# Patient Record
Sex: MALE | Race: WHITE | NOT HISPANIC OR LATINO
[De-identification: names, ages, dates, MRNs, and addresses within clinical notes are randomized per-mention and may not be internally consistent; named-entity substitution may affect disease eponyms.]

---

## 2017-01-13 ENCOUNTER — TRANSCRIPTION ENCOUNTER (OUTPATIENT)
Age: 82
End: 2017-01-13

## 2017-02-07 ENCOUNTER — APPOINTMENT (OUTPATIENT)
Dept: CARDIOTHORACIC SURGERY | Facility: CLINIC | Age: 82
End: 2017-02-07

## 2017-02-07 ENCOUNTER — RECORD ABSTRACTING (OUTPATIENT)
Age: 82
End: 2017-02-07

## 2017-02-07 VITALS
TEMPERATURE: 97.7 F | HEIGHT: 68 IN | OXYGEN SATURATION: 92 % | RESPIRATION RATE: 14 BRPM | BODY MASS INDEX: 27.28 KG/M2 | SYSTOLIC BLOOD PRESSURE: 144 MMHG | WEIGHT: 180 LBS | HEART RATE: 98 BPM | DIASTOLIC BLOOD PRESSURE: 98 MMHG

## 2017-02-07 VITALS — SYSTOLIC BLOOD PRESSURE: 140 MMHG | DIASTOLIC BLOOD PRESSURE: 98 MMHG

## 2017-02-07 DIAGNOSIS — R60.9 EDEMA, UNSPECIFIED: ICD-10-CM

## 2017-02-07 DIAGNOSIS — I10 ESSENTIAL (PRIMARY) HYPERTENSION: ICD-10-CM

## 2017-02-07 RX ORDER — DONEPEZIL HYDROCHLORIDE 10 MG/1
10 TABLET ORAL DAILY
Refills: 0 | Status: ACTIVE | COMMUNITY

## 2017-02-26 PROBLEM — I35.1 AORTIC REGURGITATION: Status: ACTIVE | Noted: 2017-02-07

## 2017-03-01 ENCOUNTER — APPOINTMENT (OUTPATIENT)
Dept: CARDIOTHORACIC SURGERY | Facility: CLINIC | Age: 82
End: 2017-03-01

## 2017-03-01 VITALS
HEIGHT: 68 IN | RESPIRATION RATE: 14 BRPM | HEART RATE: 74 BPM | WEIGHT: 180 LBS | DIASTOLIC BLOOD PRESSURE: 93 MMHG | TEMPERATURE: 98 F | OXYGEN SATURATION: 92 % | SYSTOLIC BLOOD PRESSURE: 146 MMHG | BODY MASS INDEX: 27.28 KG/M2

## 2017-03-01 VITALS — DIASTOLIC BLOOD PRESSURE: 89 MMHG | SYSTOLIC BLOOD PRESSURE: 135 MMHG

## 2017-03-01 DIAGNOSIS — I35.1 NONRHEUMATIC AORTIC (VALVE) INSUFFICIENCY: ICD-10-CM

## 2017-03-15 ENCOUNTER — OUTPATIENT (OUTPATIENT)
Dept: OUTPATIENT SERVICES | Facility: HOSPITAL | Age: 82
LOS: 1 days | Discharge: HOME | End: 2017-03-15

## 2017-03-15 DIAGNOSIS — S68.119A COMPLETE TRAUMATIC METACARPOPHALANGEAL AMPUTATION OF UNSPECIFIED FINGER, INITIAL ENCOUNTER: Chronic | ICD-10-CM

## 2017-03-15 DIAGNOSIS — Z98.890 OTHER SPECIFIED POSTPROCEDURAL STATES: Chronic | ICD-10-CM

## 2017-03-15 DIAGNOSIS — Z96.659 PRESENCE OF UNSPECIFIED ARTIFICIAL KNEE JOINT: Chronic | ICD-10-CM

## 2017-03-15 DIAGNOSIS — I35.0 NONRHEUMATIC AORTIC (VALVE) STENOSIS: ICD-10-CM

## 2017-03-15 DIAGNOSIS — I71.2 THORACIC AORTIC ANEURYSM, WITHOUT RUPTURE: ICD-10-CM

## 2017-03-15 LAB
ANION GAP SERPL CALC-SCNC: 9 MMOL/L — SIGNIFICANT CHANGE UP (ref 5–17)
APTT BLD: 32.7 SEC — SIGNIFICANT CHANGE UP (ref 27.5–37.4)
BUN SERPL-MCNC: 28 MG/DL — HIGH (ref 7–23)
CALCIUM SERPL-MCNC: 8.9 MG/DL — SIGNIFICANT CHANGE UP (ref 8.5–10.1)
CHLORIDE SERPL-SCNC: 109 MMOL/L — HIGH (ref 96–108)
CO2 SERPL-SCNC: 27 MMOL/L — SIGNIFICANT CHANGE UP (ref 22–31)
CREAT SERPL-MCNC: 0.97 MG/DL — SIGNIFICANT CHANGE UP (ref 0.5–1.3)
GLUCOSE SERPL-MCNC: 145 MG/DL — HIGH (ref 70–99)
HCT VFR BLD CALC: 44.6 % — SIGNIFICANT CHANGE UP (ref 39–50)
HGB BLD-MCNC: 15.1 G/DL — SIGNIFICANT CHANGE UP (ref 13–17)
INR BLD: 1.09 RATIO — SIGNIFICANT CHANGE UP (ref 0.88–1.16)
MCHC RBC-ENTMCNC: 32.6 PG — SIGNIFICANT CHANGE UP (ref 27–34)
MCHC RBC-ENTMCNC: 33.8 GM/DL — SIGNIFICANT CHANGE UP (ref 32–36)
MCV RBC AUTO: 96.5 FL — SIGNIFICANT CHANGE UP (ref 80–100)
PLATELET # BLD AUTO: 269 K/UL — SIGNIFICANT CHANGE UP (ref 150–400)
POTASSIUM SERPL-MCNC: 4.3 MMOL/L — SIGNIFICANT CHANGE UP (ref 3.5–5.3)
POTASSIUM SERPL-SCNC: 4.3 MMOL/L — SIGNIFICANT CHANGE UP (ref 3.5–5.3)
PROTHROM AB SERPL-ACNC: 12 SEC — SIGNIFICANT CHANGE UP (ref 10–13.1)
RBC # BLD: 4.62 M/UL — SIGNIFICANT CHANGE UP (ref 4.2–5.8)
RBC # FLD: 12.5 % — SIGNIFICANT CHANGE UP (ref 10.3–14.5)
SODIUM SERPL-SCNC: 145 MMOL/L — SIGNIFICANT CHANGE UP (ref 135–145)
WBC # BLD: 8.5 K/UL — SIGNIFICANT CHANGE UP (ref 3.8–10.5)
WBC # FLD AUTO: 8.5 K/UL — SIGNIFICANT CHANGE UP (ref 3.8–10.5)

## 2017-03-15 NOTE — H&P CARDIOLOGY - HISTORY OF PRESENT ILLNESS
81 year old male with PMHx HLD, thoracic aneurysm being followed by Dr Rodriguez. Echo and CTA have shown a progression of  aorta to 5.4/ 5.2 cm. Pt is scheduled for repair of aneurysm by the end of April and per family needs cardiac preop evaluation.  Negative stress MPI with PET in 1/2017  Wife observed pt to be SOB with walking. 81 year old male with PMHx HLD, thoracic aneurysm being followed by Dr Rodriguez. Echo and CTA have shown a progression of  aorta to 5.4/ 5.2 cm. Pt is scheduled for repair of aneurysm by the end of April and per wife needs cardiac preop evaluation.  Negative stress MPI with PET in 1/2017  Wife observed pt to be SOB with walking.

## 2017-03-15 NOTE — H&P CARDIOLOGY - PSH
Amputation finger  left index 1970  History of fundoplication  2000  S/P knee replacement  right  S/P knee replacement  left  S/P shoulder surgery  right

## 2017-03-17 ENCOUNTER — OUTPATIENT (OUTPATIENT)
Dept: OUTPATIENT SERVICES | Facility: HOSPITAL | Age: 82
LOS: 1 days | Discharge: HOME | End: 2017-03-17

## 2017-03-17 VITALS
WEIGHT: 180.34 LBS | OXYGEN SATURATION: 96 % | TEMPERATURE: 97 F | SYSTOLIC BLOOD PRESSURE: 122 MMHG | DIASTOLIC BLOOD PRESSURE: 86 MMHG | RESPIRATION RATE: 20 BRPM | HEART RATE: 61 BPM | HEIGHT: 68 IN

## 2017-03-17 VITALS
HEART RATE: 60 BPM | OXYGEN SATURATION: 99 % | DIASTOLIC BLOOD PRESSURE: 76 MMHG | SYSTOLIC BLOOD PRESSURE: 126 MMHG | RESPIRATION RATE: 18 BRPM

## 2017-03-17 DIAGNOSIS — Z96.659 PRESENCE OF UNSPECIFIED ARTIFICIAL KNEE JOINT: Chronic | ICD-10-CM

## 2017-03-17 DIAGNOSIS — Z98.890 OTHER SPECIFIED POSTPROCEDURAL STATES: Chronic | ICD-10-CM

## 2017-03-17 DIAGNOSIS — S68.119A COMPLETE TRAUMATIC METACARPOPHALANGEAL AMPUTATION OF UNSPECIFIED FINGER, INITIAL ENCOUNTER: Chronic | ICD-10-CM

## 2017-03-17 NOTE — PROGRESS NOTE ADULT - SUBJECTIVE AND OBJECTIVE BOX
Cardiology NP note      s/p LHC revealing non obstructive CAD    Denies chest pain, shortness of breath, dizziness or palpitations at this time    Right groin procedure site CDI. Venous and arterial sheaths pulled. Hemostasis achieved;  no bleeding, no hematoma, site soft, non tender, positive pedal pulses bilaterally  Right cephalic venous sheath pulled; no bleeding, no hematoma    81 year old male with PMHx HLD, thoracic aneurysm being followed by Dr Rodriguez. Echo and CTA have shown a progression of  aorta to 5.4/ 5.2 cm. Pt is scheduled for repair of aneurysm by the end of April and per wife needs cardiac preop evaluation.  Negative stress MPI with PET in 1/2017  Wife observed pt to be SOB with walking.    now s/p LHC revealing non obstructive CAD    -ambulate ad rafita post bedrest  -iv hydration  -encourage PO fluids  -plan of care discussed with patient, family and MD  -d/c after bedrest if stable  -post procedure and d/c instructions reviewed  -follow up with MD in 1-2 weeks to schedule CTS

## 2017-03-17 NOTE — PACU DISCHARGE NOTE - COMMENTS
Pt. john. baseline activity, ambulation without discomfort, s/s distress with right femoral punct. site and right antecubital site C,D,I without hematoma, s/s bleeding, ecchymosis, tenderness.  Pt and wife verb. agreement and understanding to discharge instructions.

## 2017-03-29 DIAGNOSIS — I71.2 THORACIC AORTIC ANEURYSM, WITHOUT RUPTURE: ICD-10-CM

## 2017-03-29 DIAGNOSIS — R06.02 SHORTNESS OF BREATH: ICD-10-CM

## 2017-03-29 DIAGNOSIS — E78.5 HYPERLIPIDEMIA, UNSPECIFIED: ICD-10-CM

## 2017-03-29 DIAGNOSIS — I25.10 ATHEROSCLEROTIC HEART DISEASE OF NATIVE CORONARY ARTERY WITHOUT ANGINA PECTORIS: ICD-10-CM

## 2017-03-29 DIAGNOSIS — I25.41 CORONARY ARTERY ANEURYSM: ICD-10-CM

## 2017-03-29 DIAGNOSIS — I35.1 NONRHEUMATIC AORTIC (VALVE) INSUFFICIENCY: ICD-10-CM

## 2017-04-03 ENCOUNTER — CLINICAL ADVICE (OUTPATIENT)
Age: 82
End: 2017-04-03

## 2017-04-04 ENCOUNTER — APPOINTMENT (OUTPATIENT)
Dept: ULTRASOUND IMAGING | Facility: HOSPITAL | Age: 82
End: 2017-04-04

## 2017-04-04 ENCOUNTER — APPOINTMENT (OUTPATIENT)
Dept: PULMONOLOGY | Facility: CLINIC | Age: 82
End: 2017-04-04

## 2017-04-04 ENCOUNTER — OUTPATIENT (OUTPATIENT)
Dept: OUTPATIENT SERVICES | Facility: HOSPITAL | Age: 82
LOS: 1 days | End: 2017-04-04
Payer: MEDICARE

## 2017-04-04 VITALS
WEIGHT: 186.51 LBS | DIASTOLIC BLOOD PRESSURE: 85 MMHG | RESPIRATION RATE: 18 BRPM | OXYGEN SATURATION: 94 % | TEMPERATURE: 98 F | SYSTOLIC BLOOD PRESSURE: 133 MMHG | HEART RATE: 94 BPM | HEIGHT: 64 IN

## 2017-04-04 DIAGNOSIS — I71.9 AORTIC ANEURYSM OF UNSPECIFIED SITE, WITHOUT RUPTURE: ICD-10-CM

## 2017-04-04 DIAGNOSIS — Z98.49 CATARACT EXTRACTION STATUS, UNSPECIFIED EYE: Chronic | ICD-10-CM

## 2017-04-04 DIAGNOSIS — Z96.659 PRESENCE OF UNSPECIFIED ARTIFICIAL KNEE JOINT: Chronic | ICD-10-CM

## 2017-04-04 DIAGNOSIS — I77.810 THORACIC AORTIC ECTASIA: ICD-10-CM

## 2017-04-04 DIAGNOSIS — G47.33 OBSTRUCTIVE SLEEP APNEA (ADULT) (PEDIATRIC): ICD-10-CM

## 2017-04-04 DIAGNOSIS — S68.119A COMPLETE TRAUMATIC METACARPOPHALANGEAL AMPUTATION OF UNSPECIFIED FINGER, INITIAL ENCOUNTER: Chronic | ICD-10-CM

## 2017-04-04 DIAGNOSIS — Z98.890 OTHER SPECIFIED POSTPROCEDURAL STATES: Chronic | ICD-10-CM

## 2017-04-04 DIAGNOSIS — Z01.818 ENCOUNTER FOR OTHER PREPROCEDURAL EXAMINATION: ICD-10-CM

## 2017-04-04 DIAGNOSIS — L98.9 DISORDER OF THE SKIN AND SUBCUTANEOUS TISSUE, UNSPECIFIED: ICD-10-CM

## 2017-04-04 LAB
ANION GAP SERPL CALC-SCNC: 20 MMOL/L — HIGH (ref 5–17)
BLD GP AB SCN SERPL QL: NEGATIVE — SIGNIFICANT CHANGE UP
BUN SERPL-MCNC: 20 MG/DL — SIGNIFICANT CHANGE UP (ref 7–23)
CALCIUM SERPL-MCNC: 9.7 MG/DL — SIGNIFICANT CHANGE UP (ref 8.4–10.5)
CHLORIDE SERPL-SCNC: 106 MMOL/L — SIGNIFICANT CHANGE UP (ref 96–108)
CO2 SERPL-SCNC: 18 MMOL/L — LOW (ref 22–31)
CREAT SERPL-MCNC: 0.88 MG/DL — SIGNIFICANT CHANGE UP (ref 0.5–1.3)
GLUCOSE SERPL-MCNC: 150 MG/DL — HIGH (ref 70–99)
HBA1C BLD-MCNC: 5.8 % — HIGH (ref 4–5.6)
MRSA PCR RESULT.: DETECTED
POTASSIUM SERPL-MCNC: 4.2 MMOL/L — SIGNIFICANT CHANGE UP (ref 3.5–5.3)
POTASSIUM SERPL-SCNC: 4.2 MMOL/L — SIGNIFICANT CHANGE UP (ref 3.5–5.3)
RH IG SCN BLD-IMP: POSITIVE — SIGNIFICANT CHANGE UP
S AUREUS DNA NOSE QL NAA+PROBE: DETECTED
SODIUM SERPL-SCNC: 144 MMOL/L — SIGNIFICANT CHANGE UP (ref 135–145)

## 2017-04-04 PROCEDURE — 93880 EXTRACRANIAL BILAT STUDY: CPT | Mod: 26

## 2017-04-04 PROCEDURE — 71020: CPT | Mod: 26

## 2017-04-04 RX ORDER — SODIUM CHLORIDE 9 MG/ML
3 INJECTION INTRAMUSCULAR; INTRAVENOUS; SUBCUTANEOUS EVERY 8 HOURS
Qty: 0 | Refills: 0 | Status: DISCONTINUED | OUTPATIENT
Start: 2017-04-07 | End: 2017-04-07

## 2017-04-04 RX ORDER — METOPROLOL TARTRATE 50 MG
1 TABLET ORAL
Qty: 0 | Refills: 0 | COMMUNITY

## 2017-04-04 NOTE — H&P PST ADULT - PSH
Amputation finger  left index 1970  History of fundoplication  2000  S/P knee replacement  right  S/P knee replacement  left  S/P shoulder surgery  right Amputation finger  left index 1970  History of fundoplication  2000  S/P debridement  left forearm -mrsa  S/P knee replacement  left  S/P knee replacement  right  S/P shoulder surgery  right rotatotor cuff injury  Status post cataract extraction, unspecified laterality  bilat

## 2017-04-04 NOTE — H&P PST ADULT - PMH
Hard of hearing    HLD (hyperlipidemia)    Restless leg syndrome    Thoracic aortic aneurysm Aortic root dilatation    Aortic valve insufficiency, unspecified etiology    BPH associated with nocturia    Diverticulosis of intestine without bleeding, unspecified intestinal tract location    Essential hypertension    Gastroesophageal reflux disease, esophagitis presence not specified    Hard of hearing    HLD (hyperlipidemia)    Lumbar degenerative disc disease    MRSA (methicillin resistant Staphylococcus aureus)  left forearm 2012  Nerve injury  complication from right TKR surgery, 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  CAMILA (obstructive sleep apnea)  unable to tolerate nocturnal CPAP  Restless leg syndrome    Thoracic aortic aneurysm

## 2017-04-04 NOTE — H&P PST ADULT - PROBLEM SELECTOR PLAN 3
Pt s/p fall 1 week ago, with resolving ecchymosis/small hematoma on lumbar spine.    Pt had evaluation with PMD and xray of lumbar spine negative for fracture.  Wife reports improvement of area over the last few days.  1.  CBC repeated today  2.  Instructed patient and wife to continue to monitor area and if pain worsens or site enlarges, to contact Dr. Larson and/or PMD  3.  fall precautions  4.  PT consult while in hospital

## 2017-04-04 NOTE — H&P PST ADULT - NS MD HP SKIN WOUND STATUS
mid lumbar spine with 3 x 2 cm swollen area with surrounding ecchymosis and multiple small abrasions, mildly tender to touch/dry/clean

## 2017-04-04 NOTE — H&P PST ADULT - NEGATIVE GENERAL GENITOURINARY SYMPTOMS
no bladder infections/no flank pain L/no flank pain R/no renal colic/no urine discoloration/no incontinence/no gas in urine/no dysuria

## 2017-04-04 NOTE — H&P PST ADULT - MUSCULOSKELETAL
details… detailed exam normal strength/no joint erythema/no joint warmth/no calf tenderness/decreased ROM/no joint swelling

## 2017-04-04 NOTE — H&P PST ADULT - NEGATIVE NEUROLOGICAL SYMPTOMS
no generalized seizures/no paresthesias/no weakness/no vertigo/no loss of consciousness/no syncope/no difficulty walking/no transient paralysis/no focal seizures/no tremors

## 2017-04-04 NOTE — H&P PST ADULT - HISTORY OF PRESENT ILLNESS
This is a 80 y/o male with pmhx of HLD, CAMILA, known aortic root dilatation  and AR presents today for presurgical testing.  Pt was diagnosed with aortic root dilatation several years ago and has been following with serial imaging.  He recently had been experiencing shortness of breath and dizziness and workup revealed enlargement of the aortic root.  He presents today for presurgical testing for ascending aortic replacement, possible aortic valve repair/replacement.  Denies recent chest pain, shortness of breath, syncope, palpitations, or vision change.  He does report slipping on the stairs 1 week ago.  He was evaluated by his PMD for large bruise/swelling on his lower back and had an xray that was negative for fracture.  His wife reports resolving ecchymosis/edema of his low back. This is a 80 y/o male with pmhx of HLD, CAMILA, known aortic root dilatation  and AR presents today for presurgical testing.  Pt was diagnosed with aortic root dilatation several years ago and has been followed with serial imaging.  He recently had been experiencing shortness of breath and dizziness and workup revealed enlargement of the aortic root.  He presents today for presurgical testing for ascending aortic replacement, possible aortic valve repair/replacement.  Denies recent chest pain, shortness of breath, syncope, palpitations, or vision change.      Pt does report slipping on the stairs 1 week ago.  He was evaluated by his PMD for large bruise/swelling on his lower back and had an xray that was negative for fracture.  His wife reports resolving ecchymosis/edema of his low back.

## 2017-04-05 ENCOUNTER — RECORD ABSTRACTING (OUTPATIENT)
Age: 82
End: 2017-04-05

## 2017-04-05 RX ORDER — CEFUROXIME AXETIL 250 MG
1500 TABLET ORAL ONCE
Qty: 0 | Refills: 0 | Status: DISCONTINUED | OUTPATIENT
Start: 2017-04-07 | End: 2017-04-07

## 2017-04-06 ENCOUNTER — TRANSCRIPTION ENCOUNTER (OUTPATIENT)
Age: 82
End: 2017-04-06

## 2017-04-07 ENCOUNTER — INPATIENT (INPATIENT)
Facility: HOSPITAL | Age: 82
LOS: 0 days | Discharge: HOME | DRG: 301 | End: 2017-04-07
Attending: THORACIC SURGERY (CARDIOTHORACIC VASCULAR SURGERY) | Admitting: THORACIC SURGERY (CARDIOTHORACIC VASCULAR SURGERY)
Payer: MEDICARE

## 2017-04-07 ENCOUNTER — APPOINTMENT (OUTPATIENT)
Dept: CARDIOTHORACIC SURGERY | Facility: HOSPITAL | Age: 82
End: 2017-04-07

## 2017-04-07 VITALS
RESPIRATION RATE: 20 BRPM | TEMPERATURE: 98 F | DIASTOLIC BLOOD PRESSURE: 87 MMHG | WEIGHT: 184.09 LBS | SYSTOLIC BLOOD PRESSURE: 126 MMHG | OXYGEN SATURATION: 95 % | HEIGHT: 65 IN | HEART RATE: 93 BPM

## 2017-04-07 DIAGNOSIS — I71.9 AORTIC ANEURYSM OF UNSPECIFIED SITE, WITHOUT RUPTURE: ICD-10-CM

## 2017-04-07 DIAGNOSIS — Z96.659 PRESENCE OF UNSPECIFIED ARTIFICIAL KNEE JOINT: Chronic | ICD-10-CM

## 2017-04-07 DIAGNOSIS — Z98.890 OTHER SPECIFIED POSTPROCEDURAL STATES: Chronic | ICD-10-CM

## 2017-04-07 DIAGNOSIS — Z98.49 CATARACT EXTRACTION STATUS, UNSPECIFIED EYE: Chronic | ICD-10-CM

## 2017-04-07 DIAGNOSIS — S68.119A COMPLETE TRAUMATIC METACARPOPHALANGEAL AMPUTATION OF UNSPECIFIED FINGER, INITIAL ENCOUNTER: Chronic | ICD-10-CM

## 2017-04-07 LAB — RH IG SCN BLD-IMP: POSITIVE — SIGNIFICANT CHANGE UP

## 2017-04-07 RX ORDER — VANCOMYCIN HCL 1 G
1250 VIAL (EA) INTRAVENOUS ONCE
Qty: 0 | Refills: 0 | Status: DISCONTINUED | OUTPATIENT
Start: 2017-04-07 | End: 2017-04-07

## 2017-04-07 RX ADMIN — SODIUM CHLORIDE 3 MILLILITER(S): 9 INJECTION INTRAMUSCULAR; INTRAVENOUS; SUBCUTANEOUS at 06:06

## 2017-04-07 NOTE — PRE-OP CHECKLIST - COMMENTS
Procedure cancelled by Dr. Larson for resolving hematoma on lumbar spine.  Patient to be rescheduled.

## 2017-04-07 NOTE — PATIENT PROFILE ADULT. - PMH
Aortic root dilatation    Aortic valve insufficiency, unspecified etiology    BPH associated with nocturia    Diverticulosis of intestine without bleeding, unspecified intestinal tract location    Essential hypertension    Gastroesophageal reflux disease, esophagitis presence not specified    Hard of hearing    HLD (hyperlipidemia)    Lumbar degenerative disc disease    MRSA (methicillin resistant Staphylococcus aureus)  left forearm 2012  Nerve injury  complication from right TKR surgery, 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  CAMILA (obstructive sleep apnea)  unable to tolerate nocturnal CPAP  Restless leg syndrome    Thoracic aortic aneurysm

## 2017-04-07 NOTE — PATIENT PROFILE ADULT. - PSH
Amputation finger  left index 1970  History of fundoplication  2000  S/P debridement  left forearm -mrsa  S/P knee replacement  left  S/P knee replacement  right  S/P shoulder surgery  right rotatotor cuff injury  Status post cataract extraction, unspecified laterality  bilat

## 2017-04-09 PROCEDURE — 86850 RBC ANTIBODY SCREEN: CPT

## 2017-04-09 PROCEDURE — 83036 HEMOGLOBIN GLYCOSYLATED A1C: CPT

## 2017-04-09 PROCEDURE — 86901 BLOOD TYPING SEROLOGIC RH(D): CPT

## 2017-04-09 PROCEDURE — 85027 COMPLETE CBC AUTOMATED: CPT

## 2017-04-09 PROCEDURE — 87641 MR-STAPH DNA AMP PROBE: CPT

## 2017-04-09 PROCEDURE — 87640 STAPH A DNA AMP PROBE: CPT

## 2017-04-09 PROCEDURE — 86900 BLOOD TYPING SEROLOGIC ABO: CPT

## 2017-04-09 PROCEDURE — 71046 X-RAY EXAM CHEST 2 VIEWS: CPT

## 2017-04-09 PROCEDURE — 80048 BASIC METABOLIC PNL TOTAL CA: CPT

## 2017-04-09 PROCEDURE — G0463: CPT

## 2017-04-09 PROCEDURE — 93880 EXTRACRANIAL BILAT STUDY: CPT

## 2017-06-26 ENCOUNTER — RESULT REVIEW (OUTPATIENT)
Age: 82
End: 2017-06-26

## 2017-07-07 ENCOUNTER — OUTPATIENT (OUTPATIENT)
Dept: OUTPATIENT SERVICES | Facility: HOSPITAL | Age: 82
LOS: 1 days | End: 2017-07-07
Payer: MEDICARE

## 2017-07-07 VITALS
OXYGEN SATURATION: 97 % | TEMPERATURE: 98 F | HEART RATE: 70 BPM | WEIGHT: 182.98 LBS | HEIGHT: 66 IN | RESPIRATION RATE: 18 BRPM | SYSTOLIC BLOOD PRESSURE: 130 MMHG | DIASTOLIC BLOOD PRESSURE: 80 MMHG

## 2017-07-07 DIAGNOSIS — Z98.890 OTHER SPECIFIED POSTPROCEDURAL STATES: Chronic | ICD-10-CM

## 2017-07-07 DIAGNOSIS — Z01.818 ENCOUNTER FOR OTHER PREPROCEDURAL EXAMINATION: ICD-10-CM

## 2017-07-07 DIAGNOSIS — I71.9 AORTIC ANEURYSM OF UNSPECIFIED SITE, WITHOUT RUPTURE: ICD-10-CM

## 2017-07-07 DIAGNOSIS — G47.33 OBSTRUCTIVE SLEEP APNEA (ADULT) (PEDIATRIC): ICD-10-CM

## 2017-07-07 DIAGNOSIS — Z98.49 CATARACT EXTRACTION STATUS, UNSPECIFIED EYE: Chronic | ICD-10-CM

## 2017-07-07 DIAGNOSIS — Z96.659 PRESENCE OF UNSPECIFIED ARTIFICIAL KNEE JOINT: Chronic | ICD-10-CM

## 2017-07-07 DIAGNOSIS — I77.810 THORACIC AORTIC ECTASIA: ICD-10-CM

## 2017-07-07 DIAGNOSIS — S68.119A COMPLETE TRAUMATIC METACARPOPHALANGEAL AMPUTATION OF UNSPECIFIED FINGER, INITIAL ENCOUNTER: Chronic | ICD-10-CM

## 2017-07-07 LAB
BLD GP AB SCN SERPL QL: NEGATIVE — SIGNIFICANT CHANGE UP
RH IG SCN BLD-IMP: POSITIVE — SIGNIFICANT CHANGE UP

## 2017-07-07 PROCEDURE — 80048 BASIC METABOLIC PNL TOTAL CA: CPT

## 2017-07-07 PROCEDURE — 85027 COMPLETE CBC AUTOMATED: CPT

## 2017-07-07 PROCEDURE — 87640 STAPH A DNA AMP PROBE: CPT

## 2017-07-07 PROCEDURE — 86900 BLOOD TYPING SEROLOGIC ABO: CPT

## 2017-07-07 PROCEDURE — 86901 BLOOD TYPING SEROLOGIC RH(D): CPT

## 2017-07-07 PROCEDURE — G0463: CPT

## 2017-07-07 PROCEDURE — 71046 X-RAY EXAM CHEST 2 VIEWS: CPT

## 2017-07-07 PROCEDURE — 83036 HEMOGLOBIN GLYCOSYLATED A1C: CPT

## 2017-07-07 PROCEDURE — 71020: CPT | Mod: 26

## 2017-07-07 PROCEDURE — 86850 RBC ANTIBODY SCREEN: CPT

## 2017-07-07 RX ORDER — CEFUROXIME AXETIL 250 MG
1500 TABLET ORAL ONCE
Qty: 0 | Refills: 0 | Status: COMPLETED | OUTPATIENT
Start: 2017-07-14 | End: 2017-07-14

## 2017-07-07 NOTE — H&P PST ADULT - NEGATIVE GENERAL GENITOURINARY SYMPTOMS
no flank pain L/no renal colic/no incontinence/no flank pain R/no urine discoloration/no dysuria/no bladder infections

## 2017-07-07 NOTE — H&P PST ADULT - NEGATIVE NEUROLOGICAL SYMPTOMS
no vertigo/no focal seizures/no transient paralysis/no tremors/no generalized seizures/no loss of consciousness/no paresthesias/no syncope/no difficulty walking/no weakness

## 2017-07-07 NOTE — H&P PST ADULT - PMH
Aortic root dilatation    Aortic valve insufficiency, unspecified etiology    BPH associated with nocturia    Diverticulosis of intestine without bleeding, unspecified intestinal tract location    Essential hypertension    Fall (on) (from) other stairs and steps, sequela  03/28/2017- lumbar hemtoma  Gastroesophageal reflux disease, esophagitis presence not specified    Hard of hearing    HLD (hyperlipidemia)    Lumbar degenerative disc disease    MRSA (methicillin resistant Staphylococcus aureus)  left forearm 2012  Nerve injury  complication from right TKR surgery, 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  CAMILA (obstructive sleep apnea)  unable to tolerate nocturnal CPAP  Restless leg syndrome    Thoracic aortic aneurysm Aortic root dilatation    Aortic valve insufficiency, unspecified etiology    BPH associated with nocturia    Diverticulosis of intestine without bleeding, unspecified intestinal tract location    Essential hypertension    Fall (on) (from) other stairs and steps, sequela  03/28/2017- lumbar hemtoma  Gastroesophageal reflux disease, esophagitis presence not specified    Hard of hearing    HLD (hyperlipidemia)    [place] (hard of hearing)    Lumbar degenerative disc disease    MRSA (methicillin resistant Staphylococcus aureus)  left forearm 2012  Nerve injury  complication from right TKR surgery, 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  CAMILA (obstructive sleep apnea)  unable to tolerate nocturnal CPAP  Restless leg syndrome    Thoracic aortic aneurysm

## 2017-07-07 NOTE — H&P PST ADULT - HISTORY OF PRESENT ILLNESS
This is a 80 y/o male with pmhx of HLD, CAMILA, known aortic root dilatation  and AR presents today for presurgical testing.  Pt was diagnosed with aortic root dilatation several years ago and has been followed with serial imaging.  He recently had been experiencing shortness of breath and dizziness and workup revealed enlargement of the aortic root.  He presents today for presurgical testing for ascending aortic replacement, possible aortic valve repair/replacement.  Denies recent chest pain, shortness of breath, syncope, palpitations, or vision change.      Pt does report slipping on the stairs 1 week ago.  He was evaluated by his PMD for large bruise/swelling on his lower back and had an xray that was negative for fracture.  His wife reports resolving ecchymosis/edema of his low back. This is a 82 y/o male with pmhx of HLD, CAMILA, GERD, known aortic root dilatation and AR presents today for presurgical testing.  Pt was diagnosed with aortic root dilatation several years ago and has been followed with serial imaging.  He recently had been experiencing shortness of breath and dizziness-s/p cardiology consult-s/p ECHO, cardiac cath revealed enlargement of the aortic root.  He presents today for presurgical testing for ascending aortic replacement, possible aortic valve repair/replacement.  Denies recent chest pain, shortness of breath, syncope, palpitations, or vision change.    Surgery was cancelled on 04/2017 since pt had lumbar hematoma due to s/p fall on 03/28/2017

## 2017-07-08 LAB
ANION GAP SERPL CALC-SCNC: 18 MMOL/L — HIGH (ref 5–17)
BUN SERPL-MCNC: 24 MG/DL — HIGH (ref 7–23)
CALCIUM SERPL-MCNC: 9.9 MG/DL — SIGNIFICANT CHANGE UP (ref 8.4–10.5)
CHLORIDE SERPL-SCNC: 103 MMOL/L — SIGNIFICANT CHANGE UP (ref 96–108)
CO2 SERPL-SCNC: 20 MMOL/L — LOW (ref 22–31)
CREAT SERPL-MCNC: 0.87 MG/DL — SIGNIFICANT CHANGE UP (ref 0.5–1.3)
GLUCOSE SERPL-MCNC: 100 MG/DL — HIGH (ref 70–99)
HBA1C BLD-MCNC: 5.8 % — HIGH (ref 4–5.6)
HCT VFR BLD CALC: 44.8 % — SIGNIFICANT CHANGE UP (ref 39–50)
HGB BLD-MCNC: 14.5 G/DL — SIGNIFICANT CHANGE UP (ref 13–17)
MCHC RBC-ENTMCNC: 31.2 PG — SIGNIFICANT CHANGE UP (ref 27–34)
MCHC RBC-ENTMCNC: 32.4 GM/DL — SIGNIFICANT CHANGE UP (ref 32–36)
MCV RBC AUTO: 96.3 FL — SIGNIFICANT CHANGE UP (ref 80–100)
MRSA PCR RESULT.: DETECTED
PLATELET # BLD AUTO: 276 K/UL — SIGNIFICANT CHANGE UP (ref 150–400)
POTASSIUM SERPL-MCNC: 4.7 MMOL/L — SIGNIFICANT CHANGE UP (ref 3.5–5.3)
POTASSIUM SERPL-SCNC: 4.7 MMOL/L — SIGNIFICANT CHANGE UP (ref 3.5–5.3)
RBC # BLD: 4.65 M/UL — SIGNIFICANT CHANGE UP (ref 4.2–5.8)
RBC # FLD: 14.3 % — SIGNIFICANT CHANGE UP (ref 10.3–14.5)
S AUREUS DNA NOSE QL NAA+PROBE: DETECTED
SODIUM SERPL-SCNC: 141 MMOL/L — SIGNIFICANT CHANGE UP (ref 135–145)
WBC # BLD: 7.61 K/UL — SIGNIFICANT CHANGE UP (ref 3.8–10.5)
WBC # FLD AUTO: 7.61 K/UL — SIGNIFICANT CHANGE UP (ref 3.8–10.5)

## 2017-07-10 RX ORDER — VANCOMYCIN HCL 1 G
1250 VIAL (EA) INTRAVENOUS ONCE
Qty: 0 | Refills: 0 | Status: COMPLETED | OUTPATIENT
Start: 2017-07-14 | End: 2017-07-14

## 2017-07-13 ENCOUNTER — OUTPATIENT (OUTPATIENT)
Dept: OUTPATIENT SERVICES | Facility: HOSPITAL | Age: 82
LOS: 1 days | End: 2017-07-13
Payer: MEDICARE

## 2017-07-13 DIAGNOSIS — S68.119A COMPLETE TRAUMATIC METACARPOPHALANGEAL AMPUTATION OF UNSPECIFIED FINGER, INITIAL ENCOUNTER: Chronic | ICD-10-CM

## 2017-07-13 DIAGNOSIS — Z96.659 PRESENCE OF UNSPECIFIED ARTIFICIAL KNEE JOINT: Chronic | ICD-10-CM

## 2017-07-13 DIAGNOSIS — Z98.890 OTHER SPECIFIED POSTPROCEDURAL STATES: Chronic | ICD-10-CM

## 2017-07-13 DIAGNOSIS — Z98.49 CATARACT EXTRACTION STATUS, UNSPECIFIED EYE: Chronic | ICD-10-CM

## 2017-07-13 PROCEDURE — 86923 COMPATIBILITY TEST ELECTRIC: CPT

## 2017-07-14 ENCOUNTER — RESULT REVIEW (OUTPATIENT)
Age: 82
End: 2017-07-14

## 2017-07-14 ENCOUNTER — APPOINTMENT (OUTPATIENT)
Dept: CARDIOTHORACIC SURGERY | Facility: HOSPITAL | Age: 82
End: 2017-07-14

## 2017-07-14 ENCOUNTER — INPATIENT (INPATIENT)
Facility: HOSPITAL | Age: 82
LOS: 6 days | Discharge: HOME | DRG: 220 | End: 2017-07-21
Attending: THORACIC SURGERY (CARDIOTHORACIC VASCULAR SURGERY) | Admitting: THORACIC SURGERY (CARDIOTHORACIC VASCULAR SURGERY)
Payer: MEDICARE

## 2017-07-14 VITALS
RESPIRATION RATE: 18 BRPM | DIASTOLIC BLOOD PRESSURE: 82 MMHG | OXYGEN SATURATION: 97 % | HEIGHT: 65 IN | HEART RATE: 86 BPM | SYSTOLIC BLOOD PRESSURE: 118 MMHG | TEMPERATURE: 98 F | WEIGHT: 181.88 LBS

## 2017-07-14 DIAGNOSIS — Z96.659 PRESENCE OF UNSPECIFIED ARTIFICIAL KNEE JOINT: Chronic | ICD-10-CM

## 2017-07-14 DIAGNOSIS — Z98.890 OTHER SPECIFIED POSTPROCEDURAL STATES: Chronic | ICD-10-CM

## 2017-07-14 DIAGNOSIS — Z98.49 CATARACT EXTRACTION STATUS, UNSPECIFIED EYE: Chronic | ICD-10-CM

## 2017-07-14 DIAGNOSIS — I71.9 AORTIC ANEURYSM OF UNSPECIFIED SITE, WITHOUT RUPTURE: ICD-10-CM

## 2017-07-14 DIAGNOSIS — S68.119A COMPLETE TRAUMATIC METACARPOPHALANGEAL AMPUTATION OF UNSPECIFIED FINGER, INITIAL ENCOUNTER: Chronic | ICD-10-CM

## 2017-07-14 LAB
ANION GAP SERPL CALC-SCNC: 11 MMOL/L — SIGNIFICANT CHANGE UP (ref 5–17)
BASE EXCESS BLDA CALC-SCNC: -1.5 MMOL/L — SIGNIFICANT CHANGE UP (ref -2–2)
BASOPHILS # BLD AUTO: 0 K/UL — SIGNIFICANT CHANGE UP (ref 0–0.2)
BASOPHILS NFR BLD AUTO: 0.2 % — SIGNIFICANT CHANGE UP (ref 0–2)
BUN SERPL-MCNC: 21 MG/DL — SIGNIFICANT CHANGE UP (ref 7–23)
CALCIUM SERPL-MCNC: 8.2 MG/DL — LOW (ref 8.4–10.5)
CHLORIDE SERPL-SCNC: 105 MMOL/L — SIGNIFICANT CHANGE UP (ref 96–108)
CK MB BLD-MCNC: 8.5 % — HIGH (ref 0–3.5)
CK MB CFR SERPL CALC: 30.7 NG/ML — HIGH (ref 0–6.7)
CK SERPL-CCNC: 361 U/L — HIGH (ref 30–200)
CO2 BLDA-SCNC: 25 MMOL/L — SIGNIFICANT CHANGE UP (ref 22–30)
CO2 SERPL-SCNC: 22 MMOL/L — SIGNIFICANT CHANGE UP (ref 22–31)
CREAT SERPL-MCNC: 0.69 MG/DL — SIGNIFICANT CHANGE UP (ref 0.5–1.3)
EOSINOPHIL # BLD AUTO: 0.2 K/UL — SIGNIFICANT CHANGE UP (ref 0–0.5)
EOSINOPHIL NFR BLD AUTO: 1 % — SIGNIFICANT CHANGE UP (ref 0–6)
FIBRINOGEN PPP-MCNC: 361 MG/DL — SIGNIFICANT CHANGE UP (ref 310–510)
GAS PNL BLDA: SIGNIFICANT CHANGE UP
GLUCOSE SERPL-MCNC: 167 MG/DL — HIGH (ref 70–99)
HCO3 BLDA-SCNC: 24 MMOL/L — SIGNIFICANT CHANGE UP (ref 21–29)
HCT VFR BLD CALC: 37.5 % — LOW (ref 39–50)
HGB BLD-MCNC: 13 G/DL — SIGNIFICANT CHANGE UP (ref 13–17)
HOROWITZ INDEX BLDA+IHG-RTO: 100 — SIGNIFICANT CHANGE UP
LYMPHOCYTES # BLD AUTO: 12.8 % — LOW (ref 13–44)
LYMPHOCYTES # BLD AUTO: 2.3 K/UL — SIGNIFICANT CHANGE UP (ref 1–3.3)
MCHC RBC-ENTMCNC: 33.9 PG — SIGNIFICANT CHANGE UP (ref 27–34)
MCHC RBC-ENTMCNC: 34.6 GM/DL — SIGNIFICANT CHANGE UP (ref 32–36)
MCV RBC AUTO: 98.1 FL — SIGNIFICANT CHANGE UP (ref 80–100)
MONOCYTES # BLD AUTO: 0.7 K/UL — SIGNIFICANT CHANGE UP (ref 0–0.9)
MONOCYTES NFR BLD AUTO: 4 % — SIGNIFICANT CHANGE UP (ref 2–14)
NEUTROPHILS # BLD AUTO: 15 K/UL — HIGH (ref 1.8–7.4)
NEUTROPHILS NFR BLD AUTO: 82.1 % — HIGH (ref 43–77)
PCO2 BLDA: 46 MMHG — SIGNIFICANT CHANGE UP (ref 32–46)
PH BLDA: 7.34 — LOW (ref 7.35–7.45)
PLATELET # BLD AUTO: 155 K/UL — SIGNIFICANT CHANGE UP (ref 150–400)
PO2 BLDA: 193 MMHG — HIGH (ref 74–108)
POTASSIUM SERPL-MCNC: 4.8 MMOL/L — SIGNIFICANT CHANGE UP (ref 3.5–5.3)
POTASSIUM SERPL-SCNC: 4.8 MMOL/L — SIGNIFICANT CHANGE UP (ref 3.5–5.3)
RBC # BLD: 3.83 M/UL — LOW (ref 4.2–5.8)
RBC # FLD: 12.1 % — SIGNIFICANT CHANGE UP (ref 10.3–14.5)
SAO2 % BLDA: 99 % — HIGH (ref 92–96)
SODIUM SERPL-SCNC: 138 MMOL/L — SIGNIFICANT CHANGE UP (ref 135–145)
TROPONIN T SERPL-MCNC: 0.43 NG/ML — HIGH (ref 0–0.06)
WBC # BLD: 18.3 K/UL — HIGH (ref 3.8–10.5)
WBC # FLD AUTO: 18.3 K/UL — HIGH (ref 3.8–10.5)

## 2017-07-14 PROCEDURE — 88305 TISSUE EXAM BY PATHOLOGIST: CPT | Mod: 26

## 2017-07-14 PROCEDURE — 88311 DECALCIFY TISSUE: CPT | Mod: 26

## 2017-07-14 PROCEDURE — 71010: CPT | Mod: 26

## 2017-07-14 RX ORDER — POTASSIUM CHLORIDE 20 MEQ
10 PACKET (EA) ORAL
Qty: 0 | Refills: 0 | Status: DISCONTINUED | OUTPATIENT
Start: 2017-07-14 | End: 2017-07-15

## 2017-07-14 RX ORDER — POTASSIUM CHLORIDE 20 MEQ
10 PACKET (EA) ORAL
Qty: 0 | Refills: 0 | Status: COMPLETED | OUTPATIENT
Start: 2017-07-14 | End: 2017-07-14

## 2017-07-14 RX ORDER — ALBUMIN HUMAN 25 %
250 VIAL (ML) INTRAVENOUS ONCE
Qty: 0 | Refills: 0 | Status: COMPLETED | OUTPATIENT
Start: 2017-07-14 | End: 2017-07-14

## 2017-07-14 RX ORDER — NOREPINEPHRINE BITARTRATE/D5W 8 MG/250ML
0.05 PLASTIC BAG, INJECTION (ML) INTRAVENOUS
Qty: 8 | Refills: 0 | Status: DISCONTINUED | OUTPATIENT
Start: 2017-07-14 | End: 2017-07-15

## 2017-07-14 RX ORDER — DOCUSATE SODIUM 100 MG
100 CAPSULE ORAL THREE TIMES A DAY
Qty: 0 | Refills: 0 | Status: DISCONTINUED | OUTPATIENT
Start: 2017-07-14 | End: 2017-07-21

## 2017-07-14 RX ORDER — VASOPRESSIN 20 [USP'U]/ML
0.05 INJECTION INTRAVENOUS
Qty: 100 | Refills: 0 | Status: DISCONTINUED | OUTPATIENT
Start: 2017-07-14 | End: 2017-07-15

## 2017-07-14 RX ORDER — SODIUM CHLORIDE 9 MG/ML
1000 INJECTION, SOLUTION INTRAVENOUS
Qty: 0 | Refills: 0 | Status: DISCONTINUED | OUTPATIENT
Start: 2017-07-14 | End: 2017-07-16

## 2017-07-14 RX ORDER — SODIUM CHLORIDE 9 MG/ML
3 INJECTION INTRAMUSCULAR; INTRAVENOUS; SUBCUTANEOUS EVERY 8 HOURS
Qty: 0 | Refills: 0 | Status: DISCONTINUED | OUTPATIENT
Start: 2017-07-14 | End: 2017-07-14

## 2017-07-14 RX ORDER — INSULIN HUMAN 100 [IU]/ML
2 INJECTION, SOLUTION SUBCUTANEOUS
Qty: 100 | Refills: 0 | Status: DISCONTINUED | OUTPATIENT
Start: 2017-07-14 | End: 2017-07-15

## 2017-07-14 RX ORDER — FAMOTIDINE 10 MG/ML
20 INJECTION INTRAVENOUS EVERY 12 HOURS
Qty: 0 | Refills: 0 | Status: DISCONTINUED | OUTPATIENT
Start: 2017-07-14 | End: 2017-07-15

## 2017-07-14 RX ORDER — CEFUROXIME AXETIL 250 MG
1500 TABLET ORAL EVERY 8 HOURS
Qty: 0 | Refills: 0 | Status: COMPLETED | OUTPATIENT
Start: 2017-07-14 | End: 2017-07-15

## 2017-07-14 RX ORDER — MEPERIDINE HYDROCHLORIDE 50 MG/ML
25 INJECTION INTRAMUSCULAR; INTRAVENOUS; SUBCUTANEOUS ONCE
Qty: 0 | Refills: 0 | Status: DISCONTINUED | OUTPATIENT
Start: 2017-07-14 | End: 2017-07-14

## 2017-07-14 RX ORDER — VANCOMYCIN HCL 1 G
1250 VIAL (EA) INTRAVENOUS EVERY 12 HOURS
Qty: 0 | Refills: 0 | Status: COMPLETED | OUTPATIENT
Start: 2017-07-14 | End: 2017-07-15

## 2017-07-14 RX ORDER — FENTANYL CITRATE 50 UG/ML
50 INJECTION INTRAVENOUS ONCE
Qty: 0 | Refills: 0 | Status: DISCONTINUED | OUTPATIENT
Start: 2017-07-14 | End: 2017-07-14

## 2017-07-14 RX ORDER — ASPIRIN/CALCIUM CARB/MAGNESIUM 324 MG
325 TABLET ORAL DAILY
Qty: 0 | Refills: 0 | Status: DISCONTINUED | OUTPATIENT
Start: 2017-07-14 | End: 2017-07-21

## 2017-07-14 RX ORDER — POTASSIUM CHLORIDE 20 MEQ
10 PACKET (EA) ORAL ONCE
Qty: 0 | Refills: 0 | Status: DISCONTINUED | OUTPATIENT
Start: 2017-07-14 | End: 2017-07-15

## 2017-07-14 RX ORDER — DEXMEDETOMIDINE HYDROCHLORIDE IN 0.9% SODIUM CHLORIDE 4 UG/ML
0.4 INJECTION INTRAVENOUS
Qty: 200 | Refills: 0 | Status: DISCONTINUED | OUTPATIENT
Start: 2017-07-14 | End: 2017-07-14

## 2017-07-14 RX ADMIN — FAMOTIDINE 20 MILLIGRAM(S): 10 INJECTION INTRAVENOUS at 17:09

## 2017-07-14 RX ADMIN — Medication 166.67 MILLIGRAM(S): at 17:14

## 2017-07-14 RX ADMIN — VASOPRESSIN 3 UNIT(S)/MIN: 20 INJECTION INTRAVENOUS at 16:54

## 2017-07-14 RX ADMIN — Medication 8 MICROGRAM(S)/KG/MIN: at 16:14

## 2017-07-14 RX ADMIN — Medication 100 MILLIEQUIVALENT(S): at 19:16

## 2017-07-14 RX ADMIN — INSULIN HUMAN 2 UNIT(S)/HR: 100 INJECTION, SOLUTION SUBCUTANEOUS at 16:14

## 2017-07-14 RX ADMIN — Medication 100 MILLIGRAM(S): at 16:31

## 2017-07-14 RX ADMIN — SODIUM CHLORIDE 3 MILLILITER(S): 9 INJECTION INTRAMUSCULAR; INTRAVENOUS; SUBCUTANEOUS at 06:37

## 2017-07-14 RX ADMIN — DEXMEDETOMIDINE HYDROCHLORIDE IN 0.9% SODIUM CHLORIDE 8.25 MICROGRAM(S)/KG/HR: 4 INJECTION INTRAVENOUS at 16:14

## 2017-07-14 RX ADMIN — FENTANYL CITRATE 50 MICROGRAM(S): 50 INJECTION INTRAVENOUS at 15:15

## 2017-07-14 RX ADMIN — FENTANYL CITRATE 25 MICROGRAM(S): 50 INJECTION INTRAVENOUS at 20:45

## 2017-07-14 RX ADMIN — Medication 125 MILLILITER(S): at 16:13

## 2017-07-14 RX ADMIN — FENTANYL CITRATE 50 MICROGRAM(S): 50 INJECTION INTRAVENOUS at 16:13

## 2017-07-14 RX ADMIN — FENTANYL CITRATE 25 MICROGRAM(S): 50 INJECTION INTRAVENOUS at 20:30

## 2017-07-14 RX ADMIN — Medication 125 MILLILITER(S): at 17:08

## 2017-07-14 RX ADMIN — Medication 100 MILLIEQUIVALENT(S): at 19:36

## 2017-07-14 RX ADMIN — Medication 125 MILLILITER(S): at 16:14

## 2017-07-14 RX ADMIN — Medication 125 MILLILITER(S): at 14:45

## 2017-07-14 NOTE — PATIENT PROFILE ADULT. - PMH
Aortic root dilatation    Aortic valve insufficiency, unspecified etiology    BPH associated with nocturia    Diverticulosis of intestine without bleeding, unspecified intestinal tract location    Essential hypertension    Fall (on) (from) other stairs and steps, sequela  03/28/2017- lumbar hemtoma  Gastroesophageal reflux disease, esophagitis presence not specified    Hard of hearing    HLD (hyperlipidemia)    [place] (hard of hearing)    Lumbar degenerative disc disease    MRSA (methicillin resistant Staphylococcus aureus)  left forearm 2012  Nerve injury  complication from right TKR surgery, 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  CAMILA (obstructive sleep apnea)  unable to tolerate nocturnal CPAP  Restless leg syndrome    Thoracic aortic aneurysm

## 2017-07-14 NOTE — BRIEF OPERATIVE NOTE - PROCEDURE
Repair of ascending aortic aneurysm  07/14/2017  REPAIR OFAAA UTILIZING A 28X8MM GELWEAVE GRAFT  Active  PGELLERT  AVR (aortic valve replacement)  07/14/2017  AVR UTILIZING A #23MM BOVINE PERICARDIAL VALVE  Active  PGELLERT

## 2017-07-14 NOTE — BRIEF OPERATIVE NOTE - PRE-OP DX
AI (aortic insufficiency)  07/14/2017    Abel Gruber  Ascending aortic aneurysm  07/14/2017    Abel Gruber

## 2017-07-14 NOTE — AIRWAY REMOVAL NOTE  ADULT & PEDS - ARTIFICAL AIRWAY REMOVAL COMMENTS
Written order for extubation verified. The patient was identified by full name and birth date compared to the identification band. Present during the procedure was MARILYNN vyas and ERIBERTO marshall

## 2017-07-15 LAB
ANION GAP SERPL CALC-SCNC: 14 MMOL/L — SIGNIFICANT CHANGE UP (ref 5–17)
APTT BLD: 26.4 SEC — LOW (ref 27.5–37.4)
BASOPHILS # BLD AUTO: 0 K/UL — SIGNIFICANT CHANGE UP (ref 0–0.2)
BASOPHILS NFR BLD AUTO: 0.2 % — SIGNIFICANT CHANGE UP (ref 0–2)
BUN SERPL-MCNC: 24 MG/DL — HIGH (ref 7–23)
CALCIUM SERPL-MCNC: 8.6 MG/DL — SIGNIFICANT CHANGE UP (ref 8.4–10.5)
CHLORIDE SERPL-SCNC: 107 MMOL/L — SIGNIFICANT CHANGE UP (ref 96–108)
CO2 SERPL-SCNC: 21 MMOL/L — LOW (ref 22–31)
CREAT SERPL-MCNC: 0.79 MG/DL — SIGNIFICANT CHANGE UP (ref 0.5–1.3)
EOSINOPHIL # BLD AUTO: 0.1 K/UL — SIGNIFICANT CHANGE UP (ref 0–0.5)
EOSINOPHIL NFR BLD AUTO: 0.7 % — SIGNIFICANT CHANGE UP (ref 0–6)
GAS PNL BLDA: SIGNIFICANT CHANGE UP
GLUCOSE SERPL-MCNC: 93 MG/DL — SIGNIFICANT CHANGE UP (ref 70–99)
HCT VFR BLD CALC: 36.1 % — LOW (ref 39–50)
HGB BLD-MCNC: 12.5 G/DL — LOW (ref 13–17)
INR BLD: 1.22 RATIO — HIGH (ref 0.88–1.16)
LYMPHOCYTES # BLD AUTO: 1 K/UL — SIGNIFICANT CHANGE UP (ref 1–3.3)
LYMPHOCYTES # BLD AUTO: 7.2 % — LOW (ref 13–44)
MCHC RBC-ENTMCNC: 34.3 PG — HIGH (ref 27–34)
MCHC RBC-ENTMCNC: 34.6 GM/DL — SIGNIFICANT CHANGE UP (ref 32–36)
MCV RBC AUTO: 99.1 FL — SIGNIFICANT CHANGE UP (ref 80–100)
MONOCYTES # BLD AUTO: 1.2 K/UL — HIGH (ref 0–0.9)
MONOCYTES NFR BLD AUTO: 8.8 % — SIGNIFICANT CHANGE UP (ref 2–14)
NEUTROPHILS # BLD AUTO: 11.7 K/UL — HIGH (ref 1.8–7.4)
NEUTROPHILS NFR BLD AUTO: 83.2 % — HIGH (ref 43–77)
PLATELET # BLD AUTO: 136 K/UL — LOW (ref 150–400)
POTASSIUM SERPL-MCNC: 4.6 MMOL/L — SIGNIFICANT CHANGE UP (ref 3.5–5.3)
POTASSIUM SERPL-SCNC: 4.6 MMOL/L — SIGNIFICANT CHANGE UP (ref 3.5–5.3)
PROTHROM AB SERPL-ACNC: 13.4 SEC — HIGH (ref 9.8–12.7)
RBC # BLD: 3.64 M/UL — LOW (ref 4.2–5.8)
RBC # FLD: 12.2 % — SIGNIFICANT CHANGE UP (ref 10.3–14.5)
SODIUM SERPL-SCNC: 142 MMOL/L — SIGNIFICANT CHANGE UP (ref 135–145)
TSH SERPL-MCNC: 1.39 UIU/ML — SIGNIFICANT CHANGE UP (ref 0.27–4.2)
WBC # BLD: 14.1 K/UL — HIGH (ref 3.8–10.5)
WBC # FLD AUTO: 14.1 K/UL — HIGH (ref 3.8–10.5)

## 2017-07-15 PROCEDURE — 93010 ELECTROCARDIOGRAM REPORT: CPT | Mod: 76

## 2017-07-15 PROCEDURE — 36620 INSERTION CATHETER ARTERY: CPT

## 2017-07-15 PROCEDURE — 71010: CPT | Mod: 26

## 2017-07-15 RX ORDER — HYDROMORPHONE HYDROCHLORIDE 2 MG/ML
1 INJECTION INTRAMUSCULAR; INTRAVENOUS; SUBCUTANEOUS ONCE
Qty: 0 | Refills: 0 | Status: DISCONTINUED | OUTPATIENT
Start: 2017-07-15 | End: 2017-07-15

## 2017-07-15 RX ORDER — HYDROMORPHONE HYDROCHLORIDE 2 MG/ML
0.5 INJECTION INTRAMUSCULAR; INTRAVENOUS; SUBCUTANEOUS ONCE
Qty: 0 | Refills: 0 | Status: DISCONTINUED | OUTPATIENT
Start: 2017-07-15 | End: 2017-07-15

## 2017-07-15 RX ORDER — SODIUM CHLORIDE 9 MG/ML
1000 INJECTION, SOLUTION INTRAVENOUS
Qty: 0 | Refills: 0 | Status: DISCONTINUED | OUTPATIENT
Start: 2017-07-15 | End: 2017-07-21

## 2017-07-15 RX ORDER — GLUCAGON INJECTION, SOLUTION 0.5 MG/.1ML
1 INJECTION, SOLUTION SUBCUTANEOUS ONCE
Qty: 0 | Refills: 0 | Status: DISCONTINUED | OUTPATIENT
Start: 2017-07-15 | End: 2017-07-16

## 2017-07-15 RX ORDER — ACETAMINOPHEN 500 MG
1000 TABLET ORAL ONCE
Qty: 0 | Refills: 0 | Status: COMPLETED | OUTPATIENT
Start: 2017-07-15 | End: 2017-07-15

## 2017-07-15 RX ORDER — POTASSIUM CHLORIDE 20 MEQ
10 PACKET (EA) ORAL
Qty: 0 | Refills: 0 | Status: COMPLETED | OUTPATIENT
Start: 2017-07-15 | End: 2017-07-15

## 2017-07-15 RX ORDER — DONEPEZIL HYDROCHLORIDE 10 MG/1
10 TABLET, FILM COATED ORAL AT BEDTIME
Qty: 0 | Refills: 0 | Status: DISCONTINUED | OUTPATIENT
Start: 2017-07-15 | End: 2017-07-21

## 2017-07-15 RX ORDER — DEXTROSE 50 % IN WATER 50 %
12.5 SYRINGE (ML) INTRAVENOUS ONCE
Qty: 0 | Refills: 0 | Status: DISCONTINUED | OUTPATIENT
Start: 2017-07-15 | End: 2017-07-16

## 2017-07-15 RX ORDER — ATORVASTATIN CALCIUM 80 MG/1
40 TABLET, FILM COATED ORAL AT BEDTIME
Qty: 0 | Refills: 0 | Status: DISCONTINUED | OUTPATIENT
Start: 2017-07-15 | End: 2017-07-21

## 2017-07-15 RX ORDER — FENTANYL CITRATE 50 UG/ML
25 INJECTION INTRAVENOUS ONCE
Qty: 0 | Refills: 0 | Status: DISCONTINUED | OUTPATIENT
Start: 2017-07-15 | End: 2017-07-14

## 2017-07-15 RX ORDER — INSULIN LISPRO 100/ML
VIAL (ML) SUBCUTANEOUS AT BEDTIME
Qty: 0 | Refills: 0 | Status: DISCONTINUED | OUTPATIENT
Start: 2017-07-15 | End: 2017-07-16

## 2017-07-15 RX ORDER — INSULIN LISPRO 100/ML
VIAL (ML) SUBCUTANEOUS
Qty: 0 | Refills: 0 | Status: DISCONTINUED | OUTPATIENT
Start: 2017-07-15 | End: 2017-07-16

## 2017-07-15 RX ORDER — GABAPENTIN 400 MG/1
600 CAPSULE ORAL EVERY 6 HOURS
Qty: 0 | Refills: 0 | Status: DISCONTINUED | OUTPATIENT
Start: 2017-07-15 | End: 2017-07-21

## 2017-07-15 RX ORDER — ROPINIROLE 8 MG/1
2 TABLET, FILM COATED, EXTENDED RELEASE ORAL
Qty: 0 | Refills: 0 | Status: DISCONTINUED | OUTPATIENT
Start: 2017-07-15 | End: 2017-07-21

## 2017-07-15 RX ORDER — DEXTROSE 50 % IN WATER 50 %
25 SYRINGE (ML) INTRAVENOUS ONCE
Qty: 0 | Refills: 0 | Status: DISCONTINUED | OUTPATIENT
Start: 2017-07-15 | End: 2017-07-16

## 2017-07-15 RX ORDER — IPRATROPIUM/ALBUTEROL SULFATE 18-103MCG
3 AEROSOL WITH ADAPTER (GRAM) INHALATION EVERY 8 HOURS
Qty: 0 | Refills: 0 | Status: COMPLETED | OUTPATIENT
Start: 2017-07-15 | End: 2017-07-16

## 2017-07-15 RX ORDER — ENOXAPARIN SODIUM 100 MG/ML
40 INJECTION SUBCUTANEOUS DAILY
Qty: 0 | Refills: 0 | Status: DISCONTINUED | OUTPATIENT
Start: 2017-07-15 | End: 2017-07-17

## 2017-07-15 RX ORDER — FENTANYL CITRATE 50 UG/ML
50 INJECTION INTRAVENOUS ONCE
Qty: 0 | Refills: 0 | Status: DISCONTINUED | OUTPATIENT
Start: 2017-07-15 | End: 2017-07-15

## 2017-07-15 RX ORDER — DEXTROSE 50 % IN WATER 50 %
1 SYRINGE (ML) INTRAVENOUS ONCE
Qty: 0 | Refills: 0 | Status: DISCONTINUED | OUTPATIENT
Start: 2017-07-15 | End: 2017-07-16

## 2017-07-15 RX ORDER — ALBUMIN HUMAN 25 %
250 VIAL (ML) INTRAVENOUS ONCE
Qty: 0 | Refills: 0 | Status: COMPLETED | OUTPATIENT
Start: 2017-07-15 | End: 2017-07-15

## 2017-07-15 RX ORDER — FUROSEMIDE 40 MG
20 TABLET ORAL ONCE
Qty: 0 | Refills: 0 | Status: COMPLETED | OUTPATIENT
Start: 2017-07-15 | End: 2017-07-15

## 2017-07-15 RX ADMIN — ATORVASTATIN CALCIUM 40 MILLIGRAM(S): 80 TABLET, FILM COATED ORAL at 21:20

## 2017-07-15 RX ADMIN — Medication 100 MILLIEQUIVALENT(S): at 06:30

## 2017-07-15 RX ADMIN — Medication 400 MILLIGRAM(S): at 03:00

## 2017-07-15 RX ADMIN — Medication 325 MILLIGRAM(S): at 12:00

## 2017-07-15 RX ADMIN — HYDROMORPHONE HYDROCHLORIDE 0.5 MILLIGRAM(S): 2 INJECTION INTRAMUSCULAR; INTRAVENOUS; SUBCUTANEOUS at 20:25

## 2017-07-15 RX ADMIN — GABAPENTIN 600 MILLIGRAM(S): 400 CAPSULE ORAL at 13:03

## 2017-07-15 RX ADMIN — Medication 100 MILLIGRAM(S): at 10:03

## 2017-07-15 RX ADMIN — Medication 166.67 MILLIGRAM(S): at 16:56

## 2017-07-15 RX ADMIN — FENTANYL CITRATE 50 MICROGRAM(S): 50 INJECTION INTRAVENOUS at 08:30

## 2017-07-15 RX ADMIN — Medication 166.67 MILLIGRAM(S): at 05:21

## 2017-07-15 RX ADMIN — INSULIN HUMAN 2 UNIT(S)/HR: 100 INJECTION, SOLUTION SUBCUTANEOUS at 07:07

## 2017-07-15 RX ADMIN — Medication 100 MILLIGRAM(S): at 23:40

## 2017-07-15 RX ADMIN — Medication 100 MILLIGRAM(S): at 05:21

## 2017-07-15 RX ADMIN — HYDROMORPHONE HYDROCHLORIDE 0.5 MILLIGRAM(S): 2 INJECTION INTRAMUSCULAR; INTRAVENOUS; SUBCUTANEOUS at 23:35

## 2017-07-15 RX ADMIN — ENOXAPARIN SODIUM 40 MILLIGRAM(S): 100 INJECTION SUBCUTANEOUS at 12:00

## 2017-07-15 RX ADMIN — GABAPENTIN 600 MILLIGRAM(S): 400 CAPSULE ORAL at 16:56

## 2017-07-15 RX ADMIN — ROPINIROLE 2 MILLIGRAM(S): 8 TABLET, FILM COATED, EXTENDED RELEASE ORAL at 16:56

## 2017-07-15 RX ADMIN — HYDROMORPHONE HYDROCHLORIDE 0.5 MILLIGRAM(S): 2 INJECTION INTRAMUSCULAR; INTRAVENOUS; SUBCUTANEOUS at 23:20

## 2017-07-15 RX ADMIN — HYDROMORPHONE HYDROCHLORIDE 1 MILLIGRAM(S): 2 INJECTION INTRAMUSCULAR; INTRAVENOUS; SUBCUTANEOUS at 01:15

## 2017-07-15 RX ADMIN — Medication 100 MILLIGRAM(S): at 21:20

## 2017-07-15 RX ADMIN — ROPINIROLE 2 MILLIGRAM(S): 8 TABLET, FILM COATED, EXTENDED RELEASE ORAL at 03:44

## 2017-07-15 RX ADMIN — Medication 500 MILLILITER(S): at 16:00

## 2017-07-15 RX ADMIN — HYDROMORPHONE HYDROCHLORIDE 0.5 MILLIGRAM(S): 2 INJECTION INTRAMUSCULAR; INTRAVENOUS; SUBCUTANEOUS at 05:00

## 2017-07-15 RX ADMIN — Medication 100 MILLIEQUIVALENT(S): at 05:45

## 2017-07-15 RX ADMIN — ROPINIROLE 2 MILLIGRAM(S): 8 TABLET, FILM COATED, EXTENDED RELEASE ORAL at 10:03

## 2017-07-15 RX ADMIN — Medication 3 MILLILITER(S): at 21:16

## 2017-07-15 RX ADMIN — HYDROMORPHONE HYDROCHLORIDE 0.5 MILLIGRAM(S): 2 INJECTION INTRAMUSCULAR; INTRAVENOUS; SUBCUTANEOUS at 12:30

## 2017-07-15 RX ADMIN — HYDROMORPHONE HYDROCHLORIDE 0.5 MILLIGRAM(S): 2 INJECTION INTRAMUSCULAR; INTRAVENOUS; SUBCUTANEOUS at 12:00

## 2017-07-15 RX ADMIN — GABAPENTIN 600 MILLIGRAM(S): 400 CAPSULE ORAL at 23:39

## 2017-07-15 RX ADMIN — Medication 100 MILLIGRAM(S): at 00:44

## 2017-07-15 RX ADMIN — Medication 1000 MILLIGRAM(S): at 03:30

## 2017-07-15 RX ADMIN — FENTANYL CITRATE 50 MICROGRAM(S): 50 INJECTION INTRAVENOUS at 08:00

## 2017-07-15 RX ADMIN — HYDROMORPHONE HYDROCHLORIDE 0.5 MILLIGRAM(S): 2 INJECTION INTRAMUSCULAR; INTRAVENOUS; SUBCUTANEOUS at 20:10

## 2017-07-15 RX ADMIN — ROPINIROLE 2 MILLIGRAM(S): 8 TABLET, FILM COATED, EXTENDED RELEASE ORAL at 21:20

## 2017-07-15 RX ADMIN — Medication 100 MILLIGRAM(S): at 15:59

## 2017-07-15 RX ADMIN — Medication 20 MILLIGRAM(S): at 06:00

## 2017-07-15 RX ADMIN — HYDROMORPHONE HYDROCHLORIDE 1 MILLIGRAM(S): 2 INJECTION INTRAMUSCULAR; INTRAVENOUS; SUBCUTANEOUS at 01:00

## 2017-07-15 RX ADMIN — HYDROMORPHONE HYDROCHLORIDE 0.5 MILLIGRAM(S): 2 INJECTION INTRAMUSCULAR; INTRAVENOUS; SUBCUTANEOUS at 04:30

## 2017-07-15 RX ADMIN — FAMOTIDINE 20 MILLIGRAM(S): 10 INJECTION INTRAVENOUS at 05:21

## 2017-07-15 RX ADMIN — DONEPEZIL HYDROCHLORIDE 10 MILLIGRAM(S): 10 TABLET, FILM COATED ORAL at 21:20

## 2017-07-15 NOTE — PROGRESS NOTE ADULT - ASSESSMENT
81 yrs old male s/p Ascending aortic replacement Ef 50% extubated  vpaced at 80, hypoxemic on 1005 high Flow oxygen  Diuresis   lovenox for Dvt prophiolaxis   titrate o2 , check ABG's  ecotrin  Ambulate as tollerated

## 2017-07-15 NOTE — PROCEDURE NOTE - NSPROCDETAILS_GEN_ALL_CORE
all materials/supplies accounted for at end of procedure/location identified, draped/prepped, sterile technique used, needle inserted/introduced/connected to a pressurized flush line/positive blood return obtained via catheter/sutured in place/Seldinger technique

## 2017-07-15 NOTE — PROGRESS NOTE ADULT - SUBJECTIVE AND OBJECTIVE BOX
Operation; Ascending aortic Replacement  POD# 1    Patient is doing well, resting comfortably; complains of mild incisional pain that is relieved by pain medication.    Vital Signs Last 24 Hrs  T(C): 37.3 (15 Jul 2017 03:00), Max: 37.9 (14 Jul 2017 23:00)  T(F): 99.1 (15 Jul 2017 03:00), Max: 100.2 (14 Jul 2017 23:00)  HR: 86 (15 Jul 2017 07:00) (79 - 93)  BP: --  BP(mean): --  RR: 33 (15 Jul 2017 07:00) (10 - 38)  SpO2: 98% (15 Jul 2017 07:00) (90% - 100%)  I&O's Detail    14 Jul 2017 07:01  -  15 Jul 2017 07:00  --------------------------------------------------------  IN:    Albumin 5%  - 250 mL: 1000 mL    dexmedetomidine Infusion: 33.2 mL    insulin Infusion: 32.5 mL    IV PiggyBack: 884 mL    norepinephrine Infusion: 37 mL    sodium chloride 0.45%.: 170 mL    vasopressin Infusion: 37 mL  Total IN: 2193.7 mL    OUT:    Chest Tube: 260 mL    Chest Tube: 145 mL    Chest Tube: 125 mL    Indwelling Catheter - Urethral: 1420 mL  Total OUT: 1950 mL    Total NET: 243.7 mL          CHEST TUBE:  1 mediastinal & lt plural    EPICARDIAL WIRES: Ventricular wires   HERNANDEZ: Yes    MEDICATIONS  (STANDING):  sodium chloride 0.45%. 1000 milliLiter(s) (10 mL/Hr) IV Continuous <Continuous>  famotidine Injectable 20 milliGRAM(s) IV Push every 12 hours  aspirin enteric coated 325 milliGRAM(s) Oral daily  docusate sodium 100 milliGRAM(s) Oral three times a day  potassium chloride  10 mEq/50 mL IVPB 10 milliEquivalent(s) IV Intermittent every 1 hour  potassium chloride  10 mEq/50 mL IVPB 10 milliEquivalent(s) IV Intermittent every 1 hour  potassium chloride  10 mEq/50 mL IVPB 10 milliEquivalent(s) IV Intermittent once  insulin Infusion 2 Unit(s)/Hr (2 mL/Hr) IV Continuous <Continuous>  cefuroxime  IVPB 1500 milliGRAM(s) IV Intermittent every 8 hours  norepinephrine Infusion 0.052 MICROgram(s)/kG/Min (8 mL/Hr) IV Continuous <Continuous>  vasopressin Infusion 0.05 Unit(s)/Min (3 mL/Hr) IV Continuous <Continuous>  vancomycin  IVPB 1250 milliGRAM(s) IV Intermittent every 12 hours  donepezil 10 milliGRAM(s) Oral at bedtime  rOPINIRole 2 milliGRAM(s) Oral four times a day  HYDROmorphone  Injectable 0.5 milliGRAM(s) IV Push once    MEDICATIONS  (PRN):      General: A+Ox3, in NAD  Chest: sternal dressing C/D/I  Heart: S1, S2, RRR  Lungs: CTA B/L, without W/R/R  Abdomen: Soft, ND, NT, positive BS  Extremeties: without edema B/L LE, positive DP pulses B/L     Does the patient have a history of CHF? no  -pre-operative EF 50%    Extubation within 24 hours? yes    CXR reviewed with attending.     Does the patient have a history of kidney disease? No    -what is patient's baseline Creatinine 0/79    Beta Blockers contraindicated for the first 24 hours due to vasopressor/inotropic medication      Did the patient receive transfusion of blood and/or products? non    DVT PPX with vendodynes as well as chemical prophylaxis.    Elina-operative antibiotics to be discontinued within 48 hours of CTU admission    Patient care discussed on morning interdisciplinary rounds with CTS team.

## 2017-07-15 NOTE — PHYSICAL THERAPY INITIAL EVALUATION ADULT - TRANSFER SAFETY CONCERNS NOTED: SIT/STAND, REHAB EVAL
stepping too close to front of assistive device/decreased weight-shifting ability/inability to maintain weight-bearing restrictions w/o assist/decreased step length

## 2017-07-16 DIAGNOSIS — I44.1 ATRIOVENTRICULAR BLOCK, SECOND DEGREE: ICD-10-CM

## 2017-07-16 LAB
ALBUMIN SERPL ELPH-MCNC: 3.6 G/DL — SIGNIFICANT CHANGE UP (ref 3.3–5)
ALP SERPL-CCNC: 35 U/L — LOW (ref 40–120)
ALT FLD-CCNC: 21 U/L RC — SIGNIFICANT CHANGE UP (ref 10–45)
ANION GAP SERPL CALC-SCNC: 9 MMOL/L — SIGNIFICANT CHANGE UP (ref 5–17)
AST SERPL-CCNC: 31 U/L — SIGNIFICANT CHANGE UP (ref 10–40)
BILIRUB SERPL-MCNC: 0.7 MG/DL — SIGNIFICANT CHANGE UP (ref 0.2–1.2)
BUN SERPL-MCNC: 22 MG/DL — SIGNIFICANT CHANGE UP (ref 7–23)
CALCIUM SERPL-MCNC: 8.8 MG/DL — SIGNIFICANT CHANGE UP (ref 8.4–10.5)
CHLORIDE SERPL-SCNC: 103 MMOL/L — SIGNIFICANT CHANGE UP (ref 96–108)
CO2 SERPL-SCNC: 26 MMOL/L — SIGNIFICANT CHANGE UP (ref 22–31)
CREAT SERPL-MCNC: 0.65 MG/DL — SIGNIFICANT CHANGE UP (ref 0.5–1.3)
GAS PNL BLDA: SIGNIFICANT CHANGE UP
GLUCOSE SERPL-MCNC: 150 MG/DL — HIGH (ref 70–99)
HCT VFR BLD CALC: 34.2 % — LOW (ref 39–50)
HGB BLD-MCNC: 11.3 G/DL — LOW (ref 13–17)
MCHC RBC-ENTMCNC: 32.7 PG — SIGNIFICANT CHANGE UP (ref 27–34)
MCHC RBC-ENTMCNC: 33 GM/DL — SIGNIFICANT CHANGE UP (ref 32–36)
MCV RBC AUTO: 99.1 FL — SIGNIFICANT CHANGE UP (ref 80–100)
PHOSPHATE SERPL-MCNC: 2.1 MG/DL — LOW (ref 2.5–4.5)
PLATELET # BLD AUTO: 108 K/UL — LOW (ref 150–400)
POTASSIUM SERPL-MCNC: 4.3 MMOL/L — SIGNIFICANT CHANGE UP (ref 3.5–5.3)
POTASSIUM SERPL-SCNC: 4.3 MMOL/L — SIGNIFICANT CHANGE UP (ref 3.5–5.3)
PROT SERPL-MCNC: 5.5 G/DL — LOW (ref 6–8.3)
RBC # BLD: 3.45 M/UL — LOW (ref 4.2–5.8)
RBC # FLD: 12.1 % — SIGNIFICANT CHANGE UP (ref 10.3–14.5)
SODIUM SERPL-SCNC: 138 MMOL/L — SIGNIFICANT CHANGE UP (ref 135–145)
WBC # BLD: 12.9 K/UL — HIGH (ref 3.8–10.5)
WBC # FLD AUTO: 12.9 K/UL — HIGH (ref 3.8–10.5)

## 2017-07-16 PROCEDURE — 71010: CPT | Mod: 26

## 2017-07-16 PROCEDURE — 93010 ELECTROCARDIOGRAM REPORT: CPT

## 2017-07-16 RX ORDER — OXYCODONE AND ACETAMINOPHEN 5; 325 MG/1; MG/1
1 TABLET ORAL EVERY 4 HOURS
Qty: 0 | Refills: 0 | Status: DISCONTINUED | OUTPATIENT
Start: 2017-07-16 | End: 2017-07-21

## 2017-07-16 RX ORDER — PANTOPRAZOLE SODIUM 20 MG/1
40 TABLET, DELAYED RELEASE ORAL
Qty: 0 | Refills: 0 | Status: DISCONTINUED | OUTPATIENT
Start: 2017-07-16 | End: 2017-07-21

## 2017-07-16 RX ORDER — OXYCODONE AND ACETAMINOPHEN 5; 325 MG/1; MG/1
1 TABLET ORAL ONCE
Qty: 0 | Refills: 0 | Status: DISCONTINUED | OUTPATIENT
Start: 2017-07-16 | End: 2017-07-16

## 2017-07-16 RX ORDER — OXYCODONE AND ACETAMINOPHEN 5; 325 MG/1; MG/1
2 TABLET ORAL EVERY 6 HOURS
Qty: 0 | Refills: 0 | Status: DISCONTINUED | OUTPATIENT
Start: 2017-07-16 | End: 2017-07-21

## 2017-07-16 RX ADMIN — ENOXAPARIN SODIUM 40 MILLIGRAM(S): 100 INJECTION SUBCUTANEOUS at 11:01

## 2017-07-16 RX ADMIN — OXYCODONE AND ACETAMINOPHEN 2 TABLET(S): 5; 325 TABLET ORAL at 22:45

## 2017-07-16 RX ADMIN — OXYCODONE AND ACETAMINOPHEN 1 TABLET(S): 5; 325 TABLET ORAL at 11:02

## 2017-07-16 RX ADMIN — OXYCODONE AND ACETAMINOPHEN 1 TABLET(S): 5; 325 TABLET ORAL at 11:30

## 2017-07-16 RX ADMIN — OXYCODONE AND ACETAMINOPHEN 1 TABLET(S): 5; 325 TABLET ORAL at 09:31

## 2017-07-16 RX ADMIN — ROPINIROLE 2 MILLIGRAM(S): 8 TABLET, FILM COATED, EXTENDED RELEASE ORAL at 21:17

## 2017-07-16 RX ADMIN — ATORVASTATIN CALCIUM 40 MILLIGRAM(S): 80 TABLET, FILM COATED ORAL at 21:17

## 2017-07-16 RX ADMIN — ROPINIROLE 2 MILLIGRAM(S): 8 TABLET, FILM COATED, EXTENDED RELEASE ORAL at 09:31

## 2017-07-16 RX ADMIN — ROPINIROLE 2 MILLIGRAM(S): 8 TABLET, FILM COATED, EXTENDED RELEASE ORAL at 17:37

## 2017-07-16 RX ADMIN — OXYCODONE AND ACETAMINOPHEN 1 TABLET(S): 5; 325 TABLET ORAL at 10:00

## 2017-07-16 RX ADMIN — GABAPENTIN 600 MILLIGRAM(S): 400 CAPSULE ORAL at 17:35

## 2017-07-16 RX ADMIN — OXYCODONE AND ACETAMINOPHEN 1 TABLET(S): 5; 325 TABLET ORAL at 18:48

## 2017-07-16 RX ADMIN — PANTOPRAZOLE SODIUM 40 MILLIGRAM(S): 20 TABLET, DELAYED RELEASE ORAL at 11:01

## 2017-07-16 RX ADMIN — OXYCODONE AND ACETAMINOPHEN 2 TABLET(S): 5; 325 TABLET ORAL at 15:30

## 2017-07-16 RX ADMIN — Medication 100 MILLIGRAM(S): at 05:27

## 2017-07-16 RX ADMIN — DONEPEZIL HYDROCHLORIDE 10 MILLIGRAM(S): 10 TABLET, FILM COATED ORAL at 21:17

## 2017-07-16 RX ADMIN — Medication 100 MILLIGRAM(S): at 14:23

## 2017-07-16 RX ADMIN — OXYCODONE AND ACETAMINOPHEN 1 TABLET(S): 5; 325 TABLET ORAL at 19:48

## 2017-07-16 RX ADMIN — ROPINIROLE 2 MILLIGRAM(S): 8 TABLET, FILM COATED, EXTENDED RELEASE ORAL at 05:27

## 2017-07-16 RX ADMIN — GABAPENTIN 600 MILLIGRAM(S): 400 CAPSULE ORAL at 05:27

## 2017-07-16 RX ADMIN — OXYCODONE AND ACETAMINOPHEN 2 TABLET(S): 5; 325 TABLET ORAL at 21:45

## 2017-07-16 RX ADMIN — Medication 325 MILLIGRAM(S): at 11:01

## 2017-07-16 RX ADMIN — GABAPENTIN 600 MILLIGRAM(S): 400 CAPSULE ORAL at 11:03

## 2017-07-16 RX ADMIN — Medication 100 MILLIGRAM(S): at 21:17

## 2017-07-16 RX ADMIN — OXYCODONE AND ACETAMINOPHEN 2 TABLET(S): 5; 325 TABLET ORAL at 16:00

## 2017-07-16 RX ADMIN — HYDROMORPHONE HYDROCHLORIDE 0.5 MILLIGRAM(S): 2 INJECTION INTRAMUSCULAR; INTRAVENOUS; SUBCUTANEOUS at 21:00

## 2017-07-16 RX ADMIN — HYDROMORPHONE HYDROCHLORIDE 0.5 MILLIGRAM(S): 2 INJECTION INTRAMUSCULAR; INTRAVENOUS; SUBCUTANEOUS at 21:15

## 2017-07-16 RX ADMIN — Medication 3 MILLILITER(S): at 05:06

## 2017-07-16 RX ADMIN — GABAPENTIN 600 MILLIGRAM(S): 400 CAPSULE ORAL at 23:56

## 2017-07-16 NOTE — CONSULT NOTE ADULT - PROBLEM SELECTOR RECOMMENDATION 9
Intermittently fluctuating with A-Fib rate control  -Hold all A-V Mal blockers  -Monitor ECG Daily  -Donepezil may slow hear rate because of anticholinergic effect  --Cardiology available upon request

## 2017-07-16 NOTE — PROGRESS NOTE ADULT - SUBJECTIVE AND OBJECTIVE BOX
Operation; Asc. Aortic Replacement  POD #2     Patient is doing well, resting comfortably; complains of mild incisional pain that is relieved by pain medication.    Vital Signs Last 24 Hrs  T(C): 37.3 (15 Jul 2017 19:00), Max: 37.3 (15 Jul 2017 19:00)  T(F): 99.1 (15 Jul 2017 19:00), Max: 99.1 (15 Jul 2017 19:00)  HR: 79 (16 Jul 2017 07:00) (79 - 97)  BP: --  BP(mean): --  RR: 21 (16 Jul 2017 07:00) (14 - 49)  SpO2: 96% (16 Jul 2017 07:00) (87% - 98%)  I&O's Detail    15 Jul 2017 07:01  -  16 Jul 2017 07:00  --------------------------------------------------------  IN:    Albumin 5%  - 250 mL: 250 mL    insulin Infusion: 5 mL    IV PiggyBack: 400 mL    sodium chloride 0.45%.: 240 mL  Total IN: 895 mL    OUT:    Chest Tube: 220 mL    Chest Tube: 220 mL    Chest Tube: 95 mL    Indwelling Catheter - Urethral: 1655 mL  Total OUT: 2190 mL    Total NET: -1295 mL          CHEST TUBE: ms Ct & left plural  on Suction    EPICARDIAL WIRES:  2Ventricular wires   HERNANDEZ: Yes    MEDICATIONS  (STANDING):  sodium chloride 0.45%. 1000 milliLiter(s) (10 mL/Hr) IV Continuous <Continuous>  aspirin enteric coated 325 milliGRAM(s) Oral daily  docusate sodium 100 milliGRAM(s) Oral three times a day  donepezil 10 milliGRAM(s) Oral at bedtime  rOPINIRole 2 milliGRAM(s) Oral four times a day  gabapentin 600 milliGRAM(s) Oral every 6 hours  enoxaparin Injectable 40 milliGRAM(s) SubCutaneous daily  dextrose 5%. 1000 milliLiter(s) (50 mL/Hr) IV Continuous <Continuous>  dextrose 50% Injectable 12.5 Gram(s) IV Push once  dextrose 50% Injectable 25 Gram(s) IV Push once  dextrose 50% Injectable 25 Gram(s) IV Push once  insulin lispro (HumaLOG) corrective regimen sliding scale   SubCutaneous three times a day before meals  insulin lispro (HumaLOG) corrective regimen sliding scale   SubCutaneous at bedtime  atorvastatin 40 milliGRAM(s) Oral at bedtime  ALBUTerol/ipratropium for Nebulization 3 milliLiter(s) Nebulizer every 8 hours    MEDICATIONS  (PRN):  dextrose Gel 1 Dose(s) Oral once PRN Blood Glucose LESS THAN 70 milliGRAM(s)/deciLiter  glucagon  Injectable 1 milliGRAM(s) IntraMuscular once PRN Glucose <70 milliGRAM(s)/deciLiter      General: A+Ox3, in NAD  Chest: sternal dressing C/D/I  Heart: S1, S2, RRR  Lungs: CTA B/L, without W/R/R  Abdomen: Soft, ND, NT, positive BS  Extremeties: without edema B/L LE, positive DP pulses B/L     Does the patient have a history of CHF? no    -pre-operative EF 50%    Extubation within 24 hours? yes    CXR reviewed with attending.     Does the patient have a history of kidney disease? No    -what is patient's baseline Creatinine 0.6    Beta Blockers contraindicated for the first 24 hours due to vasopressor/inotropic medication      Did the patient receive transfusion of blood and/or products?  -If yes, indication no     DVT PPX with vendodynes as well as chemical prophylaxis.    Elina-operative antibiotics to be discontinued within 48 hours of CTU admission    Patient care discussed on morning interdisciplinary rounds with CTS team.

## 2017-07-16 NOTE — CONSULT NOTE ADULT - SUBJECTIVE AND OBJECTIVE BOX
HPI:  This is a 80 y/o male with pmhx of HLD, CAMILA, GERD, known aortic root dilatation and AR presents today for presurgical testing.  Pt was diagnosed with aortic root dilatation several years ago and has been followed with serial imaging.  He recently had been experiencing shortness of breath and dizziness-s/p cardiology consult-s/p ECHO, cardiac cath revealed enlargement of the aortic root.  He presents today for presurgical testing for ascending aortic replacement, possible aortic valve repair/replacement.  Denies recent chest pain, shortness of breath, syncope, palpitations, or vision change.    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  Aortic root dilatation  MRSA (methicillin resistant Staphylococcus aureus): left forearm 2012  Lumbar degenerative disc disease  BPH associated with nocturia  Diverticulosis of intestine without bleeding, unspecified intestinal tract location  Gastroesophageal reflux disease, esophagitis presence not specified  Aortic valve insufficiency, unspecified etiology  CAMILA (obstructive sleep apnea): unable to tolerate nocturnal CPAP  Restless leg syndrome  Hard of hearing  Thoracic aortic aneurysm  HLD (hyperlipidemia)  Status post cataract extraction, unspecified laterality: bilat  S/P debridement: left forearm -mrsa  History of fundoplication: 2000  Amputation finger: left index 1970  S/P knee replacement: left  S/P knee replacement: right  S/P shoulder surgery: right rotatotor cuff injury      MEDICATIONS  (STANDING):  aspirin enteric coated 325 milliGRAM(s) Oral daily  docusate sodium 100 milliGRAM(s) Oral three times a day  donepezil 10 milliGRAM(s) Oral at bedtime  rOPINIRole 2 milliGRAM(s) Oral four times a day  gabapentin 600 milliGRAM(s) Oral every 6 hours  enoxaparin Injectable 40 milliGRAM(s) SubCutaneous daily  dextrose 5%. 1000 milliLiter(s) (50 mL/Hr) IV Continuous <Continuous>  atorvastatin 40 milliGRAM(s) Oral at bedtime  pantoprazole    Tablet 40 milliGRAM(s) Oral before breakfast    MEDICATIONS  (PRN):  oxyCODONE    5 mG/acetaminophen 325 mG 1 Tablet(s) Oral every 4 hours PRN Mild Pain (1 - 3)  oxyCODONE    5 mG/acetaminophen 325 mG 2 Tablet(s) Oral every 6 hours PRN Moderate Pain (4 - 6)      REVIEW OF SYSTEMS:    CONSTITUTIONAL: No weakness, fevers or chills  EYES: No visual changes, No diplopia  ENMT: No throat pain , No exudate  NECK: No pain or stiffness  RESPIRATORY: No cough, wheezing, hemoptysis; No shortness of breath  CARDIOVASCULAR: No chest pain or palpitations  GASTROINTESTINAL: No abdominal pain. No nausea, vomiting, or hematemesis; No diarrhea or constipation. No melena or hematochezia.  GENITOURINARY: No dysuria, frequency or hematuria  NEUROLOGICAL: No numbness or weakness  SKIN: No itching or rash  All other review of systems is negative unless indicated above    Vital Signs Last 24 Hrs  T(C): 37.2 (16 Jul 2017 12:00), Max: 37.4 (16 Jul 2017 08:00)  T(F): 99 (16 Jul 2017 12:00), Max: 99.3 (16 Jul 2017 08:00)  HR: 101 (16 Jul 2017 15:00) (79 - 110)  BP: --  BP(mean): --  RR: 19 (16 Jul 2017 15:00) (17 - 35)  SpO2: 96% (16 Jul 2017 15:00) (87% - 100%)    I&O's Summary    15 Jul 2017 07:01  -  16 Jul 2017 07:00  --------------------------------------------------------  IN: 895 mL / OUT: 2225 mL / NET: -1330 mL    16 Jul 2017 07:01  -  16 Jul 2017 16:02  --------------------------------------------------------  IN: 10 mL / OUT: 255 mL / NET: -245 mL        PHYSICAL EXAM:    Constitutional: NAD, awake and alert, well-developed  Eyes:  EOMI,  Pupils round, no lesions  ENMT: no exudate or erythema  Pulmonary: Non-labored, breath sounds are clear bilaterally, No wheezing, rales or rhonchi  Cardiovascular: PMI not palpable non-displaced Regular S1 and S2, no murmurs, rubs, gallops or clicks  Gastrointestinal: Bowel Sounds present, soft, nontender.   Lymph: No peripheral edema. No cervical lymphadenopathy.  Neurological: Alert, no focal deficits  Skin: No rashes. Changes of chronic venous stasis. No cyanosis.  Psych:  Mood & affect appropriate Confused.                                11.3   12.9  )-----------( 108      ( 16 Jul 2017 02:01 )             34.2     CBC Full  -  ( 16 Jul 2017 02:01 )  WBC Count : 12.9 K/uL  Hemoglobin : 11.3 g/dL  Hematocrit : 34.2 %  Platelet Count - Automated : 108 K/uL  Mean Cell Volume : 99.1 fl  Mean Cell Hemoglobin : 32.7 pg  Mean Cell Hemoglobin Concentration : 33.0 gm/dL  Auto Neutrophil # : x  Auto Lymphocyte # : x  Auto Monocyte # : x  Auto Eosinophil # : x  Auto Basophil # : x  Auto Neutrophil % : x  Auto Lymphocyte % : x  Auto Monocyte % : x  Auto Eosinophil % : x  Auto Basophil % : x    07-16    138  |  103  |  22  ----------------------------<  150<H>  4.3   |  26  |  0.65    Ca    8.8      16 Jul 2017 02:01  Phos  2.1     07-16    TPro  5.5<L>  /  Alb  3.6  /  TBili  0.7  /  DBili  x   /  AST  31  /  ALT  21  /  AlkPhos  35<L>  07-16 HPI:  This is a 82 y/o male with pmhx of HLD, CAMILA, GERD, known aortic root dilatation and AR presents today for presurgical testing.  Pt was diagnosed with aortic root dilatation several years ago and has been followed with serial imaging.  He recently had been experiencing shortness of breath and dizziness-s/p cardiology consult-s/p ECHO, cardiac cath revealed enlargement of the aortic root.  He presents today for presurgical testing for ascending aortic replacement, possible aortic valve repair/replacement.  Denies recent chest pain, shortness of breath, syncope, palpitations, or vision change.    Surgery was cancelled on 04/2017 since pt had lumbar hematoma due to s/p fall on 03/28/2017 (07 Jul 2017 14:13)      PAST MEDICAL & SURGICAL HISTORY:  [place] (hard of hearing)  Fall (on) (from) other stairs and steps, sequela: 03/28/2017- lumbar hemtoma  Essential hypertension  Nerve injury: complication from right TKR surgery, 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  Aortic root dilatation  MRSA (methicillin resistant Staphylococcus aureus): left forearm 2012  Lumbar degenerative disc disease  BPH associated with nocturia  Diverticulosis of intestine without bleeding, unspecified intestinal tract location  Gastroesophageal reflux disease, esophagitis presence not specified  Aortic valve insufficiency, unspecified etiology  CAMILA (obstructive sleep apnea): unable to tolerate nocturnal CPAP  Restless leg syndrome  Hard of hearing  Thoracic aortic aneurysm  HLD (hyperlipidemia)  Status post cataract extraction, unspecified laterality: bilat  S/P debridement: left forearm -mrsa  History of fundoplication: 2000  Amputation finger: left index 1970  S/P knee replacement: left  S/P knee replacement: right  S/P shoulder surgery: right rotatotor cuff injury      MEDICATIONS  (STANDING):  aspirin enteric coated 325 milliGRAM(s) Oral daily  docusate sodium 100 milliGRAM(s) Oral three times a day  donepezil 10 milliGRAM(s) Oral at bedtime  rOPINIRole 2 milliGRAM(s) Oral four times a day  gabapentin 600 milliGRAM(s) Oral every 6 hours  enoxaparin Injectable 40 milliGRAM(s) SubCutaneous daily  dextrose 5%. 1000 milliLiter(s) (50 mL/Hr) IV Continuous <Continuous>  atorvastatin 40 milliGRAM(s) Oral at bedtime  pantoprazole    Tablet 40 milliGRAM(s) Oral before breakfast    MEDICATIONS  (PRN):  oxyCODONE    5 mG/acetaminophen 325 mG 1 Tablet(s) Oral every 4 hours PRN Mild Pain (1 - 3)  oxyCODONE    5 mG/acetaminophen 325 mG 2 Tablet(s) Oral every 6 hours PRN Moderate Pain (4 - 6)      REVIEW OF SYSTEMS:    CONSTITUTIONAL: No weakness, fevers or chills  EYES: No visual changes, No diplopia  ENMT: No throat pain , No exudate  NECK: No pain or stiffness  RESPIRATORY: No cough, wheezing, hemoptysis; No shortness of breath  CARDIOVASCULAR: No chest pain or palpitations  GASTROINTESTINAL: No abdominal pain. No nausea, vomiting, or hematemesis; No diarrhea or constipation. No melena or hematochezia.  GENITOURINARY: No dysuria, frequency or hematuria  NEUROLOGICAL: No numbness or weakness  SKIN: No itching or rash  All other review of systems is negative unless indicated above    Vital Signs Last 24 Hrs  T(C): 37.2 (16 Jul 2017 12:00), Max: 37.4 (16 Jul 2017 08:00)  T(F): 99 (16 Jul 2017 12:00), Max: 99.3 (16 Jul 2017 08:00)  HR: 101 (16 Jul 2017 15:00) (79 - 110)  BP: --  BP(mean): --  RR: 19 (16 Jul 2017 15:00) (17 - 35)  SpO2: 96% (16 Jul 2017 15:00) (87% - 100%)    I&O's Summary    15 Jul 2017 07:01  -  16 Jul 2017 07:00  --------------------------------------------------------  IN: 895 mL / OUT: 2225 mL / NET: -1330 mL    16 Jul 2017 07:01  -  16 Jul 2017 16:02  --------------------------------------------------------  IN: 10 mL / OUT: 255 mL / NET: -245 mL        PHYSICAL EXAM:    Constitutional: NAD, awake and alert, well-developed  Eyes:  EOMI,  Pupils round, no lesions  ENMT: no exudate or erythema  Pulmonary: Non-labored, mild rales at bases  Cardiovascular: PMI not palpable non-displaced Regular S1 and S2, no murmurs, rubs, gallops or clicks, Midsternal dsg C/D/I, Mediastinal Chest Intact, MS pacing wire to Temp Pacemaker, VVI demand 52, MA 16 sens 0.8  Gastrointestinal: Bowel Sounds present, soft, nontender.   Lymph: No peripheral edema. No cervical lymphadenopathy.  Neurological: Alert, no focal deficits  Skin: No rashes. Changes of chronic venous stasis. No cyanosis.  Psych:  Mood & affect appropriate Confused.                          11.3   12.9  )-----------( 108      ( 16 Jul 2017 02:01 )             34.2     CBC Full  -  ( 16 Jul 2017 02:01 )  WBC Count : 12.9 K/uL  Hemoglobin : 11.3 g/dL  Hematocrit : 34.2 %  Platelet Count - Automated : 108 K/uL  Mean Cell Volume : 99.1 fl  Mean Cell Hemoglobin : 32.7 pg  Mean Cell Hemoglobin Concentration : 33.0 gm/dL  Auto Neutrophil # : x  Auto Lymphocyte # : x  Auto Monocyte # : x  Auto Eosinophil # : x  Auto Basophil # : x  Auto Neutrophil % : x  Auto Lymphocyte % : x  Auto Monocyte % : x  Auto Eosinophil % : x  Auto Basophil % : x    07-16    138  |  103  |  22  ----------------------------<  150<H>  4.3   |  26  |  0.65    Ca    8.8      16 Jul 2017 02:01  Phos  2.1     07-16    TPro  5.5<L>  /  Alb  3.6  /  TBili  0.7  /  DBili  x   /  AST  31  /  ALT  21  /  AlkPhos  35<L>  07-16 7/14/17 ECHO: EF 53%  There is a bioprosthetic aortic valve and ascending  aortic graft in situ. All leaflets open and coapt freely.  There are no paravalvular leaks, no aortic regurgitation,  and no LVOT obstruction. Limited views preclude measurement  of the mean gradient across the valve. The biventricular  function is unchanged and there are no new regional wall  motion abnormalities. The native valves are unchanged. The  aortic arch and descending aorta are intact. There is no  residual pericardial effusion.

## 2017-07-16 NOTE — CONSULT NOTE ADULT - ASSESSMENT
This is a 82 y/o male with pmhx of HLD, CAMILA, GERD, known aortic root dilatation and AR presents with shortness of breath and dizziness. Pt was diagnosed with aortic root dilatation several years ago and has been followed with serial imaging. S/P cardiac cath revealed enlargement of the aortic root. 7/14/17 and underwent AVR utilizing a #23mm bovine pericardial valve as well as repair of ascending aortic aneurysm / hemiarch with a 28X8mm gelwearve graft 7/16 Mobitz Type I noted on ECG.

## 2017-07-16 NOTE — PROGRESS NOTE ADULT - ASSESSMENT
81 yrs old male s/p Asc thoracic aotra replacement Ef 50%, post -op course complicated with Hypoxemia on high flow 60%   PMH: CAMILA   neurontin   ASA  Lovenox  Gi prophiolaxis with pepcid

## 2017-07-17 DIAGNOSIS — I35.1 NONRHEUMATIC AORTIC (VALVE) INSUFFICIENCY: ICD-10-CM

## 2017-07-17 DIAGNOSIS — J95.89 OTHER POSTPROCEDURAL COMPLICATIONS AND DISORDERS OF RESPIRATORY SYSTEM, NOT ELSEWHERE CLASSIFIED: ICD-10-CM

## 2017-07-17 DIAGNOSIS — I48.91 UNSPECIFIED ATRIAL FIBRILLATION: ICD-10-CM

## 2017-07-17 LAB
ALBUMIN SERPL ELPH-MCNC: 3.4 G/DL — SIGNIFICANT CHANGE UP (ref 3.3–5)
ALP SERPL-CCNC: 40 U/L — SIGNIFICANT CHANGE UP (ref 40–120)
ALT FLD-CCNC: 20 U/L RC — SIGNIFICANT CHANGE UP (ref 10–45)
ANION GAP SERPL CALC-SCNC: 11 MMOL/L — SIGNIFICANT CHANGE UP (ref 5–17)
APTT BLD: 32.3 SEC — SIGNIFICANT CHANGE UP (ref 27.5–37.4)
AST SERPL-CCNC: 21 U/L — SIGNIFICANT CHANGE UP (ref 10–40)
BILIRUB SERPL-MCNC: 0.5 MG/DL — SIGNIFICANT CHANGE UP (ref 0.2–1.2)
BUN SERPL-MCNC: 19 MG/DL — SIGNIFICANT CHANGE UP (ref 7–23)
CALCIUM SERPL-MCNC: 9.1 MG/DL — SIGNIFICANT CHANGE UP (ref 8.4–10.5)
CHLORIDE SERPL-SCNC: 103 MMOL/L — SIGNIFICANT CHANGE UP (ref 96–108)
CO2 SERPL-SCNC: 27 MMOL/L — SIGNIFICANT CHANGE UP (ref 22–31)
CREAT SERPL-MCNC: 0.64 MG/DL — SIGNIFICANT CHANGE UP (ref 0.5–1.3)
GLUCOSE SERPL-MCNC: 129 MG/DL — HIGH (ref 70–99)
HCT VFR BLD CALC: 33.7 % — LOW (ref 39–50)
HCT VFR BLD CALC: 35.5 % — LOW (ref 39–50)
HGB BLD-MCNC: 11.5 G/DL — LOW (ref 13–17)
HGB BLD-MCNC: 11.6 G/DL — LOW (ref 13–17)
INR BLD: 1.35 RATIO — HIGH (ref 0.88–1.16)
MCHC RBC-ENTMCNC: 32.5 GM/DL — SIGNIFICANT CHANGE UP (ref 32–36)
MCHC RBC-ENTMCNC: 32.5 PG — SIGNIFICANT CHANGE UP (ref 27–34)
MCHC RBC-ENTMCNC: 33.8 PG — SIGNIFICANT CHANGE UP (ref 27–34)
MCHC RBC-ENTMCNC: 34 GM/DL — SIGNIFICANT CHANGE UP (ref 32–36)
MCV RBC AUTO: 99.3 FL — SIGNIFICANT CHANGE UP (ref 80–100)
MCV RBC AUTO: 99.9 FL — SIGNIFICANT CHANGE UP (ref 80–100)
PHOSPHATE SERPL-MCNC: 2 MG/DL — LOW (ref 2.5–4.5)
PLATELET # BLD AUTO: 140 K/UL — LOW (ref 150–400)
PLATELET # BLD AUTO: 96 K/UL — LOW (ref 150–400)
POTASSIUM SERPL-MCNC: 4.3 MMOL/L — SIGNIFICANT CHANGE UP (ref 3.5–5.3)
POTASSIUM SERPL-SCNC: 4.3 MMOL/L — SIGNIFICANT CHANGE UP (ref 3.5–5.3)
PROT SERPL-MCNC: 5.6 G/DL — LOW (ref 6–8.3)
PROTHROM AB SERPL-ACNC: 14.7 SEC — HIGH (ref 9.8–12.7)
RBC # BLD: 3.39 M/UL — LOW (ref 4.2–5.8)
RBC # BLD: 3.56 M/UL — LOW (ref 4.2–5.8)
RBC # FLD: 12 % — SIGNIFICANT CHANGE UP (ref 10.3–14.5)
RBC # FLD: 12 % — SIGNIFICANT CHANGE UP (ref 10.3–14.5)
SODIUM SERPL-SCNC: 141 MMOL/L — SIGNIFICANT CHANGE UP (ref 135–145)
WBC # BLD: 12.4 K/UL — HIGH (ref 3.8–10.5)
WBC # BLD: 13 K/UL — HIGH (ref 3.8–10.5)
WBC # FLD AUTO: 12.4 K/UL — HIGH (ref 3.8–10.5)
WBC # FLD AUTO: 13 K/UL — HIGH (ref 3.8–10.5)

## 2017-07-17 PROCEDURE — 99233 SBSQ HOSP IP/OBS HIGH 50: CPT

## 2017-07-17 PROCEDURE — 93010 ELECTROCARDIOGRAM REPORT: CPT

## 2017-07-17 PROCEDURE — 71010: CPT | Mod: 26

## 2017-07-17 RX ORDER — SODIUM CHLORIDE 9 MG/ML
3 INJECTION INTRAMUSCULAR; INTRAVENOUS; SUBCUTANEOUS EVERY 8 HOURS
Qty: 0 | Refills: 0 | Status: DISCONTINUED | OUTPATIENT
Start: 2017-07-17 | End: 2017-07-21

## 2017-07-17 RX ORDER — HEPARIN SODIUM 5000 [USP'U]/ML
1200 INJECTION INTRAVENOUS; SUBCUTANEOUS
Qty: 25000 | Refills: 0 | Status: DISCONTINUED | OUTPATIENT
Start: 2017-07-17 | End: 2017-07-19

## 2017-07-17 RX ORDER — WARFARIN SODIUM 2.5 MG/1
5 TABLET ORAL ONCE
Qty: 0 | Refills: 0 | Status: COMPLETED | OUTPATIENT
Start: 2017-07-17 | End: 2017-07-17

## 2017-07-17 RX ORDER — FUROSEMIDE 40 MG
40 TABLET ORAL DAILY
Qty: 0 | Refills: 0 | Status: COMPLETED | OUTPATIENT
Start: 2017-07-18 | End: 2017-07-20

## 2017-07-17 RX ORDER — HEPARIN SODIUM 5000 [USP'U]/ML
1000 INJECTION INTRAVENOUS; SUBCUTANEOUS
Qty: 25000 | Refills: 0 | Status: DISCONTINUED | OUTPATIENT
Start: 2017-07-17 | End: 2017-07-17

## 2017-07-17 RX ORDER — HYDROMORPHONE HYDROCHLORIDE 2 MG/ML
0.5 INJECTION INTRAMUSCULAR; INTRAVENOUS; SUBCUTANEOUS ONCE
Qty: 0 | Refills: 0 | Status: DISCONTINUED | OUTPATIENT
Start: 2017-07-17 | End: 2017-07-16

## 2017-07-17 RX ORDER — POLYETHYLENE GLYCOL 3350 17 G/17G
17 POWDER, FOR SOLUTION ORAL DAILY
Qty: 0 | Refills: 0 | Status: DISCONTINUED | OUTPATIENT
Start: 2017-07-17 | End: 2017-07-21

## 2017-07-17 RX ADMIN — Medication 63.75 MILLIMOLE(S): at 03:30

## 2017-07-17 RX ADMIN — GABAPENTIN 600 MILLIGRAM(S): 400 CAPSULE ORAL at 17:38

## 2017-07-17 RX ADMIN — Medication 100 MILLIGRAM(S): at 12:45

## 2017-07-17 RX ADMIN — DONEPEZIL HYDROCHLORIDE 10 MILLIGRAM(S): 10 TABLET, FILM COATED ORAL at 22:27

## 2017-07-17 RX ADMIN — ROPINIROLE 2 MILLIGRAM(S): 8 TABLET, FILM COATED, EXTENDED RELEASE ORAL at 10:32

## 2017-07-17 RX ADMIN — Medication 100 MILLIGRAM(S): at 22:28

## 2017-07-17 RX ADMIN — PANTOPRAZOLE SODIUM 40 MILLIGRAM(S): 20 TABLET, DELAYED RELEASE ORAL at 05:39

## 2017-07-17 RX ADMIN — OXYCODONE AND ACETAMINOPHEN 2 TABLET(S): 5; 325 TABLET ORAL at 11:15

## 2017-07-17 RX ADMIN — OXYCODONE AND ACETAMINOPHEN 2 TABLET(S): 5; 325 TABLET ORAL at 21:07

## 2017-07-17 RX ADMIN — OXYCODONE AND ACETAMINOPHEN 2 TABLET(S): 5; 325 TABLET ORAL at 20:35

## 2017-07-17 RX ADMIN — ROPINIROLE 2 MILLIGRAM(S): 8 TABLET, FILM COATED, EXTENDED RELEASE ORAL at 22:28

## 2017-07-17 RX ADMIN — OXYCODONE AND ACETAMINOPHEN 2 TABLET(S): 5; 325 TABLET ORAL at 12:00

## 2017-07-17 RX ADMIN — Medication 325 MILLIGRAM(S): at 12:45

## 2017-07-17 RX ADMIN — ROPINIROLE 2 MILLIGRAM(S): 8 TABLET, FILM COATED, EXTENDED RELEASE ORAL at 05:40

## 2017-07-17 RX ADMIN — WARFARIN SODIUM 5 MILLIGRAM(S): 2.5 TABLET ORAL at 22:27

## 2017-07-17 RX ADMIN — POLYETHYLENE GLYCOL 3350 17 GRAM(S): 17 POWDER, FOR SOLUTION ORAL at 12:45

## 2017-07-17 RX ADMIN — ATORVASTATIN CALCIUM 40 MILLIGRAM(S): 80 TABLET, FILM COATED ORAL at 22:28

## 2017-07-17 RX ADMIN — SODIUM CHLORIDE 3 MILLILITER(S): 9 INJECTION INTRAMUSCULAR; INTRAVENOUS; SUBCUTANEOUS at 11:40

## 2017-07-17 RX ADMIN — OXYCODONE AND ACETAMINOPHEN 1 TABLET(S): 5; 325 TABLET ORAL at 18:00

## 2017-07-17 RX ADMIN — GABAPENTIN 600 MILLIGRAM(S): 400 CAPSULE ORAL at 12:45

## 2017-07-17 RX ADMIN — OXYCODONE AND ACETAMINOPHEN 2 TABLET(S): 5; 325 TABLET ORAL at 05:30

## 2017-07-17 RX ADMIN — SODIUM CHLORIDE 3 MILLILITER(S): 9 INJECTION INTRAMUSCULAR; INTRAVENOUS; SUBCUTANEOUS at 22:14

## 2017-07-17 RX ADMIN — HEPARIN SODIUM 10 UNIT(S)/HR: 5000 INJECTION INTRAVENOUS; SUBCUTANEOUS at 10:32

## 2017-07-17 RX ADMIN — OXYCODONE AND ACETAMINOPHEN 1 TABLET(S): 5; 325 TABLET ORAL at 17:39

## 2017-07-17 RX ADMIN — Medication 100 MILLIGRAM(S): at 05:39

## 2017-07-17 RX ADMIN — OXYCODONE AND ACETAMINOPHEN 2 TABLET(S): 5; 325 TABLET ORAL at 06:30

## 2017-07-17 RX ADMIN — HEPARIN SODIUM 1200 UNIT(S)/HR: 5000 INJECTION INTRAVENOUS; SUBCUTANEOUS at 19:59

## 2017-07-17 RX ADMIN — GABAPENTIN 600 MILLIGRAM(S): 400 CAPSULE ORAL at 05:40

## 2017-07-17 RX ADMIN — ROPINIROLE 2 MILLIGRAM(S): 8 TABLET, FILM COATED, EXTENDED RELEASE ORAL at 17:39

## 2017-07-17 NOTE — PHYSICAL THERAPY INITIAL EVALUATION ADULT - GAIT DEVIATIONS NOTED, PT EVAL
decreased stride length/decreased elli/decreased step length
decreased weight-shifting ability/decreased stride length/decreased step length/decreased elli

## 2017-07-17 NOTE — PHYSICAL THERAPY INITIAL EVALUATION ADULT - PLANNED THERAPY INTERVENTIONS, PT EVAL
gait training/transfer training/stairs negotiation/bed mobility training
bed mobility training/strengthening/gait training/balance training/transfer training

## 2017-07-17 NOTE — PROGRESS NOTE ADULT - SUBJECTIVE AND OBJECTIVE BOX
SON HO  MRN#: [id number]  Subjective:  Patient was seen and evaluate on AM rounds offering no specific complaints at this time.    OBJECTIVE:  ICU Vital Signs Last 24 Hrs  T(C): 36.7 (2017 04:00), Max: 37.3 (2017 16:00)  T(F): 98.1 (2017 04:00), Max: 99.1 (2017 16:00)  HR: 74 (2017 09:00) (62 - 110)  BP: 102/59 (2017 09:00) (95/52 - 135/88)  BP(mean): 74 (2017 09:00) (68 - 104)  ABP: 154/55 (2017 16:00) (77/74 - 154/55)  ABP(mean): 88 (2017 16:00) (75 - 97)  RR: 27 (2017 09:00) (13 - 34)  SpO2: 93% (2017 09:00) (92% - 99%)       @ 07:  -   @ 07:00  --------------------------------------------------------  IN: 380 mL / OUT: 1395 mL / NET: -1015 mL     @ 07:01  -  17 @ 10:08  --------------------------------------------------------  IN: 0 mL / OUT: 0 mL / NET: 0 mL        PHYSICAL EXAM:Daily     Daily Weight in k.2 (2017 00:00)  General: WN/WD NAD    HEENT:     + NCAT  + EOMI  - Conjuctival edema   - Icterus   - Thrush   - ETT  - NGT/OGT  Neck:         + FROM    + JVD     - Nodes     - Masses    + Mid-line trachea   - Tracheostomy  Chest:         - Sternal click  - Sternal drainage  + Pacing wires  - Chest tubes  - SubQ emphysema  Lungs:          + CTA   - Rhonchi    - Rales    - Wheezing     - Decreased BS   - Dullness R L  Cardiac:       + S1 + S2    + RRR   - Irregular   - S3  - S4    - Murmurs   - Rub   - Hamman’s sign   Abdomen:    + BS     + Soft    + Non-tender     - Distended    - Organomegaly  - PEG  Extremities:   - Cyanosis U/L   - Clubbing  U/L  - LE/UE Edema   + Capillary refill    + Pulses   Neuro:        + Awake   +  Alert   - Confused   - Lethargic   - Sedated   - Generalized Weakness  Skin:        - Rashes    - Erythema   + Normal incisions   + IV sites intact  - Sacral decubitus    HOSPITAL MEDICATIONS: All mediciations reviewed and analyzed  MEDICATIONS  (STANDING):  aspirin enteric coated 325 milliGRAM(s) Oral daily  docusate sodium 100 milliGRAM(s) Oral three times a day  donepezil 10 milliGRAM(s) Oral at bedtime  rOPINIRole 2 milliGRAM(s) Oral four times a day  gabapentin 600 milliGRAM(s) Oral every 6 hours  atorvastatin 40 milliGRAM(s) Oral at bedtime  pantoprazole    Tablet 40 milliGRAM(s) Oral before breakfast    MEDICATIONS  (PRN):  oxyCODONE    5 mG/acetaminophen 325 mG 1 Tablet(s) Oral every 4 hours PRN Mild Pain (1 - 3)  oxyCODONE    5 mG/acetaminophen 325 mG 2 Tablet(s) Oral every 6 hours PRN Moderate Pain (4 - 6)    LABS: All Lab data reviewed and analyzed                        11.5   12.4  )-----------( 96       ( 2017 01:48 )             33.7    07-    141  |  103  |  19  ----------------------------<  129<H>  4.3   |  27  |  0.64    Ca    9.1      2017 01:48  Phos  2.0     07-    TPro  5.6<L>  /  Alb  3.4  /  TBili  0.5  /  DBili  x   /  AST  21  /  ALT  20  /  AlkPhos  40  07-17     LIVER FUNCTIONS - ( 2017 01:48 )  Alb: 3.4 g/dL / Pro: 5.6 g/dL / ALK PHOS: 40 U/L / ALT: 20 U/L RC / AST: 21 U/L / GGT: x           RADIOLOGY: - Reviewed and analyzed

## 2017-07-17 NOTE — PHYSICAL THERAPY INITIAL EVALUATION ADULT - RANGE OF MOTION EXAMINATION, REHAB EVAL
bilateral lower extremity ROM was WFL (within functional limits)/bilateral upper extremity ROM was WFL (within functional limits)
no ROM deficits were identified

## 2017-07-17 NOTE — PROGRESS NOTE ADULT - PROBLEM SELECTOR PLAN 1
supplemental oxygen  for o2 sat greater than 92 percent  Continued early mobilization as tolerated  analgesic regimen to optimize function

## 2017-07-17 NOTE — PHYSICAL THERAPY INITIAL EVALUATION ADULT - GENERAL OBSERVATIONS, REHAB EVAL
Sitting in chair, + Chest tube x 2, A-line, Ext pacer, [place]
patient found supine in bed, intact incision, (+) cardaic monitor , nasal oxygen, potter, chest tube, IV

## 2017-07-17 NOTE — PROGRESS NOTE ADULT - PROBLEM SELECTOR PLAN 2
Epicardial pacing on VVI back-up  Started IV Heparin drip  Ordered Coumadin  No beta-blocker-Amiodarone secondary to intermittent pacing

## 2017-07-17 NOTE — PHYSICAL THERAPY INITIAL EVALUATION ADULT - IMPAIRMENTS FOUND, PT EVAL
gait, locomotion, and balance/aerobic capacity/endurance
muscle strength/aerobic capacity/endurance/gait, locomotion, and balance

## 2017-07-17 NOTE — PHYSICAL THERAPY INITIAL EVALUATION ADULT - CRITERIA FOR SKILLED THERAPEUTIC INTERVENTIONS
impairments found
predicted duration of therapy intervention/rehab potential/therapy frequency/impairments found/anticipated discharge recommendation/risk reduction/prevention/functional limitations in following categories

## 2017-07-17 NOTE — PROGRESS NOTE ADULT - PROBLEM SELECTOR PLAN 1
Respiratory status required supplemental oxygen & the following of continuous pulse oximetry for support & to prevent decompensation  Continued early mobilization as tolerated  Addressed analgesic regimen to optimize function

## 2017-07-17 NOTE — PHYSICAL THERAPY INITIAL EVALUATION ADULT - PERTINENT HX OF CURRENT PROBLEM, REHAB EVAL
82 y/o male with pmhx of HLD, CAMILA, GERD, known aortic root dilatation and AR presents today for presurgical testing.  Pt was diagnosed with aortic root dilatation several years ago and has been followed with serial imaging.  He recently had been experiencing shortness of breath and dizziness-s/p cardiology consult-s/p ECHO, cardiac cath revealed enlargement of the aortic root.  He presents today for presurgical testing for ascending aortic replacement, possible aortic valve repair/replacement.
82 y/o male with pmhx of HLD, CAMILA, GERD, known aortic root dilatation and AR, Pt s/p Ascending Aortic Replacement/ AVR (T)

## 2017-07-17 NOTE — PHYSICAL THERAPY INITIAL EVALUATION ADULT - ADDITIONAL COMMENTS
Pt lives w/ wife in a pvt house w/ 1 step to enter and 1 flt to 2nd flr. Pt typically stays on 1st fl and sleeps in a recliner due to restless leg syndrome. PTA indep w/o an assist device w/ all ADLs
patient  lives in a private house and has about 10 steps to negotiate in the house for bedroom. patient added in history  he had cardiac arrest in gym prior to this admission,

## 2017-07-17 NOTE — PHYSICAL THERAPY INITIAL EVALUATION ADULT - DISCHARGE DISPOSITION, PT EVAL
home w/ assist/home w/ home PT
home w/ assist/patient states his mother in law  will assist as needed

## 2017-07-17 NOTE — PROGRESS NOTE ADULT - SUBJECTIVE AND OBJECTIVE BOX
VITAL SIGNS    Telemetry:     SR first degree 90    Vital Signs Last 24 Hrs  T(C): 36.9 (17 @ 13:45), Max: 36.9 (17 @ 13:45)  T(F): 98.4 (17 @ 13:45), Max: 98.4 (17 @ 13:45)  HR: 106 (17 13:45) (62 - 106)  BP: 105/64 (17 @ 13:45) (95/52 - 135/88)  RR: 20 (17 @ 13:45) (13 - 30)  SpO2: 96% (17 @ 13:45) (93% - 99%)              @ 07:01  -   @ 07:00  --------------------------------------------------------  IN: 380 mL / OUT: 1395 mL / NET: -1015 mL     @ 07:01  -   @ 17:06  --------------------------------------------------------  IN: 210 mL / OUT: 550 mL / NET: -340 mL    Daily Weight in k.2 (2017 00:00)      Bilirubin Total, Serum: 0.5 mg/dL ( @ 01:48)    CAPILLARY BLOOD GLUCOSE              Drains:     MS         [  ] Drainage:                 L Pleural  [  ]  Drainage:                R Pleural  [  ]  Drainage:    Pacing Wires        [  ]   Settings:                                  Isolated  [  ]    Coumadin    [ ] YES          [  ]      NO         Reason:                               PHYSICAL EXAM  Neurology: alert and oriented x 3, nonfocal, no gross deficits    CV :    Sternal Wound :  CDI , Stable    Lungs:    Abdomen: soft, nontender, nondistended, positive bowel sounds, last bowel movement               Extremities:                                           Physical Therapy Rec:   Home  [  ]   Home w/ PT  [  ]  Rehab  [  ]    Discussed with Cardiothoracic Team at AM rounds VITAL SIGNS    Telemetry:     SR first degree 90    Vital Signs Last 24 Hrs  T(C): 36.9 (17 @ 13:45), Max: 36.9 (17 @ 13:45)  T(F): 98.4 (17 @ 13:45), Max: 98.4 (17 @ 13:45)  HR: 106 (17 @ 13:45) (62 - 106)  BP: 105/64 (17 @ 13:45) (95/52 - 135/88)  RR: 20 (17 @ 13:45) (13 - 30)  SpO2: 96% (17 @ 13:45) (93% - 99%)              @ 07:01  -   @ 07:00  --------------------------------------------------------  IN: 380 mL / OUT: 1395 mL / NET: -1015 mL     @ 07:01  -   @ 17:06  --------------------------------------------------------  IN: 210 mL / OUT: 550 mL / NET: -340 mL    Daily Weight in k.2 (2017 00:00)      Bilirubin Total, Serum: 0.5 mg/dL ( @ 01:48)    CAPILLARY BLOOD GLUCOSE              Drains:   out    Pacing Wires        [ VVI 40 ]   Settings:     +PW    Coumadin    [ x] YES                           PHYSICAL EXAM  Neurology: alert and oriented x 3, nonfocal, no gross deficits    CV :  RRR    Sternal Wound :  CDI , Stable   +PW    Lungs:  CTA    Abdomen: soft, nontender, nondistended, positive bowel sounds,              Extremities:        trace b/l pedal edema, no calf tenderness VITAL SIGNS    Telemetry:     SR first degree 90    Vital Signs Last 24 Hrs  T(C): 36.9 (17 @ 13:45), Max: 36.9 (17 @ 13:45)  T(F): 98.4 (17 @ 13:45), Max: 98.4 (17 @ 13:45)  HR: 106 (17 @ 13:45) (62 - 106)  BP: 105/64 (17 @ 13:45) (95/52 - 135/88)  RR: 20 (17 @ 13:45) (13 - 30)  SpO2: 96% (17 @ 13:45) (93% - 99%)              @ 07:01  -   @ 07:00  --------------------------------------------------------  IN: 380 mL / OUT: 1395 mL / NET: -1015 mL     @ 07:01  -   @ 17:06  --------------------------------------------------------  IN: 210 mL / OUT: 550 mL / NET: -340 mL    Daily Weight in k.2 (2017 00:00)      Bilirubin Total, Serum: 0.5 mg/dL ( @ 01:48)    CAPILLARY BLOOD GLUCOSE          Drains:   out    Pacing Wires        [ VVI 40 ]   Settings:     +PW    Coumadin    [ x] YES                           PHYSICAL EXAM  Neurology: alert and oriented x 3, nonfocal, no gross deficits  CV :  RRR  Sternal Wound :  CDI , Stable   +PW  Lungs:  CTA  Abdomen: soft, nontender, nondistended, positive bowel sounds,  Extremities:        trace b/l pedal edema, no calf tenderness

## 2017-07-17 NOTE — PROGRESS NOTE ADULT - ASSESSMENT
7/14  S/P AVR and ascending aortic replacement  with post op hypoxia  and slow afib, attached to external pacer  @ VVI 40,  requiring EPS consult  7/17   trf too floor, heparin gtt and coumadin dosing for AC 7/14  S/P AVR and ascending aortic replacement  with post op hypoxia  and slow afib/Mobitz 1, attached to external pacer  @ VVI 40,  requiring EPS consult  7/17   trf too floor, heparin gtt and coumadin dosing for AC

## 2017-07-17 NOTE — PROGRESS NOTE ADULT - PROBLEM SELECTOR PLAN 2
Epicardial pacing on VVI back-up  Started IV Heparin drip/ coumadin dosing  No beta-blocker-Amiodarone secondary to intermittent pacing Epicardial pacing on VVI back-up  EPS plan to monitor rhythm for now  Started IV Heparin drip/ coumadin dosing  No beta-blocker-Amiodarone secondary to intermittent pacing

## 2017-07-17 NOTE — PROGRESS NOTE ADULT - SUBJECTIVE AND OBJECTIVE BOX
Operation; Ascending Aortic Replacement/ AVR (T)   Ef 50%  POD #2    Patient is doing well, resting comfortably; complains of mild incisional pain that is relieved by pain medication.    Vital Signs Last 24 Hrs  T(C): 36.7 (17 Jul 2017 04:00), Max: 37.4 (16 Jul 2017 08:00)  T(F): 98.1 (17 Jul 2017 04:00), Max: 99.3 (16 Jul 2017 08:00)  HR: 70 (17 Jul 2017 06:00) (62 - 110)  BP: 96/68 (17 Jul 2017 06:00) (96/68 - 135/88)  BP(mean): 77 (17 Jul 2017 06:00) (75 - 104)  RR: 13 (17 Jul 2017 06:00) (13 - 34)  SpO2: 96% (17 Jul 2017 06:00) (91% - 100%)  I&O's Detail    16 Jul 2017 07:01  -  17 Jul 2017 07:00  --------------------------------------------------------  IN:    IV PiggyBack: 250 mL    Oral Fluid: 120 mL    sodium chloride 0.45%: 10 mL  Total IN: 380 mL    OUT:    Chest Tube: 30 mL    Chest Tube: 70 mL    Chest Tube: 25 mL    Indwelling Catheter - Urethral: 495 mL    Voided: 775 mL  Total OUT: 1395 mL    Total NET: -1015 m   EPICARDIAL WIRES: Ventricular wires   HERNANDEZ: no    MEDICATIONS  (STANDING):  aspirin enteric coated 325 milliGRAM(s) Oral daily  docusate sodium 100 milliGRAM(s) Oral three times a day  donepezil 10 milliGRAM(s) Oral at bedtime  rOPINIRole 2 milliGRAM(s) Oral four times a day  gabapentin 600 milliGRAM(s) Oral every 6 hours  enoxaparin Injectable 40 milliGRAM(s) SubCutaneous daily  dextrose 5%. 1000 milliLiter(s) (50 mL/Hr) IV Continuous <Continuous>  atorvastatin 40 milliGRAM(s) Oral at bedtime  pantoprazole    Tablet 40 milliGRAM(s) Oral before breakfast    MEDICATIONS  (PRN):  oxyCODONE    5 mG/acetaminophen 325 mG 1 Tablet(s) Oral every 4 hours PRN Mild Pain (1 - 3)  oxyCODONE    5 mG/acetaminophen 325 mG 2 Tablet(s) Oral every 6 hours PRN Moderate Pain (4 - 6)      General: A+Ox3, in NAD  Chest: sternal dressing C/D/I  Heart: S1, S2, RRR  Lungs: CTA B/L, without W/R/R  Abdomen: Soft, ND, NT, positive BS  Extremeties: without edema B/L LE, positive DP pulses B/L     Does the patient have a history of CHF ; No  -pre-operative EF 50%    Extubation within 24 hours?  yes    CXR reviewed with attending.     Does the patient have a history of kidney disease?   -give CKD stage if applicable  -what is patient's baseline Creatinine    Beta Blockers contraindicated for the first 24 hours due to vasopressor/inotropic medication      Did the patient receive transfusion of blood and/or products? no  -If yes, indication    DVT PPX with vendodynes as well as chemical prophylaxis.    Elina-operative antibiotics to be discontinued within 48 hours of CTU admission    Patient care discussed on morning interdisciplinary rounds with CTS team.

## 2017-07-17 NOTE — PROGRESS NOTE ADULT - ASSESSMENT
81 yrs old S/p Asc Thoracic Aortic replacemer, extubated  Gi prophiolaxis with protonix  Lipitor  DVT propro  laxix  neurontin

## 2017-07-18 ENCOUNTER — TRANSCRIPTION ENCOUNTER (OUTPATIENT)
Age: 82
End: 2017-07-18

## 2017-07-18 DIAGNOSIS — Z95.2 PRESENCE OF PROSTHETIC HEART VALVE: ICD-10-CM

## 2017-07-18 LAB
ANION GAP SERPL CALC-SCNC: 12 MMOL/L — SIGNIFICANT CHANGE UP (ref 5–17)
APTT BLD: 39.6 SEC — HIGH (ref 27.5–37.4)
APTT BLD: 39.9 SEC — HIGH (ref 27.5–37.4)
APTT BLD: 46.9 SEC — HIGH (ref 27.5–37.4)
APTT BLD: 56.1 SEC — HIGH (ref 27.5–37.4)
BUN SERPL-MCNC: 21 MG/DL — SIGNIFICANT CHANGE UP (ref 7–23)
CALCIUM SERPL-MCNC: 9.2 MG/DL — SIGNIFICANT CHANGE UP (ref 8.4–10.5)
CHLORIDE SERPL-SCNC: 102 MMOL/L — SIGNIFICANT CHANGE UP (ref 96–108)
CO2 SERPL-SCNC: 27 MMOL/L — SIGNIFICANT CHANGE UP (ref 22–31)
CREAT SERPL-MCNC: 0.76 MG/DL — SIGNIFICANT CHANGE UP (ref 0.5–1.3)
GLUCOSE SERPL-MCNC: 111 MG/DL — HIGH (ref 70–99)
HCT VFR BLD CALC: 33.3 % — LOW (ref 39–50)
HGB BLD-MCNC: 11.7 G/DL — LOW (ref 13–17)
INR BLD: 1.33 RATIO — HIGH (ref 0.88–1.16)
MCHC RBC-ENTMCNC: 35.1 PG — HIGH (ref 27–34)
MCHC RBC-ENTMCNC: 35.2 GM/DL — SIGNIFICANT CHANGE UP (ref 32–36)
MCV RBC AUTO: 99.6 FL — SIGNIFICANT CHANGE UP (ref 80–100)
PLATELET # BLD AUTO: 157 K/UL — SIGNIFICANT CHANGE UP (ref 150–400)
POTASSIUM SERPL-MCNC: 4.3 MMOL/L — SIGNIFICANT CHANGE UP (ref 3.5–5.3)
POTASSIUM SERPL-SCNC: 4.3 MMOL/L — SIGNIFICANT CHANGE UP (ref 3.5–5.3)
PROTHROM AB SERPL-ACNC: 14.4 SEC — HIGH (ref 9.8–12.7)
RBC # BLD: 3.34 M/UL — LOW (ref 4.2–5.8)
RBC # FLD: 12 % — SIGNIFICANT CHANGE UP (ref 10.3–14.5)
SODIUM SERPL-SCNC: 141 MMOL/L — SIGNIFICANT CHANGE UP (ref 135–145)
WBC # BLD: 11.5 K/UL — HIGH (ref 3.8–10.5)
WBC # FLD AUTO: 11.5 K/UL — HIGH (ref 3.8–10.5)

## 2017-07-18 PROCEDURE — 99233 SBSQ HOSP IP/OBS HIGH 50: CPT | Mod: GC

## 2017-07-18 PROCEDURE — 93010 ELECTROCARDIOGRAM REPORT: CPT

## 2017-07-18 PROCEDURE — 71010: CPT | Mod: 26

## 2017-07-18 RX ORDER — WARFARIN SODIUM 2.5 MG/1
5 TABLET ORAL ONCE
Qty: 0 | Refills: 0 | Status: COMPLETED | OUTPATIENT
Start: 2017-07-18 | End: 2017-07-18

## 2017-07-18 RX ADMIN — OXYCODONE AND ACETAMINOPHEN 2 TABLET(S): 5; 325 TABLET ORAL at 12:37

## 2017-07-18 RX ADMIN — SODIUM CHLORIDE 3 MILLILITER(S): 9 INJECTION INTRAMUSCULAR; INTRAVENOUS; SUBCUTANEOUS at 13:24

## 2017-07-18 RX ADMIN — ATORVASTATIN CALCIUM 40 MILLIGRAM(S): 80 TABLET, FILM COATED ORAL at 22:25

## 2017-07-18 RX ADMIN — GABAPENTIN 600 MILLIGRAM(S): 400 CAPSULE ORAL at 22:25

## 2017-07-18 RX ADMIN — SODIUM CHLORIDE 3 MILLILITER(S): 9 INJECTION INTRAMUSCULAR; INTRAVENOUS; SUBCUTANEOUS at 22:23

## 2017-07-18 RX ADMIN — OXYCODONE AND ACETAMINOPHEN 2 TABLET(S): 5; 325 TABLET ORAL at 13:00

## 2017-07-18 RX ADMIN — WARFARIN SODIUM 5 MILLIGRAM(S): 2.5 TABLET ORAL at 22:25

## 2017-07-18 RX ADMIN — GABAPENTIN 600 MILLIGRAM(S): 400 CAPSULE ORAL at 00:48

## 2017-07-18 RX ADMIN — HEPARIN SODIUM 1400 UNIT(S)/HR: 5000 INJECTION INTRAVENOUS; SUBCUTANEOUS at 08:48

## 2017-07-18 RX ADMIN — DONEPEZIL HYDROCHLORIDE 10 MILLIGRAM(S): 10 TABLET, FILM COATED ORAL at 22:25

## 2017-07-18 RX ADMIN — Medication 100 MILLIGRAM(S): at 15:00

## 2017-07-18 RX ADMIN — Medication 100 MILLIGRAM(S): at 22:25

## 2017-07-18 RX ADMIN — ROPINIROLE 2 MILLIGRAM(S): 8 TABLET, FILM COATED, EXTENDED RELEASE ORAL at 10:50

## 2017-07-18 RX ADMIN — PANTOPRAZOLE SODIUM 40 MILLIGRAM(S): 20 TABLET, DELAYED RELEASE ORAL at 05:15

## 2017-07-18 RX ADMIN — POLYETHYLENE GLYCOL 3350 17 GRAM(S): 17 POWDER, FOR SOLUTION ORAL at 12:36

## 2017-07-18 RX ADMIN — Medication 40 MILLIGRAM(S): at 05:15

## 2017-07-18 RX ADMIN — Medication 325 MILLIGRAM(S): at 12:36

## 2017-07-18 RX ADMIN — GABAPENTIN 600 MILLIGRAM(S): 400 CAPSULE ORAL at 15:00

## 2017-07-18 RX ADMIN — GABAPENTIN 600 MILLIGRAM(S): 400 CAPSULE ORAL at 05:15

## 2017-07-18 RX ADMIN — ROPINIROLE 2 MILLIGRAM(S): 8 TABLET, FILM COATED, EXTENDED RELEASE ORAL at 05:15

## 2017-07-18 RX ADMIN — ROPINIROLE 2 MILLIGRAM(S): 8 TABLET, FILM COATED, EXTENDED RELEASE ORAL at 22:24

## 2017-07-18 RX ADMIN — SODIUM CHLORIDE 3 MILLILITER(S): 9 INJECTION INTRAMUSCULAR; INTRAVENOUS; SUBCUTANEOUS at 05:11

## 2017-07-18 RX ADMIN — Medication 100 MILLIGRAM(S): at 05:15

## 2017-07-18 RX ADMIN — ROPINIROLE 2 MILLIGRAM(S): 8 TABLET, FILM COATED, EXTENDED RELEASE ORAL at 17:06

## 2017-07-18 RX ADMIN — HEPARIN SODIUM 1500 UNIT(S)/HR: 5000 INJECTION INTRAVENOUS; SUBCUTANEOUS at 17:19

## 2017-07-18 RX ADMIN — HEPARIN SODIUM 1300 UNIT(S)/HR: 5000 INJECTION INTRAVENOUS; SUBCUTANEOUS at 01:07

## 2017-07-18 NOTE — DISCHARGE NOTE ADULT - INSTRUCTIONS
fat low salt diet   weigh yourself daily call our office for weight gain greater then 3lbs in 48 hrs

## 2017-07-18 NOTE — DISCHARGE NOTE ADULT - PATIENT PORTAL LINK FT
“You can access the Follow[employer] Patient Portal, offered by [facility], by registering with the following website: [email or web address]”

## 2017-07-18 NOTE — DISCHARGE NOTE ADULT - OTHER SIGNIFICANT FINDINGS
xo3   [id number]  lungs CTA MTI CHACHO sternum stable no drainage  voiding + BM  B/L +DP  T(C): 36.8 (07-21-17 @ 13:13), Max: 36.8 (07-20-17 @ 20:23)  T(F): 98.2 (07-21-17 @ 13:13), Max: 98.2 (07-20-17 @ 20:23)  HR: 75 (07-21-17 @ 13:13) (75 - 81)  BP: 143/70 (07-21-17 @ 13:13) (117/73 - 143/70)  RR: 18 (07-21-17 @ 13:13) (18 - 18)  SpO2: 95% (07-21-17 @ 13:13) (93% - 95%)

## 2017-07-18 NOTE — PROGRESS NOTE ADULT - SUBJECTIVE AND OBJECTIVE BOX
S:  co of incisional pain.  No SOB.      VITAL SIGNS    Telemetry:  SR 1st deg  70-80    Vital Signs Last 24 Hrs  T(C): 36.7 (17 @ 04:53), Max: 36.9 (17 @ 13:45)  T(F): 98 (17 @ 04:53), Max: 98.4 (17 @ 13:45)  HR: 70 (17 @ 04:53) (70 - 106)  BP: 129/82 (17 @ 04:53) (105/64 - 156/74)  RR: 20 (17 @ 04:53) (19 - 20)  SpO2: 97% (17 @ 04:53) (94% - 97%)                    @ 07:01  -   @ 07:00  --------------------------------------------------------  IN: 473 mL / OUT: 1650 mL / NET: -1177 mL     @ 07:01  -   @ 12:52  --------------------------------------------------------  IN: 250 mL / OUT: 200 mL / NET: 50 mL          Daily     Daily Weight in K.4 (2017 07:40)        CAPILLARY BLOOD GLUCOSE              Drains:     MS         [  ] Drainage:                 L Pleural  [  ]  Drainage:                R Pleural  [  ]  Drainage:    Pacing Wires        [  ]   Settings:                                  Isolated  [  ]    Coumadin    [ x ] YES          [  ]      NO         Reason:     Aflutter                          PHYSICAL EXAM        Neurology: alert and oriented x 3, nonfocal, no gross deficits    CV :  S1 S2 RRR.  No m/r/g    Sternal Wound :  CDI , Stable    Lungs:  clear to ausc    Abdomen: soft, nontender, nondistended, positive bowel sounds, last bowel movement                  Extremities:    no edema           aspirin enteric coated 325 milliGRAM(s) Oral daily  docusate sodium 100 milliGRAM(s) Oral three times a day  donepezil 10 milliGRAM(s) Oral at bedtime  rOPINIRole 2 milliGRAM(s) Oral four times a day  gabapentin 600 milliGRAM(s) Oral every 6 hours  dextrose 5%. 1000 milliLiter(s) IV Continuous <Continuous>  atorvastatin 40 milliGRAM(s) Oral at bedtime  pantoprazole    Tablet 40 milliGRAM(s) Oral before breakfast  oxyCODONE    5 mG/acetaminophen 325 mG 1 Tablet(s) Oral every 4 hours PRN  oxyCODONE    5 mG/acetaminophen 325 mG 2 Tablet(s) Oral every 6 hours PRN  sodium chloride 0.9% lock flush 3 milliLiter(s) IV Push every 8 hours  polyethylene glycol 3350 17 Gram(s) Oral daily  furosemide    Tablet 40 milliGRAM(s) Oral daily  heparin  Infusion. 1200 Unit(s)/Hr IV Continuous <Continuous>  warfarin 5 milliGRAM(s) Oral once                              Physical Therapy Rec:   Home  [  ]   Home w/ PT  [  ]  Rehab  [  ]    Discussed with Cardiothoracic Team at AM rounds. S:  co of incisional pain.  No SOB.      VITAL SIGNS    Telemetry:  SR 1st deg  70-80    Vital Signs Last 24 Hrs  T(C): 36.7 (17 @ 04:53), Max: 36.9 (17 @ 13:45)  T(F): 98 (17 @ 04:53), Max: 98.4 (17 @ 13:45)  HR: 70 (17 @ 04:53) (70 - 106)  BP: 129/82 (17 @ 04:53) (105/64 - 156/74)  RR: 20 (17 @ 04:53) (19 - 20)  SpO2: 97% (17 @ 04:53) (94% - 97%)                    @ 07:01  -   @ 07:00  --------------------------------------------------------  IN: 473 mL / OUT: 1650 mL / NET: -1177 mL     @ 07:01  -   @ 12:52  --------------------------------------------------------  IN: 250 mL / OUT: 200 mL / NET: 50 mL          Daily     Daily Weight in K.4 (2017 07:40)        CAPILLARY BLOOD GLUCOSE              Drains:     MS         [  ] Drainage:                 L Pleural  [  ]  Drainage:                R Pleural  [  ]  Drainage:    Pacing Wires        [  ]   Settings:                                  Isolated  [  ]    Coumadin    [ x ] YES          [  ]      NO         Reason:     Aflutter                          PHYSICAL EXAM        Neurology: alert and oriented x 3, nonfocal, no gross deficits    CV :  S1 S2 RRR.  No m/r/g    Sternal Wound :  CDI , Stable    Lungs:  clear to ausc    Abdomen: soft, nontender, nondistended, positive bowel sounds, last bowel movement                  Extremities:    no edema           aspirin enteric coated 325 milliGRAM(s) Oral daily  docusate sodium 100 milliGRAM(s) Oral three times a day  donepezil 10 milliGRAM(s) Oral at bedtime  rOPINIRole 2 milliGRAM(s) Oral four times a day  gabapentin 600 milliGRAM(s) Oral every 6 hours  dextrose 5%. 1000 milliLiter(s) IV Continuous <Continuous>  atorvastatin 40 milliGRAM(s) Oral at bedtime  pantoprazole    Tablet 40 milliGRAM(s) Oral before breakfast  oxyCODONE    5 mG/acetaminophen 325 mG 1 Tablet(s) Oral every 4 hours PRN  oxyCODONE    5 mG/acetaminophen 325 mG 2 Tablet(s) Oral every 6 hours PRN  sodium chloride 0.9% lock flush 3 milliLiter(s) IV Push every 8 hours  polyethylene glycol 3350 17 Gram(s) Oral daily  furosemide    Tablet 40 milliGRAM(s) Oral daily  heparin  Infusion. 1200 Unit(s)/Hr IV Continuous <Continuous>  warfarin 5 milliGRAM(s) Oral once                              Physical Therapy Rec:   Home  [  ]   Home w/ PT  [ x ]  Rehab  [  ]    Discussed with Cardiothoracic Team at AM rounds.

## 2017-07-18 NOTE — DISCHARGE NOTE ADULT - CONDITIONS AT DISCHARGE
Pt discharged to home. Incision C/D/I. IVL D/C'd site benign. Pt without any complaints. VSS. Discharge instructions given and understood by patient. Will cont to monitor pt status. patient verbalize understanding of discharge instructions

## 2017-07-18 NOTE — DISCHARGE NOTE ADULT - ADDITIONAL INSTRUCTIONS
Follow up with MARILYNN Escobar at [facility] on 7/27/17 at 2pm   Follow up with Dr Larson on August 11 at 9 am at [facility]   Followup with Dr Rodriguez for INR/Coumadin dosing on Monday 7/24 call for time [phone]  take all medications as prescribed Follow heart dos an donts   no golf bathing or swimming  shower daily no creams or lotions on incisions   call our office for fever chills or any concerns

## 2017-07-18 NOTE — DISCHARGE NOTE ADULT - MEDICATION SUMMARY - MEDICATIONS TO STOP TAKING
I will STOP taking the medications listed below when I get home from the hospital:    Myrbetriq 25 mg oral tablet, extended release  -- 1 tab(s) by mouth 2 times a day    metoprolol tartrate 25 mg oral tablet  -- 1 .5 tab by mouth

## 2017-07-18 NOTE — DISCHARGE NOTE ADULT - HOME CARE AGENCY
[facility] [phone] for RN assessment and P/T evaluation, start of care the day after discharge

## 2017-07-18 NOTE — PROGRESS NOTE ADULT - PROBLEM SELECTOR PLAN 1
Epicardial pacing on VVI back-up  EPS plan to monitor rhythm for now  Started IV Heparin drip/ coumadin dosing  No beta-blocker-Amiodarone secondary to intermittent pacing Epicardial pacing on VVI back-up  EPS plan to monitor rhythm for now  Started IV Heparin drip/ coumadin dosing  No chico blockers.  May start low dose bb if tachy

## 2017-07-18 NOTE — DISCHARGE NOTE ADULT - REASON FOR ADMISSION
aortic root dilatation 7/14/17  REPAIR OFAAA UTILIZING A 28X8MM GELWEAVE GRAFT    AVR   07/14/2017  AVR UTILIZING A #23MM BOVINE PERICARDIAL VALVEo

## 2017-07-18 NOTE — DISCHARGE NOTE ADULT - CARE PLAN
Principal Discharge DX:	Aortic valve replaced  Goal:	7/14/17  REPAIR OF AAA UTILIZING A 28X8MM GELWEAVE GRAFT  Instructions for follow-up, activity and diet:	Follow up with MARILYNN Escboar at [facility] on 7/27/17 at 2pm   Follow up with Dr Larson on August 11 at 9 am at [facility]   Followup with Dr Rodriguez for INR/Coumadin dosing on Monday 7/24 call for time [phone]  take all medications as prescribed Follow heart dos an donts   no golf bathing or swimming  shower daily no creams or lotions on incisions   call our office for fever chills or any concerns  Principal Discharge DX:	Aortic valve replaced  Goal:	7/14/17  REPAIR OF AAA UTILIZING A 28X8MM GELWEAVE GRAFT  Instructions for follow-up, activity and diet:	Follow up with MARILYNN Escobar at [facility] on 7/27/17 at 2pm   Follow up with Dr Larson on August 11 at 9 am at [facility]   Followup with Dr Rodriguez for INR/Coumadin dosing on Monday 7/24 call for time [phone]  take all medications as prescribed Follow heart dos an donts   no golf bathing or swimming  shower daily no creams or lotions on incisions   call our office for fever chills or any concerns

## 2017-07-18 NOTE — DISCHARGE NOTE ADULT - HOSPITAL COURSE
This is a 82 y/o male is a 82 y/o male with pmhx of HLD, CAMILA, GERD, known aortic root dilatation S/P 7/14/17   AVR and ascending aortic replacement    hospital course includes   post op hypoxia and slow afib/Mobitz 1, attached to external pacer  @ VVI 40,  requiring EPS consult  7/17   trf too floor, heparin gtt and coumadin dosing for AC  7/18 Aflutter 70's on hep/coum .  Per ep continue to hold chico blockers.  Cont AC  7/19.  BB started for inc HR.  Heparin stopped.  INR 1.86.  Coumadin continued.   7/20 INR 2.98 patient discharged cancelled to monitor INR-coumadin held  7/21 INR in am  3.46 repeat INR at 1400 3.06 trending down D/w Dr Larson stable for discharge hold coumadin tonight resume 2 mg on Saturday 7/22/17/ Dr Du office contacted will see patient on Monday to check INR and dose Coumadin

## 2017-07-18 NOTE — PROGRESS NOTE ADULT - ASSESSMENT
POD # 4  AVR and ascending aortic replacement  with post op hypoxia  and slow afib/Mobitz 1, attached to external pacer  @ VVI 40,  requiring EPS consult  7/17   trf too floor, heparin gtt and coumadin dosing for AC  7/18 Aflutter 70's on hep/coum .  Per ep continue to hold chico blockers.  Cont AC

## 2017-07-18 NOTE — DISCHARGE NOTE ADULT - PLAN OF CARE
7/14/17  REPAIR OF AAA UTILIZING A 28X8MM GELWEAVE GRAFT Follow up with MARILYNN Escobar at [facility] on 7/27/17 at 2pm   Follow up with Dr Larson on August 11 at 9 am at [facility]   Followup with Dr Rodriguez for INR/Coumadin dosing on Monday 7/24 call for time [phone]  take all medications as prescribed Follow heart dos an donts   no golf bathing or swimming  shower daily no creams or lotions on incisions   call our office for fever chills or any concerns

## 2017-07-18 NOTE — DISCHARGE NOTE ADULT - MEDICATION SUMMARY - MEDICATIONS TO TAKE
I will START or STAY ON the medications listed below when I get home from the hospital:    acetaminophen-oxycodone 325 mg-5 mg oral tablet  -- 1 tab(s) by mouth every 4 hours, As needed, Mild Pain (1 - 3)  take 1-2 tabs as need PRN for pain every 4 hrs MDD:8  -- Indication: For pain    aspirin 325 mg oral delayed release tablet  -- 1 tab(s) by mouth once a day  -- Indication: For blood thinner    Coumadin 2 mg oral tablet  -- 1 tab(s) by mouth once a day start Saturday 7/22/17  -- Do not take this drug if you are pregnant.  It is very important that you take or use this exactly as directed.  Do not skip doses or discontinue unless directed by your doctor.  Obtain medical advice before taking any non-prescription drugs as some may affect the action of this medication.    -- Indication: For blood thinner    gabapentin 600 mg oral tablet  -- 1 tab(s) by mouth every 6 hours  -- Indication: For pain    atorvastatin 40 mg oral tablet  -- 1 tab(s) by mouth once a day (at bedtime)  -- Indication: For cholesterol    rOPINIRole 2 mg oral tablet  -- 1 tab(s) by mouth 4 times a day  -- Indication: For parkinson    metoprolol 25 mg oral tablet  -- 0.5 tab(s) by mouth 2 times a day  -- It is very important that you take or use this exactly as directed.  Do not skip doses or discontinue unless directed by your doctor.  May cause drowsiness.  Alcohol may intensify this effect.  Use care when operating dangerous machinery.  Some non-prescription drugs may aggravate your condition.  Read all labels carefully.  If a warning appears, check with your doctor before taking.  Take with food or milk.  This drug may impair the ability to drive or operate machinery.  Use care until you become familiar with its effects.    -- Indication: For Heart rate    metoprolol  -- 12.5 milligram(s) by mouth 2 times a day  -- Indication: For Heart rate    donepezil 10 mg oral tablet  -- 1 tab(s) by mouth once a day (at bedtime)  -- Indication: For Alzheimers    docusate sodium 100 mg oral capsule  -- 1 cap(s) by mouth 2 times a day  -- Indication: For stool softner    polyethylene glycol 3350 oral powder for reconstitution  -- 17 gram(s) by mouth once a day prn for constipation  -- Indication: For laxative    pantoprazole 40 mg oral delayed release tablet  -- 1 tab(s) by mouth once a day (before a meal)  -- Indication: For Antacid    Multi-Day Plus Minerals oral tablet  -- 1 tab(s) by mouth once a day  -- Indication: For vitamin    Vitamin C 500 mg oral tablet  -- 1 tab(s) by mouth once a day  -- Indication: For vitamin

## 2017-07-18 NOTE — PROGRESS NOTE ADULT - SUBJECTIVE AND OBJECTIVE BOX
24H hour events:  No acute events in last 24 hours. Patient seen in bed, states feels well, improved since admission.     MEDICATIONS:  aspirin enteric coated 325 milliGRAM(s) Oral daily  furosemide    Tablet 40 milliGRAM(s) Oral daily  heparin  Infusion. 1200 Unit(s)/Hr IV Continuous <Continuous>  warfarin 5 milliGRAM(s) Oral once        donepezil 10 milliGRAM(s) Oral at bedtime  rOPINIRole 2 milliGRAM(s) Oral four times a day  gabapentin 600 milliGRAM(s) Oral every 6 hours  oxyCODONE    5 mG/acetaminophen 325 mG 1 Tablet(s) Oral every 4 hours PRN  oxyCODONE    5 mG/acetaminophen 325 mG 2 Tablet(s) Oral every 6 hours PRN    docusate sodium 100 milliGRAM(s) Oral three times a day  pantoprazole    Tablet 40 milliGRAM(s) Oral before breakfast  polyethylene glycol 3350 17 Gram(s) Oral daily    atorvastatin 40 milliGRAM(s) Oral at bedtime    dextrose 5%. 1000 milliLiter(s) IV Continuous <Continuous>      REVIEW OF SYSTEMS:  General: no fatigue/malaise, weight loss/gain.  Skin: no rashes.  Ophthalmologic: no blurred vision, no loss of vision. 	  ENT: no sore throat, rhinorrhea, sinus congestion.  Respiratory: no SOB, cough or wheeze.  Gastrointestinal:  no N/V/D, no melena/hematemesis/hematochezia.  Genitourinary: no dysuria/hesitancy or hematuria.  Musculoskeletal: no myalgias or arthralgias.  Neurological: no changes in vision or hearing, no lightheadedness/dizziness, no syncope/near syncope	  Psychiatric: no unusual stress/anxiety.   Hematology/Lymphatics: no unusual bleeding, bruising and no lymphadenopathy.  Endocrine: no unusual thirst.   All others negative except as stated above and in HPI.      PHYSICAL EXAM:  T(C): 36.7 (07-18-17 @ 04:53), Max: 36.9 (07-17-17 @ 13:45)  HR: 70 (07-18-17 @ 04:53) (70 - 106)  BP: 129/82 (07-18-17 @ 04:53) (105/64 - 156/74)  RR: 20 (07-18-17 @ 04:53) (19 - 20)  SpO2: 97% (07-18-17 @ 04:53) (94% - 97%)  Wt(kg): --  I&O's Summary    17 Jul 2017 07:01  -  18 Jul 2017 07:00  --------------------------------------------------------  IN: 473 mL / OUT: 1650 mL / NET: -1177 mL    18 Jul 2017 07:01  -  18 Jul 2017 12:30  --------------------------------------------------------  IN: 250 mL / OUT: 200 mL / NET: 50 mL        Appearance: Normal	  HEENT:   Normal oral mucosa, PERRL, EOMI	  Lymphatic: No lymphadenopathy  Cardiovascular: Normal S1 S2, No JVD, No murmurs, No edema  Respiratory: Lungs clear to auscultation	  Psychiatry: A & O x 3, Mood & affect appropriate  Gastrointestinal:  Soft, Non-tender, + BS	  Skin: No rashes, No ecchymoses, No cyanosis	  Neurologic: Non-focal  Extremities: Normal range of motion, No clubbing, cyanosis or edema  Vascular: Peripheral pulses palpable 2+ bilaterally        LABS:	 	    CBC Full  -  ( 18 Jul 2017 00:41 )  WBC Count : 11.5 K/uL  Hemoglobin : 11.7 g/dL  Hematocrit : 33.3 %  Platelet Count - Automated : 157 K/uL  Mean Cell Volume : 99.6 fl  Mean Cell Hemoglobin : 35.1 pg  Mean Cell Hemoglobin Concentration : 35.2 gm/dL  Auto Neutrophil # : x  Auto Lymphocyte # : x  Auto Monocyte # : x  Auto Eosinophil # : x  Auto Basophil # : x  Auto Neutrophil % : x  Auto Lymphocyte % : x  Auto Monocyte % : x  Auto Eosinophil % : x  Auto Basophil % : x    07-18    141  |  102  |  21  ----------------------------<  111<H>  4.3   |  27  |  0.76  07-17    141  |  103  |  19  ----------------------------<  129<H>  4.3   |  27  |  0.64    Ca    9.2      18 Jul 2017 00:41  Ca    9.1      17 Jul 2017 01:48  Phos  2.0     07-17    TPro  5.6<L>  /  Alb  3.4  /  TBili  0.5  /  DBili  x   /  AST  21  /  ALT  20  /  AlkPhos  40  07-17      proBNP: Serum Pro-Brain Natriuretic Peptide: 588 pg/mL (07-15-17 @ 01:24)    Lipid Profile:   HgA1c:   TSH:       CARDIAC MARKERS:            TELEMETRY: 	  Afib/AFL today.  rates   ECG:  	      PREVIOUS DIAGNOSTIC TESTING:    [ ] Echocardiogram:  [ ]  Catheterization:  [ ] Stress Test:

## 2017-07-18 NOTE — DISCHARGE NOTE ADULT - CARE PROVIDER_API CALL
Avery Rodriguez), Cardiovascular Disease; Nuclear Cardiology  [address] [place] [address]  Phone: [phone]  Fax: [phone]    Dominik Larson), Surgery; Thoracic and Cardiac Surgery  [address] [address]  Phone: [phone]  Fax: [phone]

## 2017-07-18 NOTE — DISCHARGE NOTE ADULT - NS AS ACTIVITY OBS
Walking-Outdoors allowed/Stairs allowed/Do not drive or operate machinery/Walking-Indoors allowed/No Heavy lifting/straining/Sex allowed/Do not make important decisions/Showering allowed

## 2017-07-18 NOTE — PROGRESS NOTE ADULT - ASSESSMENT
81M HLD, CAMILA Aortic root dilatation s/p AVR. Mobitz 1 on EKG    Mobitz 1  / AFL  - improved on tele, some new AFL  - anticoagulation for AFL as tolerated  - continue to hold avn blockade, if becomes tachycardic may introduce metoprolol 12.5 BID  - EP to continue to follow  - please maintain K>4 , Mg>2  - For any questions or If there are any concerns, please call [phone] during daytime, covered by [phone] after-hours

## 2017-07-19 LAB
ANION GAP SERPL CALC-SCNC: 13 MMOL/L — SIGNIFICANT CHANGE UP (ref 5–17)
APTT BLD: 63.4 SEC — HIGH (ref 27.5–37.4)
BUN SERPL-MCNC: 20 MG/DL — SIGNIFICANT CHANGE UP (ref 7–23)
CALCIUM SERPL-MCNC: 9.4 MG/DL — SIGNIFICANT CHANGE UP (ref 8.4–10.5)
CHLORIDE SERPL-SCNC: 103 MMOL/L — SIGNIFICANT CHANGE UP (ref 96–108)
CO2 SERPL-SCNC: 27 MMOL/L — SIGNIFICANT CHANGE UP (ref 22–31)
CREAT SERPL-MCNC: 0.77 MG/DL — SIGNIFICANT CHANGE UP (ref 0.5–1.3)
GLUCOSE SERPL-MCNC: 108 MG/DL — HIGH (ref 70–99)
HCT VFR BLD CALC: 33.3 % — LOW (ref 39–50)
HGB BLD-MCNC: 11.3 G/DL — LOW (ref 13–17)
INR BLD: 1.86 RATIO — HIGH (ref 0.88–1.16)
MCHC RBC-ENTMCNC: 33.7 PG — SIGNIFICANT CHANGE UP (ref 27–34)
MCHC RBC-ENTMCNC: 34 GM/DL — SIGNIFICANT CHANGE UP (ref 32–36)
MCV RBC AUTO: 99 FL — SIGNIFICANT CHANGE UP (ref 80–100)
PLATELET # BLD AUTO: 186 K/UL — SIGNIFICANT CHANGE UP (ref 150–400)
POTASSIUM SERPL-MCNC: 4.3 MMOL/L — SIGNIFICANT CHANGE UP (ref 3.5–5.3)
POTASSIUM SERPL-SCNC: 4.3 MMOL/L — SIGNIFICANT CHANGE UP (ref 3.5–5.3)
PROTHROM AB SERPL-ACNC: 20.5 SEC — HIGH (ref 9.8–12.7)
RBC # BLD: 3.36 M/UL — LOW (ref 4.2–5.8)
RBC # FLD: 12.2 % — SIGNIFICANT CHANGE UP (ref 10.3–14.5)
SODIUM SERPL-SCNC: 143 MMOL/L — SIGNIFICANT CHANGE UP (ref 135–145)
WBC # BLD: 10.8 K/UL — HIGH (ref 3.8–10.5)
WBC # FLD AUTO: 10.8 K/UL — HIGH (ref 3.8–10.5)

## 2017-07-19 RX ORDER — METOPROLOL TARTRATE 50 MG
12.5 TABLET ORAL EVERY 12 HOURS
Qty: 0 | Refills: 0 | Status: DISCONTINUED | OUTPATIENT
Start: 2017-07-19 | End: 2017-07-21

## 2017-07-19 RX ORDER — POLYETHYLENE GLYCOL 3350 17 G/17G
17 POWDER, FOR SOLUTION ORAL DAILY
Qty: 0 | Refills: 0 | Status: DISCONTINUED | OUTPATIENT
Start: 2017-07-19 | End: 2017-07-21

## 2017-07-19 RX ORDER — WARFARIN SODIUM 2.5 MG/1
5 TABLET ORAL ONCE
Qty: 0 | Refills: 0 | Status: COMPLETED | OUTPATIENT
Start: 2017-07-19 | End: 2017-07-19

## 2017-07-19 RX ADMIN — Medication 325 MILLIGRAM(S): at 12:01

## 2017-07-19 RX ADMIN — SODIUM CHLORIDE 3 MILLILITER(S): 9 INJECTION INTRAMUSCULAR; INTRAVENOUS; SUBCUTANEOUS at 15:29

## 2017-07-19 RX ADMIN — ROPINIROLE 2 MILLIGRAM(S): 8 TABLET, FILM COATED, EXTENDED RELEASE ORAL at 23:03

## 2017-07-19 RX ADMIN — SODIUM CHLORIDE 3 MILLILITER(S): 9 INJECTION INTRAMUSCULAR; INTRAVENOUS; SUBCUTANEOUS at 05:57

## 2017-07-19 RX ADMIN — GABAPENTIN 600 MILLIGRAM(S): 400 CAPSULE ORAL at 23:03

## 2017-07-19 RX ADMIN — PANTOPRAZOLE SODIUM 40 MILLIGRAM(S): 20 TABLET, DELAYED RELEASE ORAL at 05:55

## 2017-07-19 RX ADMIN — ROPINIROLE 2 MILLIGRAM(S): 8 TABLET, FILM COATED, EXTENDED RELEASE ORAL at 10:53

## 2017-07-19 RX ADMIN — OXYCODONE AND ACETAMINOPHEN 2 TABLET(S): 5; 325 TABLET ORAL at 23:35

## 2017-07-19 RX ADMIN — ROPINIROLE 2 MILLIGRAM(S): 8 TABLET, FILM COATED, EXTENDED RELEASE ORAL at 05:55

## 2017-07-19 RX ADMIN — POLYETHYLENE GLYCOL 3350 17 GRAM(S): 17 POWDER, FOR SOLUTION ORAL at 12:01

## 2017-07-19 RX ADMIN — ROPINIROLE 2 MILLIGRAM(S): 8 TABLET, FILM COATED, EXTENDED RELEASE ORAL at 18:25

## 2017-07-19 RX ADMIN — DONEPEZIL HYDROCHLORIDE 10 MILLIGRAM(S): 10 TABLET, FILM COATED ORAL at 21:02

## 2017-07-19 RX ADMIN — GABAPENTIN 600 MILLIGRAM(S): 400 CAPSULE ORAL at 18:25

## 2017-07-19 RX ADMIN — HEPARIN SODIUM 1600 UNIT(S)/HR: 5000 INJECTION INTRAVENOUS; SUBCUTANEOUS at 07:01

## 2017-07-19 RX ADMIN — GABAPENTIN 600 MILLIGRAM(S): 400 CAPSULE ORAL at 05:55

## 2017-07-19 RX ADMIN — SODIUM CHLORIDE 3 MILLILITER(S): 9 INJECTION INTRAMUSCULAR; INTRAVENOUS; SUBCUTANEOUS at 21:04

## 2017-07-19 RX ADMIN — Medication 12.5 MILLIGRAM(S): at 12:02

## 2017-07-19 RX ADMIN — Medication 100 MILLIGRAM(S): at 21:02

## 2017-07-19 RX ADMIN — OXYCODONE AND ACETAMINOPHEN 2 TABLET(S): 5; 325 TABLET ORAL at 16:08

## 2017-07-19 RX ADMIN — OXYCODONE AND ACETAMINOPHEN 2 TABLET(S): 5; 325 TABLET ORAL at 23:03

## 2017-07-19 RX ADMIN — Medication 100 MILLIGRAM(S): at 05:55

## 2017-07-19 RX ADMIN — Medication 12.5 MILLIGRAM(S): at 21:02

## 2017-07-19 RX ADMIN — WARFARIN SODIUM 5 MILLIGRAM(S): 2.5 TABLET ORAL at 21:02

## 2017-07-19 RX ADMIN — ATORVASTATIN CALCIUM 40 MILLIGRAM(S): 80 TABLET, FILM COATED ORAL at 21:02

## 2017-07-19 RX ADMIN — Medication 40 MILLIGRAM(S): at 05:55

## 2017-07-19 RX ADMIN — GABAPENTIN 600 MILLIGRAM(S): 400 CAPSULE ORAL at 12:01

## 2017-07-19 RX ADMIN — OXYCODONE AND ACETAMINOPHEN 2 TABLET(S): 5; 325 TABLET ORAL at 16:38

## 2017-07-19 RX ADMIN — HEPARIN SODIUM 1600 UNIT(S)/HR: 5000 INJECTION INTRAVENOUS; SUBCUTANEOUS at 00:09

## 2017-07-19 RX ADMIN — Medication 100 MILLIGRAM(S): at 16:08

## 2017-07-19 NOTE — PROGRESS NOTE ADULT - SUBJECTIVE AND OBJECTIVE BOX
S:  feels better.  pain more controlled.  ambulating in hallway    Telemetry:  afib/aflutter     Vital Signs Last 24 Hrs  T(C): 36.7 (17 @ 06:00), Max: 36.9 (17 @ 20:05)  T(F): 98.1 (17 @ 06:00), Max: 98.4 (17 @ 20:05)  HR: 95 (17 @ 10:53) (80 - 109)  BP: 101/67 (17 @ 06:00) (101/67 - 112/58)  RR: 18 (17 @ 06:00) (18 - 19)  SpO2: 93% (17 @ 10:53) (92% - 93%)                    @ 07:01  -   @ 07:00  --------------------------------------------------------  IN: 948 mL / OUT: 650 mL / NET: 298 mL     @ 07:01  -   @ 12:06  --------------------------------------------------------  IN: 0 mL / OUT: 850 mL / NET: -850 mL         Daily Weight in k (2017 09:00)        CAPILLARY BLOOD GLUCOSE  155 (2017 21:56)              Drains:     MS         [  ] Drainage:                 L Pleural  [  ]  Drainage:                R Pleural  [  ]  Drainage:    Pacing Wires        [ x ]   Settings:                                  Isolated  [  ]    Coumadin    [ ] YES          [ x ]      NO         Reason:     Afib/Aflutter                            11.3   10.8  )-----------( 186      ( 2017 06:21 )             33.3       07-19    143  |  103  |  20  ----------------------------<  108<H>  4.3   |  27  |  0.77    Ca    9.4      2017 06:21        PT/INR - ( 2017 06:21 )   PT: 20.5 sec;   INR: 1.86 ratio         PTT - ( 2017 06:21 )  PTT:63.4 sec    PHYSICAL EXAM:    Neurology: alert and oriented x 3, nonfocal, no gross deficits    CV :  S1 S2 heard RRR no m/r/g    Sternal Wound :  CDI , Stable    Lungs:  clear to ausc.  no w/r/r    Abdomen: soft, nontender, nondistended, positive bowel sounds, no BM              Extremities:     no edema          aspirin enteric coated 325 milliGRAM(s) Oral daily  docusate sodium 100 milliGRAM(s) Oral three times a day  donepezil 10 milliGRAM(s) Oral at bedtime  rOPINIRole 2 milliGRAM(s) Oral four times a day  gabapentin 600 milliGRAM(s) Oral every 6 hours  dextrose 5%. 1000 milliLiter(s) IV Continuous <Continuous>  atorvastatin 40 milliGRAM(s) Oral at bedtime  pantoprazole    Tablet 40 milliGRAM(s) Oral before breakfast  oxyCODONE    5 mG/acetaminophen 325 mG 1 Tablet(s) Oral every 4 hours PRN  oxyCODONE    5 mG/acetaminophen 325 mG 2 Tablet(s) Oral every 6 hours PRN  sodium chloride 0.9% lock flush 3 milliLiter(s) IV Push every 8 hours  polyethylene glycol 3350 17 Gram(s) Oral daily  furosemide    Tablet 40 milliGRAM(s) Oral daily  guaiFENesin    Syrup 100 milliGRAM(s) Oral every 6 hours PRN  metoprolol 12.5 milliGRAM(s) Oral every 12 hours  bisacodyl Suppository 10 milliGRAM(s) Rectal daily PRN  polyethylene glycol 3350 17 Gram(s) Oral daily  warfarin 5 milliGRAM(s) Oral once                              Physical Therapy Rec:   Home  [  ]   Home w/ PT  [ x ]  Rehab  [  ]    Discussed with Cardiothoracic Team at AM rounds.

## 2017-07-19 NOTE — PROGRESS NOTE ADULT - PROBLEM SELECTOR PLAN 3
sp asc aneurysm repair
sp asc aneurysm repair  stable  DC planning Thurs/Fri
ambulate    Home PT plan

## 2017-07-19 NOTE — PROGRESS NOTE ADULT - PROBLEM SELECTOR PROBLEM 2
Aortic valve replaced
Aortic valve replaced
Atrial fibrillation, new onset
Atrial fibrillation, new onset

## 2017-07-19 NOTE — PROGRESS NOTE ADULT - PROBLEM SELECTOR PLAN 1
Epicardial pacing on VVI back-up  EPS plan to monitor rhythm for now  Started IV Heparin drip/ coumadin dosing  No [place] blockers.  low dose bb started - afib/aflut

## 2017-07-19 NOTE — PROGRESS NOTE ADULT - PROBLEM SELECTOR PROBLEM 1
Atrial fibrillation, new onset
Atrial fibrillation, new onset
Other pulmonary insufficiency, not elsewhere classified, following trauma and surgery
Other pulmonary insufficiency, not elsewhere classified, following trauma and surgery

## 2017-07-19 NOTE — PROGRESS NOTE ADULT - ASSESSMENT
POD # 4  AVR and ascending aortic replacement  with post op hypoxia  and slow afib/Mobitz 1, attached to external pacer  @ VVI 40,  requiring EPS consult  7/17   trf too floor, heparin gtt and coumadin dosing for AC  7/18 Aflutter 70's on hep/coum .  Per ep continue to hold [place] blockers.  Cont AC  7/19.  BB started for inc HR.  Heparin stopped.  INR 1.86.  Coumadin continued.

## 2017-07-20 LAB
ANION GAP SERPL CALC-SCNC: 14 MMOL/L — SIGNIFICANT CHANGE UP (ref 5–17)
BUN SERPL-MCNC: 24 MG/DL — HIGH (ref 7–23)
CALCIUM SERPL-MCNC: 9.4 MG/DL — SIGNIFICANT CHANGE UP (ref 8.4–10.5)
CHLORIDE SERPL-SCNC: 101 MMOL/L — SIGNIFICANT CHANGE UP (ref 96–108)
CO2 SERPL-SCNC: 26 MMOL/L — SIGNIFICANT CHANGE UP (ref 22–31)
CREAT SERPL-MCNC: 0.77 MG/DL — SIGNIFICANT CHANGE UP (ref 0.5–1.3)
GLUCOSE SERPL-MCNC: 143 MG/DL — HIGH (ref 70–99)
HCT VFR BLD CALC: 32.2 % — LOW (ref 39–50)
HGB BLD-MCNC: 11.4 G/DL — LOW (ref 13–17)
INR BLD: 2.93 RATIO — HIGH (ref 0.88–1.16)
MCHC RBC-ENTMCNC: 35.1 PG — HIGH (ref 27–34)
MCHC RBC-ENTMCNC: 35.3 GM/DL — SIGNIFICANT CHANGE UP (ref 32–36)
MCV RBC AUTO: 99.4 FL — SIGNIFICANT CHANGE UP (ref 80–100)
PLATELET # BLD AUTO: 220 K/UL — SIGNIFICANT CHANGE UP (ref 150–400)
POTASSIUM SERPL-MCNC: 4 MMOL/L — SIGNIFICANT CHANGE UP (ref 3.5–5.3)
POTASSIUM SERPL-SCNC: 4 MMOL/L — SIGNIFICANT CHANGE UP (ref 3.5–5.3)
PROTHROM AB SERPL-ACNC: 32.3 SEC — HIGH (ref 9.8–12.7)
RBC # BLD: 3.24 M/UL — LOW (ref 4.2–5.8)
RBC # FLD: 12.3 % — SIGNIFICANT CHANGE UP (ref 10.3–14.5)
SODIUM SERPL-SCNC: 141 MMOL/L — SIGNIFICANT CHANGE UP (ref 135–145)
WBC # BLD: 9.8 K/UL — SIGNIFICANT CHANGE UP (ref 3.8–10.5)
WBC # FLD AUTO: 9.8 K/UL — SIGNIFICANT CHANGE UP (ref 3.8–10.5)

## 2017-07-20 RX ADMIN — OXYCODONE AND ACETAMINOPHEN 1 TABLET(S): 5; 325 TABLET ORAL at 09:53

## 2017-07-20 RX ADMIN — Medication 12.5 MILLIGRAM(S): at 05:44

## 2017-07-20 RX ADMIN — ROPINIROLE 2 MILLIGRAM(S): 8 TABLET, FILM COATED, EXTENDED RELEASE ORAL at 17:13

## 2017-07-20 RX ADMIN — SODIUM CHLORIDE 3 MILLILITER(S): 9 INJECTION INTRAMUSCULAR; INTRAVENOUS; SUBCUTANEOUS at 05:47

## 2017-07-20 RX ADMIN — Medication 325 MILLIGRAM(S): at 11:08

## 2017-07-20 RX ADMIN — Medication 100 MILLIGRAM(S): at 15:15

## 2017-07-20 RX ADMIN — Medication 100 MILLIGRAM(S): at 05:44

## 2017-07-20 RX ADMIN — OXYCODONE AND ACETAMINOPHEN 1 TABLET(S): 5; 325 TABLET ORAL at 19:50

## 2017-07-20 RX ADMIN — Medication 40 MILLIGRAM(S): at 05:44

## 2017-07-20 RX ADMIN — OXYCODONE AND ACETAMINOPHEN 1 TABLET(S): 5; 325 TABLET ORAL at 15:15

## 2017-07-20 RX ADMIN — SODIUM CHLORIDE 3 MILLILITER(S): 9 INJECTION INTRAMUSCULAR; INTRAVENOUS; SUBCUTANEOUS at 22:16

## 2017-07-20 RX ADMIN — ROPINIROLE 2 MILLIGRAM(S): 8 TABLET, FILM COATED, EXTENDED RELEASE ORAL at 09:52

## 2017-07-20 RX ADMIN — SODIUM CHLORIDE 3 MILLILITER(S): 9 INJECTION INTRAMUSCULAR; INTRAVENOUS; SUBCUTANEOUS at 15:14

## 2017-07-20 RX ADMIN — ATORVASTATIN CALCIUM 40 MILLIGRAM(S): 80 TABLET, FILM COATED ORAL at 22:15

## 2017-07-20 RX ADMIN — OXYCODONE AND ACETAMINOPHEN 1 TABLET(S): 5; 325 TABLET ORAL at 10:30

## 2017-07-20 RX ADMIN — OXYCODONE AND ACETAMINOPHEN 1 TABLET(S): 5; 325 TABLET ORAL at 16:30

## 2017-07-20 RX ADMIN — ROPINIROLE 2 MILLIGRAM(S): 8 TABLET, FILM COATED, EXTENDED RELEASE ORAL at 05:44

## 2017-07-20 RX ADMIN — POLYETHYLENE GLYCOL 3350 17 GRAM(S): 17 POWDER, FOR SOLUTION ORAL at 11:09

## 2017-07-20 RX ADMIN — GABAPENTIN 600 MILLIGRAM(S): 400 CAPSULE ORAL at 17:13

## 2017-07-20 RX ADMIN — GABAPENTIN 600 MILLIGRAM(S): 400 CAPSULE ORAL at 22:15

## 2017-07-20 RX ADMIN — Medication 12.5 MILLIGRAM(S): at 17:13

## 2017-07-20 RX ADMIN — PANTOPRAZOLE SODIUM 40 MILLIGRAM(S): 20 TABLET, DELAYED RELEASE ORAL at 05:44

## 2017-07-20 RX ADMIN — GABAPENTIN 600 MILLIGRAM(S): 400 CAPSULE ORAL at 11:09

## 2017-07-20 RX ADMIN — GABAPENTIN 600 MILLIGRAM(S): 400 CAPSULE ORAL at 05:44

## 2017-07-20 RX ADMIN — Medication 100 MILLIGRAM(S): at 22:15

## 2017-07-20 RX ADMIN — ROPINIROLE 2 MILLIGRAM(S): 8 TABLET, FILM COATED, EXTENDED RELEASE ORAL at 22:15

## 2017-07-20 RX ADMIN — OXYCODONE AND ACETAMINOPHEN 1 TABLET(S): 5; 325 TABLET ORAL at 19:19

## 2017-07-20 RX ADMIN — DONEPEZIL HYDROCHLORIDE 10 MILLIGRAM(S): 10 TABLET, FILM COATED ORAL at 22:15

## 2017-07-20 NOTE — DIETITIAN INITIAL EVALUATION ADULT. - OTHER INFO
Pt seen for LOS. Pt reports good po intake/appetite, denies recent wt changes denies GI distress at this time, last BM yesterday. Pt denies food allergies, denies difficulty chewing/swallowing. States he eats whatever he wants at home, does not follow a low salt diet and puts salt on everything. Pt refuses heart-healthy diet/Coumadin/vitamin K interaction education at this time.

## 2017-07-20 NOTE — DIETITIAN INITIAL EVALUATION ADULT. - ENERGY NEEDS
ht: 65 inches. wt: 185.1 pounds (current, standing, +1 B/L leg, ankle, foot edema). BMI: 30.8 pounds. UBW: IBW: 136 pounds +/- 10% %IBW: 136%  Other pertinent objective information: 81 year old male pt c PMH HLD, CAMILA, GERD S/P 7/14/17 AVR and ascending aortic replacement with post op afib on coumadin. No pressure injuries. ht: 65 inches. wt: 185.1 pounds (current, standing, +1 B/L leg, ankle, foot edema). BMI: 30.8 pounds. UBW: 185 pounds. IBW: 136 pounds +/- 10% %IBW: 136%  Other pertinent objective information: 81 year old male pt c PMH HLD, CAMILA, GERD s/p 7/14/17 AVR and ascending aortic replacement with post op afib on coumadin. No pressure injuries.

## 2017-07-20 NOTE — DIETITIAN INITIAL EVALUATION ADULT. - ADHERENCE
poor/Pt admits " I eat whatever I want, and if I die tomorrow from eating too much salt than so be it."

## 2017-07-20 NOTE — DIETITIAN INITIAL EVALUATION ADULT. - NS AS NUTRI INTERV ED CONTENT
1) Pt declines heart-healthy/weight loss/vitamin K + coumadin education at this time. Informed pt that RD remains available as needed.

## 2017-07-21 VITALS
OXYGEN SATURATION: 95 % | TEMPERATURE: 98 F | RESPIRATION RATE: 18 BRPM | SYSTOLIC BLOOD PRESSURE: 143 MMHG | DIASTOLIC BLOOD PRESSURE: 70 MMHG | HEART RATE: 75 BPM

## 2017-07-21 LAB
ANION GAP SERPL CALC-SCNC: 12 MMOL/L — SIGNIFICANT CHANGE UP (ref 5–17)
BUN SERPL-MCNC: 21 MG/DL — SIGNIFICANT CHANGE UP (ref 7–23)
CALCIUM SERPL-MCNC: 9 MG/DL — SIGNIFICANT CHANGE UP (ref 8.4–10.5)
CHLORIDE SERPL-SCNC: 102 MMOL/L — SIGNIFICANT CHANGE UP (ref 96–108)
CO2 SERPL-SCNC: 27 MMOL/L — SIGNIFICANT CHANGE UP (ref 22–31)
CREAT SERPL-MCNC: 0.86 MG/DL — SIGNIFICANT CHANGE UP (ref 0.5–1.3)
GLUCOSE SERPL-MCNC: 126 MG/DL — HIGH (ref 70–99)
HCT VFR BLD CALC: 30.9 % — LOW (ref 39–50)
HGB BLD-MCNC: 10.5 G/DL — LOW (ref 13–17)
INR BLD: 3.01 RATIO — HIGH (ref 0.88–1.16)
INR BLD: 3.7 RATIO — HIGH (ref 0.88–1.16)
MCHC RBC-ENTMCNC: 33.7 PG — SIGNIFICANT CHANGE UP (ref 27–34)
MCHC RBC-ENTMCNC: 33.9 GM/DL — SIGNIFICANT CHANGE UP (ref 32–36)
MCV RBC AUTO: 99.4 FL — SIGNIFICANT CHANGE UP (ref 80–100)
PLATELET # BLD AUTO: 198 K/UL — SIGNIFICANT CHANGE UP (ref 150–400)
POTASSIUM SERPL-MCNC: 3.9 MMOL/L — SIGNIFICANT CHANGE UP (ref 3.5–5.3)
POTASSIUM SERPL-SCNC: 3.9 MMOL/L — SIGNIFICANT CHANGE UP (ref 3.5–5.3)
PROTHROM AB SERPL-ACNC: 33.2 SEC — HIGH (ref 9.8–12.7)
PROTHROM AB SERPL-ACNC: 41.4 SEC — HIGH (ref 9.8–12.7)
RBC # BLD: 3.1 M/UL — LOW (ref 4.2–5.8)
RBC # FLD: 12.3 % — SIGNIFICANT CHANGE UP (ref 10.3–14.5)
SODIUM SERPL-SCNC: 141 MMOL/L — SIGNIFICANT CHANGE UP (ref 135–145)
WBC # BLD: 12.3 K/UL — HIGH (ref 3.8–10.5)
WBC # FLD AUTO: 12.3 K/UL — HIGH (ref 3.8–10.5)

## 2017-07-21 PROCEDURE — 85384 FIBRINOGEN ACTIVITY: CPT

## 2017-07-21 PROCEDURE — C1769: CPT

## 2017-07-21 PROCEDURE — 82550 ASSAY OF CK (CPK): CPT

## 2017-07-21 PROCEDURE — 85730 THROMBOPLASTIN TIME PARTIAL: CPT

## 2017-07-21 PROCEDURE — 84443 ASSAY THYROID STIM HORMONE: CPT

## 2017-07-21 PROCEDURE — C1768: CPT

## 2017-07-21 PROCEDURE — 82435 ASSAY OF BLOOD CHLORIDE: CPT

## 2017-07-21 PROCEDURE — 84484 ASSAY OF TROPONIN QUANT: CPT

## 2017-07-21 PROCEDURE — 80048 BASIC METABOLIC PNL TOTAL CA: CPT

## 2017-07-21 PROCEDURE — 80053 COMPREHEN METABOLIC PANEL: CPT

## 2017-07-21 PROCEDURE — 82553 CREATINE MB FRACTION: CPT

## 2017-07-21 PROCEDURE — 84132 ASSAY OF SERUM POTASSIUM: CPT

## 2017-07-21 PROCEDURE — 85014 HEMATOCRIT: CPT

## 2017-07-21 PROCEDURE — 84295 ASSAY OF SERUM SODIUM: CPT

## 2017-07-21 PROCEDURE — 71045 X-RAY EXAM CHEST 1 VIEW: CPT

## 2017-07-21 PROCEDURE — 94640 AIRWAY INHALATION TREATMENT: CPT

## 2017-07-21 PROCEDURE — 85027 COMPLETE CBC AUTOMATED: CPT

## 2017-07-21 PROCEDURE — 83605 ASSAY OF LACTIC ACID: CPT

## 2017-07-21 PROCEDURE — 82803 BLOOD GASES ANY COMBINATION: CPT

## 2017-07-21 PROCEDURE — 82947 ASSAY GLUCOSE BLOOD QUANT: CPT

## 2017-07-21 PROCEDURE — C1751: CPT

## 2017-07-21 PROCEDURE — 88311 DECALCIFY TISSUE: CPT

## 2017-07-21 PROCEDURE — 88305 TISSUE EXAM BY PATHOLOGIST: CPT

## 2017-07-21 PROCEDURE — 84100 ASSAY OF PHOSPHORUS: CPT

## 2017-07-21 PROCEDURE — 82565 ASSAY OF CREATININE: CPT

## 2017-07-21 PROCEDURE — 99239 HOSP IP/OBS DSCHRG MGMT >30: CPT

## 2017-07-21 PROCEDURE — 93005 ELECTROCARDIOGRAM TRACING: CPT

## 2017-07-21 PROCEDURE — 97116 GAIT TRAINING THERAPY: CPT

## 2017-07-21 PROCEDURE — 94002 VENT MGMT INPAT INIT DAY: CPT

## 2017-07-21 PROCEDURE — 80076 HEPATIC FUNCTION PANEL: CPT

## 2017-07-21 PROCEDURE — 97162 PT EVAL MOD COMPLEX 30 MIN: CPT

## 2017-07-21 PROCEDURE — 85610 PROTHROMBIN TIME: CPT

## 2017-07-21 PROCEDURE — P9045: CPT

## 2017-07-21 PROCEDURE — P9047: CPT

## 2017-07-21 PROCEDURE — 82330 ASSAY OF CALCIUM: CPT

## 2017-07-21 PROCEDURE — P9016: CPT

## 2017-07-21 PROCEDURE — 83880 ASSAY OF NATRIURETIC PEPTIDE: CPT

## 2017-07-21 PROCEDURE — 86923 COMPATIBILITY TEST ELECTRIC: CPT

## 2017-07-21 PROCEDURE — C1889: CPT

## 2017-07-21 RX ORDER — DOCUSATE SODIUM 100 MG
1 CAPSULE ORAL
Qty: 60 | Refills: 0 | OUTPATIENT
Start: 2017-07-21 | End: 2017-08-20

## 2017-07-21 RX ORDER — ATORVASTATIN CALCIUM 80 MG/1
1 TABLET, FILM COATED ORAL
Qty: 30 | Refills: 0 | OUTPATIENT
Start: 2017-07-21 | End: 2017-08-20

## 2017-07-21 RX ORDER — PANTOPRAZOLE SODIUM 20 MG/1
1 TABLET, DELAYED RELEASE ORAL
Qty: 30 | Refills: 0 | OUTPATIENT
Start: 2017-07-21 | End: 2017-08-20

## 2017-07-21 RX ORDER — OXYCODONE HYDROCHLORIDE 5 MG/1
1 TABLET ORAL
Qty: 30 | Refills: 0 | OUTPATIENT
Start: 2017-07-21 | End: 2017-07-26

## 2017-07-21 RX ORDER — SIMVASTATIN 20 MG/1
1 TABLET, FILM COATED ORAL
Qty: 0 | Refills: 0 | COMMUNITY

## 2017-07-21 RX ORDER — MULTIVIT-MIN/FERROUS GLUCONATE 9 MG/15 ML
1 LIQUID (ML) ORAL
Qty: 0 | Refills: 0 | COMMUNITY

## 2017-07-21 RX ORDER — DONEPEZIL HYDROCHLORIDE 10 MG/1
1 TABLET, FILM COATED ORAL
Qty: 0 | Refills: 0 | COMMUNITY

## 2017-07-21 RX ORDER — DOCUSATE SODIUM 100 MG
1 CAPSULE ORAL
Qty: 0 | Refills: 0 | COMMUNITY

## 2017-07-21 RX ORDER — GABAPENTIN 400 MG/1
1 CAPSULE ORAL
Qty: 120 | Refills: 0 | OUTPATIENT
Start: 2017-07-21 | End: 2017-08-20

## 2017-07-21 RX ORDER — METOPROLOL TARTRATE 50 MG
1 TABLET ORAL
Qty: 0 | Refills: 0 | DISCHARGE
Start: 2017-07-21

## 2017-07-21 RX ORDER — POLYETHYLENE GLYCOL 3350 17 G/17G
17 POWDER, FOR SOLUTION ORAL
Qty: 510 | Refills: 0 | OUTPATIENT
Start: 2017-07-21 | End: 2017-08-20

## 2017-07-21 RX ORDER — DONEPEZIL HYDROCHLORIDE 10 MG/1
1 TABLET, FILM COATED ORAL
Qty: 30 | Refills: 0 | OUTPATIENT
Start: 2017-07-21 | End: 2017-08-20

## 2017-07-21 RX ORDER — OXYCODONE HYDROCHLORIDE 5 MG/1
1 TABLET ORAL
Qty: 0 | Refills: 0 | COMMUNITY
Start: 2017-07-21

## 2017-07-21 RX ORDER — ASCORBIC ACID 60 MG
1 TABLET,CHEWABLE ORAL
Qty: 0 | Refills: 0 | COMMUNITY

## 2017-07-21 RX ORDER — ROPINIROLE 8 MG/1
1 TABLET, FILM COATED, EXTENDED RELEASE ORAL
Qty: 120 | Refills: 0 | OUTPATIENT
Start: 2017-07-21 | End: 2017-08-20

## 2017-07-21 RX ORDER — METOPROLOL TARTRATE 50 MG
0.5 TABLET ORAL
Qty: 30 | Refills: 0 | OUTPATIENT
Start: 2017-07-21 | End: 2017-08-20

## 2017-07-21 RX ORDER — METOPROLOL TARTRATE 50 MG
25 TABLET ORAL
Qty: 0 | Refills: 0 | COMMUNITY
Start: 2017-07-21

## 2017-07-21 RX ORDER — ASCORBIC ACID 60 MG
1 TABLET,CHEWABLE ORAL
Qty: 30 | Refills: 0
Start: 2017-07-21 | End: 2017-08-20

## 2017-07-21 RX ORDER — DONEPEZIL HYDROCHLORIDE 10 MG/1
1 TABLET, FILM COATED ORAL
Qty: 30 | Refills: 0
Start: 2017-07-21 | End: 2017-08-20

## 2017-07-21 RX ORDER — ASPIRIN/CALCIUM CARB/MAGNESIUM 324 MG
1 TABLET ORAL
Qty: 30 | Refills: 0 | OUTPATIENT
Start: 2017-07-21 | End: 2017-08-20

## 2017-07-21 RX ORDER — MIRABEGRON 50 MG/1
1 TABLET, EXTENDED RELEASE ORAL
Qty: 0 | Refills: 0 | COMMUNITY

## 2017-07-21 RX ORDER — ROPINIROLE 8 MG/1
1 TABLET, FILM COATED, EXTENDED RELEASE ORAL
Qty: 0 | Refills: 0 | COMMUNITY

## 2017-07-21 RX ORDER — MULTIVIT-MIN/FERROUS GLUCONATE 9 MG/15 ML
1 LIQUID (ML) ORAL
Qty: 30 | Refills: 0
Start: 2017-07-21 | End: 2017-08-20

## 2017-07-21 RX ORDER — GABAPENTIN 400 MG/1
1 CAPSULE ORAL
Qty: 0 | Refills: 0 | COMMUNITY

## 2017-07-21 RX ORDER — PANTOPRAZOLE SODIUM 20 MG/1
1 TABLET, DELAYED RELEASE ORAL
Qty: 0 | Refills: 0 | COMMUNITY

## 2017-07-21 RX ORDER — METOPROLOL TARTRATE 50 MG
1 TABLET ORAL
Qty: 0 | Refills: 0 | COMMUNITY

## 2017-07-21 RX ADMIN — SODIUM CHLORIDE 3 MILLILITER(S): 9 INJECTION INTRAMUSCULAR; INTRAVENOUS; SUBCUTANEOUS at 06:10

## 2017-07-21 RX ADMIN — OXYCODONE AND ACETAMINOPHEN 2 TABLET(S): 5; 325 TABLET ORAL at 01:35

## 2017-07-21 RX ADMIN — Medication 100 MILLIGRAM(S): at 06:08

## 2017-07-21 RX ADMIN — POLYETHYLENE GLYCOL 3350 17 GRAM(S): 17 POWDER, FOR SOLUTION ORAL at 12:07

## 2017-07-21 RX ADMIN — PANTOPRAZOLE SODIUM 40 MILLIGRAM(S): 20 TABLET, DELAYED RELEASE ORAL at 06:06

## 2017-07-21 RX ADMIN — GABAPENTIN 600 MILLIGRAM(S): 400 CAPSULE ORAL at 06:07

## 2017-07-21 RX ADMIN — GABAPENTIN 600 MILLIGRAM(S): 400 CAPSULE ORAL at 12:08

## 2017-07-21 RX ADMIN — OXYCODONE AND ACETAMINOPHEN 2 TABLET(S): 5; 325 TABLET ORAL at 01:02

## 2017-07-21 RX ADMIN — Medication 12.5 MILLIGRAM(S): at 06:07

## 2017-07-21 RX ADMIN — SODIUM CHLORIDE 3 MILLILITER(S): 9 INJECTION INTRAMUSCULAR; INTRAVENOUS; SUBCUTANEOUS at 14:08

## 2017-07-21 RX ADMIN — Medication 325 MILLIGRAM(S): at 12:07

## 2017-07-21 RX ADMIN — ROPINIROLE 2 MILLIGRAM(S): 8 TABLET, FILM COATED, EXTENDED RELEASE ORAL at 06:07

## 2017-07-21 RX ADMIN — GABAPENTIN 600 MILLIGRAM(S): 400 CAPSULE ORAL at 17:27

## 2017-07-21 RX ADMIN — ROPINIROLE 2 MILLIGRAM(S): 8 TABLET, FILM COATED, EXTENDED RELEASE ORAL at 17:27

## 2017-07-21 RX ADMIN — ROPINIROLE 2 MILLIGRAM(S): 8 TABLET, FILM COATED, EXTENDED RELEASE ORAL at 12:07

## 2017-07-22 RX ORDER — WARFARIN SODIUM 2.5 MG/1
1 TABLET ORAL
Qty: 30 | Refills: 0 | OUTPATIENT
Start: 2017-07-22 | End: 2017-08-21

## 2017-07-22 RX ORDER — WARFARIN SODIUM 2.5 MG/1
1.5 TABLET ORAL
Qty: 0 | Refills: 0 | COMMUNITY
Start: 2017-07-22 | End: 2017-08-21

## 2017-07-27 ENCOUNTER — APPOINTMENT (OUTPATIENT)
Dept: CARDIOTHORACIC SURGERY | Facility: CLINIC | Age: 82
End: 2017-07-27

## 2017-07-28 DIAGNOSIS — Z01.818 ENCOUNTER FOR OTHER PREPROCEDURAL EXAMINATION: ICD-10-CM

## 2017-07-28 DIAGNOSIS — I71.9 AORTIC ANEURYSM OF UNSPECIFIED SITE, WITHOUT RUPTURE: ICD-10-CM

## 2017-08-03 RX ORDER — MUPIROCIN 2 %
2 OINTMENT (GRAM) TOPICAL
Refills: 0 | Status: COMPLETED | COMMUNITY
End: 2017-08-03

## 2017-08-03 RX ORDER — METOPROLOL TARTRATE 50 MG/1
50 TABLET, FILM COATED ORAL DAILY
Refills: 0 | Status: COMPLETED | COMMUNITY
Start: 2017-03-01 | End: 2017-08-03

## 2017-08-03 RX ORDER — MIRABEGRON 25 MG/1
25 TABLET, FILM COATED, EXTENDED RELEASE ORAL DAILY
Refills: 0 | Status: COMPLETED | COMMUNITY
End: 2017-08-03

## 2017-08-09 PROBLEM — Z09 POSTOP CHECK: Status: ACTIVE | Noted: 2017-08-09

## 2017-08-11 ENCOUNTER — APPOINTMENT (OUTPATIENT)
Dept: CARDIOTHORACIC SURGERY | Facility: CLINIC | Age: 82
End: 2017-08-11
Payer: MEDICARE

## 2017-08-11 VITALS
SYSTOLIC BLOOD PRESSURE: 120 MMHG | BODY MASS INDEX: 27.28 KG/M2 | WEIGHT: 180 LBS | RESPIRATION RATE: 14 BRPM | DIASTOLIC BLOOD PRESSURE: 80 MMHG | HEIGHT: 68 IN | TEMPERATURE: 97.4 F | HEART RATE: 89 BPM | OXYGEN SATURATION: 95 %

## 2017-08-11 VITALS — SYSTOLIC BLOOD PRESSURE: 117 MMHG | OXYGEN SATURATION: 93 % | DIASTOLIC BLOOD PRESSURE: 80 MMHG

## 2017-08-11 DIAGNOSIS — Z09 ENCOUNTER FOR FOLLOW-UP EXAMINATION AFTER COMPLETED TREATMENT FOR CONDITIONS OTHER THAN MALIGNANT NEOPLASM: ICD-10-CM

## 2017-08-11 PROCEDURE — 99024 POSTOP FOLLOW-UP VISIT: CPT

## 2018-04-13 ENCOUNTER — APPOINTMENT (OUTPATIENT)
Dept: ELECTROPHYSIOLOGY | Facility: CLINIC | Age: 83
End: 2018-04-13
Payer: MEDICARE

## 2018-04-13 VITALS
DIASTOLIC BLOOD PRESSURE: 77 MMHG | SYSTOLIC BLOOD PRESSURE: 111 MMHG | WEIGHT: 190 LBS | BODY MASS INDEX: 30.53 KG/M2 | HEIGHT: 66 IN | HEART RATE: 100 BPM

## 2018-04-13 PROCEDURE — 99205 OFFICE O/P NEW HI 60 MIN: CPT

## 2018-04-13 RX ORDER — OXYCODONE AND ACETAMINOPHEN 5; 325 MG/1; MG/1
5-325 TABLET ORAL
Refills: 0 | Status: DISCONTINUED | COMMUNITY
End: 2018-04-13

## 2018-04-24 NOTE — PACU DISCHARGE NOTE - NSCLINEINSERTRD_GEN_ALL_CORE
No
scheduled for L2-5 posterior lumbar laminectomy and fusion on 05/01/18. pre op instructions, famotidine, chlorhexidine gluconate soap given and explained. Pt verbalized understanding.  Pending medical evaluation

## 2018-04-26 NOTE — ASU PATIENT PROFILE, ADULT - PMH
Aortic root dilatation    Aortic valve insufficiency, unspecified etiology    BPH associated with nocturia    Diverticulosis of intestine without bleeding, unspecified intestinal tract location    Essential hypertension    Fall (on) (from) other stairs and steps, sequela  03/28/2017- lumbar hemtoma  Gastroesophageal reflux disease, esophagitis presence not specified    Hard of hearing    HLD (hyperlipidemia)    [place] (hard of hearing)    Lumbar degenerative disc disease    MRSA (methicillin resistant Staphylococcus aureus)  left forearm 2012  Nerve injury  complication from right TKR surgery, 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  CAMILA (obstructive sleep apnea)  unable to tolerate nocturnal CPAP  Restless leg syndrome    Thoracic aortic aneurysm

## 2018-04-26 NOTE — ASU PATIENT PROFILE, ADULT - ABILITY TO HEAR (WITH HEARING AID OR HEARING APPLIANCE IF NORMALLY USED):
Mildly to Moderately Impaired: difficulty hearing in some environments or speaker may need to increase volume or speak distinctly/Rt cochlear implant / L hearing aid

## 2018-04-27 ENCOUNTER — OUTPATIENT (OUTPATIENT)
Dept: OUTPATIENT SERVICES | Facility: HOSPITAL | Age: 83
LOS: 1 days | Discharge: HOME | End: 2018-04-27
Payer: MEDICARE

## 2018-04-27 VITALS
DIASTOLIC BLOOD PRESSURE: 83 MMHG | RESPIRATION RATE: 18 BRPM | TEMPERATURE: 97 F | SYSTOLIC BLOOD PRESSURE: 131 MMHG | HEART RATE: 62 BPM | OXYGEN SATURATION: 98 %

## 2018-04-27 VITALS
DIASTOLIC BLOOD PRESSURE: 96 MMHG | TEMPERATURE: 97 F | OXYGEN SATURATION: 98 % | RESPIRATION RATE: 18 BRPM | SYSTOLIC BLOOD PRESSURE: 137 MMHG | WEIGHT: 190.04 LBS | HEIGHT: 66 IN | HEART RATE: 64 BPM

## 2018-04-27 DIAGNOSIS — Z98.890 OTHER SPECIFIED POSTPROCEDURAL STATES: Chronic | ICD-10-CM

## 2018-04-27 DIAGNOSIS — Z96.659 PRESENCE OF UNSPECIFIED ARTIFICIAL KNEE JOINT: Chronic | ICD-10-CM

## 2018-04-27 DIAGNOSIS — Z98.49 CATARACT EXTRACTION STATUS, UNSPECIFIED EYE: Chronic | ICD-10-CM

## 2018-04-27 DIAGNOSIS — S68.119A COMPLETE TRAUMATIC METACARPOPHALANGEAL AMPUTATION OF UNSPECIFIED FINGER, INITIAL ENCOUNTER: Chronic | ICD-10-CM

## 2018-04-27 PROCEDURE — 33282: CPT

## 2018-04-27 RX ORDER — CEFAZOLIN SODIUM 1 G
1 VIAL (EA) INJECTION
Qty: 0 | Refills: 0 | COMMUNITY
Start: 2018-04-27

## 2018-04-27 RX ORDER — CEFAZOLIN SODIUM 1 G
1000 VIAL (EA) INJECTION ONCE
Qty: 0 | Refills: 0 | Status: DISCONTINUED | OUTPATIENT
Start: 2018-04-27 | End: 2018-05-12

## 2018-04-27 NOTE — PACU DISCHARGE NOTE - COMMENTS
Discharge instructions given to patient and wife. Verbalized understanding. All belongings are with the patient. Left the unit at 1141 with wife.

## 2018-05-02 DIAGNOSIS — Z79.82 LONG TERM (CURRENT) USE OF ASPIRIN: ICD-10-CM

## 2018-05-02 DIAGNOSIS — G47.30 SLEEP APNEA, UNSPECIFIED: ICD-10-CM

## 2018-05-02 DIAGNOSIS — R00.2 PALPITATIONS: ICD-10-CM

## 2018-05-02 DIAGNOSIS — I71.2 THORACIC AORTIC ANEURYSM, WITHOUT RUPTURE: ICD-10-CM

## 2018-05-02 DIAGNOSIS — Z79.01 LONG TERM (CURRENT) USE OF ANTICOAGULANTS: ICD-10-CM

## 2018-05-02 DIAGNOSIS — Z95.3 PRESENCE OF XENOGENIC HEART VALVE: ICD-10-CM

## 2018-05-02 DIAGNOSIS — Z86.73 PERSONAL HISTORY OF TRANSIENT ISCHEMIC ATTACK (TIA), AND CEREBRAL INFARCTION WITHOUT RESIDUAL DEFICITS: ICD-10-CM

## 2018-05-02 DIAGNOSIS — I08.1 RHEUMATIC DISORDERS OF BOTH MITRAL AND TRICUSPID VALVES: ICD-10-CM

## 2018-05-02 DIAGNOSIS — E78.5 HYPERLIPIDEMIA, UNSPECIFIED: ICD-10-CM

## 2018-05-02 DIAGNOSIS — Z89.029 ACQUIRED ABSENCE OF UNSPECIFIED FINGER(S): ICD-10-CM

## 2018-05-10 ENCOUNTER — APPOINTMENT (OUTPATIENT)
Dept: ELECTROPHYSIOLOGY | Facility: CLINIC | Age: 83
End: 2018-05-10
Payer: MEDICARE

## 2018-05-10 VITALS
SYSTOLIC BLOOD PRESSURE: 132 MMHG | BODY MASS INDEX: 30.53 KG/M2 | DIASTOLIC BLOOD PRESSURE: 99 MMHG | WEIGHT: 190 LBS | HEIGHT: 66 IN | HEART RATE: 76 BPM

## 2018-05-10 PROCEDURE — 93285 PRGRMG DEV EVAL SCRMS IP: CPT

## 2018-06-27 ENCOUNTER — APPOINTMENT (OUTPATIENT)
Dept: ELECTROPHYSIOLOGY | Facility: CLINIC | Age: 83
End: 2018-06-27
Payer: MEDICARE

## 2018-06-27 PROCEDURE — 93298 REM INTERROG DEV EVAL SCRMS: CPT

## 2018-06-27 PROCEDURE — 93299: CPT

## 2018-07-25 PROBLEM — G47.33 OBSTRUCTIVE SLEEP APNEA (ADULT) (PEDIATRIC): Chronic | Status: ACTIVE | Noted: 2017-04-04

## 2018-07-25 PROBLEM — I77.810 THORACIC AORTIC ECTASIA: Chronic | Status: ACTIVE | Noted: 2017-04-04

## 2018-07-25 PROBLEM — T14.8 OTHER INJURY OF UNSPECIFIED BODY REGION: Chronic | Status: ACTIVE | Noted: 2017-04-04

## 2018-07-25 PROBLEM — N40.1 BENIGN PROSTATIC HYPERPLASIA WITH LOWER URINARY TRACT SYMPTOMS: Chronic | Status: ACTIVE | Noted: 2017-04-04

## 2018-07-25 PROBLEM — A49.02 METHICILLIN RESISTANT STAPHYLOCOCCUS AUREUS INFECTION, UNSPECIFIED SITE: Chronic | Status: ACTIVE | Noted: 2017-04-04

## 2018-07-25 PROBLEM — H91.90 UNSPECIFIED HEARING LOSS, UNSPECIFIED EAR: Chronic | Status: ACTIVE | Noted: 2017-07-07

## 2018-07-25 PROBLEM — I71.2 THORACIC AORTIC ANEURYSM, WITHOUT RUPTURE: Chronic | Status: ACTIVE | Noted: 2017-03-15

## 2018-07-25 PROBLEM — M51.36 OTHER INTERVERTEBRAL DISC DEGENERATION, LUMBAR REGION: Chronic | Status: ACTIVE | Noted: 2017-04-04
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
## 2018-07-25 PROBLEM — G25.81 RESTLESS LEGS SYNDROME: Chronic | Status: ACTIVE | Noted: 2017-03-15

## 2018-07-25 PROBLEM — I10 ESSENTIAL (PRIMARY) HYPERTENSION: Chronic | Status: ACTIVE | Noted: 2017-04-04

## 2018-07-25 PROBLEM — K57.90 DIVERTICULOSIS OF INTESTINE, PART UNSPECIFIED, WITHOUT PERFORATION OR ABSCESS WITHOUT BLEEDING: Chronic | Status: ACTIVE | Noted: 2017-04-04

## 2018-07-25 PROBLEM — I35.1 NONRHEUMATIC AORTIC (VALVE) INSUFFICIENCY: Chronic | Status: ACTIVE | Noted: 2017-04-04

## 2018-07-25 PROBLEM — E78.5 HYPERLIPIDEMIA, UNSPECIFIED: Chronic | Status: ACTIVE | Noted: 2017-03-15

## 2018-07-27 ENCOUNTER — APPOINTMENT (OUTPATIENT)
Dept: ELECTROPHYSIOLOGY | Facility: CLINIC | Age: 83
End: 2018-07-27
Payer: MEDICARE

## 2018-07-27 PROCEDURE — 93298 REM INTERROG DEV EVAL SCRMS: CPT

## 2018-07-27 PROCEDURE — 93299: CPT

## 2018-09-25 ENCOUNTER — APPOINTMENT (OUTPATIENT)
Dept: ELECTROPHYSIOLOGY | Facility: CLINIC | Age: 83
End: 2018-09-25
Payer: MEDICARE

## 2018-09-25 PROCEDURE — 93298 REM INTERROG DEV EVAL SCRMS: CPT

## 2018-09-25 PROCEDURE — 93299: CPT

## 2018-10-11 ENCOUNTER — TRANSCRIPTION ENCOUNTER (OUTPATIENT)
Age: 83
End: 2018-10-11

## 2018-10-25 ENCOUNTER — APPOINTMENT (OUTPATIENT)
Dept: ELECTROPHYSIOLOGY | Facility: CLINIC | Age: 83
End: 2018-10-25
Payer: MEDICARE

## 2018-10-25 PROCEDURE — 93298 REM INTERROG DEV EVAL SCRMS: CPT

## 2018-10-25 PROCEDURE — 93299: CPT

## 2018-11-14 ENCOUNTER — APPOINTMENT (OUTPATIENT)
Dept: ELECTROPHYSIOLOGY | Facility: CLINIC | Age: 83
End: 2018-11-14
Payer: MEDICARE

## 2018-11-14 VITALS
WEIGHT: 190 LBS | BODY MASS INDEX: 30.53 KG/M2 | SYSTOLIC BLOOD PRESSURE: 145 MMHG | DIASTOLIC BLOOD PRESSURE: 96 MMHG | HEIGHT: 66 IN | HEART RATE: 83 BPM

## 2018-11-14 PROCEDURE — 93291 INTERROG DEV EVAL SCRMS IP: CPT

## 2018-11-14 RX ORDER — WARFARIN SODIUM 2 MG/1
2 TABLET ORAL DAILY
Qty: 14 | Refills: 0 | Status: DISCONTINUED | COMMUNITY
Start: 2017-08-03 | End: 2018-11-14

## 2018-11-25 ENCOUNTER — APPOINTMENT (OUTPATIENT)
Dept: ELECTROPHYSIOLOGY | Facility: CLINIC | Age: 83
End: 2018-11-25
Payer: MEDICARE

## 2018-11-25 PROCEDURE — 93299: CPT

## 2018-11-25 PROCEDURE — 93298 REM INTERROG DEV EVAL SCRMS: CPT

## 2018-12-14 ENCOUNTER — APPOINTMENT (OUTPATIENT)
Dept: CARDIOTHORACIC SURGERY | Facility: CLINIC | Age: 83
End: 2018-12-14
Payer: MEDICARE

## 2018-12-14 VITALS
BODY MASS INDEX: 30.53 KG/M2 | TEMPERATURE: 97.9 F | RESPIRATION RATE: 14 BRPM | HEART RATE: 71 BPM | WEIGHT: 190 LBS | HEIGHT: 66 IN | OXYGEN SATURATION: 95 % | SYSTOLIC BLOOD PRESSURE: 121 MMHG | DIASTOLIC BLOOD PRESSURE: 85 MMHG

## 2018-12-14 DIAGNOSIS — I71.9 AORTIC ANEURYSM OF UNSPECIFIED SITE, W/OUT RUPTURE: ICD-10-CM

## 2018-12-14 PROCEDURE — 99213 OFFICE O/P EST LOW 20 MIN: CPT

## 2018-12-14 RX ORDER — ATORVASTATIN CALCIUM 40 MG/1
40 TABLET, FILM COATED ORAL
Qty: 30 | Refills: 0 | Status: COMPLETED | COMMUNITY
Start: 2017-08-03 | End: 2018-12-14

## 2018-12-26 ENCOUNTER — APPOINTMENT (OUTPATIENT)
Dept: ELECTROPHYSIOLOGY | Facility: CLINIC | Age: 83
End: 2018-12-26
Payer: MEDICARE

## 2018-12-26 PROCEDURE — 93299: CPT

## 2018-12-26 PROCEDURE — 93298 REM INTERROG DEV EVAL SCRMS: CPT

## 2019-01-26 ENCOUNTER — APPOINTMENT (OUTPATIENT)
Dept: ELECTROPHYSIOLOGY | Facility: CLINIC | Age: 84
End: 2019-01-26
Payer: MEDICARE

## 2019-01-26 PROCEDURE — 93298 REM INTERROG DEV EVAL SCRMS: CPT

## 2019-01-26 PROCEDURE — 93299: CPT

## 2019-03-19 ENCOUNTER — APPOINTMENT (OUTPATIENT)
Dept: OTOLARYNGOLOGY | Facility: CLINIC | Age: 84
End: 2019-03-19
Payer: MEDICARE

## 2019-03-19 VITALS
SYSTOLIC BLOOD PRESSURE: 118 MMHG | HEIGHT: 66 IN | HEART RATE: 88 BPM | WEIGHT: 190 LBS | DIASTOLIC BLOOD PRESSURE: 78 MMHG | BODY MASS INDEX: 30.53 KG/M2

## 2019-03-19 DIAGNOSIS — E78.00 PURE HYPERCHOLESTEROLEMIA, UNSPECIFIED: ICD-10-CM

## 2019-03-19 DIAGNOSIS — G25.81 RESTLESS LEGS SYNDROME: ICD-10-CM

## 2019-03-19 PROCEDURE — 92542 POSITIONAL NYSTAGMUS TEST: CPT

## 2019-03-19 PROCEDURE — 99204 OFFICE O/P NEW MOD 45 MIN: CPT | Mod: 25

## 2019-03-19 NOTE — REVIEW OF SYSTEMS
[Seasonal Allergies] : seasonal allergies [Hearing Loss] : hearing loss [Dizziness] : dizziness [Post Nasal Drip] : post nasal drip [Lightheadedness] : lightheadedness [Throat Clearing] : throat clearing [Wheezing] : wheezing [Cough] : cough [Heartburn] : heartburn [Negative] : Endocrine [Patient Intake Form Reviewed] : Patient intake form was reviewed [FreeTextEntry1] : headache   skin changes

## 2019-03-19 NOTE — HISTORY OF PRESENT ILLNESS
[de-identified] : 4 y dizzy off balance but no spinning, constant sensation\par using cane\par no cva no focal weakness\par aortic aneurysm and valve 2 y ago uncomplicated\par  dizziness did not start after surgery\par cochlear 2012 ad, as aid  longstanding loss no vertigo w implant\par cervical spine limited motion

## 2019-03-19 NOTE — CONSULT LETTER
[FreeTextEntry2] : sebastien beyer [FreeTextEntry1] : Dear Dr. MARTA YATES,\par \par Thank you for your kind referral. Please refer to my enclosed office notes for SON HO . If there are any questions free to contact me.\par  [FreeTextEntry3] : Sanjiv Bailey MD, FACS\par

## 2019-03-19 NOTE — ASSESSMENT
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

## 2019-03-29 ENCOUNTER — APPOINTMENT (OUTPATIENT)
Dept: ELECTROPHYSIOLOGY | Facility: CLINIC | Age: 84
End: 2019-03-29
Payer: MEDICARE

## 2019-03-29 PROCEDURE — 93299: CPT

## 2019-03-29 PROCEDURE — 93298 REM INTERROG DEV EVAL SCRMS: CPT

## 2019-04-03 NOTE — PATIENT PROFILE ADULT. - URINARY CATHETER
Patient arrived. ID and allergies verified verbally with patient. Pt voices understanding of procedure to be performed. Consent obtained. Pt prepped for procedure. Marjorie Gambino is 120. 
08:15,Asa 81 mg po given as ordered. 08:50 TRANSFER - OUT REPORT: 
 
Verbal report given to ZURDO Pryor(name) on Bess Osborn III  being transferred to cath(unit) for routine progression of care Report consisted of patients Situation, Background, Assessment and  
Recommendations(SBAR). Information from the following report(s) Procedure Summary was reviewed with the receiving nurse. Lines:  
Peripheral IV 04/03/19 Right Antecubital (Active) Site Assessment Clean, dry, & intact 4/3/2019  7:09 AM  
  
 
Opportunity for questions and clarification was provided. Patient transported with: 
 Registered Nurse 10:15 TRANSFER - IN REPORT: 
 
Verbal report received from ZURDO Nagel(name) on Zac Rodríguez III  being received from cath(unit) for routine progression of care Report consisted of patients Situation, Background, Assessment and  
Recommendations(SBAR). Information from the following report(s) Procedure Summary was reviewed with the receiving nurse. Opportunity for questions and clarification was provided. Assessment completed upon patients arrival to unit and care assumed. 11:30, Air release completed. TR Band removed from rt wrist. No bleeding or  Hematoma. Dressing applied. Wrist immobilizer in place. Radial and ulnar pulse remain palpable on affected extremity. Pt tolerated well. Instructions given to pt regarding movement and activity restrictions. Pt voiced understanding. 12:15,DC instructions reviewed with pt and his Daughter,Mona,they voice understanding. 12:30. Pt DC via ME[facility] [place] 23 with daughter. no

## 2019-04-10 ENCOUNTER — INPATIENT (INPATIENT)
Facility: HOSPITAL | Age: 84
LOS: 4 days | Discharge: HOME | End: 2019-04-15
Attending: INTERNAL MEDICINE | Admitting: INTERNAL MEDICINE
Payer: MEDICARE

## 2019-04-10 VITALS
HEART RATE: 96 BPM | DIASTOLIC BLOOD PRESSURE: 65 MMHG | RESPIRATION RATE: 17 BRPM | TEMPERATURE: 99 F | SYSTOLIC BLOOD PRESSURE: 103 MMHG | OXYGEN SATURATION: 97 %

## 2019-04-10 DIAGNOSIS — R42 DIZZINESS AND GIDDINESS: ICD-10-CM

## 2019-04-10 DIAGNOSIS — Z98.49 CATARACT EXTRACTION STATUS, UNSPECIFIED EYE: Chronic | ICD-10-CM

## 2019-04-10 DIAGNOSIS — S68.119A COMPLETE TRAUMATIC METACARPOPHALANGEAL AMPUTATION OF UNSPECIFIED FINGER, INITIAL ENCOUNTER: Chronic | ICD-10-CM

## 2019-04-10 DIAGNOSIS — Z29.9 ENCOUNTER FOR PROPHYLACTIC MEASURES, UNSPECIFIED: ICD-10-CM

## 2019-04-10 DIAGNOSIS — Z98.890 OTHER SPECIFIED POSTPROCEDURAL STATES: Chronic | ICD-10-CM

## 2019-04-10 DIAGNOSIS — H91.90 UNSPECIFIED HEARING LOSS, UNSPECIFIED EAR: ICD-10-CM

## 2019-04-10 DIAGNOSIS — Z96.659 PRESENCE OF UNSPECIFIED ARTIFICIAL KNEE JOINT: Chronic | ICD-10-CM

## 2019-04-10 DIAGNOSIS — I10 ESSENTIAL (PRIMARY) HYPERTENSION: ICD-10-CM

## 2019-04-10 DIAGNOSIS — K92.2 GASTROINTESTINAL HEMORRHAGE, UNSPECIFIED: ICD-10-CM

## 2019-04-10 DIAGNOSIS — D62 ACUTE POSTHEMORRHAGIC ANEMIA: ICD-10-CM

## 2019-04-10 LAB
ALBUMIN SERPL ELPH-MCNC: 3.1 G/DL — LOW (ref 3.3–5)
ALP SERPL-CCNC: 69 U/L — SIGNIFICANT CHANGE UP (ref 40–120)
ALT FLD-CCNC: 29 U/L — SIGNIFICANT CHANGE UP (ref 12–78)
ANION GAP SERPL CALC-SCNC: 6 MMOL/L — SIGNIFICANT CHANGE UP (ref 5–17)
ANISOCYTOSIS BLD QL: SLIGHT — SIGNIFICANT CHANGE UP
APPEARANCE UR: CLEAR — SIGNIFICANT CHANGE UP
APTT BLD: 27.2 SEC — LOW (ref 27.5–36.3)
AST SERPL-CCNC: 24 U/L — SIGNIFICANT CHANGE UP (ref 15–37)
BASOPHILS # BLD AUTO: 0.05 K/UL — SIGNIFICANT CHANGE UP (ref 0–0.2)
BASOPHILS NFR BLD AUTO: 0.6 % — SIGNIFICANT CHANGE UP (ref 0–2)
BILIRUB SERPL-MCNC: 0.3 MG/DL — SIGNIFICANT CHANGE UP (ref 0.2–1.2)
BILIRUB UR-MCNC: NEGATIVE — SIGNIFICANT CHANGE UP
BLD GP AB SCN SERPL QL: SIGNIFICANT CHANGE UP
BUN SERPL-MCNC: 25 MG/DL — HIGH (ref 7–23)
CALCIUM SERPL-MCNC: 8.5 MG/DL — SIGNIFICANT CHANGE UP (ref 8.5–10.1)
CHLORIDE SERPL-SCNC: 109 MMOL/L — HIGH (ref 96–108)
CO2 SERPL-SCNC: 26 MMOL/L — SIGNIFICANT CHANGE UP (ref 22–31)
COLOR SPEC: YELLOW — SIGNIFICANT CHANGE UP
CREAT SERPL-MCNC: 1.1 MG/DL — SIGNIFICANT CHANGE UP (ref 0.5–1.3)
DIFF PNL FLD: NEGATIVE — SIGNIFICANT CHANGE UP
EOSINOPHIL # BLD AUTO: 0.27 K/UL — SIGNIFICANT CHANGE UP (ref 0–0.5)
EOSINOPHIL NFR BLD AUTO: 3.4 % — SIGNIFICANT CHANGE UP (ref 0–6)
GLUCOSE SERPL-MCNC: 98 MG/DL — SIGNIFICANT CHANGE UP (ref 70–99)
GLUCOSE UR QL: NEGATIVE MG/DL — SIGNIFICANT CHANGE UP
HCT VFR BLD CALC: 20.1 % — CRITICAL LOW (ref 39–50)
HGB BLD-MCNC: 6.4 G/DL — CRITICAL LOW (ref 13–17)
HYPOCHROMIA BLD QL: SLIGHT — SIGNIFICANT CHANGE UP
IMM GRANULOCYTES NFR BLD AUTO: 0.9 % — SIGNIFICANT CHANGE UP (ref 0–1.5)
INR BLD: 1.1 RATIO — SIGNIFICANT CHANGE UP (ref 0.88–1.16)
KETONES UR-MCNC: NEGATIVE — SIGNIFICANT CHANGE UP
LEUKOCYTE ESTERASE UR-ACNC: NEGATIVE — SIGNIFICANT CHANGE UP
LYMPHOCYTES # BLD AUTO: 2.28 K/UL — SIGNIFICANT CHANGE UP (ref 1–3.3)
LYMPHOCYTES # BLD AUTO: 28.6 % — SIGNIFICANT CHANGE UP (ref 13–44)
MACROCYTES BLD QL: SLIGHT — SIGNIFICANT CHANGE UP
MANUAL SMEAR VERIFICATION: SIGNIFICANT CHANGE UP
MCHC RBC-ENTMCNC: 31.8 GM/DL — LOW (ref 32–36)
MCHC RBC-ENTMCNC: 32 PG — SIGNIFICANT CHANGE UP (ref 27–34)
MCV RBC AUTO: 100.5 FL — HIGH (ref 80–100)
MONOCYTES # BLD AUTO: 0.73 K/UL — SIGNIFICANT CHANGE UP (ref 0–0.9)
MONOCYTES NFR BLD AUTO: 9.2 % — SIGNIFICANT CHANGE UP (ref 2–14)
NEUTROPHILS # BLD AUTO: 4.57 K/UL — SIGNIFICANT CHANGE UP (ref 1.8–7.4)
NEUTROPHILS NFR BLD AUTO: 57.3 % — SIGNIFICANT CHANGE UP (ref 43–77)
NITRITE UR-MCNC: NEGATIVE — SIGNIFICANT CHANGE UP
NRBC # BLD: 0 /100 WBCS — SIGNIFICANT CHANGE UP (ref 0–0)
OVALOCYTES BLD QL SMEAR: SLIGHT — SIGNIFICANT CHANGE UP
PH UR: 5 — SIGNIFICANT CHANGE UP (ref 5–8)
PLAT MORPH BLD: NORMAL — SIGNIFICANT CHANGE UP
PLATELET # BLD AUTO: 270 K/UL — SIGNIFICANT CHANGE UP (ref 150–400)
POIKILOCYTOSIS BLD QL AUTO: SLIGHT — SIGNIFICANT CHANGE UP
POTASSIUM SERPL-MCNC: 3.9 MMOL/L — SIGNIFICANT CHANGE UP (ref 3.5–5.3)
POTASSIUM SERPL-SCNC: 3.9 MMOL/L — SIGNIFICANT CHANGE UP (ref 3.5–5.3)
PROT SERPL-MCNC: 5.9 GM/DL — LOW (ref 6–8.3)
PROT UR-MCNC: NEGATIVE MG/DL — SIGNIFICANT CHANGE UP
PROTHROM AB SERPL-ACNC: 12.2 SEC — SIGNIFICANT CHANGE UP (ref 10–12.9)
RBC # BLD: 2 M/UL — LOW (ref 4.2–5.8)
RBC # FLD: 15.4 % — HIGH (ref 10.3–14.5)
RBC BLD AUTO: ABNORMAL
SODIUM SERPL-SCNC: 141 MMOL/L — SIGNIFICANT CHANGE UP (ref 135–145)
SP GR SPEC: 1.01 — SIGNIFICANT CHANGE UP (ref 1.01–1.02)
TYPE + AB SCN PNL BLD: SIGNIFICANT CHANGE UP
UROBILINOGEN FLD QL: NEGATIVE MG/DL — SIGNIFICANT CHANGE UP
WBC # BLD: 7.97 K/UL — SIGNIFICANT CHANGE UP (ref 3.8–10.5)
WBC # FLD AUTO: 7.97 K/UL — SIGNIFICANT CHANGE UP (ref 3.8–10.5)

## 2019-04-10 PROCEDURE — 93010 ELECTROCARDIOGRAM REPORT: CPT

## 2019-04-10 PROCEDURE — 71045 X-RAY EXAM CHEST 1 VIEW: CPT | Mod: 26

## 2019-04-10 PROCEDURE — 99285 EMERGENCY DEPT VISIT HI MDM: CPT

## 2019-04-10 RX ORDER — ONDANSETRON 8 MG/1
4 TABLET, FILM COATED ORAL EVERY 6 HOURS
Qty: 0 | Refills: 0 | Status: DISCONTINUED | OUTPATIENT
Start: 2019-04-10 | End: 2019-04-15

## 2019-04-10 RX ORDER — ATORVASTATIN CALCIUM 80 MG/1
40 TABLET, FILM COATED ORAL AT BEDTIME
Qty: 0 | Refills: 0 | Status: DISCONTINUED | OUTPATIENT
Start: 2019-04-10 | End: 2019-04-12

## 2019-04-10 RX ORDER — PANTOPRAZOLE SODIUM 20 MG/1
80 TABLET, DELAYED RELEASE ORAL ONCE
Qty: 0 | Refills: 0 | Status: COMPLETED | OUTPATIENT
Start: 2019-04-10 | End: 2019-04-10

## 2019-04-10 RX ORDER — DONEPEZIL HYDROCHLORIDE 10 MG/1
10 TABLET, FILM COATED ORAL AT BEDTIME
Qty: 0 | Refills: 0 | Status: DISCONTINUED | OUTPATIENT
Start: 2019-04-10 | End: 2019-04-15

## 2019-04-10 RX ORDER — PANTOPRAZOLE SODIUM 20 MG/1
8 TABLET, DELAYED RELEASE ORAL
Qty: 80 | Refills: 0 | Status: DISCONTINUED | OUTPATIENT
Start: 2019-04-10 | End: 2019-04-11

## 2019-04-10 RX ORDER — MEMANTINE HYDROCHLORIDE 10 MG/1
5 TABLET ORAL DAILY
Qty: 0 | Refills: 0 | Status: DISCONTINUED | OUTPATIENT
Start: 2019-04-10 | End: 2019-04-15

## 2019-04-10 RX ORDER — ROPINIROLE 8 MG/1
2 TABLET, FILM COATED, EXTENDED RELEASE ORAL
Qty: 0 | Refills: 0 | Status: DISCONTINUED | OUTPATIENT
Start: 2019-04-10 | End: 2019-04-15

## 2019-04-10 RX ORDER — FERROUS SULFATE 325(65) MG
1 TABLET ORAL
Qty: 0 | Refills: 0 | COMMUNITY

## 2019-04-10 RX ADMIN — ATORVASTATIN CALCIUM 40 MILLIGRAM(S): 80 TABLET, FILM COATED ORAL at 22:27

## 2019-04-10 RX ADMIN — PANTOPRAZOLE SODIUM 10 MG/HR: 20 TABLET, DELAYED RELEASE ORAL at 17:30

## 2019-04-10 RX ADMIN — PANTOPRAZOLE SODIUM 80 MILLIGRAM(S): 20 TABLET, DELAYED RELEASE ORAL at 14:47

## 2019-04-10 RX ADMIN — ROPINIROLE 4 MILLIGRAM(S): 8 TABLET, FILM COATED, EXTENDED RELEASE ORAL at 22:27

## 2019-04-10 RX ADMIN — DONEPEZIL HYDROCHLORIDE 10 MILLIGRAM(S): 10 TABLET, FILM COATED ORAL at 22:27

## 2019-04-10 RX ADMIN — MEMANTINE HYDROCHLORIDE 5 MILLIGRAM(S): 10 TABLET ORAL at 22:28

## 2019-04-10 NOTE — H&P ADULT - PROBLEM SELECTOR PLAN 4
chronic, has had op f/u and work up w/ neuro, recent op CT head/neck- may need to f/u results  neuro cs if indicated

## 2019-04-10 NOTE — ED ADULT NURSE NOTE - NSIMPLEMENTINTERV_GEN_ALL_ED
Implemented All Fall with Harm Risk Interventions:  [place] to call system. Call bell, personal items and telephone within reach. Instruct patient to call for assistance. Room bathroom lighting operational. Non-slip footwear when patient is off stretcher. Physically safe environment: no spills, clutter or unnecessary equipment. Stretcher in lowest position, wheels locked, appropriate side rails in place. Provide visual cue, wrist band, yellow gown, etc. Monitor gait and stability. Monitor for mental status changes and reorient to person, place, and time. Review medications for side effects contributing to fall risk. Reinforce activity limits and safety measures with patient and family. Provide visual clues: red socks.

## 2019-04-10 NOTE — H&P ADULT - HISTORY OF PRESENT ILLNESS
Chief Complaint: abnormal labs, recent melena, HA    HPI:      PAST MEDICAL & SURGICAL HISTORY:  [place] (hard of hearing)  Fall (on) (from) other stairs and steps, sequela: 2017- lumbar hemtoma  Essential hypertension  Nerve injury: complication from right TKR surgery, 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  Aortic root dilatation  MRSA (methicillin resistant Staphylococcus aureus): left forearm   Lumbar degenerative disc disease  BPH associated with nocturia  Diverticulosis of intestine without bleeding, unspecified intestinal tract location  Gastroesophageal reflux disease, esophagitis presence not specified  Aortic valve insufficiency, unspecified etiology  CAMILA (obstructive sleep apnea): unable to tolerate nocturnal CPAP  Restless leg syndrome  Hard of hearing  Thoracic aortic aneurysm  HLD (hyperlipidemia)  Status post cataract extraction, unspecified laterality: bilat  S/P debridement: left forearm -mrsa  History of fundoplication:   Amputation finger: left index 1970  S/P knee replacement: left  S/P knee replacement: right  S/P shoulder surgery: right rotatotor cuff injury      Review of Systems:   CONSTITUTIONAL: No fever.  EYES: No eye pain or discharge.  ENMT:  No sinus or throat pain  NECK: No pain or stiffness  RESPIRATORY: No cough, wheezing, chills or hemoptysis; No shortness of breath  CARDIOVASCULAR: No chest pain, palpitations, dizziness, or leg swelling  GASTROINTESTINAL: No abdominal or epigastric pain. No nausea, vomiting, or hematemesis; No diarrhea or constipation. No melena or hematochezia.  GENITOURINARY: No dysuria or incontinence  NEUROLOGICAL: No headaches, memory loss, loss of strength, numbness, or tremors  SKIN: No rashes.  LYMPH NODES: No enlarged glands  ENDOCRINE: No heat or cold intolerance; No hair loss  MUSCULOSKELETAL: No joint pain or swelling; No muscle, back, or extremity pain  PSYCHIATRIC: No depression, anxiety, mood swings, or difficulty sleeping  HEME/LYMPH: No easy bruising, or bleeding gums  ALLERY AND IMMUNOLOGIC: No hives or eczema    Allergies    No Known Allergies    Intolerances        Social History:     FAMILY HISTORY:  Family history of lung cancer (Mother)      Home Medications:  ceFAZolin 1 g intravenous injection: 1 gram(s) intravenous once (2018 11:03)  Coumadin 2 mg oral tablet: 1.5 tab(s) orally once a day (2018 10:27)  ferrous sulfate 324 mg (65 mg elemental iron) oral tablet: 1 tab(s) orally once a day (2018 10:27)  losartan 25 mg oral tablet: 1 tab(s) orally once a day (2018 10:27)  Metoprolol Tartrate 25 mg oral tablet: 1 tab(s) orally 2 times a day (2018 10:27)  Namenda 5 mg oral tablet: orally once a day (2018 10:27)  rOPINIRole 2 mg oral tablet: 2 tab(s) orally 3 times a day (2018 10:27)      MEDICATIONS  (STANDING):  pantoprazole  Injectable 80 milliGRAM(s) IV Push once  pantoprazole Infusion 8 mG/Hr (10 mL/Hr) IV Continuous <Continuous>    MEDICATIONS  (PRN):  ondansetron Injectable 4 milliGRAM(s) IV Push every 6 hours PRN Nausea      CAPILLARY BLOOD GLUCOSE        I&O's Summary      PHYSICAL EXAM:  Vital Signs Last 24 Hrs  T(C): 36.7 (10 Apr 2019 12:51), Max: 37.2 (10 Apr 2019 09:27)  T(F): 98.1 (10 Apr 2019 12:51), Max: 99 (10 Apr 2019 09:27)  HR: 74 (10 Apr 2019 12:51) (70 - 96)  BP: 129/74 (10 Apr 2019 12:51) (95/52 - 129/74)  BP(mean): --  RR: 18 (10 Apr 2019 12:51) (16 - 18)  SpO2: 98% (10 Apr 2019 12:51) (95% - 98%)  GENERAL: NAD, well-developed  HEAD:  Atraumatic, Normocephalic  EYES: EOMI, PERRLA, conjunctiva and sclera clear  NECK: Supple, No JVD  CHEST/LUNG: Clear to auscultation bilaterally; No wheeze  HEART: Regular rate and rhythm; No murmurs, rubs, or gallops  ABDOMEN: Soft, Nontender, Nondistended; Bowel sounds present  EXTREMITIES:  2+ Peripheral Pulses, No clubbing, cyanosis, or edema  PSYCH: AAOx3  NEUROLOGY: non-focal  SKIN: No rashes or lesions    LABS:                        6.4    7.97  )-----------( 270      ( 10 Apr 2019 10:01 )             20.1     04-10    141  |  109<H>  |  25<H>  ----------------------------<  98  3.9   |  26  |  1.10    Ca    8.5      10 Apr 2019 10:01    TPro  5.9<L>  /  Alb  3.1<L>  /  TBili  0.3  /  DBili  x   /  AST  24  /  ALT  29  /  AlkPhos  69  04-10    PT/INR - ( 10 Apr 2019 10:01 )   PT: 12.2 sec;   INR: 1.10 ratio         PTT - ( 10 Apr 2019 10:01 )  PTT:27.2 sec      Urinalysis Basic - ( 10 Apr 2019 10:01 )    Color: Yellow / Appearance: Clear / S.015 / pH: x  Gluc: x / Ketone: Negative  / Bili: Negative / Urobili: Negative mg/dL   Blood: x / Protein: Negative mg/dL / Nitrite: Negative   Leuk Esterase: Negative / RBC: x / WBC x   Sq Epi: x / Non Sq Epi: x / Bacteria: x        RADIOLOGY & ADDITIONAL TESTS:    Imaging Personally Reviewed:  cxr neg  EKG Personally Reviewed:  n/a Chief Complaint: abnormal labs, recent melena, HA    HPI:  83M w/PMH [place] (cochlear implant), chronic dizziness, HTN, RLS, presents for abnormal labs.  Pt has been experiencing dizziness for over a year and has been getting evaluated and PT.  Since Friday (), he told his wife that he's notice "a lot" of blood in his BM.  He also endorses melena since then (he's not currently on iron).  Wife also noticed that he's more pale than usual since  and he's started having HA that he doesn't normally have.  She took him to his doctor  Yesterday, had labs and sent for CT head.  Office called wife this AM and told his Hg 6 and to go to ED.  He denies any n/v/abd pain, hematemesis.  Denies sob, cp, cough, fever/chills, dysuria.    In ED, Guaiac Pos, Hg 6.4, 2 units PRBCs ordered and receiving at time of my exam, SBP 90'S to low 100's (improving now), HR high 90's (now 70's).  Pt has had anemia in the past and had been on iron replacement, but denies prior transfusion.      PAST MEDICAL & SURGICAL HISTORY:  [place] (hard of hearing)  Fall (on) (from) other stairs and steps, sequela: 2017- lumbar hemtoma  Essential hypertension  Nerve injury: complication from right TKR surgery, 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  Aortic root dilatation  MRSA (methicillin resistant Staphylococcus aureus): left forearm   Lumbar degenerative disc disease  BPH associated with nocturia  Diverticulosis of intestine without bleeding, unspecified intestinal tract location  Gastroesophageal reflux disease, esophagitis presence not specified  Aortic valve insufficiency, unspecified etiology  CAMILA (obstructive sleep apnea): unable to tolerate nocturnal CPAP  Restless leg syndrome  Thoracic aortic aneurysm  HLD (hyperlipidemia)  Status post cataract extraction, unspecified laterality: bilat  S/P debridement: left forearm -mrsa  History of fundoplication:   Amputation finger: left index 1970  S/P knee replacement: left  S/P knee replacement: right  S/P shoulder surgery: right rotatotor cuff injury      Review of Systems:   CONSTITUTIONAL: No fever.  EYES: No eye pain or discharge.  ENMT:  No sinus or throat pain  NECK: No pain or stiffness  RESPIRATORY: No cough, wheezing, chills or hemoptysis; No shortness of breath  CARDIOVASCULAR: No chest pain, palpitations, dizziness, or leg swelling  GASTROINTESTINAL: SEE HPI  GENITOURINARY: No dysuria or incontinence  NEUROLOGICAL: ++ headaches, ++DIZZINESS, NO memory loss, loss of strength, numbness, or tremors  SKIN: No rashes.  MUSCULOSKELETAL: No joint pain or swelling; No muscle, back, or extremity pain  PSYCHIATRIC: No depression, anxiety, mood swings, or difficulty sleeping      Allergies    No Known Allergies    Intolerances        Social History:   LIVES W/ WIFE  USES CANE AND HOLDS ON TO WALLS TO AMBULATE  DENIES SMOKING/ETOH/DRUG USE    FAMILY HISTORY:  Family history of lung cancer (Mother)    Home Medications:  Coumadin 2 mg oral tablet: 1.5 tab(s) orally once a day (2018 10:27)  losartan 25 mg oral tablet: 1 tab(s) orally once a day (2018 10:27)  Metoprolol Tartrate 25 mg oral tablet: 1 tab(s) orally 2 times a day (2018 10:27)  Namenda 5 mg oral tablet: orally once a day (2018 10:27)  rOPINIRole 2 mg oral tablet: 2 tab(s) orally 3 times a day (2018 10:27)      MEDICATIONS  (STANDING):  pantoprazole  Injectable 80 milliGRAM(s) IV Push once  pantoprazole Infusion 8 mG/Hr (10 mL/Hr) IV Continuous <Continuous>    MEDICATIONS  (PRN):  ondansetron Injectable 4 milliGRAM(s) IV Push every 6 hours PRN Nausea        PHYSICAL EXAM:  Vital Signs Last 24 Hrs  T(C): 36.7 (10 Apr 2019 12:51), Max: 37.2 (10 Apr 2019 09:27)  T(F): 98.1 (10 Apr 2019 12:51), Max: 99 (10 Apr 2019 09:27)  HR: 74 (10 Apr 2019 12:51) (70 - 96)  BP: 129/74 (10 Apr 2019 12:51) (95/52 - 129/74)  BP(mean): --  RR: 18 (10 Apr 2019 12:51) (16 - 18)  SpO2: 98% (10 Apr 2019 12:51) (95% - 98%)  GENERAL: NAD, well-developed  HEAD:  Atraumatic, Normocephalic, FRAIL  EYES: EOMI, PERRLA, conjunctiva and sclera clear  ENT: [place], no nasal discharge, throat clear, dentition normal for age  NECK: Supple, No JVD, no LAD, no thyromegaly   CHEST/LUNG: Clear to auscultation bilaterally; No wheeze, resp unlabored  HEART: Regular rate and rhythm; No murmurs, rubs, or gallops  ABDOMEN: Soft, Nontender, Nondistended; Bowel sounds present, no HSM  EXTREMITIES:  2+ Peripheral Pulses, No clubbing, cyanosis, or trace pedal edema b/l  PSYCH: AAOx3, normal behavior  NEUROLOGY: non-focal, sensory and cn 2-12 intact  SKIN: No visible rashes or lesions    LABS:                        6.4    7.97  )-----------( 270      ( 10 Apr 2019 10:01 )             20.1     04-10    141  |  109<H>  |  25<H>  ----------------------------<  98  3.9   |  26  |  1.10    Ca    8.5      10 Apr 2019 10:01    TPro  5.9<L>  /  Alb  3.1<L>  /  TBili  0.3  /  DBili  x   /  AST  24  /  ALT  29  /  AlkPhos  69  04-10    PT/INR - ( 10 Apr 2019 10:01 )   PT: 12.2 sec;   INR: 1.10 ratio         PTT - ( 10 Apr 2019 10:01 )  PTT:27.2 sec      Urinalysis Basic - ( 10 Apr 2019 10:01 )    Color: Yellow / Appearance: Clear / S.015 / pH: x  Gluc: x / Ketone: Negative  / Bili: Negative / Urobili: Negative mg/dL   Blood: x / Protein: Negative mg/dL / Nitrite: Negative   Leuk Esterase: Negative / RBC: x / WBC x   Sq Epi: x / Non Sq Epi: x / Bacteria: x        RADIOLOGY & ADDITIONAL TESTS:    Imaging Personally Reviewed:  cxr neg  EKG Personally Reviewed:  n/a

## 2019-04-10 NOTE — ED ADULT NURSE REASSESSMENT NOTE - NS ED NURSE REASSESS COMMENT FT1
Patient care received from ERIBERTO CARDONA. Patient A&Ox4, resting in bed. VSS, denies pain/discomfort. Expiratory wheezing to auscultation, MD Morales notified; no new orders at this time. Patient had episode of incontinent, hygiene care performed, linens changed. Patient repositioned for comfort & safety. 1 of 2 units of PRBcs infusing as prescribed. Plan of care discussed, wait time explained; hospitalist consult pending. Safety & comfort measures in place, spouse bedside. Will continue to monitor.

## 2019-04-10 NOTE — H&P ADULT - ASSESSMENT
83M w/PMH [place] (cochlear implant), chronic dizziness, HTN, RLS, presents for abnormal labs.    +melena, +hematochezia     In ED, Guaiac Pos, Hg 6.4, 2 units PRBCs ordered and receiving at time of my exam, SBP low 100's (improving now), HR 90's (now 80's).  Pt has had anemia in the past and had been on iron replacement, but denies prior transfusion.

## 2019-04-10 NOTE — ED PROVIDER NOTE - PROGRESS NOTE DETAILS
Stool guaiac performed by Dr. Morales. Lot #: [id number]; EXP: 6/20/19 Result: brown stool, guaic positive; QC- reactive. Carmen RUSSO: 2 units of PRBCs ordered; endorsed to Dr. Rendon for admission.

## 2019-04-10 NOTE — CONSULT NOTE ADULT - SUBJECTIVE AND OBJECTIVE BOX
HPI:  Chief Complaint: abnormal labs, recent melena, HA    HPI:  83M w/PMH [place] (cochlear implant), chronic dizziness, HTN, RLS, presents for abnormal labs.  Pt has been experiencing dizziness for over a year and has been getting evaluated and PT.  Since Friday (), he told his wife that he's notice "a lot" of blood in his BM.  He also endorses melena since then (he's not currently on iron).  Wife also noticed that he's more pale than usual since  and he's started having HA that he doesn't normally have.  She took him to his doctor  Yesterday, had labs and sent for CT head.  Office called wife this AM and told his Hg 6 and to go to ED.  He denies any n/v/abd pain, hematemesis.  Denies sob, cp, cough, fever/chills, dysuria.    In ED, Guaiac Pos, Hg 6.4, 2 units PRBCs ordered and receiving at time of my exam, SBP 90'S to low 100's (improving now), HR high 90's (now 70's).  Pt has had anemia in the past and had been on iron replacement, but denies prior transfusion.      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  Aortic root dilatation  MRSA (methicillin resistant Staphylococcus aureus): left forearm   Lumbar degenerative disc disease  BPH associated with nocturia  Diverticulosis of intestine without bleeding, unspecified intestinal tract location  Gastroesophageal reflux disease, esophagitis presence not specified  Aortic valve insufficiency, unspecified etiology  CAMILA (obstructive sleep apnea): unable to tolerate nocturnal CPAP  Restless leg syndrome  Thoracic aortic aneurysm  HLD (hyperlipidemia)  Status post cataract extraction, unspecified laterality: bilat  S/P debridement: left forearm -mrsa  History of fundoplication:   Amputation finger: left index 1970  S/P knee replacement: left  S/P knee replacement: right  S/P shoulder surgery: right rotatotor cuff injury      Review of Systems:   CONSTITUTIONAL: No fever.  EYES: No eye pain or discharge.  ENMT:  No sinus or throat pain  NECK: No pain or stiffness  RESPIRATORY: No cough, wheezing, chills or hemoptysis; No shortness of breath  CARDIOVASCULAR: No chest pain, palpitations, dizziness, or leg swelling  GASTROINTESTINAL: SEE HPI  GENITOURINARY: No dysuria or incontinence  NEUROLOGICAL: ++ headaches, ++DIZZINESS, NO memory loss, loss of strength, numbness, or tremors  SKIN: No rashes.  MUSCULOSKELETAL: No joint pain or swelling; No muscle, back, or extremity pain  PSYCHIATRIC: No depression, anxiety, mood swings, or difficulty sleeping      Allergies    No Known Allergies    Intolerances        Social History:   LIVES W/ WIFE  USES CANE AND HOLDS ON TO WALLS TO AMBULATE  DENIES SMOKING/ETOH/DRUG USE    FAMILY HISTORY:  Family history of lung cancer (Mother)    Home Medications:  Coumadin 2 mg oral tablet: 1.5 tab(s) orally once a day (2018 10:27)  losartan 25 mg oral tablet: 1 tab(s) orally once a day (2018 10:27)  Metoprolol Tartrate 25 mg oral tablet: 1 tab(s) orally 2 times a day (2018 10:27)  Namenda 5 mg oral tablet: orally once a day (2018 10:27)  rOPINIRole 2 mg oral tablet: 2 tab(s) orally 3 times a day (2018 10:27)      MEDICATIONS  (STANDING):  pantoprazole  Injectable 80 milliGRAM(s) IV Push once  pantoprazole Infusion 8 mG/Hr (10 mL/Hr) IV Continuous <Continuous>    MEDICATIONS  (PRN):  ondansetron Injectable 4 milliGRAM(s) IV Push every 6 hours PRN Nausea        PHYSICAL EXAM:  Vital Signs Last 24 Hrs  T(C): 36.7 (10 Apr 2019 12:51), Max: 37.2 (10 Apr 2019 09:27)  T(F): 98.1 (10 Apr 2019 12:51), Max: 99 (10 Apr 2019 09:27)  HR: 74 (10 Apr 2019 12:51) (70 - 96)  BP: 129/74 (10 Apr 2019 12:51) (95/52 - 129/74)  BP(mean): --  RR: 18 (10 Apr 2019 12:51) (16 - 18)  SpO2: 98% (10 Apr 2019 12:51) (95% - 98%)  GENERAL: NAD, well-developed  HEAD:  Atraumatic, Normocephalic, FRAIL  EYES: EOMI, PERRLA, conjunctiva and sclera clear  ENT: [place], no nasal discharge, throat clear, dentition normal for age  NECK: Supple, No JVD, no LAD, no thyromegaly   CHEST/LUNG: Clear to auscultation bilaterally; No wheeze, resp unlabored  HEART: Regular rate and rhythm; No murmurs, rubs, or gallops  ABDOMEN: Soft, Nontender, Nondistended; Bowel sounds present, no HSM  EXTREMITIES:  2+ Peripheral Pulses, No clubbing, cyanosis, or trace pedal edema b/l  PSYCH: AAOx3, normal behavior  NEUROLOGY: non-focal, sensory and cn 2-12 intact  SKIN: No visible rashes or lesions    LABS:                        6.4    7.97  )-----------( 270      ( 10 Apr 2019 10:01 )             20.1     04-10    141  |  109<H>  |  25<H>  ----------------------------<  98  3.9   |  26  |  1.10    Ca    8.5      10 Apr 2019 10:01    TPro  5.9<L>  /  Alb  3.1<L>  /  TBili  0.3  /  DBili  x   /  AST  24  /  ALT  29  /  AlkPhos  69  04-10    PT/INR - ( 10 Apr 2019 10:01 )   PT: 12.2 sec;   INR: 1.10 ratio         PTT - ( 10 Apr 2019 10:01 )  PTT:27.2 sec      Urinalysis Basic - ( 10 Apr 2019 10:01 )    Color: Yellow / Appearance: Clear / S.015 / pH: x  Gluc: x / Ketone: Negative  / Bili: Negative / Urobili: Negative mg/dL   Blood: x / Protein: Negative mg/dL / Nitrite: Negative   Leuk Esterase: Negative / RBC: x / WBC x   Sq Epi: x / Non Sq Epi: x / Bacteria: x        RADIOLOGY & ADDITIONAL TESTS:    Imaging Personally Reviewed:  cxr neg  EKG Personally Reviewed:  n/a (10 Apr 2019 14:05)      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  Aortic root dilatation  MRSA (methicillin resistant Staphylococcus aureus): left forearm   Lumbar degenerative disc disease  BPH associated with nocturia  Diverticulosis of intestine without bleeding, unspecified intestinal tract location  Gastroesophageal reflux disease, esophagitis presence not specified  Aortic valve insufficiency, unspecified etiology  CAMILA (obstructive sleep apnea): unable to tolerate nocturnal CPAP  Restless leg syndrome  Hard of hearing  Thoracic aortic aneurysm  HLD (hyperlipidemia)  Status post cataract extraction, unspecified laterality: bilat  S/P debridement: left forearm -mrsa  History of fundoplication:   Amputation finger: left index 1970  S/P knee replacement: left  S/P knee replacement: right  S/P shoulder surgery: right rotatotor cuff injury      MEDICATIONS  (STANDING):  atorvastatin 40 milliGRAM(s) Oral at bedtime  donepezil 10 milliGRAM(s) Oral at bedtime  memantine 5 milliGRAM(s) Oral daily  pantoprazole Infusion 8 mG/Hr (10 mL/Hr) IV Continuous <Continuous>  rOPINIRole 4 milliGRAM(s) Oral three times a day    MEDICATIONS  (PRN):  ondansetron Injectable 4 milliGRAM(s) IV Push every 6 hours PRN Nausea      Allergies    No Known Allergies    Intolerances        SOCIAL HISTORY:    FAMILY HISTORY:  Family history of lung cancer (Mother)   Non-contributory    REVIEW OF SYSTEMS      General:	    Respiratory and Thorax:  	  Cardiovascular:	    Gastrointestinal:	    Musculoskeletal:	   Vital Signs Last 24 Hrs  T(C): 36.4 (10 Apr 2019 16:07), Max: 37.2 (10 Apr 2019 09:27)  T(F): 97.5 (10 Apr 2019 16:07), Max: 99 (10 Apr 2019 09:27)  HR: 86 (10 Apr 2019 16:07) (63 - 96)  BP: 96/66 (10 Apr 2019 16:07) (95/52 - 129/74)  BP(mean): --  RR: 17 (10 Apr 2019 16:07) (16 - 18)  SpO2: 97% (10 Apr 2019 16:07) (95% - 98%)    HEENT :No Pallor.No icterus. EOMI,PERLAA  Chest : Clear to Auscultation  CVS : S1S2 Normal.No murmurs.  Abdomen: Soft.Non tender .Normal bowel sounds.No Organomegaly.  CNS: Alert.Oriented to Time,Place and Person.No focal deficit.  EXT: Normal Range of motion.No pitting edema.    LABS:                        6.4    7.97  )-----------( 270      ( 10 Apr 2019 10:01 )             20.1     04-10    141  |  109<H>  |  25<H>  ----------------------------<  98  3.9   |  26  |  1.10    Ca    8.5      10 Apr 2019 10:01    TPro  5.9<L>  /  Alb  3.1<L>  /  TBili  0.3  /  DBili  x   /  AST  24  /  ALT  29  /  AlkPhos  69  04-10    PT/INR - ( 10 Apr 2019 10:01 )   PT: 12.2 sec;   INR: 1.10 ratio         PTT - ( 10 Apr 2019 10:01 )  PTT:27.2 sec  LIVER FUNCTIONS - ( 10 Apr 2019 10:01 )  Alb: 3.1 g/dL / Pro: 5.9 gm/dL / ALK PHOS: 69 U/L / ALT: 29 U/L / AST: 24 U/L / GGT: x             RADIOLOGY & ADDITIONAL STUDIES:

## 2019-04-10 NOTE — ED PROVIDER NOTE - OBJECTIVE STATEMENT
84 y/o male with PMHx of Aortic root dilation on ASA, Aortic valve insufficiency, BPH, essential HTN, GERD, HLD, Lumbar degenerative disease, MRSA, CAMILA, restless leg syndrome, thoracic aneurysm on ASA, metoprolol and Losartan, h/o bilateral knee replacement, left index finger amputation, fundoplication, debridement left forearm-MRSA, presents to the ED c/o dizziness. Pt states his PMD sent to him to neurologist where he had a CAT scan yesterday of his head and neck because of recent dizziness and HA. Was called today and told to have hemoglobin of level 6. For the last year pt has had a h/o of anemia and taken iron supplements. States pt had a black bowel movements with bright red blood on 4/5/19 (5 days ago) and 4/6/19 (4 days ago). PMD: Dr. Esvin Soriano. Neurologist: Dr. Terry 82 y/o male with PMHx of Aortic root dilation on ASA, Aortic valve insufficiency, BPH, essential HTN, GERD, HLD, Lumbar degenerative disease, MRSA, CAMILA, restless leg syndrome, thoracic aneurysm on ASA, metoprolol and Losartan, h/o bilateral knee replacement, left index finger amputation, fundoplication, debridement left forearm-MRSA, presents to the ED c/o dizziness today. Pt states his PMD sent to him to neurologist where he had a CAT scan yesterday of his head and neck because of ongoing dizziness and HA. Was called today and told to have hemoglobin of level 6. For the last year pt has had a h/o of anemia and taken iron supplements. States pt had a black bowel movements with bright red blood on 4/5/19 (5 days ago) and 4/6/19 (4 days ago). PMD: Dr. Esvin Soriano. Neurologist: Dr. Narvaez 82 y/o male with PMHx of Aortic root dilation on ASA, Aortic valve insufficiency, BPH, essential HTN, GERD, HLD, Lumbar degenerative disease, MRSA, CAMILA, restless leg syndrome, thoracic aneurysm on ASA, metoprolol and Losartan, h/o bilateral knee replacement, left index finger amputation, fundoplication, debridement left forearm-MRSA, presents to the ED c/o dizziness today. Pt states his PMD sent to him to neurologist where he had a CAT scan yesterday of his head and neck because of ongoing dizziness and HA. Was called today and told to have hemoglobin of level 6. For the last year pt has had a h/o of anemia and taken iron supplements. States pt had a black bowel movements with bright red blood on 4/5/19 (5 days ago) and 4/6/19 (4 days ago). PMD: Dr. Esvin Soriano. Neurologist: Dr. Narvaez. GI: Dr. Mendiola. 82 y/o male with PMHx of Aortic root dilation on ASA, Aortic valve insufficiency, BPH, essential HTN, GERD, HLD, Lumbar degenerative disease, MRSA, CAMILA, restless leg syndrome, thoracic aneurysm on ASA, metoprolol and Losartan, h/o bilateral knee replacement, left index finger amputation, fundoplication, debridement left forearm-MRSA, presents to the ED c/o dizziness today. Pt states his PMD sent to him to neurologist where he had a CAT scan yesterday of his head and neck because of ongoing dizziness and HA. Was called today and told to have hemoglobin of 6. For the last year pt has had a h/o of anemia and taken iron supplements. States pt had a black bowel movements with bright red blood on 4/5/19 (5 days ago) and 4/6/19 (4 days ago). PMD: Dr. Esvin Soriano. Neurologist: Dr. Narvaez. GI: Dr. Mendiola.

## 2019-04-10 NOTE — ED PROVIDER NOTE - PMH
Aortic root dilatation    Aortic valve insufficiency, unspecified etiology    BPH associated with nocturia    Diverticulosis of intestine without bleeding, unspecified intestinal tract location    Essential hypertension    Fall (on) (from) other stairs and steps, sequela  03/28/2017- lumbar hemtoma  Gastroesophageal reflux disease, esophagitis presence not specified    Hard of hearing    HLD (hyperlipidemia)    [place] (hard of hearing)    Lumbar degenerative disc disease    MRSA (methicillin resistant Staphylococcus aureus)  left forearm 2012  Nerve injury  complication from right TKR surgery, 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  CAMILA (obstructive sleep apnea)  unable to tolerate nocturnal CPAP  Restless leg syndrome    Thoracic aortic aneurysm

## 2019-04-10 NOTE — ED ADULT NURSE REASSESSMENT NOTE - NS ED NURSE REASSESS COMMENT FT1
Patient resting comfortably in bed. VSS, first infusion of PRBCs complete. No s/s of transfusion reaction present. Protonix IVP administered, dose request sent to pharmacy for protonix gtt. ERIBERTO Berman on 5S will initiate protonix gtt and second unit of PRBCs. Plan of care discussed. Safety & comfort measures in place, spouse bedside. Purposeful active rounding on my time.

## 2019-04-10 NOTE — H&P ADULT - PROBLEM SELECTOR PLAN 1
melena, hematochezia  monitor H/H, vitals  GI cs- Dr Mendiola  CLD and NPO >MN for possible EGD  PPI gtt

## 2019-04-10 NOTE — H&P ADULT - PROBLEM SELECTOR PLAN 2
likely 2/2 above  treat as above  monitor h/h and transfuse as indicated  transfusing 2 units now for Hg 6.4 and symptomatic  monitor vitals- now improving w/ transfusion

## 2019-04-10 NOTE — ED PROVIDER NOTE - CLINICAL SUMMARY MEDICAL DECISION MAKING FREE TEXT BOX
84 y/o male with anemia. Plan for EKG, labs, CXR and reevaluate. 82 y/o male with anemia, gib. Plan for EKG, labs, CXR and reevaluate.

## 2019-04-10 NOTE — H&P ADULT - PROBLEM SELECTOR PLAN 3
hold home meds - losartan and metoprolol for ABL anemia, gi bleed  monitor bp and resume meds as indicated

## 2019-04-11 ENCOUNTER — RESULT REVIEW (OUTPATIENT)
Age: 84
End: 2019-04-11

## 2019-04-11 LAB
BASOPHILS # BLD AUTO: 0.04 K/UL — SIGNIFICANT CHANGE UP (ref 0–0.2)
BASOPHILS NFR BLD AUTO: 0.5 % — SIGNIFICANT CHANGE UP (ref 0–2)
EOSINOPHIL # BLD AUTO: 0.2 K/UL — SIGNIFICANT CHANGE UP (ref 0–0.5)
EOSINOPHIL NFR BLD AUTO: 2.4 % — SIGNIFICANT CHANGE UP (ref 0–6)
GLUCOSE BLDC GLUCOMTR-MCNC: 109 MG/DL — HIGH (ref 70–99)
HCT VFR BLD CALC: 24.4 % — LOW (ref 39–50)
HCT VFR BLD CALC: 24.8 % — LOW (ref 39–50)
HGB BLD-MCNC: 7.8 G/DL — LOW (ref 13–17)
HGB BLD-MCNC: 8 G/DL — LOW (ref 13–17)
IMM GRANULOCYTES NFR BLD AUTO: 0.6 % — SIGNIFICANT CHANGE UP (ref 0–1.5)
LYMPHOCYTES # BLD AUTO: 1.46 K/UL — SIGNIFICANT CHANGE UP (ref 1–3.3)
LYMPHOCYTES # BLD AUTO: 17.7 % — SIGNIFICANT CHANGE UP (ref 13–44)
MCHC RBC-ENTMCNC: 30.2 PG — SIGNIFICANT CHANGE UP (ref 27–34)
MCHC RBC-ENTMCNC: 31.5 GM/DL — LOW (ref 32–36)
MCV RBC AUTO: 96.1 FL — SIGNIFICANT CHANGE UP (ref 80–100)
MONOCYTES # BLD AUTO: 0.7 K/UL — SIGNIFICANT CHANGE UP (ref 0–0.9)
MONOCYTES NFR BLD AUTO: 8.5 % — SIGNIFICANT CHANGE UP (ref 2–14)
NEUTROPHILS # BLD AUTO: 5.78 K/UL — SIGNIFICANT CHANGE UP (ref 1.8–7.4)
NEUTROPHILS NFR BLD AUTO: 70.3 % — SIGNIFICANT CHANGE UP (ref 43–77)
NRBC # BLD: 0 /100 WBCS — SIGNIFICANT CHANGE UP (ref 0–0)
PLATELET # BLD AUTO: 265 K/UL — SIGNIFICANT CHANGE UP (ref 150–400)
RBC # BLD: 2.58 M/UL — LOW (ref 4.2–5.8)
RBC # FLD: 15.5 % — HIGH (ref 10.3–14.5)
WBC # BLD: 8.23 K/UL — SIGNIFICANT CHANGE UP (ref 3.8–10.5)
WBC # FLD AUTO: 8.23 K/UL — SIGNIFICANT CHANGE UP (ref 3.8–10.5)

## 2019-04-11 PROCEDURE — 88305 TISSUE EXAM BY PATHOLOGIST: CPT | Mod: 26

## 2019-04-11 RX ORDER — SOD SULF/SODIUM/NAHCO3/KCL/PEG
SOLUTION, RECONSTITUTED, ORAL ORAL
Qty: 0 | Refills: 0 | Status: COMPLETED | OUTPATIENT
Start: 2019-04-12 | End: 2019-04-12

## 2019-04-11 RX ORDER — SOD SULF/SODIUM/NAHCO3/KCL/PEG
1000 SOLUTION, RECONSTITUTED, ORAL ORAL ONCE
Qty: 0 | Refills: 0 | Status: COMPLETED | OUTPATIENT
Start: 2019-04-11 | End: 2019-04-11

## 2019-04-11 RX ORDER — SODIUM CHLORIDE 9 MG/ML
1000 INJECTION, SOLUTION INTRAVENOUS
Qty: 0 | Refills: 0 | Status: DISCONTINUED | OUTPATIENT
Start: 2019-04-11 | End: 2019-04-13

## 2019-04-11 RX ORDER — IPRATROPIUM/ALBUTEROL SULFATE 18-103MCG
3 AEROSOL WITH ADAPTER (GRAM) INHALATION ONCE
Qty: 0 | Refills: 0 | Status: COMPLETED | OUTPATIENT
Start: 2019-04-11 | End: 2019-04-11

## 2019-04-11 RX ORDER — ACETAMINOPHEN 500 MG
650 TABLET ORAL ONCE
Qty: 0 | Refills: 0 | Status: COMPLETED | OUTPATIENT
Start: 2019-04-11 | End: 2019-04-11

## 2019-04-11 RX ORDER — PANTOPRAZOLE SODIUM 20 MG/1
40 TABLET, DELAYED RELEASE ORAL
Qty: 0 | Refills: 0 | Status: DISCONTINUED | OUTPATIENT
Start: 2019-04-11 | End: 2019-04-15

## 2019-04-11 RX ORDER — SOD SULF/SODIUM/NAHCO3/KCL/PEG
1000 SOLUTION, RECONSTITUTED, ORAL ORAL ONCE
Qty: 0 | Refills: 0 | Status: COMPLETED | OUTPATIENT
Start: 2019-04-11 | End: 2019-04-12

## 2019-04-11 RX ADMIN — ROPINIROLE 4 MILLIGRAM(S): 8 TABLET, FILM COATED, EXTENDED RELEASE ORAL at 22:50

## 2019-04-11 RX ADMIN — ATORVASTATIN CALCIUM 40 MILLIGRAM(S): 80 TABLET, FILM COATED ORAL at 22:50

## 2019-04-11 RX ADMIN — Medication 650 MILLIGRAM(S): at 02:36

## 2019-04-11 RX ADMIN — ROPINIROLE 4 MILLIGRAM(S): 8 TABLET, FILM COATED, EXTENDED RELEASE ORAL at 05:23

## 2019-04-11 RX ADMIN — ROPINIROLE 4 MILLIGRAM(S): 8 TABLET, FILM COATED, EXTENDED RELEASE ORAL at 18:13

## 2019-04-11 RX ADMIN — PANTOPRAZOLE SODIUM 10 MG/HR: 20 TABLET, DELAYED RELEASE ORAL at 05:22

## 2019-04-11 RX ADMIN — DONEPEZIL HYDROCHLORIDE 10 MILLIGRAM(S): 10 TABLET, FILM COATED ORAL at 22:50

## 2019-04-11 RX ADMIN — Medication 3 MILLILITER(S): at 15:35

## 2019-04-11 RX ADMIN — Medication 1000 MILLILITER(S): at 18:14

## 2019-04-11 RX ADMIN — Medication 650 MILLIGRAM(S): at 02:06

## 2019-04-11 RX ADMIN — SODIUM CHLORIDE 75 MILLILITER(S): 9 INJECTION, SOLUTION INTRAVENOUS at 09:31

## 2019-04-11 RX ADMIN — MEMANTINE HYDROCHLORIDE 5 MILLIGRAM(S): 10 TABLET ORAL at 18:13

## 2019-04-12 LAB
HCT VFR BLD CALC: 24.2 % — LOW (ref 39–50)
HGB BLD-MCNC: 7.8 G/DL — LOW (ref 13–17)
MCHC RBC-ENTMCNC: 31 PG — SIGNIFICANT CHANGE UP (ref 27–34)
MCHC RBC-ENTMCNC: 32.2 GM/DL — SIGNIFICANT CHANGE UP (ref 32–36)
MCV RBC AUTO: 96 FL — SIGNIFICANT CHANGE UP (ref 80–100)
NRBC # BLD: 0 /100 WBCS — SIGNIFICANT CHANGE UP (ref 0–0)
PLATELET # BLD AUTO: 259 K/UL — SIGNIFICANT CHANGE UP (ref 150–400)
RBC # BLD: 2.52 M/UL — LOW (ref 4.2–5.8)
RBC # FLD: 15.2 % — HIGH (ref 10.3–14.5)
WBC # BLD: 6.9 K/UL — SIGNIFICANT CHANGE UP (ref 3.8–10.5)
WBC # FLD AUTO: 6.9 K/UL — SIGNIFICANT CHANGE UP (ref 3.8–10.5)

## 2019-04-12 RX ORDER — IPRATROPIUM/ALBUTEROL SULFATE 18-103MCG
3 AEROSOL WITH ADAPTER (GRAM) INHALATION ONCE
Qty: 0 | Refills: 0 | Status: COMPLETED | OUTPATIENT
Start: 2019-04-12 | End: 2019-04-12

## 2019-04-12 RX ORDER — METOPROLOL TARTRATE 50 MG
1 TABLET ORAL
Qty: 0 | Refills: 0 | COMMUNITY

## 2019-04-12 RX ORDER — PSYLLIUM SEED (WITH DEXTROSE)
3 POWDER (GRAM) ORAL
Qty: 0 | Refills: 0 | COMMUNITY

## 2019-04-12 RX ORDER — GABAPENTIN 400 MG/1
600 CAPSULE ORAL
Qty: 0 | Refills: 0 | Status: DISCONTINUED | OUTPATIENT
Start: 2019-04-12 | End: 2019-04-15

## 2019-04-12 RX ORDER — ROPINIROLE 8 MG/1
2 TABLET, FILM COATED, EXTENDED RELEASE ORAL
Qty: 0 | Refills: 0 | COMMUNITY

## 2019-04-12 RX ORDER — ATORVASTATIN CALCIUM 80 MG/1
20 TABLET, FILM COATED ORAL AT BEDTIME
Qty: 0 | Refills: 0 | Status: DISCONTINUED | OUTPATIENT
Start: 2019-04-12 | End: 2019-04-15

## 2019-04-12 RX ORDER — MEMANTINE HYDROCHLORIDE 10 MG/1
0 TABLET ORAL
Qty: 0 | Refills: 0 | COMMUNITY

## 2019-04-12 RX ADMIN — MEMANTINE HYDROCHLORIDE 5 MILLIGRAM(S): 10 TABLET ORAL at 11:06

## 2019-04-12 RX ADMIN — ROPINIROLE 4 MILLIGRAM(S): 8 TABLET, FILM COATED, EXTENDED RELEASE ORAL at 06:36

## 2019-04-12 RX ADMIN — ROPINIROLE 2 MILLIGRAM(S): 8 TABLET, FILM COATED, EXTENDED RELEASE ORAL at 17:17

## 2019-04-12 RX ADMIN — SODIUM CHLORIDE 75 MILLILITER(S): 9 INJECTION, SOLUTION INTRAVENOUS at 13:12

## 2019-04-12 RX ADMIN — ROPINIROLE 2 MILLIGRAM(S): 8 TABLET, FILM COATED, EXTENDED RELEASE ORAL at 23:41

## 2019-04-12 RX ADMIN — DONEPEZIL HYDROCHLORIDE 10 MILLIGRAM(S): 10 TABLET, FILM COATED ORAL at 21:39

## 2019-04-12 RX ADMIN — Medication 1000 MILLILITER(S): at 06:36

## 2019-04-12 RX ADMIN — PANTOPRAZOLE SODIUM 40 MILLIGRAM(S): 20 TABLET, DELAYED RELEASE ORAL at 09:27

## 2019-04-12 RX ADMIN — ATORVASTATIN CALCIUM 20 MILLIGRAM(S): 80 TABLET, FILM COATED ORAL at 21:39

## 2019-04-12 RX ADMIN — SODIUM CHLORIDE 75 MILLILITER(S): 9 INJECTION, SOLUTION INTRAVENOUS at 01:49

## 2019-04-12 RX ADMIN — GABAPENTIN 600 MILLIGRAM(S): 400 CAPSULE ORAL at 17:17

## 2019-04-12 RX ADMIN — GABAPENTIN 600 MILLIGRAM(S): 400 CAPSULE ORAL at 23:41

## 2019-04-13 LAB
BLD GP AB SCN SERPL QL: SIGNIFICANT CHANGE UP
HCT VFR BLD CALC: 23.6 % — LOW (ref 39–50)
HGB BLD-MCNC: 7.5 G/DL — LOW (ref 13–17)
MCHC RBC-ENTMCNC: 31 PG — SIGNIFICANT CHANGE UP (ref 27–34)
MCHC RBC-ENTMCNC: 31.8 GM/DL — LOW (ref 32–36)
MCV RBC AUTO: 97.5 FL — SIGNIFICANT CHANGE UP (ref 80–100)
NRBC # BLD: 0 /100 WBCS — SIGNIFICANT CHANGE UP (ref 0–0)
PLATELET # BLD AUTO: 209 K/UL — SIGNIFICANT CHANGE UP (ref 150–400)
RBC # BLD: 2.42 M/UL — LOW (ref 4.2–5.8)
RBC # FLD: 14.8 % — HIGH (ref 10.3–14.5)
TYPE + AB SCN PNL BLD: SIGNIFICANT CHANGE UP
WBC # BLD: 9.09 K/UL — SIGNIFICANT CHANGE UP (ref 3.8–10.5)
WBC # FLD AUTO: 9.09 K/UL — SIGNIFICANT CHANGE UP (ref 3.8–10.5)

## 2019-04-13 RX ORDER — FUROSEMIDE 40 MG
20 TABLET ORAL ONCE
Qty: 0 | Refills: 0 | Status: COMPLETED | OUTPATIENT
Start: 2019-04-13 | End: 2019-04-13

## 2019-04-13 RX ADMIN — MEMANTINE HYDROCHLORIDE 5 MILLIGRAM(S): 10 TABLET ORAL at 10:51

## 2019-04-13 RX ADMIN — ROPINIROLE 2 MILLIGRAM(S): 8 TABLET, FILM COATED, EXTENDED RELEASE ORAL at 05:17

## 2019-04-13 RX ADMIN — PANTOPRAZOLE SODIUM 40 MILLIGRAM(S): 20 TABLET, DELAYED RELEASE ORAL at 05:17

## 2019-04-13 RX ADMIN — GABAPENTIN 600 MILLIGRAM(S): 400 CAPSULE ORAL at 16:44

## 2019-04-13 RX ADMIN — ATORVASTATIN CALCIUM 20 MILLIGRAM(S): 80 TABLET, FILM COATED ORAL at 22:42

## 2019-04-13 RX ADMIN — GABAPENTIN 600 MILLIGRAM(S): 400 CAPSULE ORAL at 22:43

## 2019-04-13 RX ADMIN — Medication 20 MILLIGRAM(S): at 21:09

## 2019-04-13 RX ADMIN — ROPINIROLE 2 MILLIGRAM(S): 8 TABLET, FILM COATED, EXTENDED RELEASE ORAL at 22:43

## 2019-04-13 RX ADMIN — Medication 20 MILLIGRAM(S): at 18:33

## 2019-04-13 RX ADMIN — SODIUM CHLORIDE 75 MILLILITER(S): 9 INJECTION, SOLUTION INTRAVENOUS at 05:18

## 2019-04-13 RX ADMIN — GABAPENTIN 600 MILLIGRAM(S): 400 CAPSULE ORAL at 10:51

## 2019-04-13 RX ADMIN — ROPINIROLE 2 MILLIGRAM(S): 8 TABLET, FILM COATED, EXTENDED RELEASE ORAL at 10:51

## 2019-04-13 RX ADMIN — GABAPENTIN 600 MILLIGRAM(S): 400 CAPSULE ORAL at 05:18

## 2019-04-13 RX ADMIN — DONEPEZIL HYDROCHLORIDE 10 MILLIGRAM(S): 10 TABLET, FILM COATED ORAL at 22:42

## 2019-04-13 RX ADMIN — ROPINIROLE 2 MILLIGRAM(S): 8 TABLET, FILM COATED, EXTENDED RELEASE ORAL at 16:44

## 2019-04-14 LAB
HCT VFR BLD CALC: 31.5 % — LOW (ref 39–50)
HGB BLD-MCNC: 10.3 G/DL — LOW (ref 13–17)
MCHC RBC-ENTMCNC: 30.6 PG — SIGNIFICANT CHANGE UP (ref 27–34)
MCHC RBC-ENTMCNC: 32.7 GM/DL — SIGNIFICANT CHANGE UP (ref 32–36)
MCV RBC AUTO: 93.5 FL — SIGNIFICANT CHANGE UP (ref 80–100)
NRBC # BLD: 0 /100 WBCS — SIGNIFICANT CHANGE UP (ref 0–0)
PLATELET # BLD AUTO: 236 K/UL — SIGNIFICANT CHANGE UP (ref 150–400)
RBC # BLD: 3.37 M/UL — LOW (ref 4.2–5.8)
RBC # FLD: 15 % — HIGH (ref 10.3–14.5)
WBC # BLD: 9.06 K/UL — SIGNIFICANT CHANGE UP (ref 3.8–10.5)
WBC # FLD AUTO: 9.06 K/UL — SIGNIFICANT CHANGE UP (ref 3.8–10.5)

## 2019-04-14 RX ADMIN — GABAPENTIN 600 MILLIGRAM(S): 400 CAPSULE ORAL at 22:47

## 2019-04-14 RX ADMIN — ATORVASTATIN CALCIUM 20 MILLIGRAM(S): 80 TABLET, FILM COATED ORAL at 21:34

## 2019-04-14 RX ADMIN — ROPINIROLE 2 MILLIGRAM(S): 8 TABLET, FILM COATED, EXTENDED RELEASE ORAL at 05:07

## 2019-04-14 RX ADMIN — GABAPENTIN 600 MILLIGRAM(S): 400 CAPSULE ORAL at 05:07

## 2019-04-14 RX ADMIN — ROPINIROLE 2 MILLIGRAM(S): 8 TABLET, FILM COATED, EXTENDED RELEASE ORAL at 22:46

## 2019-04-14 RX ADMIN — GABAPENTIN 600 MILLIGRAM(S): 400 CAPSULE ORAL at 17:16

## 2019-04-14 RX ADMIN — ROPINIROLE 2 MILLIGRAM(S): 8 TABLET, FILM COATED, EXTENDED RELEASE ORAL at 17:16

## 2019-04-14 RX ADMIN — DONEPEZIL HYDROCHLORIDE 10 MILLIGRAM(S): 10 TABLET, FILM COATED ORAL at 21:34

## 2019-04-14 RX ADMIN — ROPINIROLE 2 MILLIGRAM(S): 8 TABLET, FILM COATED, EXTENDED RELEASE ORAL at 11:18

## 2019-04-14 RX ADMIN — PANTOPRAZOLE SODIUM 40 MILLIGRAM(S): 20 TABLET, DELAYED RELEASE ORAL at 05:08

## 2019-04-14 RX ADMIN — MEMANTINE HYDROCHLORIDE 5 MILLIGRAM(S): 10 TABLET ORAL at 11:18

## 2019-04-14 RX ADMIN — GABAPENTIN 600 MILLIGRAM(S): 400 CAPSULE ORAL at 11:17

## 2019-04-14 NOTE — PHYSICAL THERAPY INITIAL EVALUATION ADULT - PERTINENT HX OF CURRENT PROBLEM, REHAB EVAL
83M w/PMH [place] (cochlear implant), chronic dizziness, HTN, RLS, presents for abnormal labs.  Pt has been experiencing dizziness for over a year and has been getting evaluated and PT.  Since Friday (4/5), he told his wife that he's notice "a lot" of blood in his BM. She took him to his doctor yesterday, had labs and sent for CT head.  Office called wife this AM and told his Hg 6 and to go to ED.

## 2019-04-14 NOTE — PROGRESS NOTE ADULT - ASSESSMENT
83M w/PMH [place] (cochlear implant), chronic dizziness, HTN, RLS, presents for abnormal labs.    +melena, +hematochezia     In ED, Guaiac Pos, Hg 6.4, 2 units PRBCs ordered and receiving at time of my exam, SBP low 100's (improving now), HR 90's (now 80's).  Pt has had anemia in the past and had been on iron replacement, but denies prior transfusion.       Problem/Plan - 1:  ·  Problem: GIB (gastrointestinal bleeding).  Plan: melena, hematochezia  monitor H/H, vitals  GI cs- Dr Mendiola, s/p EGD, no active bleed      Problem/Plan - 2:  ·  Problem: Acute blood loss anemia.  Plan: likely 2/2 above  treat as above  monitor h/h and transfuse as indicated  transfusing 2 units now for Hg 6.4 and symptomatic  monitor vitals    Problem/Plan - 3:  ·  Problem: Essential hypertension.  Plan: hold home meds - losartan and metoprolol for ABL anemia, gi bleed  monitor bp and resume meds as indicated.     Problem/Plan - 4:  ·  Problem: Dizziness.  Plan: chronic, has had op f/u and work up w/ neuro, recent op CT head/neck- may need to f/u results  neuro cs if indicated.     Problem/Plan - 5:  ·  Problem: Hard of hearing.  Plan: pt has cochlear implant  supportive care.     Problem/Plan - 6:  Problem: Prophylactic measure. Plan: SCDs only in light of GI bleed/anemia.    poc discussed with pt, team.
83M w/PMH "[employer] (cochlear implant), chronic dizziness, HTN, RLS, presents for abnormal labs.    +melena, +hematochezia     In ED, Guaiac Pos, Hg 6.4, 2 units PRBCs ordered and receiving at time of my exam, SBP low 100's (improving now), HR 90's (now 80's).  Pt has had anemia in the past and had been on iron replacement, but denies prior transfusion.       Problem/Plan - 1:  ·  Problem: GIB (gastrointestinal bleeding).  Plan: melena, hematochezia  monitor H/H, vitals  GI cs- Dr Mendiola, s/p EGD 4/11, no active bleed  s/p colonoscopy 4/12; non bleeding internal hemorrhoids; diet advanced  monitor Hb       Problem/Plan - 2:  ·  Problem: Acute blood loss anemia.  Plan: likely 2/2 above  treat as above  monitor h/h and transfuse as indicated  transfusing 2 units now for Hg 6.4 and symptomatic  monitor vitals    Problem/Plan - 3:  ·  Problem: Essential hypertension.  Plan: hold home meds - losartan and metoprolol for ABL anemia, gi bleed  monitor bp and resume meds as indicated.     Problem/Plan - 4:  ·  Problem: Dizziness.  Plan: chronic, has had op f/u and work up w/ neuro, recent op CT head/neck- may need to f/u results  neuro cs if indicated.     Problem/Plan - 5:  ·  Problem: Hard of hearing.  Plan: pt has cochlear implant  supportive care.     Problem/Plan - 6:  Problem: Prophylactic measure. Plan: SCDs only in light of GI bleed/anemia.    PTx consult    poc discussed with pt, team.
83M w/PMH [place] (cochlear implant), chronic dizziness, HTN, RLS, presents for abnormal labs.    +melena, +hematochezia     In ED, Guaiac Pos, Hg 6.4, 2 units PRBCs ordered and receiving at time of my exam, SBP low 100's (improving now), HR 90's (now 80's).  Pt has had anemia in the past and had been on iron replacement, but denies prior transfusion.       Problem/Plan - 1:  ·  Problem: GIB (gastrointestinal bleeding).  Plan: melena, hematochezia  monitor H/H, vitals  GI cs- Dr Mendiola, s/p EGD 4/11, no active bleed  s/p colonoscopy 4/12; non bleeding internal hemorrhoids; diet advanced  monitor Hb , dropped to 7.5 on iv fluids  gibe 2 units PRBC on 4/13; check CBC in am; if continues to drop then might benefit from capsule endoscopy  d/c iv fluids      Problem/Plan - 2:  ·  Problem: Acute blood loss anemia.  Plan: likely 2/2 above  treat as above  monitor h/h and transfuse as indicated  monitor vitals    Problem/Plan - 3:  ·  Problem: Essential hypertension.  Plan: hold home meds - losartan and metoprolol for ABL anemia, gi bleed  monitor bp and resume meds as indicated.     Problem/Plan - 4:  ·  Problem: Dizziness.  Plan: chronic, has had op f/u and work up w/ neuro, recent op CT head/neck- may need to f/u results  neuro cs if indicated.     Problem/Plan - 5:  ·  Problem: Hard of hearing.  Plan: pt has cochlear implant  supportive care.     Problem/Plan - 6:  Problem: Prophylactic measure. Plan: SCDs only in light of GI bleed/anemia.    PTx consult    poc discussed with pt, his wife Rachel at [phone], team.
83M w/PMH [place] (cochlear implant), chronic dizziness, HTN, RLS, presents for abnormal labs.    +melena, +hematochezia     In ED, Guaiac Pos, Hg 6.4, 2 units PRBCs ordered and receiving at time of my exam, SBP low 100's (improving now), HR 90's (now 80's).  Pt has had anemia in the past and had been on iron replacement, but denies prior transfusion.       Problem/Plan - 1:  ·  Problem: GIB (gastrointestinal bleeding).  Plan: melena, hematochezia  monitor H/H, vitals  GI cs- Dr Mendiola, s/p EGD 4/11, no active bleed  s/p colonoscopy 4/12; non bleeding internal hemorrhoids; diet advanced  monitor Hb , dropped to 7.5 on iv fluids  given 2 units PRBC on 4/13, Hb 10.3;; check CBC in am; if  drops then might benefit from capsule endoscopy      Problem/Plan - 2:  ·  Problem: Acute blood loss anemia.  Plan: likely 2/2 above  treat as above  monitor h/h and transfuse as indicated  monitor vitals    Problem/Plan - 3:  ·  Problem: Essential hypertension.  Plan: hold home meds - losartan and metoprolol for ABL anemia, gi bleed  monitor bp and resume meds as indicated.     Problem/Plan - 4:  ·  Problem: Dizziness.  Plan: chronic, has had op f/u and work up w/ neuro, recent op CT head/neck- may need to f/u results  neuro cs if indicated.     Problem/Plan - 5:  ·  Problem: Hard of hearing.  Plan: pt has cochlear implant  supportive care.     Problem/Plan - 6:  Problem: Prophylactic measure. Plan: SCDs only in light of GI bleed/anemia.    PTx consult    poc discussed with pt, his wife Rachel at [phone], team.

## 2019-04-14 NOTE — PROGRESS NOTE ADULT - SUBJECTIVE AND OBJECTIVE BOX
Chief Complaint: abnormal labs, recent melena, HA    HPI:  83M w/PMH [place] (cochlear implant), chronic dizziness, HTN, RLS, presents for abnormal labs.  Pt has been experiencing dizziness for over a year and has been getting evaluated and PT.  Since Friday (), he told his wife that he's notice "a lot" of blood in his BM.  He also endorses melena since then (he's not currently on iron).  Wife also noticed that he's more pale than usual since  and he's started having HA that he doesn't normally have.  She took him to his doctor  Yesterday, had labs and sent for CT head.  Office called wife this AM and told his Hg 6 and to go to ED.  He denies any n/v/abd pain, hematemesis.  Denies sob, cp, cough, fever/chills, dysuria.    In ED, Guaiac Pos, Hg 6.4, 2 units PRBCs ordered and receiving at time of my exam, SBP 90'S to low 100's (improving now), HR high 90's (now 70's).  Pt has had anemia in the past and had been on iron replacement, but denies prior transfusion.      : Hb 7.8; s/p EGD, no active bleed      PHYSICAL EXAM:    Daily     Daily     ICU Vital Signs Last 24 Hrs  T(C): 36.4 (2019 11:56), Max: 37.1 (10 Apr 2019 22:12)  T(F): 97.5 (2019 11:56), Max: 98.8 (10 Apr 2019 22:12)  HR: 95 (2019 15:35) (73 - 95)  BP: 116/66 (2019 11:56) (95/61 - 119/66)  BP(mean): --  ABP: --  ABP(mean): --  RR: 17 (2019 11:56) (17 - 19)  SpO2: 97% (2019 11:56) (95% - 98%)      Constitutional: Well appearing  HEENT: Atraumatic, MARISSA, Normal, No congestion  Respiratory: Breath Sounds normal, no rhonchi/wheeze  Cardiovascular: N S1S2;   Gastrointestinal: Abdomen soft, non tender, Bowel Sounds present  Extremities: No edema, peripheral pulses present  Neurological: AAO x 3, no gross focal motor deficits  Skin: Non cellulitic, no rash, ulcers  Lymph Nodes: No lymphadenopathy noted  Back: No CVA tenderness   Musculoskeletal: non tender  Breasts: Deferred  Genitourinary: deferred  Rectal: Deferred                          7.8    8.23  )-----------( 265      ( 2019 06:56 )             24.8       CBC Full  -  ( 2019 06:56 )  WBC Count : 8.23 K/uL  RBC Count : 2.58 M/uL  Hemoglobin : 7.8 g/dL  Hematocrit : 24.8 %  Platelet Count - Automated : 265 K/uL  Mean Cell Volume : 96.1 fl  Mean Cell Hemoglobin : 30.2 pg  Mean Cell Hemoglobin Concentration : 31.5 gm/dL  Auto Neutrophil # : 5.78 K/uL  Auto Lymphocyte # : 1.46 K/uL  Auto Monocyte # : 0.70 K/uL  Auto Eosinophil # : 0.20 K/uL  Auto Basophil # : 0.04 K/uL  Auto Neutrophil % : 70.3 %  Auto Lymphocyte % : 17.7 %  Auto Monocyte % : 8.5 %  Auto Eosinophil % : 2.4 %  Auto Basophil % : 0.5 %      0410    141  |  109<H>  |  25<H>  ----------------------------<  98  3.9   |  26  |  1.10    Ca    8.5      10 Apr 2019 10:01    TPro  5.9<L>  /  Alb  3.1<L>  /  TBili  0.3  /  DBili  x   /  AST  24  /  ALT  29  /  AlkPhos  69  04-10      LIVER FUNCTIONS - ( 10 Apr 2019 10:01 )  Alb: 3.1 g/dL / Pro: 5.9 gm/dL / ALK PHOS: 69 U/L / ALT: 29 U/L / AST: 24 U/L / GGT: x             PT/INR - ( 10 Apr 2019 10:01 )   PT: 12.2 sec;   INR: 1.10 ratio         PTT - ( 10 Apr 2019 10:01 )  PTT:27.2 sec          Urinalysis Basic - ( 10 Apr 2019 10:01 )    Color: Yellow / Appearance: Clear / S.015 / pH: x  Gluc: x / Ketone: Negative  / Bili: Negative / Urobili: Negative mg/dL   Blood: x / Protein: Negative mg/dL / Nitrite: Negative   Leuk Esterase: Negative / RBC: x / WBC x   Sq Epi: x / Non Sq Epi: x / Bacteria: x            MEDICATIONS  (STANDING):  atorvastatin 40 milliGRAM(s) Oral at bedtime  dextrose 5% + sodium chloride 0.9%. 1000 milliLiter(s) (75 mL/Hr) IV Continuous <Continuous>  donepezil 10 milliGRAM(s) Oral at bedtime  memantine 5 milliGRAM(s) Oral daily  pantoprazole    Tablet 40 milliGRAM(s) Oral before breakfast  polyethylene glycol/electrolyte Solution 1000 milliLiter(s) Oral once  polyethylene glycol/electrolyte Solution 1000 milliLiter(s) Oral once  polyethylene glycol/electrolyte Solution      rOPINIRole 4 milliGRAM(s) Oral three times a day
Chief Complaint: abnormal labs, recent melena, HA    HPI:  83M w/PMH [place] (cochlear implant), chronic dizziness, HTN, RLS, presents for abnormal labs.  Pt has been experiencing dizziness for over a year and has been getting evaluated and PT.  Since Friday (4/5), he told his wife that he's notice "a lot" of blood in his BM.  He also endorses melena since then (he's not currently on iron).  Wife also noticed that he's more pale than usual since 4/6 and he's started having HA that he doesn't normally have.  She took him to his doctor  Yesterday, had labs and sent for CT head.  Office called wife this AM and told his Hg 6 and to go to ED.  He denies any n/v/abd pain, hematemesis.  Denies sob, cp, cough, fever/chills, dysuria.    In ED, Guaiac Pos, Hg 6.4, 2 units PRBCs ordered and receiving at time of my exam, SBP 90'S to low 100's (improving now), HR high 90's (now 70's).  Pt has had anemia in the past and had been on iron replacement, but denies prior transfusion.        4/12: Hb 7.8  s/p colonoscopy; non bleeding internal hemorrhoids    4/13: no active bleed  Hb dropped to 7.5 from 7.8 on iv fluids          PHYSICAL EXAM:    Daily     Daily     ICU Vital Signs Last 24 Hrs  T(C): 36.9 (13 Apr 2019 11:27), Max: 37.1 (13 Apr 2019 05:47)  T(F): 98.4 (13 Apr 2019 11:27), Max: 98.8 (13 Apr 2019 05:47)  HR: 95 (13 Apr 2019 11:27) (88 - 95)  BP: 99/55 (13 Apr 2019 11:27) (99/55 - 131/76)  BP(mean): --  ABP: --  ABP(mean): --  RR: 18 (13 Apr 2019 11:27) (18 - 18)  SpO2: 92% (13 Apr 2019 11:27) (92% - 99%)      Constitutional: Well appearing  HEENT: Atraumatic, MARISSA, Normal, No congestion  Respiratory: Breath Sounds normal, no rhonchi/wheeze  Cardiovascular: N S1S2;   Gastrointestinal: Abdomen soft, non tender, Bowel Sounds present  Extremities: No edema, peripheral pulses present  Neurological: AAO x 3, no gross focal motor deficits  Skin: Non cellulitic, no rash, ulcers  Lymph Nodes: No lymphadenopathy noted  Back: No CVA tenderness   Musculoskeletal: non tender  Breasts: Deferred  Genitourinary: deferred  Rectal: Deferred                          7.5    9.09  )-----------( 209      ( 13 Apr 2019 07:04 )             23.6       CBC Full  -  ( 13 Apr 2019 07:04 )  WBC Count : 9.09 K/uL  RBC Count : 2.42 M/uL  Hemoglobin : 7.5 g/dL  Hematocrit : 23.6 %  Platelet Count - Automated : 209 K/uL  Mean Cell Volume : 97.5 fl  Mean Cell Hemoglobin : 31.0 pg  Mean Cell Hemoglobin Concentration : 31.8 gm/dL  Auto Neutrophil # : x  Auto Lymphocyte # : x  Auto Monocyte # : x  Auto Eosinophil # : x  Auto Basophil # : x  Auto Neutrophil % : x  Auto Lymphocyte % : x  Auto Monocyte % : x  Auto Eosinophil % : x  Auto Basophil % : x                                    MEDICATIONS  (STANDING):  atorvastatin 20 milliGRAM(s) Oral at bedtime  donepezil 10 milliGRAM(s) Oral at bedtime  furosemide    Tablet 20 milliGRAM(s) Oral once  gabapentin 600 milliGRAM(s) Oral four times a day  memantine 5 milliGRAM(s) Oral daily  pantoprazole    Tablet 40 milliGRAM(s) Oral before breakfast  rOPINIRole 2 milliGRAM(s) Oral four times a day
Chief Complaint: abnormal labs, recent melena, HA    HPI:  83M w/PMH [place] (cochlear implant), chronic dizziness, HTN, RLS, presents for abnormal labs.  Pt has been experiencing dizziness for over a year and has been getting evaluated and PT.  Since Friday (4/5), he told his wife that he's notice "a lot" of blood in his BM.  He also endorses melena since then (he's not currently on iron).  Wife also noticed that he's more pale than usual since 4/6 and he's started having HA that he doesn't normally have.  She took him to his doctor  Yesterday, had labs and sent for CT head.  Office called wife this AM and told his Hg 6 and to go to ED.  He denies any n/v/abd pain, hematemesis.  Denies sob, cp, cough, fever/chills, dysuria.    In ED, Guaiac Pos, Hg 6.4, 2 units PRBCs ordered and receiving at time of my exam, SBP 90'S to low 100's (improving now), HR high 90's (now 70's).  Pt has had anemia in the past and had been on iron replacement, but denies prior transfusion.        4/12: Hb 7.8  s/p colonoscopy; non bleeding internal hemorrhoids    4/13: no active bleed  Hb dropped to 7.5 from 7.8 on iv fluids    4/14: no further bleeding  Hb 10.3 after 2 more units of PRBC yesterday      PHYSICAL EXAM:    Vital Signs Last 24 Hrs  T(C): 36.8 (14 Apr 2019 11:55), Max: 37 (13 Apr 2019 21:58)  T(F): 98.3 (14 Apr 2019 11:55), Max: 98.6 (13 Apr 2019 21:58)  HR: 77 (14 Apr 2019 11:55) (77 - 98)  BP: 113/81 (14 Apr 2019 11:55) (113/76 - 150/90)  BP(mean): --  RR: 18 (14 Apr 2019 11:55) (18 - 20)  SpO2: 97% (14 Apr 2019 11:55) (95% - 97%)      Constitutional: Well appearing  HEENT: Atraumatic, MARISSA, Normal, No congestion  Respiratory: Breath Sounds normal, no rhonchi/wheeze  Cardiovascular: N S1S2;   Gastrointestinal: Abdomen soft, non tender, Bowel Sounds present  Extremities: No edema, peripheral pulses present  Neurological: AAO x 3, no gross focal motor deficits  Skin: Non cellulitic, no rash, ulcers  Lymph Nodes: No lymphadenopathy noted  Back: No CVA tenderness   Musculoskeletal: non tender  Breasts: Deferred  Genitourinary: deferred  Rectal: Deferred                                     10.3   9.06  )-----------( 236      ( 14 Apr 2019 05:39 )             31.5             MEDICATIONS  (STANDING):  atorvastatin 20 milliGRAM(s) Oral at bedtime  donepezil 10 milliGRAM(s) Oral at bedtime  furosemide    Tablet 20 milliGRAM(s) Oral once  gabapentin 600 milliGRAM(s) Oral four times a day  memantine 5 milliGRAM(s) Oral daily  pantoprazole    Tablet 40 milliGRAM(s) Oral before breakfast  rOPINIRole 2 milliGRAM(s) Oral four times a day
Chief Complaint: abnormal labs, recent melena, HA    HPI:  83M w/PMH [place] (cochlear implant), chronic dizziness, HTN, RLS, presents for abnormal labs.  Pt has been experiencing dizziness for over a year and has been getting evaluated and PT.  Since Friday (), he told his wife that he's notice "a lot" of blood in his BM.  He also endorses melena since then (he's not currently on iron).  Wife also noticed that he's more pale than usual since  and he's started having HA that he doesn't normally have.  She took him to his doctor  Yesterday, had labs and sent for CT head.  Office called wife this AM and told his Hg 6 and to go to ED.  He denies any n/v/abd pain, hematemesis.  Denies sob, cp, cough, fever/chills, dysuria.    In ED, Guaiac Pos, Hg 6.4, 2 units PRBCs ordered and receiving at time of my exam, SBP 90'S to low 100's (improving now), HR high 90's (now 70's).  Pt has had anemia in the past and had been on iron replacement, but denies prior transfusion.        : Hb 7.8  s/o colonoscopy; non bleeding internal hemorrhoids        Vital Signs Last 24 Hrs  T(C): 36.7 (2019 17:16), Max: 36.8 (2019 20:40)  T(F): 98 (2019 17:16), Max: 98.3 (2019 20:40)  HR: 88 (2019 17:16) (71 - 90)  BP: 130/78 (2019 17:16) (121/71 - 141/88)  BP(mean): --  RR: 18 (2019 17:16) (17 - 18)  SpO2: 99% (2019 17:16) (98% - 99%): Hb 7.8; s/p EGD, no active bleed    Constitutional: Well appearing  HEENT: Atraumatic, MARISSA, Normal, No congestion  Respiratory: Breath Sounds normal, no rhonchi/wheeze  Cardiovascular: N S1S2;   Gastrointestinal: Abdomen soft, non tender, Bowel Sounds present  Extremities: No edema, peripheral pulses present  Neurological: AAO x 3, no gross focal motor deficits  Skin: Non cellulitic, no rash, ulcers  Lymph Nodes: No lymphadenopathy noted  Back: No CVA tenderness   Musculoskeletal: non tender  Breasts: Deferred  Genitourinary: deferred  Rectal: Deferred                          7.8    8.23  )-----------( 265      ( 2019 06:56 )             24.8       CBC Full  -  ( 2019 06:56 )  WBC Count : 8.23 K/uL  RBC Count : 2.58 M/uL  Hemoglobin : 7.8 g/dL  Hematocrit : 24.8 %  Platelet Count - Automated : 265 K/uL  Mean Cell Volume : 96.1 fl  Mean Cell Hemoglobin : 30.2 pg  Mean Cell Hemoglobin Concentration : 31.5 gm/dL  Auto Neutrophil # : 5.78 K/uL  Auto Lymphocyte # : 1.46 K/uL  Auto Monocyte # : 0.70 K/uL  Auto Eosinophil # : 0.20 K/uL  Auto Basophil # : 0.04 K/uL  Auto Neutrophil % : 70.3 %  Auto Lymphocyte % : 17.7 %  Auto Monocyte % : 8.5 %  Auto Eosinophil % : 2.4 %  Auto Basophil % : 0.5 %      0410    141  |  109<H>  |  25<H>  ----------------------------<  98  3.9   |  26  |  1.10    Ca    8.5      10 Apr 2019 10:01    TPro  5.9<L>  /  Alb  3.1<L>  /  TBili  0.3  /  DBili  x   /  AST  24  /  ALT  29  /  AlkPhos  69  04-10      LIVER FUNCTIONS - ( 10 Apr 2019 10:01 )  Alb: 3.1 g/dL / Pro: 5.9 gm/dL / ALK PHOS: 69 U/L / ALT: 29 U/L / AST: 24 U/L / GGT: x             PT/INR - ( 10 Apr 2019 10:01 )   PT: 12.2 sec;   INR: 1.10 ratio         PTT - ( 10 Apr 2019 10:01 )  PTT:27.2 sec          Urinalysis Basic - ( 10 Apr 2019 10:01 )    Color: Yellow / Appearance: Clear / S.015 / pH: x  Gluc: x / Ketone: Negative  / Bili: Negative / Urobili: Negative mg/dL   Blood: x / Protein: Negative mg/dL / Nitrite: Negative   Leuk Esterase: Negative / RBC: x / WBC x   Sq Epi: x / Non Sq Epi: x / Bacteria: x            MEDICATIONS  (STANDING):  atorvastatin 40 milliGRAM(s) Oral at bedtime  dextrose 5% + sodium chloride 0.9%. 1000 milliLiter(s) (75 mL/Hr) IV Continuous <Continuous>  donepezil 10 milliGRAM(s) Oral at bedtime  memantine 5 milliGRAM(s) Oral daily  pantoprazole    Tablet 40 milliGRAM(s) Oral before breakfast  polyethylene glycol/electrolyte Solution 1000 milliLiter(s) Oral once  polyethylene glycol/electrolyte Solution 1000 milliLiter(s) Oral once  polyethylene glycol/electrolyte Solution      rOPINIRole 4 milliGRAM(s) Oral three times a day

## 2019-04-15 ENCOUNTER — TRANSCRIPTION ENCOUNTER (OUTPATIENT)
Age: 84
End: 2019-04-15

## 2019-04-15 VITALS
DIASTOLIC BLOOD PRESSURE: 72 MMHG | RESPIRATION RATE: 16 BRPM | OXYGEN SATURATION: 97 % | TEMPERATURE: 98 F | SYSTOLIC BLOOD PRESSURE: 111 MMHG | HEART RATE: 65 BPM

## 2019-04-15 LAB
HCT VFR BLD CALC: 31.8 % — LOW (ref 39–50)
HGB BLD-MCNC: 10 G/DL — LOW (ref 13–17)
MCHC RBC-ENTMCNC: 29.5 PG — SIGNIFICANT CHANGE UP (ref 27–34)
MCHC RBC-ENTMCNC: 31.4 GM/DL — LOW (ref 32–36)
MCV RBC AUTO: 93.8 FL — SIGNIFICANT CHANGE UP (ref 80–100)
NRBC # BLD: 0 /100 WBCS — SIGNIFICANT CHANGE UP (ref 0–0)
PLATELET # BLD AUTO: 220 K/UL — SIGNIFICANT CHANGE UP (ref 150–400)
RBC # BLD: 3.39 M/UL — LOW (ref 4.2–5.8)
RBC # FLD: 14.7 % — HIGH (ref 10.3–14.5)
SURGICAL PATHOLOGY STUDY: SIGNIFICANT CHANGE UP
WBC # BLD: 9.25 K/UL — SIGNIFICANT CHANGE UP (ref 3.8–10.5)
WBC # FLD AUTO: 9.25 K/UL — SIGNIFICANT CHANGE UP (ref 3.8–10.5)

## 2019-04-15 RX ADMIN — PANTOPRAZOLE SODIUM 40 MILLIGRAM(S): 20 TABLET, DELAYED RELEASE ORAL at 05:44

## 2019-04-15 RX ADMIN — MEMANTINE HYDROCHLORIDE 5 MILLIGRAM(S): 10 TABLET ORAL at 12:01

## 2019-04-15 RX ADMIN — GABAPENTIN 600 MILLIGRAM(S): 400 CAPSULE ORAL at 12:00

## 2019-04-15 RX ADMIN — GABAPENTIN 600 MILLIGRAM(S): 400 CAPSULE ORAL at 05:43

## 2019-04-15 RX ADMIN — ROPINIROLE 2 MILLIGRAM(S): 8 TABLET, FILM COATED, EXTENDED RELEASE ORAL at 12:00

## 2019-04-15 RX ADMIN — ROPINIROLE 2 MILLIGRAM(S): 8 TABLET, FILM COATED, EXTENDED RELEASE ORAL at 05:43

## 2019-04-15 NOTE — DISCHARGE NOTE PROVIDER - NSDCCPCAREPLAN_GEN_ALL_CORE_FT
PRINCIPAL DISCHARGE DIAGNOSIS  Diagnosis: GIB (gastrointestinal bleeding)  Assessment and Plan of Treatment:

## 2019-04-15 NOTE — DISCHARGE NOTE PROVIDER - CARE PROVIDER_API CALL
Beatrice Mendiola (MD)  Gastroenterology  [address], [facility] [address]  [place] [address]  Phone: [phone]  Fax: [phone]  Follow Up Time:     Dennis Soriano ()  Internal Medicine  [address] [place] [address]  Phone: [phone]  Fax: [phone]  Follow Up Time:

## 2019-04-15 NOTE — DISCHARGE NOTE NURSING/CASE MANAGEMENT/SOCIAL WORK - NSDCDPATPORTLINK_GEN_ALL_CORE
You can access the [facility] Patient Portal, offered by [facility], by registering with the following website: [email or web address]

## 2019-04-15 NOTE — DISCHARGE NOTE PROVIDER - HOSPITAL COURSE
83M w/PMH [place] (cochlear implant), chronic dizziness, HTN, RLS, presents for abnormal labs.      +melena, +hematochezia         In ED, Guaiac Pos, Hg 6.4, 2 units PRBCs ordered and receiving at time of my exam, SBP low 100's (improving now), HR 90's (now 80's).  Pt has had anemia in the past and had been on iron replacement, but denies prior transfusion.           Problem/Plan - 1:    ·  Problem: GIB (gastrointestinal bleeding).  Plan: melena, hematochezia    monitor H/H, vitals    GI cs- Dr Mendiola, s/p EGD 4/11, no active bleed    s/p colonoscopy 4/12; non bleeding internal hemorrhoids; diet advanced    monitor Hb , dropped to 7.5 on iv fluids    given 2 units PRBC on 4/13, Hb 10.3;; check CBC in am; if  drops then might benefit from capsule endoscopy    - Hb stable, 10.0            Problem/Plan - 2:    ·  Problem: Acute blood loss anemia.  Plan: likely 2/2 above    treat as above    monitor h/h and transfuse as indicated    monitor vitals        Problem/Plan - 3:    ·  Problem: Essential hypertension.  Plan: hold home meds - losartan and metoprolol for ABL anemia, gi bleed    monitor bp and resume meds as indicated.         Problem/Plan - 4:    ·  Problem: Dizziness.  Plan: chronic, has had op f/u and work up w/ neuro, recent op CT head/neck- may need to f/u results    neuro cs if indicated.         Problem/Plan - 5:    ·  Problem: Hard of hearing.  Plan: pt has cochlear implant    supportive care.         d/c home today        check CBC in 2-3 with pcp        monitor BP        total time spent 41 min

## 2019-04-19 DIAGNOSIS — Z96.653 PRESENCE OF ARTIFICIAL KNEE JOINT, BILATERAL: ICD-10-CM

## 2019-04-19 DIAGNOSIS — G47.33 OBSTRUCTIVE SLEEP APNEA (ADULT) (PEDIATRIC): ICD-10-CM

## 2019-04-19 DIAGNOSIS — K92.2 GASTROINTESTINAL HEMORRHAGE, UNSPECIFIED: ICD-10-CM

## 2019-04-19 DIAGNOSIS — G25.81 RESTLESS LEGS SYNDROME: ICD-10-CM

## 2019-04-19 DIAGNOSIS — M51.36 OTHER INTERVERTEBRAL DISC DEGENERATION, LUMBAR REGION: ICD-10-CM

## 2019-04-19 DIAGNOSIS — I71.2 THORACIC AORTIC ANEURYSM, WITHOUT RUPTURE: ICD-10-CM

## 2019-04-19 DIAGNOSIS — R42 DIZZINESS AND GIDDINESS: ICD-10-CM

## 2019-04-19 DIAGNOSIS — I77.89 OTHER SPECIFIED DISORDERS OF ARTERIES AND ARTERIOLES: ICD-10-CM

## 2019-04-19 DIAGNOSIS — N40.0 BENIGN PROSTATIC HYPERPLASIA WITHOUT LOWER URINARY TRACT SYMPTOMS: ICD-10-CM

## 2019-04-19 DIAGNOSIS — Z79.82 LONG TERM (CURRENT) USE OF ASPIRIN: ICD-10-CM

## 2019-04-19 DIAGNOSIS — I35.1 NONRHEUMATIC AORTIC (VALVE) INSUFFICIENCY: ICD-10-CM

## 2019-04-19 DIAGNOSIS — K64.1 SECOND DEGREE HEMORRHOIDS: ICD-10-CM

## 2019-04-19 DIAGNOSIS — K21.9 GASTRO-ESOPHAGEAL REFLUX DISEASE WITHOUT ESOPHAGITIS: ICD-10-CM

## 2019-04-19 DIAGNOSIS — Z79.01 LONG TERM (CURRENT) USE OF ANTICOAGULANTS: ICD-10-CM

## 2019-04-19 DIAGNOSIS — D62 ACUTE POSTHEMORRHAGIC ANEMIA: ICD-10-CM

## 2019-04-19 DIAGNOSIS — I10 ESSENTIAL (PRIMARY) HYPERTENSION: ICD-10-CM

## 2019-04-19 DIAGNOSIS — H91.90 UNSPECIFIED HEARING LOSS, UNSPECIFIED EAR: ICD-10-CM

## 2019-04-19 DIAGNOSIS — K92.1 MELENA: ICD-10-CM

## 2019-04-19 DIAGNOSIS — I48.91 UNSPECIFIED ATRIAL FIBRILLATION: ICD-10-CM

## 2019-04-19 DIAGNOSIS — K57.30 DIVERTICULOSIS OF LARGE INTESTINE WITHOUT PERFORATION OR ABSCESS WITHOUT BLEEDING: ICD-10-CM

## 2019-04-19 DIAGNOSIS — E78.5 HYPERLIPIDEMIA, UNSPECIFIED: ICD-10-CM

## 2019-04-29 ENCOUNTER — APPOINTMENT (OUTPATIENT)
Dept: ELECTROPHYSIOLOGY | Facility: CLINIC | Age: 84
End: 2019-04-29
Payer: MEDICARE

## 2019-04-29 PROCEDURE — 93298 REM INTERROG DEV EVAL SCRMS: CPT

## 2019-04-29 PROCEDURE — 93299: CPT

## 2019-05-16 ENCOUNTER — APPOINTMENT (OUTPATIENT)
Dept: ELECTROPHYSIOLOGY | Facility: CLINIC | Age: 84
End: 2019-05-16
Payer: MEDICARE

## 2019-05-16 VITALS — SYSTOLIC BLOOD PRESSURE: 130 MMHG | HEART RATE: 74 BPM | DIASTOLIC BLOOD PRESSURE: 95 MMHG

## 2019-05-16 VITALS — BODY MASS INDEX: 30.53 KG/M2 | WEIGHT: 190 LBS | HEIGHT: 66 IN

## 2019-05-16 PROCEDURE — 93285 PRGRMG DEV EVAL SCRMS IP: CPT

## 2019-05-30 ENCOUNTER — APPOINTMENT (OUTPATIENT)
Dept: ELECTROPHYSIOLOGY | Facility: CLINIC | Age: 84
End: 2019-05-30
Payer: MEDICARE

## 2019-05-30 PROCEDURE — 93299: CPT

## 2019-05-30 PROCEDURE — 93298 REM INTERROG DEV EVAL SCRMS: CPT

## 2019-07-10 ENCOUNTER — APPOINTMENT (OUTPATIENT)
Dept: OTOLARYNGOLOGY | Facility: CLINIC | Age: 84
End: 2019-07-10
Payer: MEDICARE

## 2019-07-10 VITALS
HEIGHT: 66 IN | SYSTOLIC BLOOD PRESSURE: 152 MMHG | HEART RATE: 67 BPM | BODY MASS INDEX: 30.53 KG/M2 | WEIGHT: 190 LBS | DIASTOLIC BLOOD PRESSURE: 88 MMHG

## 2019-07-10 DIAGNOSIS — R26.89 OTHER ABNORMALITIES OF GAIT AND MOBILITY: ICD-10-CM

## 2019-07-10 DIAGNOSIS — K21.9 GASTRO-ESOPHAGEAL REFLUX DISEASE W/OUT ESOPHAGITIS: ICD-10-CM

## 2019-07-10 PROCEDURE — 99214 OFFICE O/P EST MOD 30 MIN: CPT | Mod: 25

## 2019-07-10 PROCEDURE — 31575 DIAGNOSTIC LARYNGOSCOPY: CPT

## 2019-07-10 NOTE — HISTORY OF PRESENT ILLNESS
[de-identified] : co difficulty standing imbalance more than one year\par no vertigo\par progressively worse past 1-2 mo\par hearing aid as\par rt cochlear implant 7 ago  [facility]\par co throat clearing excessive and tightness throat  no dyphagia or hoarseness\par daily pantoprazole\par

## 2019-07-10 NOTE — ASSESSMENT
[FreeTextEntry1] : rt cochlear implant\par peristant imbalance \par rec fu Dr Kaplan otologist\par no laryngeal signs reflux\par ?pnd trial fluticasone spray

## 2019-07-10 NOTE — PROCEDURE
[de-identified] : Indication for procedure:Unable to examine laryngeal structures with mirror exam\par Scope # 4\par Topical anesthesia with viscous xylocaine 2% is applied to the anterior nares.\par A flexible fiberoptic laryngoscope is than introduced through the nares.\par The nasopharynx is clear without mass or inflammation.\par The posterior pharyngeal wall is unremarkable.\par The tongue base and vallecula are unremarkable.  The hypopharynx is unremarkable and unobstructed.\par The supraglottic larynx is within normal limits.\par Both vocal cords are fully mobile with no nodule, polyp or other lesion present.\par There is no edema or erythema overlying the arytenoid cartilages or the inter-arytenoid space.\par The subglottic space is clear.\par The voice has a normal quality.\par

## 2019-07-13 ENCOUNTER — INPATIENT (INPATIENT)
Facility: HOSPITAL | Age: 84
LOS: 4 days | Discharge: HOME | End: 2019-07-18
Attending: HOSPITALIST | Admitting: HOSPITALIST
Payer: MEDICARE

## 2019-07-13 VITALS — WEIGHT: 190.04 LBS | HEIGHT: 66 IN

## 2019-07-13 DIAGNOSIS — Z96.659 PRESENCE OF UNSPECIFIED ARTIFICIAL KNEE JOINT: Chronic | ICD-10-CM

## 2019-07-13 DIAGNOSIS — Z98.890 OTHER SPECIFIED POSTPROCEDURAL STATES: Chronic | ICD-10-CM

## 2019-07-13 DIAGNOSIS — S68.119A COMPLETE TRAUMATIC METACARPOPHALANGEAL AMPUTATION OF UNSPECIFIED FINGER, INITIAL ENCOUNTER: Chronic | ICD-10-CM

## 2019-07-13 DIAGNOSIS — Z98.49 CATARACT EXTRACTION STATUS, UNSPECIFIED EYE: Chronic | ICD-10-CM

## 2019-07-13 LAB
ALBUMIN SERPL ELPH-MCNC: 2.4 G/DL — LOW (ref 3.3–5)
ALP SERPL-CCNC: 121 U/L — HIGH (ref 40–120)
ALT FLD-CCNC: 40 U/L — SIGNIFICANT CHANGE UP (ref 12–78)
ANION GAP SERPL CALC-SCNC: 7 MMOL/L — SIGNIFICANT CHANGE UP (ref 5–17)
APTT BLD: 31.2 SEC — SIGNIFICANT CHANGE UP (ref 27.5–36.3)
AST SERPL-CCNC: 26 U/L — SIGNIFICANT CHANGE UP (ref 15–37)
BASE EXCESS BLDV CALC-SCNC: 4.8 MMOL/L — HIGH (ref -2–2)
BASOPHILS # BLD AUTO: 0.08 K/UL — SIGNIFICANT CHANGE UP (ref 0–0.2)
BASOPHILS NFR BLD AUTO: 0.5 % — SIGNIFICANT CHANGE UP (ref 0–2)
BILIRUB SERPL-MCNC: 0.6 MG/DL — SIGNIFICANT CHANGE UP (ref 0.2–1.2)
BLD GP AB SCN SERPL QL: SIGNIFICANT CHANGE UP
BUN SERPL-MCNC: 22 MG/DL — SIGNIFICANT CHANGE UP (ref 7–23)
CALCIUM SERPL-MCNC: 9.2 MG/DL — SIGNIFICANT CHANGE UP (ref 8.5–10.1)
CHLORIDE SERPL-SCNC: 105 MMOL/L — SIGNIFICANT CHANGE UP (ref 96–108)
CO2 SERPL-SCNC: 31 MMOL/L — SIGNIFICANT CHANGE UP (ref 22–31)
CREAT SERPL-MCNC: 0.81 MG/DL — SIGNIFICANT CHANGE UP (ref 0.5–1.3)
EOSINOPHIL # BLD AUTO: 0.13 K/UL — SIGNIFICANT CHANGE UP (ref 0–0.5)
EOSINOPHIL NFR BLD AUTO: 0.8 % — SIGNIFICANT CHANGE UP (ref 0–6)
GLUCOSE SERPL-MCNC: 117 MG/DL — HIGH (ref 70–99)
HCO3 BLDV-SCNC: 30 MMOL/L — HIGH (ref 21–29)
HCT VFR BLD CALC: 35.7 % — LOW (ref 39–50)
HGB BLD-MCNC: 10.7 G/DL — LOW (ref 13–17)
IMM GRANULOCYTES NFR BLD AUTO: 0.6 % — SIGNIFICANT CHANGE UP (ref 0–1.5)
INR BLD: 1.63 RATIO — HIGH (ref 0.88–1.16)
LACTATE SERPL-SCNC: 1.3 MMOL/L — SIGNIFICANT CHANGE UP (ref 0.7–2)
LIDOCAIN IGE QN: 43 U/L — LOW (ref 73–393)
LYMPHOCYTES # BLD AUTO: 1.45 K/UL — SIGNIFICANT CHANGE UP (ref 1–3.3)
LYMPHOCYTES # BLD AUTO: 8.9 % — LOW (ref 13–44)
MAGNESIUM SERPL-MCNC: 1.8 MG/DL — SIGNIFICANT CHANGE UP (ref 1.6–2.6)
MCHC RBC-ENTMCNC: 25.7 PG — LOW (ref 27–34)
MCHC RBC-ENTMCNC: 30 GM/DL — LOW (ref 32–36)
MCV RBC AUTO: 85.8 FL — SIGNIFICANT CHANGE UP (ref 80–100)
MONOCYTES # BLD AUTO: 1.16 K/UL — HIGH (ref 0–0.9)
MONOCYTES NFR BLD AUTO: 7.1 % — SIGNIFICANT CHANGE UP (ref 2–14)
NEUTROPHILS # BLD AUTO: 13.46 K/UL — HIGH (ref 1.8–7.4)
NEUTROPHILS NFR BLD AUTO: 82.1 % — HIGH (ref 43–77)
NT-PROBNP SERPL-SCNC: 3131 PG/ML — HIGH (ref 0–450)
PCO2 BLDV: 52 MMHG — HIGH (ref 35–50)
PH BLDV: 7.38 — SIGNIFICANT CHANGE UP (ref 7.35–7.45)
PLATELET # BLD AUTO: 394 K/UL — SIGNIFICANT CHANGE UP (ref 150–400)
PO2 BLDV: 45 MMHG — SIGNIFICANT CHANGE UP (ref 25–45)
POTASSIUM SERPL-MCNC: 3.1 MMOL/L — LOW (ref 3.5–5.3)
POTASSIUM SERPL-SCNC: 3.1 MMOL/L — LOW (ref 3.5–5.3)
PROT SERPL-MCNC: 7.3 GM/DL — SIGNIFICANT CHANGE UP (ref 6–8.3)
PROTHROM AB SERPL-ACNC: 18.4 SEC — HIGH (ref 10–12.9)
RBC # BLD: 4.16 M/UL — LOW (ref 4.2–5.8)
RBC # FLD: 19.9 % — HIGH (ref 10.3–14.5)
SAO2 % BLDV: 73 % — SIGNIFICANT CHANGE UP (ref 67–88)
SODIUM SERPL-SCNC: 143 MMOL/L — SIGNIFICANT CHANGE UP (ref 135–145)
TROPONIN I SERPL-MCNC: 0.03 NG/ML — SIGNIFICANT CHANGE UP (ref 0.01–0.04)
TYPE + AB SCN PNL BLD: SIGNIFICANT CHANGE UP
WBC # BLD: 16.38 K/UL — HIGH (ref 3.8–10.5)
WBC # FLD AUTO: 16.38 K/UL — HIGH (ref 3.8–10.5)

## 2019-07-13 PROCEDURE — 71275 CT ANGIOGRAPHY CHEST: CPT | Mod: 26

## 2019-07-13 PROCEDURE — 99285 EMERGENCY DEPT VISIT HI MDM: CPT

## 2019-07-13 PROCEDURE — 93010 ELECTROCARDIOGRAM REPORT: CPT

## 2019-07-13 PROCEDURE — 71045 X-RAY EXAM CHEST 1 VIEW: CPT | Mod: 26

## 2019-07-13 RX ORDER — POTASSIUM CHLORIDE 20 MEQ
40 PACKET (EA) ORAL EVERY 4 HOURS
Refills: 0 | Status: DISCONTINUED | OUTPATIENT
Start: 2019-07-13 | End: 2019-07-13

## 2019-07-13 RX ORDER — FUROSEMIDE 40 MG
40 TABLET ORAL
Refills: 0 | Status: DISCONTINUED | OUTPATIENT
Start: 2019-07-13 | End: 2019-07-18

## 2019-07-13 RX ORDER — ATORVASTATIN CALCIUM 80 MG/1
20 TABLET, FILM COATED ORAL AT BEDTIME
Refills: 0 | Status: DISCONTINUED | OUTPATIENT
Start: 2019-07-13 | End: 2019-07-18

## 2019-07-13 RX ORDER — GABAPENTIN 400 MG/1
1 CAPSULE ORAL
Qty: 0 | Refills: 0 | DISCHARGE

## 2019-07-13 RX ORDER — FUROSEMIDE 40 MG
1 TABLET ORAL
Qty: 0 | Refills: 0 | DISCHARGE

## 2019-07-13 RX ORDER — POTASSIUM CHLORIDE 20 MEQ
10 PACKET (EA) ORAL ONCE
Refills: 0 | Status: COMPLETED | OUTPATIENT
Start: 2019-07-13 | End: 2019-07-13

## 2019-07-13 RX ORDER — FUROSEMIDE 40 MG
40 TABLET ORAL ONCE
Refills: 0 | Status: COMPLETED | OUTPATIENT
Start: 2019-07-13 | End: 2019-07-13

## 2019-07-13 RX ORDER — MEMANTINE HYDROCHLORIDE 10 MG/1
5 TABLET ORAL
Refills: 0 | Status: DISCONTINUED | OUTPATIENT
Start: 2019-07-13 | End: 2019-07-18

## 2019-07-13 RX ORDER — POTASSIUM CHLORIDE 20 MEQ
20 PACKET (EA) ORAL ONCE
Refills: 0 | Status: DISCONTINUED | OUTPATIENT
Start: 2019-07-13 | End: 2019-07-13

## 2019-07-13 RX ORDER — CEFEPIME 1 G/1
2000 INJECTION, POWDER, FOR SOLUTION INTRAMUSCULAR; INTRAVENOUS ONCE
Refills: 0 | Status: COMPLETED | OUTPATIENT
Start: 2019-07-13 | End: 2019-07-13

## 2019-07-13 RX ORDER — POTASSIUM CHLORIDE 20 MEQ
40 PACKET (EA) ORAL ONCE
Refills: 0 | Status: DISCONTINUED | OUTPATIENT
Start: 2019-07-13 | End: 2019-07-13

## 2019-07-13 RX ORDER — LOSARTAN POTASSIUM 100 MG/1
1 TABLET, FILM COATED ORAL
Qty: 0 | Refills: 0 | DISCHARGE

## 2019-07-13 RX ORDER — VANCOMYCIN HCL 1 G
1250 VIAL (EA) INTRAVENOUS ONCE
Refills: 0 | Status: COMPLETED | OUTPATIENT
Start: 2019-07-13 | End: 2019-07-13

## 2019-07-13 RX ORDER — PANTOPRAZOLE SODIUM 20 MG/1
40 TABLET, DELAYED RELEASE ORAL
Refills: 0 | Status: DISCONTINUED | OUTPATIENT
Start: 2019-07-13 | End: 2019-07-18

## 2019-07-13 RX ORDER — ROPINIROLE 8 MG/1
1 TABLET, FILM COATED, EXTENDED RELEASE ORAL
Qty: 0 | Refills: 0 | DISCHARGE

## 2019-07-13 RX ORDER — METOPROLOL TARTRATE 50 MG
25 TABLET ORAL
Refills: 0 | Status: DISCONTINUED | OUTPATIENT
Start: 2019-07-13 | End: 2019-07-18

## 2019-07-13 RX ORDER — ROPINIROLE 8 MG/1
2 TABLET, FILM COATED, EXTENDED RELEASE ORAL
Refills: 0 | Status: DISCONTINUED | OUTPATIENT
Start: 2019-07-13 | End: 2019-07-18

## 2019-07-13 RX ORDER — LANOLIN ALCOHOL/MO/W.PET/CERES
5 CREAM (GRAM) TOPICAL AT BEDTIME
Refills: 0 | Status: DISCONTINUED | OUTPATIENT
Start: 2019-07-13 | End: 2019-07-18

## 2019-07-13 RX ORDER — POTASSIUM CHLORIDE 20 MEQ
10 PACKET (EA) ORAL ONCE
Refills: 0 | Status: COMPLETED | OUTPATIENT
Start: 2019-07-13 | End: 2019-07-14

## 2019-07-13 RX ORDER — ACETAMINOPHEN 500 MG
650 TABLET ORAL EVERY 6 HOURS
Refills: 0 | Status: DISCONTINUED | OUTPATIENT
Start: 2019-07-13 | End: 2019-07-13

## 2019-07-13 RX ORDER — DONEPEZIL HYDROCHLORIDE 10 MG/1
10 TABLET, FILM COATED ORAL AT BEDTIME
Refills: 0 | Status: DISCONTINUED | OUTPATIENT
Start: 2019-07-13 | End: 2019-07-18

## 2019-07-13 RX ORDER — AZITHROMYCIN 500 MG/1
500 TABLET, FILM COATED ORAL DAILY
Refills: 0 | Status: DISCONTINUED | OUTPATIENT
Start: 2019-07-13 | End: 2019-07-17

## 2019-07-13 RX ORDER — GABAPENTIN 400 MG/1
600 CAPSULE ORAL
Refills: 0 | Status: DISCONTINUED | OUTPATIENT
Start: 2019-07-13 | End: 2019-07-18

## 2019-07-13 RX ORDER — CEFTRIAXONE 500 MG/1
2000 INJECTION, POWDER, FOR SOLUTION INTRAMUSCULAR; INTRAVENOUS EVERY 24 HOURS
Refills: 0 | Status: DISCONTINUED | OUTPATIENT
Start: 2019-07-13 | End: 2019-07-15

## 2019-07-13 RX ORDER — LOSARTAN POTASSIUM 100 MG/1
25 TABLET, FILM COATED ORAL DAILY
Refills: 0 | Status: DISCONTINUED | OUTPATIENT
Start: 2019-07-13 | End: 2019-07-18

## 2019-07-13 RX ORDER — MEMANTINE HYDROCHLORIDE 10 MG/1
1 TABLET ORAL
Qty: 0 | Refills: 0 | DISCHARGE

## 2019-07-13 RX ADMIN — DONEPEZIL HYDROCHLORIDE 10 MILLIGRAM(S): 10 TABLET, FILM COATED ORAL at 21:41

## 2019-07-13 RX ADMIN — ATORVASTATIN CALCIUM 20 MILLIGRAM(S): 80 TABLET, FILM COATED ORAL at 21:41

## 2019-07-13 RX ADMIN — CEFEPIME 100 MILLIGRAM(S): 1 INJECTION, POWDER, FOR SOLUTION INTRAMUSCULAR; INTRAVENOUS at 15:38

## 2019-07-13 RX ADMIN — Medication 166.67 MILLIGRAM(S): at 16:15

## 2019-07-13 RX ADMIN — ROPINIROLE 2 MILLIGRAM(S): 8 TABLET, FILM COATED, EXTENDED RELEASE ORAL at 21:40

## 2019-07-13 RX ADMIN — Medication 40 MILLIGRAM(S): at 13:44

## 2019-07-13 RX ADMIN — CEFTRIAXONE 2000 MILLIGRAM(S): 500 INJECTION, POWDER, FOR SOLUTION INTRAMUSCULAR; INTRAVENOUS at 21:41

## 2019-07-13 RX ADMIN — Medication 100 MILLIEQUIVALENT(S): at 21:39

## 2019-07-13 RX ADMIN — GABAPENTIN 600 MILLIGRAM(S): 400 CAPSULE ORAL at 21:41

## 2019-07-13 RX ADMIN — MEMANTINE HYDROCHLORIDE 5 MILLIGRAM(S): 10 TABLET ORAL at 21:41

## 2019-07-13 RX ADMIN — Medication 25 MILLIGRAM(S): at 21:41

## 2019-07-13 NOTE — PATIENT PROFILE ADULT - NSPROGENDIFFINTUB_GEN_A_NUR
patient is very [place] and unable to decifer what I am asking- needs to be reviewed when patient has batteries replaced in HA

## 2019-07-13 NOTE — H&P ADULT - HISTORY OF PRESENT ILLNESS
83M w/PMH [place] (cochlear implant), chronic dizziness, HTN, RLS, recent GIB (s/p EGD/colonoscpy), aortic root dilation, aortic valve insufficiency, CAMILA unable to tolerate CPAP, presents with SOB, cough and b/l LE edema, HA, decreased appetite and imbalance 83M w/PMH [place] (cochlear implant), chronic imbalance, HTN, RLS, recent GIB (s/p EGD/colonoscpy), aortic root dilation, aortic valve insufficiency, CAMILA unable to tolerate CPAP, presents with SOB, cough and b/l LE edema. wife reports that pt has had b/l LE swelling x several months but has gotten worse since 1-2 weeks ago. he was on lasix 20 mg PO and it was increased by cardiologist to 40 mg daily for 1 week. on thursday after being on higher dose of lasix for a week, she called the NP and was told to go to the ER if no improvement. today pt's condition got worse and w/ increased dyspnea. pt/wife called pt's cardiologist, Dr. Aguirre, and was instructed to go to the ER. currently pt denies any chest pain. has some dyspnea and nonproductive cough. no fevers or chills.   chronic imbalance - per wife this has gotten worse.  headaches and back pain as reported by wife but pt currently denies any pain.  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 wife reports that pt has had b/l LE swelling x several months but has gotten worse since 1-2 weeks ago. he was on lasix 20 mg PO and it was increased by cardiologist to 40 mg daily for 1 week. on thursday after being on higher dose of lasix for a week, she called the NP and was told to go to the ER if no improvement. today pt's condition got worse and w/ increased dyspnea. pt/wife called pt's cardiologist, Dr. Aguirre, and was instructed to go to the ER. currently pt denies any chest pain. has some dyspnea and nonproductive cough. no fevers or chills.   chronic imbalance - per wife this has gotten worse.  headaches and back pain as reported by wife but pt currently denies any pain.  had recent GI bleed few months and now without any hematochezia or melena.

## 2019-07-13 NOTE — PATIENT PROFILE ADULT - FALL HARM RISK
bones(Osteoporosis,prev fx,steroid use,metastatic bone ca)/hx of rigkt TKA, [place] with right cochlear implant, sleep apnea, chronic imbalance/other

## 2019-07-13 NOTE — ED ADULT NURSE REASSESSMENT NOTE - NS ED NURSE REASSESS COMMENT FT1
Pt sleeping, HR down to 30's momentarily then back to 50's, A.fib on monitor.  Pt with oxygen level 90% on 2L NC, oxygen increased to 4 lpm with saturation up to 96%, Dr. Caro aware of all.

## 2019-07-13 NOTE — PATIENT PROFILE ADULT - NSASFALLNEEDSASSISTWITH_GEN_A_NUR
standing/patient ambulates with a straight cane and has LE swelling. Patietn is continent at times and other times has urinary incontinence/walking/toileting

## 2019-07-13 NOTE — ED PROVIDER NOTE - PROGRESS NOTE DETAILS
pt with hr that decreases into the 30's at times, pneumoonia vs chf vs both on xray will give lasix and antibiotics, does not meet septic criteria. case dw Dr. Pena tba to telemetry BRENDAN Castaneda DO

## 2019-07-13 NOTE — ED ADULT NURSE REASSESSMENT NOTE - NS ED NURSE REASSESS COMMENT FT1
Pt unable to tolerate PO potassium, spoke with Dr. Lino, PO potassium d/c'd, pt to receive IV Potassium s/p vanco admin.

## 2019-07-13 NOTE — H&P ADULT - NSICDXPASTMEDICALHX_GEN_ALL_CORE_FT
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

## 2019-07-13 NOTE — H&P ADULT - ASSESSMENT
1.	acute on chronic * CHF  2.	pneumonia ?   3.	leukocytosis  4.	hypokalemia  5.	chronic anemia      ·	pt w/ edema, elevated BNP and abn cxr  ·	Echo  ·	lasix IV. 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     1.	acute on chronic diastolic CHF - pt w/ edema, elevated BNP and abn cxr. check Echo. start lasix 40 mg IV BID. monitor renal function while on diuretic. tele. EKG. Consult cardiology  2.	atrial fibrillation - rate control w/ BB. not on anticoag due to recent GI bleed.   3.	pneumonia - pt w/ cough, leukocytosis but afebrile. CXR suggestive of pneumonia. CT chest pending. antibiotics: vanc/cefepime given in ER. pt is from home. will switch to ceftriaxone/ azithromycin. blood cultures pending. checking lactate  4.	leukocytosis - likely 2/2 to pna. see above  5.	hypokalemia- supplement. check Mg  6.	chronic anemia - hgb stable monitor   7.	h/o AVR  8.	HTN - BB and losartan w/ holding parameters  9.	CAMILA- unable to john pap      ·	DVT proph: scds/compression stocking. no anticoag due to GIB  ·	code status: DNR/ DNI - MOLST form completed. Advance care discussion done. total time spent: 15 min.  ·	all questions answered

## 2019-07-13 NOTE — H&P ADULT - NSHPPHYSICALEXAM_GEN_ALL_CORE
T(C): 36.7 (07-13-19 @ 12:05), Max: 36.7 (07-13-19 @ 12:05)  HR: 57 (07-13-19 @ 13:45) (57 - 60)  BP: 133/79 (07-13-19 @ 13:45) (120/68 - 133/79)  RR: 18 (07-13-19 @ 13:45) (18 - 18)  SpO2: 94% (07-13-19 @ 13:45) (94% - 96%)    General:  Alert, cooperative, no distress, appears stated age.  Head:  Normocephalic, without obvious abnormality, atraumatic.  Eyes:  Conjunctivae/corneas clear. PERRL, EOMs intact.   Nose: Nares normal.  Mucosa normal. No drainage  Throat: Lips, mucosa, and tongue normal. Teeth and gums normal.  Neck: Supple, symmetrical, trachea midline  Back:   Symmetric, no curvature. ROM normal. No CVA tenderness.  Lungs:   Good effort. Clear to auscultation bilaterally.  Chest wall:  No tenderness or deformity.  Heart:  Regular rate and rhythm, S1, S2 normal, no murmur, click, rub or gallop.  Abdomen:   Soft, non-tender. Non-distended. No R/R/G. Bowel sounds normal. No masses,  No HSM.  Psych: Alert and oriented x 3. Appropriate mood/ affect.  Extremities: Extremities normal, atraumatic, no cyanosis or edema.  Pulses: 2+ and symmetric all extremities.  Skin: Skin color, texture, turgor normal. No rashes or lesions  Neurologic: * T(C): 36.7 (07-13-19 @ 12:05), Max: 36.7 (07-13-19 @ 12:05)  HR: 57 (07-13-19 @ 13:45) (57 - 60)  BP: 133/79 (07-13-19 @ 13:45) (120/68 - 133/79)  RR: 18 (07-13-19 @ 13:45) (18 - 18)  SpO2: 94% (07-13-19 @ 13:45) (94% - 96%)    General:  Alert, cooperative, no distress, appears stated age.  Head:  Normocephalic, without obvious abnormality, atraumatic. [place]  Eyes:  Conjunctivae/corneas clear. PERRL, EOMs intact.   Nose: Nares normal.  Mucosa normal. No drainage  Throat: Lips, mucosa, and tongue normal.  gums normal.  Neck: Supple, symmetrical, trachea midline  Back:   Symmetric, no curvature. ROM normal. No CVA tenderness.  Lungs:   Good effort. Clear to auscultation bilaterally.  Chest wall:  No tenderness or deformity.  Heart:  irregularly irregular, S1, S2 normal, no murmur, click, rub or gallop.  Abdomen:   Soft, non-tender. Non-distended. No R/R/G. Bowel sounds normal. No masses,  No HSM.  Psych: Alert and oriented x 3. Appropriate mood/ affect.  Extremities: Extremities normal, atraumatic, no cyanosis. +2 pitting edema.  Pulses: 2+ and symmetric all extremities.  Skin: Skin color, texture, turgor normal. No rashes or lesions  Neurologic: fluent speech, no facial droop, EOMI. moves all ext

## 2019-07-13 NOTE — PATIENT PROFILE ADULT - ABILITY TO HEAR (WITH HEARING AID OR HEARING APPLIANCE IF NORMALLY USED):
Severely Impaired: absence of useful hearing/Patient with right cochlear implant and b/l hearing aids

## 2019-07-13 NOTE — PATIENT PROFILE ADULT - FUNCTIONAL SCREEN CURRENT LEVEL: COMMUNICATION, MLM
2 = difficulty understanding and speaking (not related to language barrier)/patient john MORRIS with left hearing aid and right cochlear implant- patient states he cant hear bc he needs new batteries

## 2019-07-13 NOTE — ED PROVIDER NOTE - PMH
Aortic root dilatation    Aortic valve insufficiency, unspecified etiology    BPH associated with nocturia    Diverticulosis of intestine without bleeding, unspecified intestinal tract location    Essential hypertension    Fall (on) (from) other stairs and steps, sequela  03/28/2017- lumbar hemtoma  Gastroesophageal reflux disease, esophagitis presence not specified    Hard of hearing    HLD (hyperlipidemia)    [place] (hard of hearing)    Lumbar degenerative disc disease    MRSA (methicillin resistant Staphylococcus aureus)  left forearm 2012  Nerve injury  complication from right TKR surgery, 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  CAMILA (obstructive sleep apnea)  unable to tolerate nocturnal CPAP  Restless leg syndrome    Thoracic aortic aneurysm

## 2019-07-13 NOTE — ED STATDOCS - PMH
Aortic root dilatation    Aortic valve insufficiency, unspecified etiology    BPH associated with nocturia    Diverticulosis of intestine without bleeding, unspecified intestinal tract location    Essential hypertension    Fall (on) (from) other stairs and steps, sequela  03/28/2017- lumbar hemtoma  Gastroesophageal reflux disease, esophagitis presence not specified    Hard of hearing    HLD (hyperlipidemia)    [place] (hard of hearing)    Lumbar degenerative disc disease    MRSA (methicillin resistant Staphylococcus aureus)  left forearm 2012  Nerve injury  complication from right TKR surgery, 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  CAMILA (obstructive sleep apnea)  unable to tolerate nocturnal CPAP  Restless leg syndrome    Thoracic aortic aneurysm

## 2019-07-13 NOTE — ED STATDOCS - PROGRESS NOTE DETAILS
Vivienne ROGER for Dr. Garcia: Pertinent HPI/PMH/PSH/FHx/SHx and Review of Systems contained within HPI: 84 yo male p/w CC b/l LE edema x3 months. Pt saw his cardiologist 2 days ago and MD recommended pt to go to ED. States he has a questionable small blood clots in b/l LE. No IVC filter. No anticoagulants due to bleeding risk. Currently on Lasix.   PMH/PSH: BPH, [place], HLD, HTN, MRSA.   ROS positive for: SOB, dry cough, LE swelling.  ROS negative for: Chest pain, Nausea, vomiting, diarrhea, abdominal pain.  Will send pt to main ED for further evaluation.

## 2019-07-13 NOTE — ED PROVIDER NOTE - OBJECTIVE STATEMENT
84 y/o male with a PMHx of aortic root dilation, Aortic valve insufficiency, BPH, diverticulosis, HTN, GERD, HLD, CAMILA, Thoracic aortic aneurysm, MRSA, presents to the ED c/o bilateral LE edema for the past few months. As per wife at bedside pt has had bilateral LE edema, SOB, dry cough, HA, urine incontinence, decrease appetite and imbalance. Pt had his Lasix doubled 1.5 weeks ago with no relief. Called cardiologist today who sent pt to ed for IV Lasix. PMD: Dr. Soriano. Cardio: Dr. Aguirre.

## 2019-07-13 NOTE — H&P ADULT - NSICDXFAMILYHX_GEN_ALL_CORE_FT
FAMILY HISTORY:  Mother  Still living? No  Family history of lung cancer, Age at diagnosis: Age Unknown

## 2019-07-13 NOTE — H&P ADULT - NSHPLABSRESULTS_GEN_ALL_CORE
LABS: All Labs Reviewed:                        10.7   16.38 )-----------( 394      ( 13 Jul 2019 13:16 )             35.7     07-13    143  |  105  |  22  ----------------------------<  117<H>  3.1<L>   |  31  |  0.81    Ca    9.2      13 Jul 2019 13:16  Mg     1.8     07-13    TPro  7.3  /  Alb  2.4<L>  /  TBili  0.6  /  DBili  x   /  AST  26  /  ALT  40  /  AlkPhos  121<H>  07-13    PT/INR - ( 13 Jul 2019 13:16 )   PT: 18.4 sec;   INR: 1.63 ratio         PTT - ( 13 Jul 2019 13:16 )  PTT:31.2 sec  CARDIAC MARKERS ( 13 Jul 2019 13:16 )  0.030 ng/mL / x     / x     / x     / x            Blood Culture:   RADIOLOGY/EKG:  < from: Xray Chest 1 View AP/PA. (07.13.19 @ 13:34) >    Impression: Interval development of perihilar infiltrates bilaterally   suggesting CHF versus pneumonia    < end of copied text >

## 2019-07-13 NOTE — ED ADULT NURSE NOTE - NSIMPLEMENTINTERV_GEN_ALL_ED
Implemented All Fall Risk Interventions:  [employer] to call system. Call bell, personal items and telephone within reach. Instruct patient to call for assistance. Room bathroom lighting operational. Non-slip footwear when patient is off stretcher. Physically safe environment: no spills, clutter or unnecessary equipment. Stretcher in lowest position, wheels locked, appropriate side rails in place. Provide visual cue, wrist band, yellow gown, etc. Monitor gait and stability. Monitor for mental status changes and reorient to person, place, and time. Review medications for side effects contributing to fall risk. Reinforce activity limits and safety measures with patient and family.

## 2019-07-13 NOTE — H&P ADULT - NSICDXPASTSURGICALHX_GEN_ALL_CORE_FT
PAST SURGICAL HISTORY:  Amputation finger left index 1970    History of fundoplication 2000    S/P debridement left forearm -mrsa    S/P knee replacement right    S/P knee replacement left    S/P shoulder surgery right rotatotor cuff injury    Status post cataract extraction, unspecified laterality bilat

## 2019-07-13 NOTE — ED PROVIDER NOTE - CHPI ED SYMPTOMS POS
SHORTNESS OF BREATH/+HA +bilateral LE edema +HA +urine incontinence +decrease appetite +imbalance/COUGH

## 2019-07-14 LAB
ANION GAP SERPL CALC-SCNC: 10 MMOL/L — SIGNIFICANT CHANGE UP (ref 5–17)
BUN SERPL-MCNC: 20 MG/DL — SIGNIFICANT CHANGE UP (ref 7–23)
CALCIUM SERPL-MCNC: 8.9 MG/DL — SIGNIFICANT CHANGE UP (ref 8.5–10.1)
CHLORIDE SERPL-SCNC: 102 MMOL/L — SIGNIFICANT CHANGE UP (ref 96–108)
CO2 SERPL-SCNC: 31 MMOL/L — SIGNIFICANT CHANGE UP (ref 22–31)
CREAT SERPL-MCNC: 0.83 MG/DL — SIGNIFICANT CHANGE UP (ref 0.5–1.3)
GLUCOSE SERPL-MCNC: 129 MG/DL — HIGH (ref 70–99)
HCT VFR BLD CALC: 33.4 % — LOW (ref 39–50)
HGB BLD-MCNC: 10.2 G/DL — LOW (ref 13–17)
MAGNESIUM SERPL-MCNC: 1.9 MG/DL — SIGNIFICANT CHANGE UP (ref 1.6–2.6)
MCHC RBC-ENTMCNC: 25.7 PG — LOW (ref 27–34)
MCHC RBC-ENTMCNC: 30.5 GM/DL — LOW (ref 32–36)
MCV RBC AUTO: 84.1 FL — SIGNIFICANT CHANGE UP (ref 80–100)
PLATELET # BLD AUTO: 361 K/UL — SIGNIFICANT CHANGE UP (ref 150–400)
POTASSIUM SERPL-MCNC: 3 MMOL/L — LOW (ref 3.5–5.3)
POTASSIUM SERPL-SCNC: 3 MMOL/L — LOW (ref 3.5–5.3)
RBC # BLD: 3.97 M/UL — LOW (ref 4.2–5.8)
RBC # FLD: 20.1 % — HIGH (ref 10.3–14.5)
SODIUM SERPL-SCNC: 143 MMOL/L — SIGNIFICANT CHANGE UP (ref 135–145)
WBC # BLD: 14.97 K/UL — HIGH (ref 3.8–10.5)
WBC # FLD AUTO: 14.97 K/UL — HIGH (ref 3.8–10.5)

## 2019-07-14 RX ORDER — ALBUTEROL 90 UG/1
2.5 AEROSOL, METERED ORAL ONCE
Refills: 0 | Status: COMPLETED | OUTPATIENT
Start: 2019-07-14 | End: 2019-07-14

## 2019-07-14 RX ORDER — ALBUTEROL 90 UG/1
2.5 AEROSOL, METERED ORAL
Refills: 0 | Status: DISCONTINUED | OUTPATIENT
Start: 2019-07-14 | End: 2019-07-18

## 2019-07-14 RX ORDER — LANOLIN ALCOHOL/MO/W.PET/CERES
5 CREAM (GRAM) TOPICAL ONCE
Refills: 0 | Status: COMPLETED | OUTPATIENT
Start: 2019-07-14 | End: 2019-07-14

## 2019-07-14 RX ORDER — POTASSIUM CHLORIDE 20 MEQ
40 PACKET (EA) ORAL ONCE
Refills: 0 | Status: COMPLETED | OUTPATIENT
Start: 2019-07-14 | End: 2019-07-14

## 2019-07-14 RX ORDER — IPRATROPIUM/ALBUTEROL SULFATE 18-103MCG
3 AEROSOL WITH ADAPTER (GRAM) INHALATION EVERY 6 HOURS
Refills: 0 | Status: DISCONTINUED | OUTPATIENT
Start: 2019-07-14 | End: 2019-07-18

## 2019-07-14 RX ORDER — ALBUTEROL 90 UG/1
1.25 AEROSOL, METERED ORAL
Refills: 0 | Status: DISCONTINUED | OUTPATIENT
Start: 2019-07-14 | End: 2019-07-14

## 2019-07-14 RX ORDER — ALBUTEROL 90 UG/1
2.5 AEROSOL, METERED ORAL ONCE
Refills: 0 | Status: DISCONTINUED | OUTPATIENT
Start: 2019-07-14 | End: 2019-07-14

## 2019-07-14 RX ADMIN — Medication 40 MILLIGRAM(S): at 18:59

## 2019-07-14 RX ADMIN — Medication 3 MILLILITER(S): at 20:02

## 2019-07-14 RX ADMIN — ROPINIROLE 2 MILLIGRAM(S): 8 TABLET, FILM COATED, EXTENDED RELEASE ORAL at 06:09

## 2019-07-14 RX ADMIN — PANTOPRAZOLE SODIUM 40 MILLIGRAM(S): 20 TABLET, DELAYED RELEASE ORAL at 06:09

## 2019-07-14 RX ADMIN — MEMANTINE HYDROCHLORIDE 5 MILLIGRAM(S): 10 TABLET ORAL at 19:00

## 2019-07-14 RX ADMIN — MEMANTINE HYDROCHLORIDE 5 MILLIGRAM(S): 10 TABLET ORAL at 06:08

## 2019-07-14 RX ADMIN — GABAPENTIN 600 MILLIGRAM(S): 400 CAPSULE ORAL at 06:08

## 2019-07-14 RX ADMIN — Medication 40 MILLIGRAM(S): at 09:47

## 2019-07-14 RX ADMIN — LOSARTAN POTASSIUM 25 MILLIGRAM(S): 100 TABLET, FILM COATED ORAL at 06:08

## 2019-07-14 RX ADMIN — ATORVASTATIN CALCIUM 20 MILLIGRAM(S): 80 TABLET, FILM COATED ORAL at 23:12

## 2019-07-14 RX ADMIN — Medication 25 MILLIGRAM(S): at 06:08

## 2019-07-14 RX ADMIN — Medication 25 MILLIGRAM(S): at 19:01

## 2019-07-14 RX ADMIN — Medication 40 MILLIEQUIVALENT(S): at 11:47

## 2019-07-14 RX ADMIN — ROPINIROLE 2 MILLIGRAM(S): 8 TABLET, FILM COATED, EXTENDED RELEASE ORAL at 19:01

## 2019-07-14 RX ADMIN — Medication 40 MILLIGRAM(S): at 23:12

## 2019-07-14 RX ADMIN — AZITHROMYCIN 500 MILLIGRAM(S): 500 TABLET, FILM COATED ORAL at 11:47

## 2019-07-14 RX ADMIN — Medication 3 MILLILITER(S): at 14:19

## 2019-07-14 RX ADMIN — Medication 5 MILLIGRAM(S): at 23:12

## 2019-07-14 RX ADMIN — ROPINIROLE 2 MILLIGRAM(S): 8 TABLET, FILM COATED, EXTENDED RELEASE ORAL at 01:24

## 2019-07-14 RX ADMIN — CEFTRIAXONE 2000 MILLIGRAM(S): 500 INJECTION, POWDER, FOR SOLUTION INTRAMUSCULAR; INTRAVENOUS at 23:12

## 2019-07-14 RX ADMIN — GABAPENTIN 600 MILLIGRAM(S): 400 CAPSULE ORAL at 19:00

## 2019-07-14 RX ADMIN — Medication 100 MILLIEQUIVALENT(S): at 00:02

## 2019-07-14 RX ADMIN — DONEPEZIL HYDROCHLORIDE 10 MILLIGRAM(S): 10 TABLET, FILM COATED ORAL at 23:12

## 2019-07-14 RX ADMIN — Medication 40 MILLIGRAM(S): at 14:20

## 2019-07-14 RX ADMIN — ROPINIROLE 2 MILLIGRAM(S): 8 TABLET, FILM COATED, EXTENDED RELEASE ORAL at 11:46

## 2019-07-14 RX ADMIN — GABAPENTIN 600 MILLIGRAM(S): 400 CAPSULE ORAL at 23:15

## 2019-07-14 RX ADMIN — GABAPENTIN 600 MILLIGRAM(S): 400 CAPSULE ORAL at 11:46

## 2019-07-14 RX ADMIN — Medication 5 MILLIGRAM(S): at 01:53

## 2019-07-14 RX ADMIN — ROPINIROLE 2 MILLIGRAM(S): 8 TABLET, FILM COATED, EXTENDED RELEASE ORAL at 23:15

## 2019-07-14 NOTE — CONSULT NOTE ADULT - SUBJECTIVE AND OBJECTIVE BOX
Patient is a 83y old  Male who presents with a chief complaint of edema, cough, shortness of breath (14 Jul 2019 11:20)      HPI:  83M w/PMH non-obstructive CAD, aortic root dilation, aortic valve insufficiency, s/p bioprosthetic aortic valve replacement and ascending aortic root graft, A-fib, [place] (cochlear implant), chronic imbalance, HTN, RLS, recent GIB (s/p EGD/colonoscpy),  CAMILA unable to tolerate CPAP, presents with SOB, cough and b/l LE edema. wife reports that pt has had b/l LE swelling x several months but has gotten worse since 1-2 weeks ago. he was on lasix 20 mg PO and it was increased by cardiologist to 40 mg daily for 1 week. on thursday after being on higher dose of lasix for a week, she called the NP and was told to go to the ER if no improvement. today pt's condition got worse and w/ increased dyspnea. pt/wife called pt's cardiologist, Dr. Aguirre, and was instructed to go to the ER. currently pt denies any chest pain. has some dyspnea and nonproductive cough. no fevers or chills.   chronic imbalance - per wife this has gotten worse.  headaches and back pain as reported by wife but pt currently denies any pain.  had recent GI bleed few months and now without any hematochezia or melena. (13 Jul 2019 14:35)  While i was in the room he c/o dizziness and had a HR 30 , also with hypoxia 90% on 4 L also with cough no CP       PAST MEDICAL & SURGICAL HISTORY:  [place] (hard of hearing)  Fall (on) (from) other stairs and steps, sequela: 03/28/2017- lumbar hemtoma  Essential hypertension  Nerve injury: complication from right TKR surgery, 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  Aortic root dilatation  MRSA (methicillin resistant Staphylococcus aureus): left forearm 2012  Lumbar degenerative disc disease  BPH associated with nocturia  Diverticulosis of intestine without bleeding, unspecified intestinal tract location  Gastroesophageal reflux disease, esophagitis presence not specified  Aortic valve insufficiency, unspecified etiology  CAMILA (obstructive sleep apnea): unable to tolerate nocturnal CPAP  Restless leg syndrome  Hard of hearing  Thoracic aortic aneurysm  HLD (hyperlipidemia)  Status post cataract extraction, unspecified laterality: bilat  S/P debridement: left forearm -mrsa  History of fundoplication: 2000  Amputation finger: left index 1970  S/P knee replacement: left  S/P knee replacement: right  S/P shoulder surgery: right rotatotor cuff injury                                    MEDICATIONS  (STANDING):  atorvastatin 20 milliGRAM(s) Oral at bedtime  azithromycin   Tablet 500 milliGRAM(s) Oral daily  cefTRIAXone Injectable. 2000 milliGRAM(s) IV Push every 24 hours  donepezil 10 milliGRAM(s) Oral at bedtime  furosemide   Injectable 40 milliGRAM(s) IV Push two times a day  gabapentin 600 milliGRAM(s) Oral four times a day  losartan 25 milliGRAM(s) Oral daily  melatonin 5 milliGRAM(s) Oral at bedtime  memantine 5 milliGRAM(s) Oral two times a day  metoprolol tartrate 25 milliGRAM(s) Oral two times a day  pantoprazole    Tablet 40 milliGRAM(s) Oral before breakfast  rOPINIRole 2 milliGRAM(s) Oral four times a day    MEDICATIONS  (PRN):      FAMILY HISTORY:  Family history of lung cancer (Mother)      SOCIAL HISTORY:    CIGARETTES:        Vital Signs Last 24 Hrs  T(C): 36.6 (14 Jul 2019 06:04), Max: 36.6 (14 Jul 2019 06:04)  T(F): 97.9 (14 Jul 2019 06:04), Max: 97.9 (14 Jul 2019 06:04)  HR: 80 (14 Jul 2019 10:52) (57 - 80)  BP: 103/59 (14 Jul 2019 10:52) (103/59 - 133/79)  BP(mean): --  RR: 20 (14 Jul 2019 10:52) (18 - 20)  SpO2: 92% (14 Jul 2019 10:52) (90% - 98%)            INTERPRETATION OF TELEMETRY:  AFIB 30's then up to 140s at times  ECG:    I&O's Detail    13 Jul 2019 07:01  -  14 Jul 2019 07:00  --------------------------------------------------------  IN:    IV PiggyBack: 50 mL  Total IN: 50 mL    OUT:    Voided: 500 mL  Total OUT: 500 mL    Total NET: -450 mL      14 Jul 2019 07:01  -  14 Jul 2019 13:13  --------------------------------------------------------  IN:  Total IN: 0 mL    OUT:    Voided: 300 mL  Total OUT: 300 mL    Total NET: -300 mL          LABS:                        10.2   14.97 )-----------( 361      ( 14 Jul 2019 08:29 )             33.4     07-14    143  |  102  |  20  ----------------------------<  129<H>  3.0<L>   |  31  |  0.83    Ca    8.9      14 Jul 2019 08:29  Mg     1.9     07-14    TPro  7.3  /  Alb  2.4<L>  /  TBili  0.6  /  DBili  x   /  AST  26  /  ALT  40  /  AlkPhos  121<H>  07-13    CARDIAC MARKERS ( 13 Jul 2019 13:16 )  0.030 ng/mL / x     / x     / x     / x          PT/INR - ( 13 Jul 2019 13:16 )   PT: 18.4 sec;   INR: 1.63 ratio         PTT - ( 13 Jul 2019 13:16 )  PTT:31.2 sec    I&O's Summary    13 Jul 2019 07:01  -  14 Jul 2019 07:00  --------------------------------------------------------  IN: 50 mL / OUT: 500 mL / NET: -450 mL    14 Jul 2019 07:01  -  14 Jul 2019 13:13  --------------------------------------------------------  IN: 0 mL / OUT: 300 mL / NET: -300 mL      BNPSerum Pro-Brain Natriuretic Peptide: 3131 pg/mL (07-13 @ 13:16)    RADIOLOGY & ADDITIONAL STUDIES:

## 2019-07-14 NOTE — PROVIDER CONTACT NOTE (OTHER) - ASSESSMENT
Patient sitting on edge of bed urinating stated "I feel lousy" Patient asked if he felt dizzy and he stated yes. Per wife patient is always light headed. Bp 152/85, HR 40's-50's on cardiac monitor, 02 sat 90-92% on 4L NC, Temp 98.0. Patient wheezing and SOB.

## 2019-07-14 NOTE — PROGRESS NOTE ADULT - SUBJECTIVE AND OBJECTIVE BOX
CC:  Patient is a 83y old  Male who presents with a chief complaint of edema, cough, shortness of breath (13 Jul 2019 14:35)    SUBJECTIVE:     -no new complaints or issues at current time.    ROS:  all other review of systems are negative unless indicated above.    atorvastatin 20 milliGRAM(s) Oral at bedtime  azithromycin   Tablet 500 milliGRAM(s) Oral daily  cefTRIAXone Injectable. 2000 milliGRAM(s) IV Push every 24 hours  donepezil 10 milliGRAM(s) Oral at bedtime  furosemide   Injectable 40 milliGRAM(s) IV Push two times a day  gabapentin 600 milliGRAM(s) Oral four times a day  losartan 25 milliGRAM(s) Oral daily  melatonin 5 milliGRAM(s) Oral at bedtime  memantine 5 milliGRAM(s) Oral two times a day  metoprolol tartrate 25 milliGRAM(s) Oral two times a day  pantoprazole    Tablet 40 milliGRAM(s) Oral before breakfast  potassium chloride    Tablet ER 40 milliEquivalent(s) Oral once  rOPINIRole 2 milliGRAM(s) Oral four times a day    T(C): 36.6 (07-14-19 @ 06:04), Max: 36.7 (07-13-19 @ 12:05)  HR: 80 (07-14-19 @ 10:52) (57 - 80)  BP: 103/59 (07-14-19 @ 10:52) (103/59 - 133/79)  RR: 20 (07-14-19 @ 10:52) (18 - 20)  SpO2: 92% (07-14-19 @ 10:52) (90% - 98%)    Constitutional: NAD.   Neck:  JVP wnl.  supple.  Respiratory: Breath sounds are clear bilaterally, No wheezing, rales or rhonchi  Cardiovascular: S1 and S2, regular rate and rhythm, no murmur, rub or gallop.  Gastrointestinal: Bowel Sounds present, soft, nontender, nondistended, no guarding, no rebound, no mass.  Extremities: No peripheral edema  Neurological: A/O x                            10.2   14.97 )-----------( 361      ( 14 Jul 2019 08:29 )             33.4     PT/INR - ( 13 Jul 2019 13:16 )   PT: 18.4 sec;   INR: 1.63 ratio         PTT - ( 13 Jul 2019 13:16 )  PTT:31.2 sec  07-14    143  |  102  |  20  ----------------------------<  129<H>  3.0<L>   |  31  |  0.83    Ca    8.9      14 Jul 2019 08:29  Mg     1.9     07-14    TPro  7.3  /  Alb  2.4<L>  /  TBili  0.6  /  DBili  x   /  AST  26  /  ALT  40  /  AlkPhos  121<H>  07-13 CC:  Patient is a 83y old  Male who presents with a chief complaint of edema, cough, shortness of breath (13 Jul 2019 14:35)    SUBJECTIVE:     -no new complaints or issues at current time.    ROS:  all other review of systems are negative unless indicated above.    atorvastatin 20 milliGRAM(s) Oral at bedtime  azithromycin   Tablet 500 milliGRAM(s) Oral daily  cefTRIAXone Injectable. 2000 milliGRAM(s) IV Push every 24 hours  donepezil 10 milliGRAM(s) Oral at bedtime  furosemide   Injectable 40 milliGRAM(s) IV Push two times a day  gabapentin 600 milliGRAM(s) Oral four times a day  losartan 25 milliGRAM(s) Oral daily  melatonin 5 milliGRAM(s) Oral at bedtime  memantine 5 milliGRAM(s) Oral two times a day  metoprolol tartrate 25 milliGRAM(s) Oral two times a day  pantoprazole    Tablet 40 milliGRAM(s) Oral before breakfast  potassium chloride    Tablet ER 40 milliEquivalent(s) Oral once  rOPINIRole 2 milliGRAM(s) Oral four times a day    T(C): 36.6 (07-14-19 @ 06:04), Max: 36.7 (07-13-19 @ 12:05)  HR: 80 (07-14-19 @ 10:52) (57 - 80)  BP: 103/59 (07-14-19 @ 10:52) (103/59 - 133/79)  RR: 20 (07-14-19 @ 10:52) (18 - 20)  SpO2: 92% (07-14-19 @ 10:52) (90% - 98%)    Constitutional: NAD.   Neck:  JVP wnl.  supple.  Respiratory: (+) MOD BL WZ.  Cardiovascular: S1 and S2, regular rate and rhythm, no murmur, rub or gallop.  Gastrointestinal: Bowel Sounds present, soft, nontender, nondistended, no guarding, no rebound, no mass.  Extremities: No peripheral edema  Neurological: A/O x                            10.2   14.97 )-----------( 361      ( 14 Jul 2019 08:29 )             33.4     PT/INR - ( 13 Jul 2019 13:16 )   PT: 18.4 sec;   INR: 1.63 ratio         PTT - ( 13 Jul 2019 13:16 )  PTT:31.2 sec  07-14    143  |  102  |  20  ----------------------------<  129<H>  3.0<L>   |  31  |  0.83    Ca    8.9      14 Jul 2019 08:29  Mg     1.9     07-14    TPro  7.3  /  Alb  2.4<L>  /  TBili  0.6  /  DBili  x   /  AST  26  /  ALT  40  /  AlkPhos  121<H>  07-13

## 2019-07-14 NOTE — PROGRESS NOTE ADULT - ASSESSMENT
83M w/PMH non-obstructive CAD, aortic root dilation, aortic valve insufficiency, s/p bioprosthetic aortic valve replacement and ascending aortic root graft, A-fib, [place] (cochlear implant), chronic imbalance, HTN, RLS, recent GIB (s/p EGD/colonoscpy),  CAMILA unable to tolerate CPAP, presents with SOB, cough and b/l LE edema.     1.	acute on chronic diastolic CHF - pt w/ edema, elevated BNP and abn cxr. Echo. lasix 40 mg IV BID. monitor renal function while on diuretic. tele. EKG. cardiology  2.	atrial fibrillation - rate control w/ BB. not on anticoag due to recent GI bleed.   3.	pneumonia - pt w/ cough, leukocytosis but afebrile. CXR suggestive of pneumonia. ceftriaxone/ azithromycin. blood cultures  4.	leukocytosis - likely 2/2 to pna. see above  5.	hypokalemia- supplement. check Mg  6.	chronic anemia - hgb stable monitor   7.	h/o AVR  8.	HTN - BB and losartan w/ holding parameters  9.	CAMILA- unable to john pap 83M w/PMH non-obstructive CAD, aortic root dilation, aortic valve insufficiency, s/p bioprosthetic aortic valve replacement and ascending aortic root graft, A-fib, [place] (cochlear implant), chronic imbalance, HTN, RLS, recent GIB (s/p EGD/colonoscpy),  CAMILA unable to tolerate CPAP, presents with SOB, cough and b/l LE edema.     1.	wheezing.  supplemental O2.  start DUO nebs, LORENZO and SoluMedrol.  2.	acute on chronic diastolic CHF - pt w/ edema, elevated BNP and abn cxr. Echo. lasix 40 mg IV BID. monitor renal function while on diuretic. tele. EKG. cardiology  3.	atrial fibrillation - rate control w/ BB. not on anticoag due to recent GI bleed.   4.	pneumonia - pt w/ cough, leukocytosis but afebrile. CXR suggestive of pneumonia. ceftriaxone/ azithromycin. blood cultures  5.	leukocytosis - likely 2/2 to pna. see above  6.	hypokalemia- supplement. check Mg  7.	chronic anemia - hgb stable monitor   8.	h/o AVR  9.	HTN - BB and losartan w/ holding parameters  10.	CAMILA- unable to john pap

## 2019-07-15 LAB
ANION GAP SERPL CALC-SCNC: 6 MMOL/L — SIGNIFICANT CHANGE UP (ref 5–17)
BUN SERPL-MCNC: 30 MG/DL — HIGH (ref 7–23)
CALCIUM SERPL-MCNC: 9.2 MG/DL — SIGNIFICANT CHANGE UP (ref 8.5–10.1)
CHLORIDE SERPL-SCNC: 102 MMOL/L — SIGNIFICANT CHANGE UP (ref 96–108)
CO2 SERPL-SCNC: 32 MMOL/L — HIGH (ref 22–31)
CREAT SERPL-MCNC: 1 MG/DL — SIGNIFICANT CHANGE UP (ref 0.5–1.3)
GLUCOSE SERPL-MCNC: 181 MG/DL — HIGH (ref 70–99)
HCT VFR BLD CALC: 34.5 % — LOW (ref 39–50)
HGB BLD-MCNC: 10.4 G/DL — LOW (ref 13–17)
MCHC RBC-ENTMCNC: 25.4 PG — LOW (ref 27–34)
MCHC RBC-ENTMCNC: 30.1 GM/DL — LOW (ref 32–36)
MCV RBC AUTO: 84.4 FL — SIGNIFICANT CHANGE UP (ref 80–100)
PLATELET # BLD AUTO: 400 K/UL — SIGNIFICANT CHANGE UP (ref 150–400)
POTASSIUM SERPL-MCNC: 3.6 MMOL/L — SIGNIFICANT CHANGE UP (ref 3.5–5.3)
POTASSIUM SERPL-SCNC: 3.6 MMOL/L — SIGNIFICANT CHANGE UP (ref 3.5–5.3)
RBC # BLD: 4.09 M/UL — LOW (ref 4.2–5.8)
RBC # FLD: 20.1 % — HIGH (ref 10.3–14.5)
SODIUM SERPL-SCNC: 140 MMOL/L — SIGNIFICANT CHANGE UP (ref 135–145)
WBC # BLD: 12.83 K/UL — HIGH (ref 3.8–10.5)
WBC # FLD AUTO: 12.83 K/UL — HIGH (ref 3.8–10.5)

## 2019-07-15 PROCEDURE — 93010 ELECTROCARDIOGRAM REPORT: CPT

## 2019-07-15 PROCEDURE — 93306 TTE W/DOPPLER COMPLETE: CPT | Mod: 26

## 2019-07-15 RX ORDER — CEFTRIAXONE 500 MG/1
1000 INJECTION, POWDER, FOR SOLUTION INTRAMUSCULAR; INTRAVENOUS EVERY 24 HOURS
Refills: 0 | Status: DISCONTINUED | OUTPATIENT
Start: 2019-07-15 | End: 2019-07-15

## 2019-07-15 RX ORDER — CEFTRIAXONE 500 MG/1
1000 INJECTION, POWDER, FOR SOLUTION INTRAMUSCULAR; INTRAVENOUS EVERY 24 HOURS
Refills: 0 | Status: DISCONTINUED | OUTPATIENT
Start: 2019-07-15 | End: 2019-07-18

## 2019-07-15 RX ADMIN — Medication 40 MILLIGRAM(S): at 08:21

## 2019-07-15 RX ADMIN — ROPINIROLE 2 MILLIGRAM(S): 8 TABLET, FILM COATED, EXTENDED RELEASE ORAL at 17:19

## 2019-07-15 RX ADMIN — Medication 40 MILLIGRAM(S): at 13:51

## 2019-07-15 RX ADMIN — Medication 5 MILLIGRAM(S): at 22:21

## 2019-07-15 RX ADMIN — LOSARTAN POTASSIUM 25 MILLIGRAM(S): 100 TABLET, FILM COATED ORAL at 06:39

## 2019-07-15 RX ADMIN — ATORVASTATIN CALCIUM 20 MILLIGRAM(S): 80 TABLET, FILM COATED ORAL at 22:21

## 2019-07-15 RX ADMIN — DONEPEZIL HYDROCHLORIDE 10 MILLIGRAM(S): 10 TABLET, FILM COATED ORAL at 22:21

## 2019-07-15 RX ADMIN — GABAPENTIN 600 MILLIGRAM(S): 400 CAPSULE ORAL at 17:19

## 2019-07-15 RX ADMIN — GABAPENTIN 600 MILLIGRAM(S): 400 CAPSULE ORAL at 06:39

## 2019-07-15 RX ADMIN — Medication 3 MILLILITER(S): at 20:14

## 2019-07-15 RX ADMIN — Medication 40 MILLIGRAM(S): at 06:39

## 2019-07-15 RX ADMIN — MEMANTINE HYDROCHLORIDE 5 MILLIGRAM(S): 10 TABLET ORAL at 06:39

## 2019-07-15 RX ADMIN — ROPINIROLE 2 MILLIGRAM(S): 8 TABLET, FILM COATED, EXTENDED RELEASE ORAL at 06:39

## 2019-07-15 RX ADMIN — ROPINIROLE 2 MILLIGRAM(S): 8 TABLET, FILM COATED, EXTENDED RELEASE ORAL at 12:33

## 2019-07-15 RX ADMIN — Medication 100 MILLIGRAM(S): at 02:29

## 2019-07-15 RX ADMIN — Medication 25 MILLIGRAM(S): at 17:19

## 2019-07-15 RX ADMIN — Medication 40 MILLIGRAM(S): at 22:21

## 2019-07-15 RX ADMIN — MEMANTINE HYDROCHLORIDE 5 MILLIGRAM(S): 10 TABLET ORAL at 17:19

## 2019-07-15 RX ADMIN — PANTOPRAZOLE SODIUM 40 MILLIGRAM(S): 20 TABLET, DELAYED RELEASE ORAL at 06:39

## 2019-07-15 RX ADMIN — AZITHROMYCIN 500 MILLIGRAM(S): 500 TABLET, FILM COATED ORAL at 12:33

## 2019-07-15 RX ADMIN — Medication 25 MILLIGRAM(S): at 06:39

## 2019-07-15 RX ADMIN — GABAPENTIN 600 MILLIGRAM(S): 400 CAPSULE ORAL at 12:33

## 2019-07-15 RX ADMIN — Medication 40 MILLIGRAM(S): at 17:19

## 2019-07-15 RX ADMIN — CEFTRIAXONE 1000 MILLIGRAM(S): 500 INJECTION, POWDER, FOR SOLUTION INTRAMUSCULAR; INTRAVENOUS at 22:21

## 2019-07-15 NOTE — DIETITIAN INITIAL EVALUATION ADULT. - PERTINENT LABORATORY DATA
07-15 Na140 mmol/L Glu 181 mg/dL<H> K+ 3.6 mmol/L Cr  1.00 mg/dL BUN 30 mg/dL<H> Phos n/a   Alb n/a   PAB n/a

## 2019-07-15 NOTE — PROVIDER CONTACT NOTE (OTHER) - SITUATION
Dr. Stewart s/w Adalgisa from [facility] service
Consult left with Dr. Rodriguez's answering service.
Spoke to Roger[facility].
Notified by relief tele RN Bimi that patient's heart rate in the 30's.

## 2019-07-15 NOTE — DIETITIAN INITIAL EVALUATION ADULT. - MALNUTRITION
Patient meets criteria for moderate protein-calorie malnutrition in context of chronic disease.  NFPE reveals moderate muscle wasting (temples, shoulders, clavicles, scapula, thighs, calves, interosseus.)  Moderate fat wasting (triceps, ribs.)

## 2019-07-15 NOTE — DIETITIAN INITIAL EVALUATION ADULT. - PERTINENT MEDS FT
MEDICATIONS  (STANDING):  ALBUTerol/ipratropium for Nebulization 3 milliLiter(s) Nebulizer every 6 hours  atorvastatin 20 milliGRAM(s) Oral at bedtime  azithromycin   Tablet 500 milliGRAM(s) Oral daily  cefTRIAXone Injectable. 2000 milliGRAM(s) IV Push every 24 hours  donepezil 10 milliGRAM(s) Oral at bedtime  furosemide   Injectable 40 milliGRAM(s) IV Push two times a day  gabapentin 600 milliGRAM(s) Oral four times a day  losartan 25 milliGRAM(s) Oral daily  melatonin 5 milliGRAM(s) Oral at bedtime  memantine 5 milliGRAM(s) Oral two times a day  methylPREDNISolone sodium succinate Injectable 40 milliGRAM(s) IV Push every 8 hours  metoprolol tartrate 25 milliGRAM(s) Oral two times a day  pantoprazole    Tablet 40 milliGRAM(s) Oral before breakfast  rOPINIRole 2 milliGRAM(s) Oral four times a day    MEDICATIONS  (PRN):  ALBUTerol    0.083% 2.5 milliGRAM(s) Nebulizer every 3 hours PRN Shortness of Breath and/or Wheezing

## 2019-07-15 NOTE — PROGRESS NOTE ADULT - ASSESSMENT
83M w/PMH non-obstructive CAD, aortic root dilation, aortic valve insufficiency, s/p bioprosthetic aortic valve replacement and ascending aortic root graft, A-fib, [place] (cochlear implant), chronic imbalance, HTN, RLS, recent GIB (s/p EGD/colonoscpy),  CAMILA unable to tolerate CPAP, presents with SOB, cough and b/l LE edema.     1.	wheezing.  supplemental O2.  start DUO nebs, LORENZO and SoluMedrol.  2.	acute on chronic diastolic CHF - pt w/ edema, elevated BNP and abn cxr. Echo. lasix 40 mg IV BID. monitor renal function while on diuretic. tele. EKG. cardiology  3.	echocardiogram noted.  RV hypokinesis.  RA dilated.  moderate pHTN.  4.	atrial fibrillation - rate control w/ BB. not on anticoag due to recent GI bleed.   5.	pneumonia - pt w/ cough, leukocytosis but afebrile. CXR suggestive of pneumonia. ceftriaxone/ azithromycin. blood cultures  6.	leukocytosis - likely 2/2 to pna. see above  7.	hypokalemia- supplement. check Mg  8.	chronic anemia - hgb stable monitor   9.	h/o AVR  10.	HTN - BB and losartan w/ holding parameters  11.	CAMILA- unable to john pap    EP consult, re:  PPM.

## 2019-07-15 NOTE — PROGRESS NOTE ADULT - ASSESSMENT
83M w/PMH non-obstructive CAD, aortic root dilation, aortic valve insufficiency, s/p bioprosthetic aortic valve replacement and ascending aortic root graft, A-fib, [place] (cochlear implant), chronic imbalance, HTN, RLS, recent GIB (s/p EGD/colonoscpy),  CAMILA unable to tolerate CPAP, presents with SOB, cough and b/l LE edema. wife reports that pt has had b/l LE swelling x several months but has gotten worse since 1-2 weeks ago. he was on lasix 20 mg PO and it was increased by cardiologist to 40 mg daily for 1 week.   denies any pain.  had recent GI bleed few months and now without any hematochezia or melena. (13 Jul 2019 14:35)  While i was in the room he c/o dizziness and had a HR 30 , also with hypoxia 90% on 4 L also with cough no CP   Tachy praneeth on tele  Will consider PPM placement and also increase AV node blockers after to reduce tachy episodes.  Decrease Na intake  SOB secondary to diastolic dysfunction/dietary indescretion and exacerbated by tachy episodes  Consider EP consult for PPM placement

## 2019-07-15 NOTE — PROGRESS NOTE ADULT - SUBJECTIVE AND OBJECTIVE BOX
Patient is a 83y old  Male who presents with a chief complaint of edema, cough, shortness of breath (14 Jul 2019 11:20)      HPI:  83M w/PMH non-obstructive CAD, aortic root dilation, aortic valve insufficiency, s/p bioprosthetic aortic valve replacement and ascending aortic root graft, A-fib, [place] (cochlear implant), chronic imbalance, HTN, RLS, recent GIB (s/p EGD/colonoscpy),  CAMILA unable to tolerate CPAP, presents with SOB, cough and b/l LE edema. wife reports that pt has had b/l LE swelling x several months but has gotten worse since 1-2 weeks ago. he was on lasix 20 mg PO and it was increased by cardiologist to 40 mg daily for 1 week. on thursday after being on higher dose of lasix for a week, she called the NP and was told to go to the ER if no improvement. today pt's condition got worse and w/ increased dyspnea. pt/wife called pt's cardiologist, Dr. Aguirre, and was instructed to go to the ER. currently pt denies any chest pain. has some dyspnea and nonproductive cough. no fevers or chills.   chronic imbalance - per wife this has gotten worse.  headaches and back pain as reported by wife but pt currently denies any pain.  had recent GI bleed few months and now without any hematochezia or melena. (13 Jul 2019 14:35)  While i was in the room he c/o dizziness and had a HR 30 , also with hypoxia 90% on 4 L also with cough no CP     7/15 Examined at bedsite, Improved SOB, tele with tachy praneeth episodes      PAST MEDICAL & SURGICAL HISTORY:  [place] (hard of hearing)  Fall (on) (from) other stairs and steps, sequela: 03/28/2017- lumbar hemtoma  Essential hypertension  Nerve injury: complication from right TKR surgery, 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  Aortic root dilatation  MRSA (methicillin resistant Staphylococcus aureus): left forearm 2012  Lumbar degenerative disc disease  BPH associated with nocturia  Diverticulosis of intestine without bleeding, unspecified intestinal tract location  Gastroesophageal reflux disease, esophagitis presence not specified  Aortic valve insufficiency, unspecified etiology  CAMILA (obstructive sleep apnea): unable to tolerate nocturnal CPAP  Restless leg syndrome  Hard of hearing  Thoracic aortic aneurysm  HLD (hyperlipidemia)  Status post cataract extraction, unspecified laterality: bilat  S/P debridement: left forearm -mrsa  History of fundoplication: 2000  Amputation finger: left index 1970  S/P knee replacement: left  S/P knee replacement: right  S/P shoulder surgery: right rotatotor cuff injury                                    MEDICATIONS  (STANDING):  atorvastatin 20 milliGRAM(s) Oral at bedtime  azithromycin   Tablet 500 milliGRAM(s) Oral daily  cefTRIAXone Injectable. 2000 milliGRAM(s) IV Push every 24 hours  donepezil 10 milliGRAM(s) Oral at bedtime  furosemide   Injectable 40 milliGRAM(s) IV Push two times a day  gabapentin 600 milliGRAM(s) Oral four times a day  losartan 25 milliGRAM(s) Oral daily  melatonin 5 milliGRAM(s) Oral at bedtime  memantine 5 milliGRAM(s) Oral two times a day  metoprolol tartrate 25 milliGRAM(s) Oral two times a day  pantoprazole    Tablet 40 milliGRAM(s) Oral before breakfast  rOPINIRole 2 milliGRAM(s) Oral four times a day    MEDICATIONS  (PRN):      FAMILY HISTORY:  Family history of lung cancer (Mother)      SOCIAL HISTORY:    CIGARETTES:        Vital Signs Last 24 Hrs  T(C): 36.6 (14 Jul 2019 06:04), Max: 36.6 (14 Jul 2019 06:04)  T(F): 97.9 (14 Jul 2019 06:04), Max: 97.9 (14 Jul 2019 06:04)  HR: 80 (14 Jul 2019 10:52) (57 - 80)  BP: 103/59 (14 Jul 2019 10:52) (103/59 - 133/79)  BP(mean): --  RR: 20 (14 Jul 2019 10:52) (18 - 20)  SpO2: 92% (14 Jul 2019 10:52) (90% - 98%)            INTERPRETATION OF TELEMETRY:  AFIB 30's then up to 140s at times  ECG:    I&O's Detail    13 Jul 2019 07:01  -  14 Jul 2019 07:00  --------------------------------------------------------  IN:    IV PiggyBack: 50 mL  Total IN: 50 mL    OUT:    Voided: 500 mL  Total OUT: 500 mL    Total NET: -450 mL      14 Jul 2019 07:01  -  14 Jul 2019 13:13  --------------------------------------------------------  IN:  Total IN: 0 mL    OUT:    Voided: 300 mL  Total OUT: 300 mL    Total NET: -300 mL          LABS:                        10.2   14.97 )-----------( 361      ( 14 Jul 2019 08:29 )             33.4     07-14    143  |  102  |  20  ----------------------------<  129<H>  3.0<L>   |  31  |  0.83    Ca    8.9      14 Jul 2019 08:29  Mg     1.9     07-14    TPro  7.3  /  Alb  2.4<L>  /  TBili  0.6  /  DBili  x   /  AST  26  /  ALT  40  /  AlkPhos  121<H>  07-13    CARDIAC MARKERS ( 13 Jul 2019 13:16 )  0.030 ng/mL / x     / x     / x     / x          PT/INR - ( 13 Jul 2019 13:16 )   PT: 18.4 sec;   INR: 1.63 ratio         PTT - ( 13 Jul 2019 13:16 )  PTT:31.2 sec    I&O's Summary    13 Jul 2019 07:01  -  14 Jul 2019 07:00  --------------------------------------------------------  IN: 50 mL / OUT: 500 mL / NET: -450 mL    14 Jul 2019 07:01  -  14 Jul 2019 13:13  --------------------------------------------------------  IN: 0 mL / OUT: 300 mL / NET: -300 mL      BNPSerum Pro-Brain Natriuretic Peptide: 3131 pg/mL (07-13 @ 13:16)    RADIOLOGY & ADDITIONAL STUDIES:

## 2019-07-15 NOTE — CONSULT NOTE ADULT - SUBJECTIVE AND OBJECTIVE BOX
HPI:    83 y.o.m w/PMH non-obstructive CAD, aortic root dilation, aortic valve insufficiency, s/p bioprosthetic aortic valve replacement and ascending aortic root graft, A-fib, [place] (cochlear implant), chronic imbalance, HTN, RLS, recent GIB (s/p EGD/colonoscpy),  CAMILA unable to tolerate CPAP, admitted with SOB, cough and b/l LE edema. wife reports that pt has had b/l LE swelling x several months but has gotten worse since 1-2 weeks ago. he was on lasix 20 mg PO and it was increased by cardiologist to 40 mg daily for 1 week. on thursday after being on higher dose of lasix for a week, she called the NP and was told to go to the ER if no improvement. today pt's condition got worse and w/ increased dyspnea. pt/wife called pt's cardiologist, Dr. Aguirre, and was instructed to go to the ER. currently pt denies any chest pain. has some dyspnea and nonproductive cough. no fevers or chills. pat seen for pulmonary for abnormal CT chest.    PAST MEDICAL & SURGICAL HISTORY:  [place] (hard of hearing)  Fall (on) (from) other stairs and steps, sequela: 03/28/2017- lumbar hemtoma  Essential hypertension  Nerve injury: complication from right TKR surgery, 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  Aortic root dilatation  MRSA (methicillin resistant Staphylococcus aureus): left forearm 2012  Lumbar degenerative disc disease  BPH associated with nocturia  Diverticulosis of intestine without bleeding, unspecified intestinal tract location  Gastroesophageal reflux disease, esophagitis presence not specified  Aortic valve insufficiency, unspecified etiology  CAMILA (obstructive sleep apnea): unable to tolerate nocturnal CPAP  Restless leg syndrome  Hard of hearing  Thoracic aortic aneurysm  HLD (hyperlipidemia)  Status post cataract extraction, unspecified laterality: bilat  S/P debridement: left forearm -mrsa  History of fundoplication: 2000  Amputation finger: left index 1970  S/P knee replacement: left  S/P knee replacement: right  S/P shoulder surgery: right rotatotor cuff injury      Home Medications:  acetaminophen 325 mg oral tablet: 2 tab(s) orally every 8 hours, As Needed - for headache (13 Jul 2019 16:25)  atorvastatin 20 mg oral tablet: 1 tab(s) orally once a day (13 Jul 2019 16:25)  fluticasone 50 mcg/inh nasal spray: 2 spray(s) in each nostril once a day (13 Jul 2019 16:25)  furosemide 20 mg oral tablet: 2 tab(s) orally once a day (13 Jul 2019 16:25)  gabapentin 600 mg oral tablet: 1 tab(s) orally 6 times a day (13 Jul 2019 16:25)  memantine 5 mg oral tablet: 1 tab(s) orally once a day (13 Jul 2019 16:25)  Metoprolol Tartrate 25 mg oral tablet: 1 tab(s) orally 2 times a day with food (13 Jul 2019 16:25)  Multiple Vitamins oral tablet: 1 tab(s) orally once a day (13 Jul 2019 16:25)  pantoprazole 40 mg oral delayed release tablet: 1 tab(s) orally once a day (13 Jul 2019 16:25)  rOPINIRole 2 mg oral tablet: 1 tab(s) orally 6 times a day (13 Jul 2019 16:25)      MEDICATIONS  (STANDING):  ALBUTerol/ipratropium for Nebulization 3 milliLiter(s) Nebulizer every 6 hours  atorvastatin 20 milliGRAM(s) Oral at bedtime  azithromycin   Tablet 500 milliGRAM(s) Oral daily  cefTRIAXone Injectable. 2000 milliGRAM(s) IV Push every 24 hours  donepezil 10 milliGRAM(s) Oral at bedtime  furosemide   Injectable 40 milliGRAM(s) IV Push two times a day  gabapentin 600 milliGRAM(s) Oral four times a day  losartan 25 milliGRAM(s) Oral daily  melatonin 5 milliGRAM(s) Oral at bedtime  memantine 5 milliGRAM(s) Oral two times a day  methylPREDNISolone sodium succinate Injectable 40 milliGRAM(s) IV Push every 8 hours  metoprolol tartrate 25 milliGRAM(s) Oral two times a day  pantoprazole    Tablet 40 milliGRAM(s) Oral before breakfast  rOPINIRole 2 milliGRAM(s) Oral four times a day    MEDICATIONS  (PRN):  ALBUTerol    0.083% 2.5 milliGRAM(s) Nebulizer every 3 hours PRN Shortness of Breath and/or Wheezing      Allergies    No Known Allergies    Intolerances        SOCIAL HISTORY: Denies tobacco, etoh abuse or illicit drug use    FAMILY HISTORY:  Family history of lung cancer (Mother)      Vital Signs Last 24 Hrs  T(C): 36.2 (15 Jul 2019 05:44), Max: 36.8 (14 Jul 2019 18:45)  T(F): 97.2 (15 Jul 2019 05:44), Max: 98.3 (14 Jul 2019 18:45)  HR: 76 (15 Jul 2019 05:44) (44 - 84)  BP: 129/78 (15 Jul 2019 05:44) (103/59 - 152/85)  BP(mean): --  RR: 18 (15 Jul 2019 05:44) (18 - 20)  SpO2: 91% (15 Jul 2019 05:44) (89% - 95%)        REVIEW OF SYSTEMS:    CONSTITUTIONAL:  As per HPI.  HEENT:  Eyes:  No diplopia or blurred vision. ENT:  No earache, sore throat or runny nose.  CARDIOVASCULAR:  No pressure, squeezing, tightness, heaviness or aching about the chest, neck, axilla or epigastrium.  RESPIRATORY:  + cough, +shortness of breath, PND or orthopnea.  GASTROINTESTINAL:  No nausea, vomiting or diarrhea.  GENITOURINARY:  No dysuria, frequency or urgency.  MUSCULOSKELETAL:  As per HPI.  SKIN:  No change in skin, hair or nails.  NEUROLOGIC:  No paresthesias, fasciculations, seizures or weakness.  PSYCHIATRIC:  No disorder of thought or mood.  ENDOCRINE:  No heat or cold intolerance, polyuria or polydipsia.  HEMATOLOGICAL:  No easy bruising or bleedings:  .     PHYSICAL EXAMINATION:    GENERAL APPEARANCE:  Pt. is not currently dyspneic, in no distress. Pt. is alert, oriented, and pleasant.  HEENT:  Pupils are normal and react normally. No icterus. Mucous membranes well colored.  NECK:  Supple. No lymphadenopathy. Jugular venous pressure not elevated. Carotids equal.   HEART:   The cardiac impulse has a normal quality. Regular. Normal S1 and S2. There are no murmurs, rubs or gallops noted  CHEST:  Chest crackles to auscultation. Normal respiratory effort.  ABDOMEN:  Soft and nontender.   EXTREMITIES:  There is no cyanosis, clubbing or edema.   SKIN:  No rash or significant lesions are noted.    LABS:                        10.4   12.83 )-----------( 400      ( 15 Jul 2019 08:55 )             34.5     07-15    140  |  102  |  30<H>  ----------------------------<  181<H>  3.6   |  32<H>  |  1.00    Ca    9.2      15 Jul 2019 08:55  Mg     1.9     07-14    TPro  7.3  /  Alb  2.4<L>  /  TBili  0.6  /  DBili  x   /  AST  26  /  ALT  40  /  AlkPhos  121<H>  07-13    LIVER FUNCTIONS - ( 13 Jul 2019 13:16 )  Alb: 2.4 g/dL / Pro: 7.3 gm/dL / ALK PHOS: 121 U/L / ALT: 40 U/L / AST: 26 U/L / GGT: x           PT/INR - ( 13 Jul 2019 13:16 )   PT: 18.4 sec;   INR: 1.63 ratio         PTT - ( 13 Jul 2019 13:16 )  PTT:31.2 sec  CARDIAC MARKERS ( 13 Jul 2019 13:16 )  0.030 ng/mL / x     / x     / x     / x        Culture - Blood (collected 07-13-19 @ 14:59)  Source: .Blood None  Preliminary Report (07-14-19 @ 21:01):    No growth to date.    Culture - Blood (collected 07-13-19 @ 14:59)  Source: .Blood None  Preliminary Report (07-14-19 @ 21:01):    No growth to date.        RADIOLOGY & ADDITIONAL STUDIES:     CT Angio Chest PE Protocol w/ IV Cont (07.13.19 @ 15:19) >  IMPRESSION:    No evidence of pulmonary embolus .  Pulmonary vascular congestion with small bilateral pleural effusions  Bilateral upper lobe infiltrates may be due to CHF or may represent  superimposed pneumonia

## 2019-07-15 NOTE — CONSULT NOTE ADULT - ASSESSMENT
PROBLEMS:    Acute on chronic diastolic CHF - with small pleural effusion  Atrial fibrillation   Pneumonia   Chronic anemia - hgb stable monitor   H/o AVR  HTN   CAMILA      PLAN;    IV RHOCEPHIN / PO AZITHROMYCIN  IV STEROIDS  AEROSOLS  SUPPORTIVE CARE  KEEP NEG BALANCE  DVT PROPHYLASIX
83M w/PMH non-obstructive CAD, aortic root dilation, aortic valve insufficiency, s/p bioprosthetic aortic valve replacement and ascending aortic root graft, A-fib, [place] (cochlear implant), chronic imbalance, HTN, RLS, recent GIB (s/p EGD/colonoscpy),  CAMILA unable to tolerate CPAP, presents with SOB, cough and b/l LE edema. wife reports that pt has had b/l LE swelling x several months but has gotten worse since 1-2 weeks ago. he was on lasix 20 mg PO and it was increased by cardiologist to 40 mg daily for 1 week. on thursday after being on higher dose of lasix for a week, she called the NP and was told to go to the ER if no improvement. today pt's condition got worse and w/ increased dyspnea. pt/wife called pt's cardiologist, Dr. Aguirre, and was instructed to go to the ER. currently pt denies any chest pain. has some dyspnea and nonproductive cough. no fevers or chills.   chronic imbalance - per wife this has gotten worse.  headaches and back pain as reported by wife but pt currently denies any pain.  had recent GI bleed few months and now without any hematochezia or melena. (13 Jul 2019 14:35)  While i was in the room he c/o dizziness and had a HR 30 , also with hypoxia 90% on 4 L also with cough no CP   1) IV ABC for PNA and WBC ct   2) start nebs and IV steroids  for active wheezing consider pulmonary consult D/W DR D'amico   3) continue IV lasix for decompnesated diastolic HF   4) cont BBlockers ACEI and statin that he takes as oupt   5) DNR DNI

## 2019-07-15 NOTE — CHART NOTE - NSCHARTNOTEFT_GEN_A_CORE
Upon Nutritional Assessment by the Registered Dietitian your patient was determined to meet criteria / has evidence of the following diagnosis/diagnoses:          [ ]  Mild Protein Calorie Malnutrition        [x ]  Moderate Protein Calorie Malnutrition        [ ] Severe Protein Calorie Malnutrition        [ ] Unspecified Protein Calorie Malnutrition        [ ] Underweight / BMI <19        [ ] Morbid Obesity / BMI > 40      Findings as based on:  •  Comprehensive nutrition assessment and consultation  •  Calorie counts (nutrient intake analysis)  •  Food acceptance and intake status from observations by staff  •  Follow up  •  Patient education  •  Intervention secondary to interdisciplinary rounds  •   concerns    · Malnutrition  Patient meets criteria for moderate protein-calorie malnutrition in context of chronic disease.    NFPE reveals moderate muscle wasting (temples, shoulders, clavicles, scapula, thighs, calves, interosseus.)   Moderate fat wasting (triceps, ribs.)  Wt hx and PO intake hx unknown, pt has dementia  PU 1+, edema 2+ feet    Treatment:    The following diet has been recommended:  2 Gm Na, add 1500 ml FR  add gelatein BID  Suggest add Vit C 500 mg BID  Record PO intake in EMR after each meal (nursing.)   Monitor PO intake, tolerance, labs and weight.     PROVIDER Section:     By signing this assessment you are acknowledging and agree with the diagnosis/diagnoses assigned by the Registered Dietitian    Comments:

## 2019-07-15 NOTE — CHART NOTE - NSCHARTNOTEFT_GEN_A_CORE
Pt seen on [facility]   Loop   Recorder [employer], REVEAL LINQ [id number]  Implanted on April 27th 2018  Last Monitor Session on July 10th 2019    Interrogated today 7-15-19  Battery Status  Good    noted on interrogation pt is in A. Fib rate ranging from  bpm  noted on monitor pause of 2.47 sec on 7-15 at 2:40

## 2019-07-15 NOTE — PROGRESS NOTE ADULT - SUBJECTIVE AND OBJECTIVE BOX
CC:  Patient is a 83y old  Male who presents with a chief complaint of edema, cough, shortness of breath (13 Jul 2019 14:35)    SUBJECTIVE:     -no new complaints or issues at current time.    ROS:  all other review of systems are negative unless indicated above.    atorvastatin 20 milliGRAM(s) Oral at bedtime  azithromycin   Tablet 500 milliGRAM(s) Oral daily  cefTRIAXone Injectable. 2000 milliGRAM(s) IV Push every 24 hours  donepezil 10 milliGRAM(s) Oral at bedtime  furosemide   Injectable 40 milliGRAM(s) IV Push two times a day  gabapentin 600 milliGRAM(s) Oral four times a day  losartan 25 milliGRAM(s) Oral daily  melatonin 5 milliGRAM(s) Oral at bedtime  memantine 5 milliGRAM(s) Oral two times a day  metoprolol tartrate 25 milliGRAM(s) Oral two times a day  pantoprazole    Tablet 40 milliGRAM(s) Oral before breakfast  potassium chloride    Tablet ER 40 milliEquivalent(s) Oral once  rOPINIRole 2 milliGRAM(s) Oral four times a day    T(C): 36.6 (07-14-19 @ 06:04), Max: 36.7 (07-13-19 @ 12:05)  HR: 80 (07-14-19 @ 10:52) (57 - 80)  BP: 103/59 (07-14-19 @ 10:52) (103/59 - 133/79)  RR: 20 (07-14-19 @ 10:52) (18 - 20)  SpO2: 92% (07-14-19 @ 10:52) (90% - 98%)    Constitutional: NAD.   Neck:  JVP wnl.  supple.  Respiratory: (+) MOD BL WZ.  Cardiovascular: S1 and S2, regular rate and rhythm, no murmur, rub or gallop.  Gastrointestinal: Bowel Sounds present, soft, nontender, nondistended, no guarding, no rebound, no mass.  Extremities: No peripheral edema  Neurological: A/O x                            10.2   14.97 )-----------( 361      ( 14 Jul 2019 08:29 )             33.4     PT/INR - ( 13 Jul 2019 13:16 )   PT: 18.4 sec;   INR: 1.63 ratio         PTT - ( 13 Jul 2019 13:16 )  PTT:31.2 sec  07-14    143  |  102  |  20  ----------------------------<  129<H>  3.0<L>   |  31  |  0.83    Ca    8.9      14 Jul 2019 08:29  Mg     1.9     07-14    TPro  7.3  /  Alb  2.4<L>  /  TBili  0.6  /  DBili  x   /  AST  26  /  ALT  40  /  AlkPhos  121<H>  07-13

## 2019-07-15 NOTE — DIETITIAN INITIAL EVALUATION ADULT. - ENERGY NEEDS
Ht.  66    "        Wt.     91   kg               BMI   32               IBW    65   kg               Pt is at  139  %  IBW  ABW    71 kg

## 2019-07-15 NOTE — DIETITIAN INITIAL EVALUATION ADULT. - OTHER INFO
PT is 83M w/PMH non-obstructive CAD, aortic root dilation, aortic valve insufficiency, s/p bioprosthetic aortic valve replacement and ascending aortic root graft, A-fib, [place] (cochlear implant), chronic imbalance, HTN, RLS, recent GIB (s/p EGD/colonoscpy),  CAMILA unable to tolerate CPAP, presents with SOB, cough and b/l LE edema. wife reports that pt has had b/l LE swelling x several months but has gotten worse since 1-2 weeks ago. he was on lasix 20 mg PO and it was increased by cardiologist to 40 mg daily for 1 week. on thursday after being on higher dose of lasix for a week, she called the NP and was told to go to the ER if no improvement. Today pt's condition got worse and w/ increased dyspnea.Chronic imbalance - per wife this has gotten worse. Pt had recent GI bleed few months and now without any hematochezia or melena. PT is 83M w/PMH non-obstructive CAD, aortic root dilation, aortic valve insufficiency, s/p bioprosthetic aortic valve replacement and ascending aortic root graft, A-fib, [place] (cochlear implant), chronic imbalance, HTN, RLS, recent GIB (s/p EGD/colonoscpy),  CAMILA unable to tolerate CPAP, presents with SOB, cough and b/l LE edema. wife reports that pt has had b/l LE swelling x several months but has gotten worse since 1-2 weeks ago. he was on lasix 20 mg PO and it was increased by cardiologist to 40 mg daily for 1 week. on thursday after being on higher dose of lasix for a week, she called the NP and was told to go to the ER if no improvement. Today pt's condition got worse and w/ increased dyspnea.Chronic imbalance - per wife this has gotten worse. Pt had recent GI bleed few months and now without any hematochezia or melena. PT has dementia, was confused on interview, no family present.

## 2019-07-16 RX ADMIN — Medication 40 MILLIGRAM(S): at 21:52

## 2019-07-16 RX ADMIN — Medication 3 MILLILITER(S): at 15:22

## 2019-07-16 RX ADMIN — Medication 40 MILLIGRAM(S): at 11:59

## 2019-07-16 RX ADMIN — Medication 40 MILLIGRAM(S): at 16:58

## 2019-07-16 RX ADMIN — ROPINIROLE 2 MILLIGRAM(S): 8 TABLET, FILM COATED, EXTENDED RELEASE ORAL at 06:20

## 2019-07-16 RX ADMIN — Medication 25 MILLIGRAM(S): at 06:20

## 2019-07-16 RX ADMIN — AZITHROMYCIN 500 MILLIGRAM(S): 500 TABLET, FILM COATED ORAL at 11:58

## 2019-07-16 RX ADMIN — DONEPEZIL HYDROCHLORIDE 10 MILLIGRAM(S): 10 TABLET, FILM COATED ORAL at 21:53

## 2019-07-16 RX ADMIN — Medication 40 MILLIGRAM(S): at 06:20

## 2019-07-16 RX ADMIN — ROPINIROLE 2 MILLIGRAM(S): 8 TABLET, FILM COATED, EXTENDED RELEASE ORAL at 17:00

## 2019-07-16 RX ADMIN — GABAPENTIN 600 MILLIGRAM(S): 400 CAPSULE ORAL at 11:58

## 2019-07-16 RX ADMIN — PANTOPRAZOLE SODIUM 40 MILLIGRAM(S): 20 TABLET, DELAYED RELEASE ORAL at 06:20

## 2019-07-16 RX ADMIN — Medication 3 MILLILITER(S): at 19:54

## 2019-07-16 RX ADMIN — GABAPENTIN 600 MILLIGRAM(S): 400 CAPSULE ORAL at 17:05

## 2019-07-16 RX ADMIN — GABAPENTIN 600 MILLIGRAM(S): 400 CAPSULE ORAL at 06:22

## 2019-07-16 RX ADMIN — ROPINIROLE 2 MILLIGRAM(S): 8 TABLET, FILM COATED, EXTENDED RELEASE ORAL at 00:18

## 2019-07-16 RX ADMIN — MEMANTINE HYDROCHLORIDE 5 MILLIGRAM(S): 10 TABLET ORAL at 16:59

## 2019-07-16 RX ADMIN — ROPINIROLE 2 MILLIGRAM(S): 8 TABLET, FILM COATED, EXTENDED RELEASE ORAL at 11:58

## 2019-07-16 RX ADMIN — LOSARTAN POTASSIUM 25 MILLIGRAM(S): 100 TABLET, FILM COATED ORAL at 06:20

## 2019-07-16 RX ADMIN — GABAPENTIN 600 MILLIGRAM(S): 400 CAPSULE ORAL at 00:18

## 2019-07-16 RX ADMIN — Medication 25 MILLIGRAM(S): at 16:59

## 2019-07-16 RX ADMIN — Medication 40 MILLIGRAM(S): at 16:57

## 2019-07-16 RX ADMIN — Medication 5 MILLIGRAM(S): at 21:53

## 2019-07-16 RX ADMIN — CEFTRIAXONE 1000 MILLIGRAM(S): 500 INJECTION, POWDER, FOR SOLUTION INTRAMUSCULAR; INTRAVENOUS at 21:52

## 2019-07-16 RX ADMIN — MEMANTINE HYDROCHLORIDE 5 MILLIGRAM(S): 10 TABLET ORAL at 06:20

## 2019-07-16 RX ADMIN — ATORVASTATIN CALCIUM 20 MILLIGRAM(S): 80 TABLET, FILM COATED ORAL at 21:53

## 2019-07-16 NOTE — PROGRESS NOTE ADULT - ASSESSMENT
Impression:  83M.  admitted 07/13/2019.  presented to ED c/o SOB, cough and b/l LE edema.  onset 1-2 weeks prior to admission.    PMHx:  non-obstructive CAD;  AF (not on AC due to recent GIB);  AI-AVR (bio);  thoracic AA-graft;  HTN;  HLD;  CAMILA;  GIB;  chronic anemia;  RLS;  LS DDD;  BPH.    Assessment:  1.	acute upon chronic HF w/ small pleural effusions.  2.	CAP.  3.	hypoxia.  4.	tachybrady.  5.	RV hypokinesis.  hx CAMILA.    Plan:  -IV Lasix, ARB and BB.  -cx no growth.  c/w IV ABx.  -supplemental O2, BD nebs and IV steroids.  -EP consulted for PPM placement.  -c/w chronic medical issue management.  -DVT prophylaxis, SCD.  -discussed w/ RN and patient's wife.

## 2019-07-16 NOTE — PROGRESS NOTE ADULT - SUBJECTIVE AND OBJECTIVE BOX
Patient is a 83y old  Male who presents with a chief complaint of edema, cough, shortness of breath (15 Jul 2019 17:47)    7/16- sitting in chair denies complaints refuses moniter    MEDICATIONS  (STANDING):  ALBUTerol/ipratropium for Nebulization 3 milliLiter(s) Nebulizer every 6 hours  atorvastatin 20 milliGRAM(s) Oral at bedtime  azithromycin   Tablet 500 milliGRAM(s) Oral daily  cefTRIAXone Injectable. 1000 milliGRAM(s) IV Push every 24 hours  donepezil 10 milliGRAM(s) Oral at bedtime  furosemide   Injectable 40 milliGRAM(s) IV Push two times a day  gabapentin 600 milliGRAM(s) Oral four times a day  losartan 25 milliGRAM(s) Oral daily  melatonin 5 milliGRAM(s) Oral at bedtime  memantine 5 milliGRAM(s) Oral two times a day  methylPREDNISolone sodium succinate Injectable 40 milliGRAM(s) IV Push every 8 hours  metoprolol tartrate 25 milliGRAM(s) Oral two times a day  pantoprazole    Tablet 40 milliGRAM(s) Oral before breakfast  rOPINIRole 2 milliGRAM(s) Oral four times a day    MEDICATIONS  (PRN):  ALBUTerol    0.083% 2.5 milliGRAM(s) Nebulizer every 3 hours PRN Shortness of Breath and/or Wheezing            Vital Signs Last 24 Hrs  T(C): 36.3 (16 Jul 2019 05:56), Max: 36.4 (15 Jul 2019 10:33)  T(F): 97.4 (16 Jul 2019 05:56), Max: 97.6 (15 Jul 2019 10:33)  HR: 60 (16 Jul 2019 05:56) (60 - 89)  BP: 125/88 (16 Jul 2019 05:56) (115/60 - 139/68)  BP(mean): 86 (15 Jul 2019 17:15) (86 - 86)  RR: 19 (16 Jul 2019 05:56) (18 - 19)  SpO2: 95% (16 Jul 2019 05:56) (95% - 96%)            INTERPRETATION OF TELEMETRY:    ECG:        LABS:                        10.4   12.83 )-----------( 400      ( 15 Jul 2019 08:55 )             34.5     07-15    140  |  102  |  30<H>  ----------------------------<  181<H>  3.6   |  32<H>  |  1.00    Ca    9.2      15 Jul 2019 08:55              I&O's Summary    15 Jul 2019 07:01  -  16 Jul 2019 07:00  --------------------------------------------------------  IN: 0 mL / OUT: 100 mL / NET: -100 mL      BNP  RADIOLOGY & ADDITIONAL STUDIES:

## 2019-07-16 NOTE — PROGRESS NOTE ADULT - ASSESSMENT
83M w/PMH non-obstructive CAD, aortic root dilation, aortic valve insufficiency, s/p bioprosthetic aortic valve replacement and ascending aortic root graft, A-fib, [place] (cochlear implant), chronic imbalance, HTN, RLS, recent GIB (s/p EGD/colonoscpy),  CAMILA unable to tolerate CPAP, presents with SOB, cough and b/l LE edema. wife reports that pt has had b/l LE swelling x several months but has gotten worse since 1-2 weeks ago. he was on lasix 20 mg PO and it was increased by cardiologist to 40 mg daily for 1 week.   denies any pain.  had recent GI bleed few months and now without any hematochezia or melena. (13 Jul 2019 14:35)  While i was in the room he c/o dizziness and had a HR 30 , also with hypoxia 90% on 4 L also with cough no CP On SUNDAY   Tachy praneeth on tele  Will consider PPM placement after PNA resolves and also increase AV node blockers after to reduce tachy episodes   Decrease Na intake  SOB secondary to diastolic dysfunction/dietary indescretion and exacerbated by tachy episodes  Consider EP consult for PPM placement

## 2019-07-16 NOTE — PROGRESS NOTE ADULT - ASSESSMENT
PROBLEMS:    Acute on chronic diastolic CHF - with small pleural effusion  Atrial fibrillation   Pneumonia   Chronic anemia - hgb stable monitor   H/o AVR  HTN   CAMILA      PLAN;    PULMONARY BETTER  IV RHOCEPHIN / PO AZITHROMYCIN  IV STEROIDS  AEROSOLS  SUPPORTIVE CARE  KEEP NEG BALANCE  DVT PROPHYLASIX

## 2019-07-16 NOTE — PROGRESS NOTE ADULT - SUBJECTIVE AND OBJECTIVE BOX
CC:  Patient is a 83y old  Male who presents with a chief complaint of edema, cough, shortness of breath (16 Jul 2019 09:52)    SUBJECTIVE:     -no new complaints or issues at current time.    ROS:  all other review of systems are negative unless indicated above.    ALBUTerol    0.083% 2.5 milliGRAM(s) Nebulizer every 3 hours PRN  ALBUTerol/ipratropium for Nebulization 3 milliLiter(s) Nebulizer every 6 hours  atorvastatin 20 milliGRAM(s) Oral at bedtime  azithromycin   Tablet 500 milliGRAM(s) Oral daily  cefTRIAXone Injectable. 1000 milliGRAM(s) IV Push every 24 hours  donepezil 10 milliGRAM(s) Oral at bedtime  furosemide   Injectable 40 milliGRAM(s) IV Push two times a day  gabapentin 600 milliGRAM(s) Oral four times a day  losartan 25 milliGRAM(s) Oral daily  melatonin 5 milliGRAM(s) Oral at bedtime  memantine 5 milliGRAM(s) Oral two times a day  methylPREDNISolone sodium succinate Injectable 40 milliGRAM(s) IV Push every 8 hours  metoprolol tartrate 25 milliGRAM(s) Oral two times a day  pantoprazole    Tablet 40 milliGRAM(s) Oral before breakfast  rOPINIRole 2 milliGRAM(s) Oral four times a day    T(C): 36.7 (07-16-19 @ 17:25), Max: 36.7 (07-16-19 @ 17:25)  HR: 72 (07-16-19 @ 17:25) (60 - 94)  BP: 117/68 (07-16-19 @ 17:25) (116/64 - 129/73)  RR: 18 (07-16-19 @ 17:25) (18 - 19)  SpO2: 97% (07-16-19 @ 17:25) (94% - 97%)    Constitutional: NAD.   Neck:  JVP wnl.  supple.  Respiratory: Breath sounds are clear bilaterally, No wheezing, rales or rhonchi  Cardiovascular: S1 and S2, regular rate and rhythm, no murmur, rub or gallop.  Gastrointestinal: Bowel Sounds present, soft, nontender, nondistended, no guarding, no rebound, no mass.  Extremities: No peripheral edema  Neurological: A/O x                            10.4   12.83 )-----------( 400      ( 15 Jul 2019 08:55 )             34.5       07-15    140  |  102  |  30<H>  ----------------------------<  181<H>  3.6   |  32<H>  |  1.00    Ca    9.2      15 Jul 2019 08:55

## 2019-07-17 LAB
ANION GAP SERPL CALC-SCNC: 7 MMOL/L — SIGNIFICANT CHANGE UP (ref 5–17)
BUN SERPL-MCNC: 45 MG/DL — HIGH (ref 7–23)
CALCIUM SERPL-MCNC: 9.3 MG/DL — SIGNIFICANT CHANGE UP (ref 8.5–10.1)
CHLORIDE SERPL-SCNC: 102 MMOL/L — SIGNIFICANT CHANGE UP (ref 96–108)
CO2 SERPL-SCNC: 32 MMOL/L — HIGH (ref 22–31)
CREAT SERPL-MCNC: 1.06 MG/DL — SIGNIFICANT CHANGE UP (ref 0.5–1.3)
GLUCOSE SERPL-MCNC: 139 MG/DL — HIGH (ref 70–99)
HCT VFR BLD CALC: 33.7 % — LOW (ref 39–50)
HGB BLD-MCNC: 10.4 G/DL — LOW (ref 13–17)
MCHC RBC-ENTMCNC: 25.9 PG — LOW (ref 27–34)
MCHC RBC-ENTMCNC: 30.9 GM/DL — LOW (ref 32–36)
MCV RBC AUTO: 84 FL — SIGNIFICANT CHANGE UP (ref 80–100)
PLATELET # BLD AUTO: 325 K/UL — SIGNIFICANT CHANGE UP (ref 150–400)
POTASSIUM SERPL-MCNC: 3.8 MMOL/L — SIGNIFICANT CHANGE UP (ref 3.5–5.3)
POTASSIUM SERPL-SCNC: 3.8 MMOL/L — SIGNIFICANT CHANGE UP (ref 3.5–5.3)
RBC # BLD: 4.01 M/UL — LOW (ref 4.2–5.8)
RBC # FLD: 20.2 % — HIGH (ref 10.3–14.5)
SODIUM SERPL-SCNC: 141 MMOL/L — SIGNIFICANT CHANGE UP (ref 135–145)
WBC # BLD: 21.71 K/UL — HIGH (ref 3.8–10.5)
WBC # FLD AUTO: 21.71 K/UL — HIGH (ref 3.8–10.5)

## 2019-07-17 RX ADMIN — Medication 40 MILLIGRAM(S): at 12:29

## 2019-07-17 RX ADMIN — Medication 3 MILLILITER(S): at 08:27

## 2019-07-17 RX ADMIN — PANTOPRAZOLE SODIUM 40 MILLIGRAM(S): 20 TABLET, DELAYED RELEASE ORAL at 05:45

## 2019-07-17 RX ADMIN — Medication 3 MILLILITER(S): at 14:14

## 2019-07-17 RX ADMIN — Medication 5 MILLIGRAM(S): at 21:37

## 2019-07-17 RX ADMIN — Medication 40 MILLIGRAM(S): at 17:45

## 2019-07-17 RX ADMIN — MEMANTINE HYDROCHLORIDE 5 MILLIGRAM(S): 10 TABLET ORAL at 05:45

## 2019-07-17 RX ADMIN — GABAPENTIN 600 MILLIGRAM(S): 400 CAPSULE ORAL at 05:45

## 2019-07-17 RX ADMIN — CEFTRIAXONE 1000 MILLIGRAM(S): 500 INJECTION, POWDER, FOR SOLUTION INTRAMUSCULAR; INTRAVENOUS at 21:37

## 2019-07-17 RX ADMIN — ROPINIROLE 2 MILLIGRAM(S): 8 TABLET, FILM COATED, EXTENDED RELEASE ORAL at 05:44

## 2019-07-17 RX ADMIN — AZITHROMYCIN 500 MILLIGRAM(S): 500 TABLET, FILM COATED ORAL at 12:29

## 2019-07-17 RX ADMIN — ROPINIROLE 2 MILLIGRAM(S): 8 TABLET, FILM COATED, EXTENDED RELEASE ORAL at 23:58

## 2019-07-17 RX ADMIN — MEMANTINE HYDROCHLORIDE 5 MILLIGRAM(S): 10 TABLET ORAL at 17:45

## 2019-07-17 RX ADMIN — Medication 40 MILLIGRAM(S): at 05:45

## 2019-07-17 RX ADMIN — GABAPENTIN 600 MILLIGRAM(S): 400 CAPSULE ORAL at 00:22

## 2019-07-17 RX ADMIN — Medication 25 MILLIGRAM(S): at 17:45

## 2019-07-17 RX ADMIN — DONEPEZIL HYDROCHLORIDE 10 MILLIGRAM(S): 10 TABLET, FILM COATED ORAL at 21:36

## 2019-07-17 RX ADMIN — GABAPENTIN 600 MILLIGRAM(S): 400 CAPSULE ORAL at 12:29

## 2019-07-17 RX ADMIN — ROPINIROLE 2 MILLIGRAM(S): 8 TABLET, FILM COATED, EXTENDED RELEASE ORAL at 12:28

## 2019-07-17 RX ADMIN — Medication 25 MILLIGRAM(S): at 05:47

## 2019-07-17 RX ADMIN — LOSARTAN POTASSIUM 25 MILLIGRAM(S): 100 TABLET, FILM COATED ORAL at 05:45

## 2019-07-17 RX ADMIN — ATORVASTATIN CALCIUM 20 MILLIGRAM(S): 80 TABLET, FILM COATED ORAL at 21:36

## 2019-07-17 RX ADMIN — ROPINIROLE 2 MILLIGRAM(S): 8 TABLET, FILM COATED, EXTENDED RELEASE ORAL at 17:46

## 2019-07-17 RX ADMIN — ROPINIROLE 2 MILLIGRAM(S): 8 TABLET, FILM COATED, EXTENDED RELEASE ORAL at 00:22

## 2019-07-17 RX ADMIN — GABAPENTIN 600 MILLIGRAM(S): 400 CAPSULE ORAL at 23:58

## 2019-07-17 RX ADMIN — GABAPENTIN 600 MILLIGRAM(S): 400 CAPSULE ORAL at 17:45

## 2019-07-17 NOTE — PROGRESS NOTE ADULT - ASSESSMENT
PROBLEMS:    Acute on chronic diastolic CHF - with small pleural effusion  Atrial fibrillation   Pneumonia   Chronic anemia - hgb stable monitor   H/o AVR  HTN   CAMILA      PLAN;    high wbc may be steroids  tapre iv [id number] daily  PULMONARY BETTER  IV RHOCEPHIN / PO AZITHROMYCIN  AEROSOLS  SUPPORTIVE CARE  KEEP NEG BALANCE  DVT PROPHYLASIX

## 2019-07-17 NOTE — PHYSICAL THERAPY INITIAL EVALUATION ADULT - PERTINENT HX OF CURRENT PROBLEM, REHAB EVAL
83M   presents with SOB, cough and b/l LE edema. w/PMH non-obstructive CAD, aortic root dilation, aortic valve insufficiency, s/p bioprosthetic aortic valve replacement and ascending aortic root graft, A-fib, [place] (cochlear implant), chronic imbalance, HTN, RLS, recent GIB (s/p EGD/colonoscpy),

## 2019-07-17 NOTE — PROGRESS NOTE ADULT - SUBJECTIVE AND OBJECTIVE BOX
Subjective:    pat better, no new complaint.    Home Medications:  acetaminophen 325 mg oral tablet: 2 tab(s) orally every 8 hours, As Needed - for headache (13 Jul 2019 16:25)  atorvastatin 20 mg oral tablet: 1 tab(s) orally once a day (13 Jul 2019 16:25)  fluticasone 50 mcg/inh nasal spray: 2 spray(s) in each nostril once a day (13 Jul 2019 16:25)  furosemide 20 mg oral tablet: 2 tab(s) orally once a day (13 Jul 2019 16:25)  gabapentin 600 mg oral tablet: 1 tab(s) orally 6 times a day (13 Jul 2019 16:25)  memantine 5 mg oral tablet: 1 tab(s) orally once a day (13 Jul 2019 16:25)  Metoprolol Tartrate 25 mg oral tablet: 1 tab(s) orally 2 times a day with food (13 Jul 2019 16:25)  Multiple Vitamins oral tablet: 1 tab(s) orally once a day (13 Jul 2019 16:25)  pantoprazole 40 mg oral delayed release tablet: 1 tab(s) orally once a day (13 Jul 2019 16:25)  rOPINIRole 2 mg oral tablet: 1 tab(s) orally 6 times a day (13 Jul 2019 16:25)    MEDICATIONS  (STANDING):  ALBUTerol/ipratropium for Nebulization 3 milliLiter(s) Nebulizer every 6 hours  atorvastatin 20 milliGRAM(s) Oral at bedtime  azithromycin   Tablet 500 milliGRAM(s) Oral daily  cefTRIAXone Injectable. 1000 milliGRAM(s) IV Push every 24 hours  donepezil 10 milliGRAM(s) Oral at bedtime  furosemide   Injectable 40 milliGRAM(s) IV Push two times a day  gabapentin 600 milliGRAM(s) Oral four times a day  losartan 25 milliGRAM(s) Oral daily  melatonin 5 milliGRAM(s) Oral at bedtime  memantine 5 milliGRAM(s) Oral two times a day  methylPREDNISolone sodium succinate Injectable 40 milliGRAM(s) IV Push every 8 hours  metoprolol tartrate 25 milliGRAM(s) Oral two times a day  pantoprazole    Tablet 40 milliGRAM(s) Oral before breakfast  rOPINIRole 2 milliGRAM(s) Oral four times a day    MEDICATIONS  (PRN):  ALBUTerol    0.083% 2.5 milliGRAM(s) Nebulizer every 3 hours PRN Shortness of Breath and/or Wheezing      Allergies    No Known Allergies    Intolerances        Vital Signs Last 24 Hrs  T(C): 36.4 (17 Jul 2019 05:15), Max: 36.7 (16 Jul 2019 17:25)  T(F): 97.6 (17 Jul 2019 05:15), Max: 98.1 (16 Jul 2019 17:25)  HR: 63 (17 Jul 2019 08:32) (63 - 88)  BP: 121/83 (17 Jul 2019 05:15) (114/82 - 129/73)  BP(mean): --  RR: 19 (17 Jul 2019 05:15) (18 - 19)  SpO2: 91% (17 Jul 2019 08:32) (91% - 97%)      PHYSICAL EXAMINATION:    NECK:  Supple. No lymphadenopathy. Jugular venous pressure not elevated. Carotids equal.   HEART:   The cardiac impulse has a normal quality. Reg., Nl S1 and S2.  There are no murmurs, rubs or gallops noted  CHEST:  Chest crackles to auscultation. Normal respiratory effort.  ABDOMEN:  Soft and nontender.   EXTREMITIES:  There is no edema.       LABS:                        10.4   21.71 )-----------( 325      ( 17 Jul 2019 08:34 )             33.7     07-17    141  |  102  |  45<H>  ----------------------------<  139<H>  3.8   |  32<H>  |  1.06    Ca    9.3      17 Jul 2019 08:34

## 2019-07-17 NOTE — PHYSICAL THERAPY INITIAL EVALUATION ADULT - LIVES WITH, PROFILE
spouse/Pt states his lives at home with his wife and son, spouse/Pt states his lives at home with his wife and son, in a split level home with 6 CATHRYN and 6 Steps in home to Bedroom, 6 to bathroom and 6 to livingroom

## 2019-07-17 NOTE — PHYSICAL THERAPY INITIAL EVALUATION ADULT - GENERAL OBSERVATIONS, REHAB EVAL
Pt received coming out of the bathroom w/ nursing aide, ambulating w/ a SC from home. +HM, severely [place] hearing aides+

## 2019-07-17 NOTE — PROGRESS NOTE ADULT - SUBJECTIVE AND OBJECTIVE BOX
Patient is a 83y old  Male who presents with a chief complaint of edema, cough, shortness of breath (17 Jul 2019 14:20)    7/17- no CP or SOb   no further lightheadedness or praneeth arrhytmias on tele , episode that occurred 2 days likely related to PNA ,   MEDICATIONS  (STANDING):  ALBUTerol/ipratropium for Nebulization 3 milliLiter(s) Nebulizer every 6 hours  atorvastatin 20 milliGRAM(s) Oral at bedtime  cefTRIAXone Injectable. 1000 milliGRAM(s) IV Push every 24 hours  donepezil 10 milliGRAM(s) Oral at bedtime  furosemide   Injectable 40 milliGRAM(s) IV Push two times a day  gabapentin 600 milliGRAM(s) Oral four times a day  losartan 25 milliGRAM(s) Oral daily  melatonin 5 milliGRAM(s) Oral at bedtime  memantine 5 milliGRAM(s) Oral two times a day  methylPREDNISolone sodium succinate Injectable 40 milliGRAM(s) IV Push daily  metoprolol tartrate 25 milliGRAM(s) Oral two times a day  pantoprazole    Tablet 40 milliGRAM(s) Oral before breakfast  rOPINIRole 2 milliGRAM(s) Oral four times a day    MEDICATIONS  (PRN):  ALBUTerol    0.083% 2.5 milliGRAM(s) Nebulizer every 3 hours PRN Shortness of Breath and/or Wheezing            Vital Signs Last 24 Hrs  T(C): 36.7 (17 Jul 2019 10:47), Max: 36.7 (16 Jul 2019 17:25)  T(F): 98 (17 Jul 2019 10:47), Max: 98.1 (16 Jul 2019 17:25)  HR: 65 (17 Jul 2019 14:15) (63 - 91)  BP: 110/74 (17 Jul 2019 10:47) (110/74 - 121/83)  BP(mean): --  RR: 18 (17 Jul 2019 10:47) (18 - 19)  SpO2: 91% (17 Jul 2019 14:15) (90% - 97%)            INTERPRETATION OF TELEMETRY:    ECG:        LABS:                        10.4   21.71 )-----------( 325      ( 17 Jul 2019 08:34 )             33.7     07-17    141  |  102  |  45<H>  ----------------------------<  139<H>  3.8   |  32<H>  |  1.06    Ca    9.3      17 Jul 2019 08:34              I&O's Summary    16 Jul 2019 07:01  -  17 Jul 2019 07:00  --------------------------------------------------------  IN: 0 mL / OUT: 900 mL / NET: -900 mL      BNP  RADIOLOGY & ADDITIONAL STUDIES:

## 2019-07-17 NOTE — PROGRESS NOTE ADULT - SUBJECTIVE AND OBJECTIVE BOX
CC:  Patient is a 83y old  Male who presents with a chief complaint of edema, cough, shortness of breath (17 Jul 2019 10:01)    SUBJECTIVE:     -no new complaints or issues at current time.    ROS:  all other review of systems are negative unless indicated above.    ALBUTerol    0.083% 2.5 milliGRAM(s) Nebulizer every 3 hours PRN  ALBUTerol/ipratropium for Nebulization 3 milliLiter(s) Nebulizer every 6 hours  atorvastatin 20 milliGRAM(s) Oral at bedtime  azithromycin   Tablet 500 milliGRAM(s) Oral daily  cefTRIAXone Injectable. 1000 milliGRAM(s) IV Push every 24 hours  donepezil 10 milliGRAM(s) Oral at bedtime  furosemide   Injectable 40 milliGRAM(s) IV Push two times a day  gabapentin 600 milliGRAM(s) Oral four times a day  losartan 25 milliGRAM(s) Oral daily  melatonin 5 milliGRAM(s) Oral at bedtime  memantine 5 milliGRAM(s) Oral two times a day  methylPREDNISolone sodium succinate Injectable 40 milliGRAM(s) IV Push daily  metoprolol tartrate 25 milliGRAM(s) Oral two times a day  pantoprazole    Tablet 40 milliGRAM(s) Oral before breakfast  rOPINIRole 2 milliGRAM(s) Oral four times a day    T(C): 36.7 (07-17-19 @ 10:47), Max: 36.7 (07-16-19 @ 17:25)  HR: 91 (07-17-19 @ 10:47) (63 - 91)  BP: 110/74 (07-17-19 @ 10:47) (110/74 - 121/83)  RR: 18 (07-17-19 @ 10:47) (18 - 19)  SpO2: 90% (07-17-19 @ 10:47) (90% - 97%)                        10.4   21.71 )-----------( 325      ( 17 Jul 2019 08:34 )             33.7       07-17    141  |  102  |  45<H>  ----------------------------<  139<H>  3.8   |  32<H>  |  1.06    Ca    9.3      17 Jul 2019 08:34    Constitutional: NAD.   Neck:  JVP wnl.  supple.  Respiratory: Breath sounds are clear bilaterally, No wheezing, rales or rhonchi  Cardiovascular: S1 and S2, regular rate and rhythm, no murmur, rub or gallop.  Gastrointestinal: Bowel Sounds present, soft, nontender, nondistended, no guarding, no rebound, no mass.  Extremities: No peripheral edema  Neurological: A/O x      Impression:  83M.  admitted 07/13/2019.  presented to ED c/o SOB, cough and b/l LE edema.  onset 1-2 weeks prior to admission.    PMHx:  non-obstructive CAD;  AF (not on AC due to recent GIB);  AI-AVR (bio);  thoracic AA-graft;  HTN;  HLD;  CAMILA;  GIB;  chronic anemia;  RLS;  LS DDD;  BPH.    Assessment:  1.	tachybrady/SSS.  2.	acute upon chronic HF w/ small pleural effusions.  3.	CAP.  4.	hypoxia.  RV hypokinesis.  hx CAMILA.  5.	leukocytosis.  favor secondary to IV steroids.    Plan:  -IV Lasix, ARB and BB.  -cx no growth.  c/w IV ABx.  -supplemental O2, BD nebs and taper IV steroids.  -ILR interrogated, AF  w/ 2.47s pause 07/15.  EP consult.  -c/w chronic medical issue management.  -DVT prophylaxis, SCD.  -discussed w/ RN and patient's wife. CC:  Patient is a 83y old  Male who presents with a chief complaint of edema, cough, shortness of breath (17 Jul 2019 10:01)    SUBJECTIVE:     -no new complaints or issues at current time.    ROS:  all other review of systems are negative unless indicated above.    ALBUTerol    0.083% 2.5 milliGRAM(s) Nebulizer every 3 hours PRN  ALBUTerol/ipratropium for Nebulization 3 milliLiter(s) Nebulizer every 6 hours  atorvastatin 20 milliGRAM(s) Oral at bedtime  azithromycin   Tablet 500 milliGRAM(s) Oral daily  cefTRIAXone Injectable. 1000 milliGRAM(s) IV Push every 24 hours  donepezil 10 milliGRAM(s) Oral at bedtime  furosemide   Injectable 40 milliGRAM(s) IV Push two times a day  gabapentin 600 milliGRAM(s) Oral four times a day  losartan 25 milliGRAM(s) Oral daily  melatonin 5 milliGRAM(s) Oral at bedtime  memantine 5 milliGRAM(s) Oral two times a day  methylPREDNISolone sodium succinate Injectable 40 milliGRAM(s) IV Push daily  metoprolol tartrate 25 milliGRAM(s) Oral two times a day  pantoprazole    Tablet 40 milliGRAM(s) Oral before breakfast  rOPINIRole 2 milliGRAM(s) Oral four times a day    T(C): 36.7 (07-17-19 @ 10:47), Max: 36.7 (07-16-19 @ 17:25)  HR: 91 (07-17-19 @ 10:47) (63 - 91)  BP: 110/74 (07-17-19 @ 10:47) (110/74 - 121/83)  RR: 18 (07-17-19 @ 10:47) (18 - 19)  SpO2: 90% (07-17-19 @ 10:47) (90% - 97%)                        10.4   21.71 )-----------( 325      ( 17 Jul 2019 08:34 )             33.7       07-17    141  |  102  |  45<H>  ----------------------------<  139<H>  3.8   |  32<H>  |  1.06    Ca    9.3      17 Jul 2019 08:34    Constitutional: NAD.   Neck:  JVP wnl.  supple.  Respiratory: Breath sounds are clear bilaterally, No wheezing, rales or rhonchi  Cardiovascular: S1 and S2, regular rate and rhythm, no murmur, rub or gallop.  Gastrointestinal: Bowel Sounds present, soft, nontender, nondistended, no guarding, no rebound, no mass.  Extremities: No peripheral edema  Neurological: A/O x      Impression:  83M.  admitted 07/13/2019.  presented to ED c/o SOB, cough and b/l LE edema.  onset 1-2 weeks prior to admission.    PMHx:  non-obstructive CAD;  AF (not on AC due to recent GIB);  AI-AVR (bio);  thoracic AA-graft;  HTN;  HLD;  CAMILA;  GIB;  chronic anemia;  RLS;  LS DDD;  BPH.    Assessment:  1.	tachybrady/SSS.  2.	acute upon chronic HF w/ small pleural effusions.  3.	CAP.  4.	hypoxia.  RV hypokinesis.  hx CAMILA.  5.	leukocytosis.  favor secondary to IV steroids.  6.	prerenal azotemia due to IV Lasix    Plan:  -reduce IV Lasix.  c/w ARB and BB.  -cx no growth.  c/w IV ABx.  -supplemental O2, BD nebs and taper IV steroids.  -ILR interrogated, AF  w/ 2.47s pause 07/15.  EP consult.  -c/w chronic medical issue management.  -DVT prophylaxis, SCD.  -discussed w/ RN and patient's wife. CC:  Patient is a 83y old  Male who presents with a chief complaint of edema, cough, shortness of breath (17 Jul 2019 10:01)    SUBJECTIVE:     -no new complaints or issues at current time.    ROS:  all other review of systems are negative unless indicated above.    ALBUTerol    0.083% 2.5 milliGRAM(s) Nebulizer every 3 hours PRN  ALBUTerol/ipratropium for Nebulization 3 milliLiter(s) Nebulizer every 6 hours  atorvastatin 20 milliGRAM(s) Oral at bedtime  azithromycin   Tablet 500 milliGRAM(s) Oral daily  cefTRIAXone Injectable. 1000 milliGRAM(s) IV Push every 24 hours  donepezil 10 milliGRAM(s) Oral at bedtime  furosemide   Injectable 40 milliGRAM(s) IV Push two times a day  gabapentin 600 milliGRAM(s) Oral four times a day  losartan 25 milliGRAM(s) Oral daily  melatonin 5 milliGRAM(s) Oral at bedtime  memantine 5 milliGRAM(s) Oral two times a day  methylPREDNISolone sodium succinate Injectable 40 milliGRAM(s) IV Push daily  metoprolol tartrate 25 milliGRAM(s) Oral two times a day  pantoprazole    Tablet 40 milliGRAM(s) Oral before breakfast  rOPINIRole 2 milliGRAM(s) Oral four times a day    T(C): 36.7 (07-17-19 @ 10:47), Max: 36.7 (07-16-19 @ 17:25)  HR: 91 (07-17-19 @ 10:47) (63 - 91)  BP: 110/74 (07-17-19 @ 10:47) (110/74 - 121/83)  RR: 18 (07-17-19 @ 10:47) (18 - 19)  SpO2: 90% (07-17-19 @ 10:47) (90% - 97%)                        10.4   21.71 )-----------( 325      ( 17 Jul 2019 08:34 )             33.7       07-17    141  |  102  |  45<H>  ----------------------------<  139<H>  3.8   |  32<H>  |  1.06    Ca    9.3      17 Jul 2019 08:34    Constitutional: NAD.   Neck:  JVP wnl.  supple.  Respiratory: Breath sounds are clear bilaterally, No wheezing, rales or rhonchi  Cardiovascular: S1 and S2, regular rate and rhythm, no murmur, rub or gallop.  Gastrointestinal: Bowel Sounds present, soft, nontender, nondistended, no guarding, no rebound, no mass.  Extremities: No peripheral edema  Neurological: A/O x      Impression:  83M.  admitted 07/13/2019.  presented to ED c/o SOB, cough and b/l LE edema.  onset 1-2 weeks prior to admission.    PMHx:  non-obstructive CAD;  AF (not on AC due to recent GIB);  AI-AVR (bio);  thoracic AA-graft;  HTN;  HLD;  CAMILA;  GIB;  chronic anemia;  RLS;  LS DDD;  BPH.    Assessment:  1.	tachybrady/SSS.  2.	acute upon chronic HF w/ small pleural effusions.  3.	CAP.  4.	hypoxia.  RV hypokinesis.  hx CAMILA.  5.	leukocytosis.  favor secondary to IV steroids.  6.	prerenal azotemia due to IV Lasix    Plan:  -repeat CXR.  if improved, will reduce IV Lasix.  c/w ARB and BB.  -cx no growth.  c/w IV ABx.  -supplemental O2, BD nebs and taper IV steroids.  -ILR interrogated, AF  w/ 2.47s pause 07/15.  EP consult.  -c/w chronic medical issue management.  -DVT prophylaxis, SCD.  -discussed w/ RN and patient's wife.

## 2019-07-17 NOTE — PROGRESS NOTE ADULT - ASSESSMENT
83M w/PMH non-obstructive CAD, aortic root dilation, aortic valve insufficiency, s/p bioprosthetic aortic valve replacement and ascending aortic root graft, A-fib, [place] (cochlear implant), chronic imbalance, HTN, RLS, recent GIB (s/p EGD/colonoscpy),  CAMILA unable to tolerate CPAP, presents with SOB, cough and b/l LE edema. wife reports that pt has had b/l LE swelling x several months but has gotten worse since 1-2 weeks ago. he was on lasix 20 mg PO and it was increased by cardiologist to 40 mg daily for 1 week.   denies any pain.  had recent GI bleed few months and now without any hematochezia or melena. (13 Jul 2019 14:35)  no further episodes on tele   no indication at this time for a PPM , [facility] will continue to be followed as oupt   SOB secondary to diastolic dysfunction/dietary indescretion and exacerbated by tachy episodes

## 2019-07-18 ENCOUNTER — TRANSCRIPTION ENCOUNTER (OUTPATIENT)
Age: 84
End: 2019-07-18

## 2019-07-18 ENCOUNTER — RESULT CHARGE (OUTPATIENT)
Age: 84
End: 2019-07-18

## 2019-07-18 VITALS
RESPIRATION RATE: 17 BRPM | OXYGEN SATURATION: 96 % | DIASTOLIC BLOOD PRESSURE: 77 MMHG | SYSTOLIC BLOOD PRESSURE: 128 MMHG

## 2019-07-18 LAB
ANION GAP SERPL CALC-SCNC: 7 MMOL/L — SIGNIFICANT CHANGE UP (ref 5–17)
BUN SERPL-MCNC: 40 MG/DL — HIGH (ref 7–23)
CALCIUM SERPL-MCNC: 9 MG/DL — SIGNIFICANT CHANGE UP (ref 8.5–10.1)
CHLORIDE SERPL-SCNC: 104 MMOL/L — SIGNIFICANT CHANGE UP (ref 96–108)
CO2 SERPL-SCNC: 33 MMOL/L — HIGH (ref 22–31)
CREAT SERPL-MCNC: 0.96 MG/DL — SIGNIFICANT CHANGE UP (ref 0.5–1.3)
CULTURE RESULTS: SIGNIFICANT CHANGE UP
CULTURE RESULTS: SIGNIFICANT CHANGE UP
GLUCOSE SERPL-MCNC: 114 MG/DL — HIGH (ref 70–99)
HCT VFR BLD CALC: 36 % — LOW (ref 39–50)
HGB BLD-MCNC: 10.9 G/DL — LOW (ref 13–17)
MCHC RBC-ENTMCNC: 25.5 PG — LOW (ref 27–34)
MCHC RBC-ENTMCNC: 30.3 GM/DL — LOW (ref 32–36)
MCV RBC AUTO: 84.3 FL — SIGNIFICANT CHANGE UP (ref 80–100)
PLATELET # BLD AUTO: 280 K/UL — SIGNIFICANT CHANGE UP (ref 150–400)
POTASSIUM SERPL-MCNC: 3.8 MMOL/L — SIGNIFICANT CHANGE UP (ref 3.5–5.3)
POTASSIUM SERPL-SCNC: 3.8 MMOL/L — SIGNIFICANT CHANGE UP (ref 3.5–5.3)
RBC # BLD: 4.27 M/UL — SIGNIFICANT CHANGE UP (ref 4.2–5.8)
RBC # FLD: 20.4 % — HIGH (ref 10.3–14.5)
SODIUM SERPL-SCNC: 144 MMOL/L — SIGNIFICANT CHANGE UP (ref 135–145)
SPECIMEN SOURCE: SIGNIFICANT CHANGE UP
SPECIMEN SOURCE: SIGNIFICANT CHANGE UP
WBC # BLD: 15.2 K/UL — HIGH (ref 3.8–10.5)
WBC # FLD AUTO: 15.2 K/UL — HIGH (ref 3.8–10.5)

## 2019-07-18 PROCEDURE — 71045 X-RAY EXAM CHEST 1 VIEW: CPT | Mod: 26

## 2019-07-18 RX ORDER — CEFUROXIME AXETIL 250 MG
1 TABLET ORAL
Qty: 4 | Refills: 0
Start: 2019-07-18 | End: 2019-07-19

## 2019-07-18 RX ORDER — CEFUROXIME AXETIL 250 MG
500 TABLET ORAL EVERY 12 HOURS
Refills: 0 | Status: DISCONTINUED | OUTPATIENT
Start: 2019-07-18 | End: 2019-07-18

## 2019-07-18 RX ORDER — LOSARTAN POTASSIUM 100 MG/1
1 TABLET, FILM COATED ORAL
Qty: 14 | Refills: 0
Start: 2019-07-18 | End: 2019-07-31

## 2019-07-18 RX ORDER — FUROSEMIDE 40 MG
40 TABLET ORAL DAILY
Refills: 0 | Status: DISCONTINUED | OUTPATIENT
Start: 2019-07-18 | End: 2019-07-18

## 2019-07-18 RX ORDER — ACETAMINOPHEN 500 MG
2 TABLET ORAL
Qty: 0 | Refills: 0 | DISCHARGE

## 2019-07-18 RX ORDER — METOPROLOL TARTRATE 50 MG
1 TABLET ORAL
Qty: 0 | Refills: 0 | DISCHARGE
Start: 2019-07-18

## 2019-07-18 RX ADMIN — GABAPENTIN 600 MILLIGRAM(S): 400 CAPSULE ORAL at 12:04

## 2019-07-18 RX ADMIN — MEMANTINE HYDROCHLORIDE 5 MILLIGRAM(S): 10 TABLET ORAL at 06:11

## 2019-07-18 RX ADMIN — ROPINIROLE 2 MILLIGRAM(S): 8 TABLET, FILM COATED, EXTENDED RELEASE ORAL at 06:11

## 2019-07-18 RX ADMIN — Medication 3 MILLILITER(S): at 09:00

## 2019-07-18 RX ADMIN — LOSARTAN POTASSIUM 25 MILLIGRAM(S): 100 TABLET, FILM COATED ORAL at 06:11

## 2019-07-18 RX ADMIN — Medication 40 MILLIGRAM(S): at 09:30

## 2019-07-18 RX ADMIN — Medication 40 MILLIGRAM(S): at 06:11

## 2019-07-18 RX ADMIN — Medication 25 MILLIGRAM(S): at 06:11

## 2019-07-18 RX ADMIN — PANTOPRAZOLE SODIUM 40 MILLIGRAM(S): 20 TABLET, DELAYED RELEASE ORAL at 06:11

## 2019-07-18 RX ADMIN — GABAPENTIN 600 MILLIGRAM(S): 400 CAPSULE ORAL at 06:11

## 2019-07-18 RX ADMIN — ROPINIROLE 2 MILLIGRAM(S): 8 TABLET, FILM COATED, EXTENDED RELEASE ORAL at 12:04

## 2019-07-18 NOTE — PROGRESS NOTE ADULT - SUBJECTIVE AND OBJECTIVE BOX
CC:  Patient is a 83y old  Male who presents with a chief complaint of edema, cough, shortness of breath (17 Jul 2019 10:01)    SUBJECTIVE:     -"I'm leaving today!"  -reports he is breathing comfortably.  -ambulating w/ PT.    ROS:  all other review of systems are negative unless indicated above.    ALBUTerol    0.083% 2.5 milliGRAM(s) Nebulizer every 3 hours PRN  ALBUTerol/ipratropium for Nebulization 3 milliLiter(s) Nebulizer every 6 hours  atorvastatin 20 milliGRAM(s) Oral at bedtime  azithromycin   Tablet 500 milliGRAM(s) Oral daily  cefTRIAXone Injectable. 1000 milliGRAM(s) IV Push every 24 hours  donepezil 10 milliGRAM(s) Oral at bedtime  furosemide   Injectable 40 milliGRAM(s) IV Push two times a day  gabapentin 600 milliGRAM(s) Oral four times a day  losartan 25 milliGRAM(s) Oral daily  melatonin 5 milliGRAM(s) Oral at bedtime  memantine 5 milliGRAM(s) Oral two times a day  methylPREDNISolone sodium succinate Injectable 40 milliGRAM(s) IV Push daily  metoprolol tartrate 25 milliGRAM(s) Oral two times a day  pantoprazole    Tablet 40 milliGRAM(s) Oral before breakfast  rOPINIRole 2 milliGRAM(s) Oral four times a day    T(C): 36.7 (07-17-19 @ 10:47), Max: 36.7 (07-16-19 @ 17:25)  HR: 91 (07-17-19 @ 10:47) (63 - 91)  BP: 110/74 (07-17-19 @ 10:47) (110/74 - 121/83)  RR: 18 (07-17-19 @ 10:47) (18 - 19)  SpO2: 90% (07-17-19 @ 10:47) (90% - 97%)                        10.4   21.71 )-----------( 325      ( 17 Jul 2019 08:34 )             33.7       07-17    141  |  102  |  45<H>  ----------------------------<  139<H>  3.8   |  32<H>  |  1.06    Ca    9.3      17 Jul 2019 08:34    Constitutional: NAD.   Neck:  JVP wnl.  supple.  Respiratory: (+) DIMINISHED L>R.  FAINT RALES.  Cardiovascular: S1 and S2, regular rate and rhythm, no murmur, rub or gallop.  Gastrointestinal: Bowel Sounds present, soft, nontender, nondistended, no guarding, no rebound, no mass.  Extremities: No peripheral edema  Neurological: A/O x  3.    Impression:  83M.  admitted 07/13/2019.  presented to ED c/o SOB, cough and b/l LE edema.  onset 1-2 weeks prior to admission.    PMHx:  non-obstructive CAD;  AF (not on AC due to recent GIB);  AI-AVR (bio);  thoracic AA-graft;  HTN;  HLD;  CAMILA;  GIB;  chronic anemia;  RLS;  LS DDD;  BPH.    Assessment:  1.	tachybrady/SSS.  2.	acute upon chronic HF w/ small pleural effusions-subjectively improved symptoms.  today's CXR probative for resolving HF.  3.	CAP.  4.	hypoxia.  RV hypokinesis.  hx CAMILA.  5.	leukocytosis-trending down.  favor secondary to IV steroids.  6.	prerenal azotemia due to IV Lasix    Plan:  -07/18 CXR repeated, improved.  d/c IV Lasix and start 20mg PO qd.  c/w ARB and BB.  -cx no growth.  d/c IV ABx and start Ceftin 500mg po bid x 2 more days (total 7).  -d/c IV steroids and start prendinsone 40mg po qd.  -ILR interrogated, AF  w/ 2.47s pause 07/15.  EP, no indication for PPM at current time.  -c/w chronic medical issue management.  -discussed w/ Pulmonology, RN and case management.  -may discharge home today.

## 2019-07-18 NOTE — PROGRESS NOTE ADULT - ASSESSMENT
PROBLEMS:    Acute on chronic diastolic CHF - with small pleural effusion  Atrial fibrillation   Pneumonia   Chronic anemia - hgb stable monitor   H/o AVR  HTN   CAMLIA      PLAN;    high wbc trending down  iv solumedrolds 40mg daily  PULMONARY BETTER  IV RHOCEPHIN   AEROSOLS  SUPPORTIVE CARE  KEEP NEG BALANCE  DVT PROPHYLASIX

## 2019-07-18 NOTE — DISCHARGE NOTE PROVIDER - CARE PROVIDER_API CALL
Avery Rodriguez)  Cardiovascular Disease; Nuclear Cardiology  [address] [place] [address]  Phone: [phone]  Fax: [phone]  Follow Up Time: 1 week    Adalberto Mckoy)  Critical Care Medicine; Internal Medicine; Pulmonary Disease; Sleep Medicine  [address] [place] [address]  Phone: [phone]  Fax: [phone]  Follow Up Time: 1 week

## 2019-07-18 NOTE — PROGRESS NOTE ADULT - SUBJECTIVE AND OBJECTIVE BOX
Subjective:    pat better, lying in bed.    Home Medications:  acetaminophen 325 mg oral tablet: 2 tab(s) orally every 8 hours, As Needed - for headache (13 Jul 2019 16:25)  atorvastatin 20 mg oral tablet: 1 tab(s) orally once a day (13 Jul 2019 16:25)  fluticasone 50 mcg/inh nasal spray: 2 spray(s) in each nostril once a day (13 Jul 2019 16:25)  furosemide 20 mg oral tablet: 2 tab(s) orally once a day (13 Jul 2019 16:25)  gabapentin 600 mg oral tablet: 1 tab(s) orally 6 times a day (13 Jul 2019 16:25)  memantine 5 mg oral tablet: 1 tab(s) orally once a day (13 Jul 2019 16:25)  Metoprolol Tartrate 25 mg oral tablet: 1 tab(s) orally 2 times a day with food (13 Jul 2019 16:25)  Multiple Vitamins oral tablet: 1 tab(s) orally once a day (13 Jul 2019 16:25)  pantoprazole 40 mg oral delayed release tablet: 1 tab(s) orally once a day (13 Jul 2019 16:25)  rOPINIRole 2 mg oral tablet: 1 tab(s) orally 6 times a day (13 Jul 2019 16:25)    MEDICATIONS  (STANDING):  ALBUTerol/ipratropium for Nebulization 3 milliLiter(s) Nebulizer every 6 hours  atorvastatin 20 milliGRAM(s) Oral at bedtime  cefTRIAXone Injectable. 1000 milliGRAM(s) IV Push every 24 hours  donepezil 10 milliGRAM(s) Oral at bedtime  furosemide   Injectable 40 milliGRAM(s) IV Push two times a day  gabapentin 600 milliGRAM(s) Oral four times a day  losartan 25 milliGRAM(s) Oral daily  melatonin 5 milliGRAM(s) Oral at bedtime  memantine 5 milliGRAM(s) Oral two times a day  methylPREDNISolone sodium succinate Injectable 40 milliGRAM(s) IV Push daily  metoprolol tartrate 25 milliGRAM(s) Oral two times a day  pantoprazole    Tablet 40 milliGRAM(s) Oral before breakfast  rOPINIRole 2 milliGRAM(s) Oral four times a day    MEDICATIONS  (PRN):  ALBUTerol    0.083% 2.5 milliGRAM(s) Nebulizer every 3 hours PRN Shortness of Breath and/or Wheezing      Allergies    No Known Allergies    Intolerances        Vital Signs Last 24 Hrs  T(C): 36.9 (18 Jul 2019 11:26), Max: 36.9 (18 Jul 2019 06:08)  T(F): 98.5 (18 Jul 2019 11:26), Max: 98.5 (18 Jul 2019 11:26)  HR: 88 (18 Jul 2019 11:26) (65 - 91)  BP: 128/77 (18 Jul 2019 11:34) (116/78 - 132/82)  BP(mean): --  RR: 17 (18 Jul 2019 11:34) (17 - 18)  SpO2: 96% (18 Jul 2019 11:34) (90% - 96%)      PHYSICAL EXAMINATION:    NECK:  Supple. No lymphadenopathy. Jugular venous pressure not elevated. Carotids equal.   HEART:   The cardiac impulse has a normal quality. Reg., Nl S1 and S2.  There are no murmurs, rubs or gallops noted  CHEST:  Chest is clear to auscultation. Normal respiratory effort.  ABDOMEN:  Soft and nontender.   EXTREMITIES:  There is no edema.       LABS:                        10.9   15.20 )-----------( 280      ( 18 Jul 2019 08:25 )             36.0     07-18    144  |  104  |  40<H>  ----------------------------<  114<H>  3.8   |  33<H>  |  0.96    Ca    9.0      18 Jul 2019 08:25

## 2019-07-18 NOTE — PROGRESS NOTE ADULT - SUBJECTIVE AND OBJECTIVE BOX
Patient is a 83y old  Male who presents with a chief complaint of edema, cough, shortness of breath (17 Jul 2019 16:08)  7/18- denies Sxs today       MEDICATIONS  (STANDING):  ALBUTerol/ipratropium for Nebulization 3 milliLiter(s) Nebulizer every 6 hours  atorvastatin 20 milliGRAM(s) Oral at bedtime  cefTRIAXone Injectable. 1000 milliGRAM(s) IV Push every 24 hours  donepezil 10 milliGRAM(s) Oral at bedtime  furosemide   Injectable 40 milliGRAM(s) IV Push two times a day  gabapentin 600 milliGRAM(s) Oral four times a day  losartan 25 milliGRAM(s) Oral daily  melatonin 5 milliGRAM(s) Oral at bedtime  memantine 5 milliGRAM(s) Oral two times a day  methylPREDNISolone sodium succinate Injectable 40 milliGRAM(s) IV Push daily  metoprolol tartrate 25 milliGRAM(s) Oral two times a day  pantoprazole    Tablet 40 milliGRAM(s) Oral before breakfast  rOPINIRole 2 milliGRAM(s) Oral four times a day    MEDICATIONS  (PRN):  ALBUTerol    0.083% 2.5 milliGRAM(s) Nebulizer every 3 hours PRN Shortness of Breath and/or Wheezing            Vital Signs Last 24 Hrs  T(C): 36.9 (18 Jul 2019 06:08), Max: 36.9 (18 Jul 2019 06:08)  T(F): 98.4 (18 Jul 2019 06:08), Max: 98.4 (18 Jul 2019 06:08)  HR: 77 (18 Jul 2019 09:00) (65 - 91)  BP: 132/82 (18 Jul 2019 06:08) (110/74 - 132/82)  BP(mean): --  RR: 17 (18 Jul 2019 06:08) (17 - 18)  SpO2: 93% (18 Jul 2019 06:08) (90% - 95%)            INTERPRETATION OF TELEMETRY:    ECG:        LABS:                        10.9   15.20 )-----------( 280      ( 18 Jul 2019 08:25 )             36.0     07-18    144  |  104  |  40<H>  ----------------------------<  114<H>  3.8   |  33<H>  |  0.96    Ca    9.0      18 Jul 2019 08:25              I&O's Summary    17 Jul 2019 07:01  -  18 Jul 2019 07:00  --------------------------------------------------------  IN: 0 mL / OUT: 600 mL / NET: -600 mL      BNP  RADIOLOGY & ADDITIONAL STUDIES:

## 2019-07-18 NOTE — PROGRESS NOTE ADULT - ASSESSMENT
83M w/PMH non-obstructive CAD, aortic root dilation, aortic valve insufficiency, s/p bioprosthetic aortic valve replacement and ascending aortic root graft, A-fib, [place] (cochlear implant), chronic imbalance, HTN, RLS, recent GIB (s/p EGD/colonoscpy),  CAMILA unable to tolerate CPAP, presents with SOB, cough and b/l LE edema. wife reports that pt has had b/l LE swelling x several months but has gotten worse since 1-2 weeks ago. denies any pain.  had recent GI bleed few months and now without any hematochezia or melena. (13 Jul 2019 14:35)  no further episodes on tele   no indication at this time for a PPM , [facility] will continue to be followed as oupt   SOB secondary to diastolic dysfunction/dietary indescretion and exacerbated by tachy episodes  Can DC home from acardiac standpoint on Lasix 40mg PO daily   I can see patient within 1 week in the office

## 2019-07-18 NOTE — DISCHARGE NOTE PROVIDER - HOSPITAL COURSE
Impression:    83M.  admitted 07/13/2019.  presented to ED c/o SOB, cough and b/l LE edema.  onset 1-2 weeks prior to admission.        PMHx:  non-obstructive CAD;  AF (not on AC due to recent GIB);  AI-AVR (bio);  thoracic AA-graft;  HTN;  HLD;  CAMILA;  GIB;  chronic anemia;  RLS;  LS DDD;  BPH.        Assessment:    1.tachybrady/SSS.    2.acute upon chronic HF w/ small pleural effusions-subjectively improved symptoms.  today's CXR probative for resolving HF.    3.CAP.    4.hypoxia.  RV hypokinesis.  hx CAMILA.    5.leukocytosis-trending down.  favor secondary to IV steroids.    6.prerenal azotemia due to IV Lasix        Plan:    -07/18 CXR repeated, improved.  d/c IV Lasix and start 20mg PO qd.  c/w ARB and BB.    -cx no growth.  d/c IV ABx and start Ceftin 500mg po bid x 2 more days (total 7).    -d/c IV steroids and start prendinsone 40mg po qd.    -ILR interrogated, AF  w/ 2.47s pause 07/15.  EP, no indication for PPM at current time.    -c/w chronic medical issue management.    -discussed w/ Pulmonology, RN and case management.    -may discharge home today.

## 2019-07-18 NOTE — PROGRESS NOTE ADULT - REASON FOR ADMISSION
edema, cough, shortness of breath

## 2019-07-18 NOTE — DISCHARGE NOTE PROVIDER - PROVIDER TOKENS
PROVIDER:[TOKEN:[[id number]],FOLLOWUP:[1 week]],PROVIDER:[TOKEN:[[id number]],FOLLOWUP:[1 week]]

## 2019-07-18 NOTE — DISCHARGE NOTE NURSING/CASE MANAGEMENT/SOCIAL WORK - NSDCDPATPORTLINK_GEN_ALL_CORE
You can access the [facility] Patient Portal, offered by [facility], by registering with the following website: [email or web address]

## 2019-07-18 NOTE — PROGRESS NOTE ADULT - PROVIDER SPECIALTY LIST ADULT
Cardiology
Hospitalist
Pulmonology
Pulmonology
Hospitalist
Pulmonology
Cardiology
Cardiology

## 2019-07-18 NOTE — DISCHARGE NOTE PROVIDER - NSDCCPCAREPLAN_GEN_ALL_CORE_FT
PRINCIPAL DISCHARGE DIAGNOSIS  Diagnosis: CHF (congestive heart failure)  Assessment and Plan of Treatment:       SECONDARY DISCHARGE DIAGNOSES  Diagnosis: Pneumonia  Assessment and Plan of Treatment:

## 2019-07-19 ENCOUNTER — APPOINTMENT (OUTPATIENT)
Dept: INTERNAL MEDICINE | Facility: CLINIC | Age: 84
End: 2019-07-19
Payer: MEDICARE

## 2019-07-19 VITALS
TEMPERATURE: 97.5 F | WEIGHT: 178 LBS | HEIGHT: 66 IN | BODY MASS INDEX: 28.61 KG/M2 | HEART RATE: 92 BPM | SYSTOLIC BLOOD PRESSURE: 110 MMHG | OXYGEN SATURATION: 97 % | DIASTOLIC BLOOD PRESSURE: 70 MMHG | RESPIRATION RATE: 18 BRPM

## 2019-07-19 DIAGNOSIS — Z95.3 PRESENCE OF XENOGENIC HEART VALVE: ICD-10-CM

## 2019-07-19 DIAGNOSIS — Z95.828 PRESENCE OF OTHER VASCULAR IMPLANTS AND GRAFTS: ICD-10-CM

## 2019-07-19 PROCEDURE — 94060 EVALUATION OF WHEEZING: CPT

## 2019-07-19 PROCEDURE — 99205 OFFICE O/P NEW HI 60 MIN: CPT | Mod: 25

## 2019-07-19 RX ORDER — ASPIRIN 325 MG/1
325 TABLET ORAL
Qty: 30 | Refills: 0 | Status: DISCONTINUED | COMMUNITY
Start: 2017-08-03 | End: 2019-07-19

## 2019-07-24 DIAGNOSIS — E44.0 MODERATE PROTEIN-CALORIE MALNUTRITION: ICD-10-CM

## 2019-07-24 DIAGNOSIS — N40.0 BENIGN PROSTATIC HYPERPLASIA WITHOUT LOWER URINARY TRACT SYMPTOMS: ICD-10-CM

## 2019-07-24 DIAGNOSIS — I50.33 ACUTE ON CHRONIC DIASTOLIC (CONGESTIVE) HEART FAILURE: ICD-10-CM

## 2019-07-24 DIAGNOSIS — G47.33 OBSTRUCTIVE SLEEP APNEA (ADULT) (PEDIATRIC): ICD-10-CM

## 2019-07-24 DIAGNOSIS — J18.9 PNEUMONIA, UNSPECIFIED ORGANISM: ICD-10-CM

## 2019-07-24 DIAGNOSIS — I11.0 HYPERTENSIVE HEART DISEASE WITH HEART FAILURE: ICD-10-CM

## 2019-07-24 DIAGNOSIS — E78.5 HYPERLIPIDEMIA, UNSPECIFIED: ICD-10-CM

## 2019-07-24 DIAGNOSIS — I48.91 UNSPECIFIED ATRIAL FIBRILLATION: ICD-10-CM

## 2019-07-24 DIAGNOSIS — G25.81 RESTLESS LEGS SYNDROME: ICD-10-CM

## 2019-07-24 DIAGNOSIS — E87.6 HYPOKALEMIA: ICD-10-CM

## 2019-07-24 DIAGNOSIS — Z95.3 PRESENCE OF XENOGENIC HEART VALVE: ICD-10-CM

## 2019-07-24 DIAGNOSIS — K21.9 GASTRO-ESOPHAGEAL REFLUX DISEASE WITHOUT ESOPHAGITIS: ICD-10-CM

## 2019-07-24 DIAGNOSIS — Z96.653 PRESENCE OF ARTIFICIAL KNEE JOINT, BILATERAL: ICD-10-CM

## 2019-07-24 DIAGNOSIS — D64.9 ANEMIA, UNSPECIFIED: ICD-10-CM

## 2019-07-24 DIAGNOSIS — Z66 DO NOT RESUSCITATE: ICD-10-CM

## 2019-07-31 ENCOUNTER — APPOINTMENT (OUTPATIENT)
Dept: ELECTROPHYSIOLOGY | Facility: CLINIC | Age: 84
End: 2019-07-31
Payer: MEDICARE

## 2019-07-31 PROCEDURE — 93299: CPT

## 2019-07-31 PROCEDURE — 93298 REM INTERROG DEV EVAL SCRMS: CPT

## 2019-08-08 ENCOUNTER — APPOINTMENT (OUTPATIENT)
Dept: INTERNAL MEDICINE | Facility: CLINIC | Age: 84
End: 2019-08-08
Payer: MEDICARE

## 2019-08-08 ENCOUNTER — NON-APPOINTMENT (OUTPATIENT)
Age: 84
End: 2019-08-08

## 2019-08-08 VITALS
TEMPERATURE: 98.3 F | BODY MASS INDEX: 29.25 KG/M2 | RESPIRATION RATE: 20 BRPM | DIASTOLIC BLOOD PRESSURE: 80 MMHG | HEART RATE: 64 BPM | OXYGEN SATURATION: 93 % | WEIGHT: 182 LBS | HEIGHT: 66 IN | SYSTOLIC BLOOD PRESSURE: 116 MMHG

## 2019-08-08 DIAGNOSIS — J90 PLEURAL EFFUSION, NOT ELSEWHERE CLASSIFIED: ICD-10-CM

## 2019-08-08 DIAGNOSIS — I71.2 THORACIC AORTIC ANEURYSM, W/OUT RUPTURE: ICD-10-CM

## 2019-08-08 DIAGNOSIS — J18.9 PNEUMONIA, UNSPECIFIED ORGANISM: ICD-10-CM

## 2019-08-08 PROCEDURE — 99214 OFFICE O/P EST MOD 30 MIN: CPT | Mod: 25

## 2019-08-08 PROCEDURE — 94060 EVALUATION OF WHEEZING: CPT

## 2019-08-08 NOTE — HISTORY OF PRESENT ILLNESS
[FreeTextEntry1] : Mr. Davison presents for followup evaluation accompanied by his wife. He finished the antibiotic therapy. He did have a repeat chest x-ray which showed no evidence of pneumonia. CAT scan was denied by the insurance company. Mr. Davison has no symptoms of dysphagia. He denies any chest pain, shortness of breath or palpitations.

## 2019-08-08 NOTE — REASON FOR VISIT
[Initial Evaluation] : an initial evaluation [Follow-Up] : a follow-up visit [Abnormal CT Scan] : abnormal CT Scan

## 2019-08-08 NOTE — HISTORY OF PRESENT ILLNESS
[FreeTextEntry1] : Mr. Davison is an 83-year-old male who presents for initial pulmonary evaluation. He is accompanied by his wife. Patient was discharged [facility] from yesterday. He was admitted on 7/13/19 with that shortness of breath and cough. Chest x-ray showed bilateral infiltrates. Patient had a CT scan of the chest which shows bilateral upper and mid lung infiltrates with bilateral pleural effusions left greater than right. There is evidence of gas versus abscess and left pleural effusion. Mr. Davison was treated with intravenous antibiotics and is now on Ceftin 500 mg p.o. b.i.d. x5 days. He is a nonsmoker. The patient denies any difficulty swallowing or cough after eating. His wife reports no obvious evidence of dysphagia as well. Mr. Davison has a history of obstructive sleep apnea as well as restless leg syndrome. He does not use a CPAP machine. Patient also has a history of hypertension.

## 2019-08-08 NOTE — PROCEDURE
[FreeTextEntry1] : Spirometry pre-and postbronchodilator therapy shows a mixed restrictive/obstructive lung disease. FEV1 is 1.09 L which is 47% predicted. FEV1 percent is 68%.

## 2019-08-08 NOTE — ASSESSMENT
[FreeTextEntry1] : Mr. Davison presents for followup evaluation. Is accompanied by his wife. Repeat chest x-ray shows no evidence of pneumonia. Patient is eating without difficulty. He has no cough or dysphagia. It is unlikely he is having significant aspiration syndrome. He will continue on current metered dose inhaler therapy. Mr. Davison has a history of obstructive sleep apnea but refuses to wear CPAP. His previous bilateral pneumonia has cleared. Followup in 6 months.

## 2019-08-08 NOTE — REASON FOR VISIT
[Initial Evaluation] : an initial evaluation [Abnormal CT Scan] : abnormal CT Scan [Follow-Up] : a follow-up visit

## 2019-08-08 NOTE — PROCEDURE
[FreeTextEntry1] : Spirometry shows moderate restrictive defect. FEV1 is 1.49 L which is 65% predicted.

## 2019-08-08 NOTE — HISTORY OF PRESENT ILLNESS
[FreeTextEntry1] : Mr. Davison is an 83-year-old male who presents for initial pulmonary evaluation. He is accompanied by his wife. Patient was discharged [facility] from yesterday. He was admitted on 7/13/19 with that shortness of breath and cough. Chest x-ray showed bilateral infiltrates. Patient had a CT scan of the chest which shows bilateral upper and mid lung infiltrates with bilateral pleural effusions left greater than right. There is evidence of gas versus abscess and left pleural effusion. Mr. Davison was treated with intravenous antibiotics and is now on Ceftin 500 mg p.o. b.i.d. x5 days. He is a nonsmoker. The patient denies any difficulty swallowing or cough after eating. His wife reports no obvious evidence of dysphagia as well. Mr. Davison has a history of obstructive sleep apnea as well as restless leg syndrome. He does not use a CPAP machine. Patient also has a history of hypertension.

## 2019-08-08 NOTE — ASSESSMENT
[FreeTextEntry1] : Mr. Davison is an 83-year-old male presents for initial pulmonary evaluation. He was just discharged from [facility]. Presented on 7/13 with bilateral pneumonia. CT scan was reviewed. There is a high suspicion of possible aspiration pneumonia. Mr. Davison will continue on current Ceftin 500 mg p.o. b.i.d. He will repeat a CT scan of the chest in approximately 2 weeks. The patient does not give any history of dysphagia or aspiration. If CAT scan is significant improved no further workup will be required. If there is evidence of aspiration pneumonia persistent on a CAT scan done the patient will require a modified barium swallow to rule out aspiration syndrome.

## 2019-08-08 NOTE — ASSESSMENT
[FreeTextEntry1] : Mr. Davison is an 83-year-old male presents for initial pulmonary evaluation. He was just discharged from [facility]. Presented on 7/13 with bilateral pneumonia. CT scan was reviewed. There is a high suspicion of possible aspiration pneumonia. Mr. Davison will continue on current Ceftin 500 mg p.o. b.i.d. He will repeat a CT scan of the chest in approximately 2 weeks. The patient does not give any history of dysphagia or aspiration. If CAT scan is significant improved no further workup will be required. If there is evidence of aspiration pneumonia persistent on a CAT scan done the patient will require a modified barium swallow to rule out aspiration syndrome.

## 2019-08-08 NOTE — PHYSICAL EXAM
[Kyphosis] : kyphosis [Skin Turgor] : normal skin turgor [FreeTextEntry1] : Decreased  breath sounds bases [General Appearance - Well Developed] : well developed [Normal Appearance] : normal appearance [Well Groomed] : well groomed [General Appearance - Well Nourished] : well nourished [General Appearance - In No Acute Distress] : no acute distress [No Deformities] : no deformities [Eyelids - No Xanthelasma] : the eyelids demonstrated no xanthelasmas [Normal Conjunctiva] : the conjunctiva exhibited no abnormalities [Normal Oropharynx] : normal oropharynx [Neck Appearance] : the appearance of the neck was normal [Thyroid Diffuse Enlargement] : the thyroid was not enlarged [Neck Cervical Mass (___cm)] : no neck mass was observed [Jugular Venous Distention Increased] : there was no jugular-venous distention [Heart Rate And Rhythm] : heart rate and rhythm were normal [Thyroid Nodule] : there were no palpable thyroid nodules [Heart Sounds] : normal S1 and S2 [Murmurs] : no murmurs present [Respiration, Rhythm And Depth] : normal respiratory rhythm and effort [Exaggerated Use Of Accessory Muscles For Inspiration] : no accessory muscle use [Auscultation Breath Sounds / Voice Sounds] : lungs were clear to auscultation bilaterally [Abdomen Soft] : soft [Abdomen Tenderness] : non-tender [Abdomen Mass (___ Cm)] : no abdominal mass palpated [Abnormal Walk] : normal gait [Gait - Sufficient For Exercise Testing] : the gait was sufficient for exercise testing [Nail Clubbing] : no clubbing of the fingernails [Cyanosis, Localized] : no localized cyanosis [Petechial Hemorrhages (___cm)] : no petechial hemorrhages [] : no rash [Skin Color & Pigmentation] : normal skin color and pigmentation [No Venous Stasis] : no venous stasis [Skin Lesions] : no skin lesions [No Skin Ulcers] : no skin ulcer [No Xanthoma] : no  xanthoma was observed [Sensation] : the sensory exam was normal to light touch and pinprick [Deep Tendon Reflexes (DTR)] : deep tendon reflexes were 2+ and symmetric [No Focal Deficits] : no focal deficits [Impaired Insight] : insight and judgment were intact [Oriented To Time, Place, And Person] : oriented to person, place, and time [Affect] : the affect was normal

## 2019-08-08 NOTE — PHYSICAL EXAM
[Kyphosis] : kyphosis [Skin Turgor] : normal skin turgor [FreeTextEntry1] : Decreased  breath sounds bases [General Appearance - Well Developed] : well developed [Normal Appearance] : normal appearance [Well Groomed] : well groomed [General Appearance - Well Nourished] : well nourished [General Appearance - In No Acute Distress] : no acute distress [No Deformities] : no deformities [Eyelids - No Xanthelasma] : the eyelids demonstrated no xanthelasmas [Normal Conjunctiva] : the conjunctiva exhibited no abnormalities [Normal Oropharynx] : normal oropharynx [Neck Appearance] : the appearance of the neck was normal [Jugular Venous Distention Increased] : there was no jugular-venous distention [Neck Cervical Mass (___cm)] : no neck mass was observed [Thyroid Diffuse Enlargement] : the thyroid was not enlarged [Thyroid Nodule] : there were no palpable thyroid nodules [Heart Rate And Rhythm] : heart rate and rhythm were normal [Murmurs] : no murmurs present [Heart Sounds] : normal S1 and S2 [Respiration, Rhythm And Depth] : normal respiratory rhythm and effort [Auscultation Breath Sounds / Voice Sounds] : lungs were clear to auscultation bilaterally [Exaggerated Use Of Accessory Muscles For Inspiration] : no accessory muscle use [Abdomen Soft] : soft [Abdomen Mass (___ Cm)] : no abdominal mass palpated [Abdomen Tenderness] : non-tender [Abnormal Walk] : normal gait [Gait - Sufficient For Exercise Testing] : the gait was sufficient for exercise testing [Nail Clubbing] : no clubbing of the fingernails [Cyanosis, Localized] : no localized cyanosis [Petechial Hemorrhages (___cm)] : no petechial hemorrhages [] : no rash [Skin Color & Pigmentation] : normal skin color and pigmentation [Skin Lesions] : no skin lesions [No Venous Stasis] : no venous stasis [No Skin Ulcers] : no skin ulcer [No Xanthoma] : no  xanthoma was observed [Deep Tendon Reflexes (DTR)] : deep tendon reflexes were 2+ and symmetric [Sensation] : the sensory exam was normal to light touch and pinprick [No Focal Deficits] : no focal deficits [Impaired Insight] : insight and judgment were intact [Oriented To Time, Place, And Person] : oriented to person, place, and time [Affect] : the affect was normal

## 2019-08-22 ENCOUNTER — APPOINTMENT (OUTPATIENT)
Dept: ELECTROPHYSIOLOGY | Facility: CLINIC | Age: 84
End: 2019-08-22
Payer: MEDICARE

## 2019-08-22 VITALS
SYSTOLIC BLOOD PRESSURE: 122 MMHG | WEIGHT: 182 LBS | HEART RATE: 101 BPM | HEIGHT: 66 IN | DIASTOLIC BLOOD PRESSURE: 59 MMHG | BODY MASS INDEX: 29.25 KG/M2 | OXYGEN SATURATION: 96 %

## 2019-08-22 DIAGNOSIS — R00.2 PALPITATIONS: ICD-10-CM

## 2019-08-22 PROCEDURE — 93285 PRGRMG DEV EVAL SCRMS IP: CPT

## 2019-08-22 NOTE — DIETITIAN INITIAL EVALUATION ADULT. - NUTRITION INTERVENTION
[address] [facility]  Pt Name: Carter Moralez  MRN: [id number]  [place] 1965  Date of evaluation: 8/19/2019  Provider: CASSIE Tai CNP    CHIEF COMPLAINT       Chief Complaint   Patient presents with    Medication Refill       Nurses Notes reviewed and I agree except as noted in the HPI. HISTORY OF PRESENT ILLNESS    Carter Moralez is a 48 y.o. male whopresents to the emergency department from home requesting a refill on his combivent inhaler. The patient states he has had a cough and tightness from an asthma exacerbation for the past few days and he used all his inhaler. No fevers. Just cough and tightness. Triage notes and Nursing notes were reviewed by myself. Any discrepancies are addressed above. REVIEW OF SYSTEMS     Review of Systems   Constitutional: Negative for chills, fatigue and fever. HENT: Negative for congestion, ear discharge, ear pain, postnasal drip and rhinorrhea. Respiratory: Positive for cough, shortness of breath and wheezing. Negative for chest tightness. Cardiovascular: Negative for chest pain, palpitations and leg swelling. Gastrointestinal: Negative for abdominal pain. Genitourinary: Negative for difficulty urinating, dysuria, enuresis, flank pain and hematuria. Musculoskeletal: Negative for back pain and joint swelling. Neurological: Negative for dizziness, light-headedness, numbness and headaches. Psychiatric/Behavioral: Negative for agitation, behavioral problems and confusion. All pertinent systems were reviewed and are negative unless indicated in the HPI. PAST MEDICAL HISTORY    has a past medical history of Hypertension. SURGICAL HISTORY      has no past surgical history on file.     CURRENT MEDICATIONS       Discharge Medication List as of 8/20/2019 12:47 AM      CONTINUE these medications which have NOT CHANGED    Details   albuterol-ipratropium (COMBIVENT)  MCG/ACT inhaler Inhale 2 puffs Nutrition Education/Meals and Snack

## 2019-08-31 ENCOUNTER — APPOINTMENT (OUTPATIENT)
Dept: ELECTROPHYSIOLOGY | Facility: CLINIC | Age: 84
End: 2019-08-31
Payer: MEDICARE

## 2019-08-31 PROCEDURE — 93298 REM INTERROG DEV EVAL SCRMS: CPT

## 2019-08-31 PROCEDURE — 93299: CPT

## 2019-10-07 ENCOUNTER — EMERGENCY (EMERGENCY)
Facility: HOSPITAL | Age: 84
LOS: 0 days | Discharge: HOME | End: 2019-10-08
Attending: EMERGENCY MEDICINE
Payer: MEDICARE

## 2019-10-07 VITALS
DIASTOLIC BLOOD PRESSURE: 76 MMHG | SYSTOLIC BLOOD PRESSURE: 122 MMHG | RESPIRATION RATE: 17 BRPM | HEART RATE: 80 BPM | OXYGEN SATURATION: 95 % | TEMPERATURE: 98 F

## 2019-10-07 VITALS — WEIGHT: 179.9 LBS | HEIGHT: 66 IN

## 2019-10-07 DIAGNOSIS — Z96.653 PRESENCE OF ARTIFICIAL KNEE JOINT, BILATERAL: ICD-10-CM

## 2019-10-07 DIAGNOSIS — Z96.659 PRESENCE OF UNSPECIFIED ARTIFICIAL KNEE JOINT: Chronic | ICD-10-CM

## 2019-10-07 DIAGNOSIS — G25.81 RESTLESS LEGS SYNDROME: ICD-10-CM

## 2019-10-07 DIAGNOSIS — Z98.49 CATARACT EXTRACTION STATUS, UNSPECIFIED EYE: Chronic | ICD-10-CM

## 2019-10-07 DIAGNOSIS — M79.9 SOFT TISSUE DISORDER, UNSPECIFIED: ICD-10-CM

## 2019-10-07 DIAGNOSIS — I10 ESSENTIAL (PRIMARY) HYPERTENSION: ICD-10-CM

## 2019-10-07 DIAGNOSIS — K21.9 GASTRO-ESOPHAGEAL REFLUX DISEASE WITHOUT ESOPHAGITIS: ICD-10-CM

## 2019-10-07 DIAGNOSIS — S68.119A COMPLETE TRAUMATIC METACARPOPHALANGEAL AMPUTATION OF UNSPECIFIED FINGER, INITIAL ENCOUNTER: Chronic | ICD-10-CM

## 2019-10-07 DIAGNOSIS — M54.5 LOW BACK PAIN: ICD-10-CM

## 2019-10-07 DIAGNOSIS — Z98.890 OTHER SPECIFIED POSTPROCEDURAL STATES: Chronic | ICD-10-CM

## 2019-10-07 DIAGNOSIS — M25.551 PAIN IN RIGHT HIP: ICD-10-CM

## 2019-10-07 DIAGNOSIS — E78.5 HYPERLIPIDEMIA, UNSPECIFIED: ICD-10-CM

## 2019-10-07 LAB
ALBUMIN SERPL ELPH-MCNC: 3.9 G/DL — SIGNIFICANT CHANGE UP (ref 3.3–5)
ALP SERPL-CCNC: 79 U/L — SIGNIFICANT CHANGE UP (ref 40–120)
ALT FLD-CCNC: 29 U/L — SIGNIFICANT CHANGE UP (ref 12–78)
ANION GAP SERPL CALC-SCNC: 5 MMOL/L — SIGNIFICANT CHANGE UP (ref 5–17)
AST SERPL-CCNC: 25 U/L — SIGNIFICANT CHANGE UP (ref 15–37)
BASOPHILS # BLD AUTO: 0.08 K/UL — SIGNIFICANT CHANGE UP (ref 0–0.2)
BASOPHILS NFR BLD AUTO: 0.9 % — SIGNIFICANT CHANGE UP (ref 0–2)
BILIRUB SERPL-MCNC: 0.4 MG/DL — SIGNIFICANT CHANGE UP (ref 0.2–1.2)
BUN SERPL-MCNC: 29 MG/DL — HIGH (ref 7–23)
CALCIUM SERPL-MCNC: 9.4 MG/DL — SIGNIFICANT CHANGE UP (ref 8.5–10.1)
CHLORIDE SERPL-SCNC: 107 MMOL/L — SIGNIFICANT CHANGE UP (ref 96–108)
CO2 SERPL-SCNC: 30 MMOL/L — SIGNIFICANT CHANGE UP (ref 22–31)
CREAT SERPL-MCNC: 1.17 MG/DL — SIGNIFICANT CHANGE UP (ref 0.5–1.3)
EOSINOPHIL # BLD AUTO: 0.49 K/UL — SIGNIFICANT CHANGE UP (ref 0–0.5)
EOSINOPHIL NFR BLD AUTO: 5.4 % — SIGNIFICANT CHANGE UP (ref 0–6)
GLUCOSE SERPL-MCNC: 100 MG/DL — HIGH (ref 70–99)
HCT VFR BLD CALC: 39.5 % — SIGNIFICANT CHANGE UP (ref 39–50)
HGB BLD-MCNC: 12.2 G/DL — LOW (ref 13–17)
IMM GRANULOCYTES NFR BLD AUTO: 0.7 % — SIGNIFICANT CHANGE UP (ref 0–1.5)
LYMPHOCYTES # BLD AUTO: 1.91 K/UL — SIGNIFICANT CHANGE UP (ref 1–3.3)
LYMPHOCYTES # BLD AUTO: 20.9 % — SIGNIFICANT CHANGE UP (ref 13–44)
MCHC RBC-ENTMCNC: 27.2 PG — SIGNIFICANT CHANGE UP (ref 27–34)
MCHC RBC-ENTMCNC: 30.9 GM/DL — LOW (ref 32–36)
MCV RBC AUTO: 88.2 FL — SIGNIFICANT CHANGE UP (ref 80–100)
MONOCYTES # BLD AUTO: 0.81 K/UL — SIGNIFICANT CHANGE UP (ref 0–0.9)
MONOCYTES NFR BLD AUTO: 8.9 % — SIGNIFICANT CHANGE UP (ref 2–14)
NEUTROPHILS # BLD AUTO: 5.79 K/UL — SIGNIFICANT CHANGE UP (ref 1.8–7.4)
NEUTROPHILS NFR BLD AUTO: 63.2 % — SIGNIFICANT CHANGE UP (ref 43–77)
PLATELET # BLD AUTO: 259 K/UL — SIGNIFICANT CHANGE UP (ref 150–400)
POTASSIUM SERPL-MCNC: 4.7 MMOL/L — SIGNIFICANT CHANGE UP (ref 3.5–5.3)
POTASSIUM SERPL-SCNC: 4.7 MMOL/L — SIGNIFICANT CHANGE UP (ref 3.5–5.3)
PROT SERPL-MCNC: 7 GM/DL — SIGNIFICANT CHANGE UP (ref 6–8.3)
RBC # BLD: 4.48 M/UL — SIGNIFICANT CHANGE UP (ref 4.2–5.8)
RBC # FLD: 17 % — HIGH (ref 10.3–14.5)
SODIUM SERPL-SCNC: 142 MMOL/L — SIGNIFICANT CHANGE UP (ref 135–145)
WBC # BLD: 9.14 K/UL — SIGNIFICANT CHANGE UP (ref 3.8–10.5)
WBC # FLD AUTO: 9.14 K/UL — SIGNIFICANT CHANGE UP (ref 3.8–10.5)

## 2019-10-07 PROCEDURE — 80053 COMPREHEN METABOLIC PANEL: CPT

## 2019-10-07 PROCEDURE — 73702 CT LWR EXTREMITY W/O&W/DYE: CPT | Mod: RT

## 2019-10-07 PROCEDURE — 73702 CT LWR EXTREMITY W/O&W/DYE: CPT | Mod: 26,RT

## 2019-10-07 PROCEDURE — 36415 COLL VENOUS BLD VENIPUNCTURE: CPT

## 2019-10-07 PROCEDURE — 99284 EMERGENCY DEPT VISIT MOD MDM: CPT | Mod: 25

## 2019-10-07 PROCEDURE — 99284 EMERGENCY DEPT VISIT MOD MDM: CPT

## 2019-10-07 PROCEDURE — 85025 COMPLETE CBC W/AUTO DIFF WBC: CPT

## 2019-10-07 RX ORDER — SODIUM CHLORIDE 9 MG/ML
1000 INJECTION INTRAMUSCULAR; INTRAVENOUS; SUBCUTANEOUS ONCE
Refills: 0 | Status: COMPLETED | OUTPATIENT
Start: 2019-10-07 | End: 2019-10-07

## 2019-10-07 RX ADMIN — SODIUM CHLORIDE 1000 MILLILITER(S): 9 INJECTION INTRAMUSCULAR; INTRAVENOUS; SUBCUTANEOUS at 19:45

## 2019-10-07 NOTE — ED PROVIDER NOTE - PATIENT PORTAL LINK FT
You can access the FollowMyHealth Patient Portal offered by [facility] by registering at the following website: [email or web address]. By joining [employer]’s FollowMyHealth portal, you will also be able to view your health information using other applications (apps) compatible with our system.

## 2019-10-07 NOTE — ED STATDOCS - CLINICAL SUMMARY MEDICAL DECISION MAKING FREE TEXT BOX
Pt presents to the ED with lesion in right iliac muscle. Will send pt to main ED for further evaluation.

## 2019-10-07 NOTE — ED PROVIDER NOTE - PMH
Aortic root dilatation    Aortic valve insufficiency, unspecified etiology    BPH associated with nocturia    Diverticulosis of intestine without bleeding, unspecified intestinal tract location    Essential hypertension    Fall (on) (from) other stairs and steps, sequela  03/28/2017- lumbar hemtoma  Gastroesophageal reflux disease, esophagitis presence not specified    Hard of hearing    HLD (hyperlipidemia)    [place] (hard of hearing)    Lumbar degenerative disc disease    MRSA (methicillin resistant Staphylococcus aureus)  left forearm 2012  Nerve injury  complication from right TKR surgery, 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  CAMILA (obstructive sleep apnea)  unable to tolerate nocturnal CPAP  Restless leg syndrome    Thoracic aortic aneurysm

## 2019-10-07 NOTE — ED STATDOCS - PROGRESS NOTE DETAILS
Vivienne HAIDER for ED attending, Dr. Sales: 82 y/o male with PMHx of aortic root dilatation, aortic valve insufficiency, BPH, diverticulosis, HTN, GERD, HLD, lumbar degenerative disc disease, MRSA left forearm, CAMILA, restless leg syndrome, thoracic aortic aneurysm, s/p left index finger amputation, s/p bilateral knee replacement, s/p right rotator cuff surgery presents to the ED c/o lower back pain. Pain is intermittent. Per wife, pt has had excruciating back pain so was seen by spine Dr. Durbin who sent pt for CT with contrast at [facility], [place]. CT revealed moderate to severe degeneration of the right hip, most consistent with extension of fluid from right hip joint. Told to f/u with hip oncologist in [place] but wife brought pt to [facility] since it is closer. Will send pt to main ED for further evaluation.

## 2019-10-07 NOTE — ED PROVIDER NOTE - MUSCULOSKELETAL, MLM
Spine appears normal, range of motion is not limited, no muscle or joint tenderness Spine appears normal, range of motion is not limited, no muscle or joint tenderness. 5/5 strength on flexion and extension of all limbs. No nuchal rigidity.

## 2019-10-07 NOTE — ED PROVIDER NOTE - NEUROLOGICAL, MLM
Alert and oriented, no focal deficits, no motor or sensory deficits. Alert and oriented, no focal deficits, no motor or sensory deficits. CNs 2-12 intact. NIH=0 GCS=15. no saddle anesthesia.

## 2019-10-07 NOTE — ED PROVIDER NOTE - CLINICAL SUMMARY MEDICAL DECISION MAKING FREE TEXT BOX
plan imaging and consult ortho/ spine. plan imaging and consult ortho/ spine.    Abdi ARECHIGA: Imaging done as there is a risk of abscess, or expanding hematoma, As per our CT report, likely a cyst. Patient with lower back pain, and muscle aches so blood work done to rule out hypokalemia, and other hematological causes of myalgia, pain. Results discussed with patient. Patient neurovascular intact on dc and amicable to leave with outpatient follow up.

## 2019-10-07 NOTE — ED PROVIDER NOTE - NS_ ATTENDINGSCRIBEDETAILS _ED_A_ED_FT
I James Patton MD saw and examined the patient. Scribe documented for me and under my supervision. I have modified the scribe's documentation where necessary to reflect my history, physical exam and other relevant documentations pertinent to the care of the patient.

## 2019-10-07 NOTE — ED ADULT NURSE NOTE - NSIMPLEMENTINTERV_GEN_ALL_ED
Implemented All Fall Risk Interventions:  [place] to call system. Call bell, personal items and telephone within reach. Instruct patient to call for assistance. Room bathroom lighting operational. Non-slip footwear when patient is off stretcher. Physically safe environment: no spills, clutter or unnecessary equipment. Stretcher in lowest position, wheels locked, appropriate side rails in place. Provide visual cue, wrist band, yellow gown, etc. Monitor gait and stability. Monitor for mental status changes and reorient to person, place, and time. Review medications for side effects contributing to fall risk. Reinforce activity limits and safety measures with patient and family.

## 2019-10-07 NOTE — ED ADULT TRIAGE NOTE - CHIEF COMPLAINT QUOTE
SENT BY DR STONE FOR LESION IN RIGHT ILIAC MUSCLE.  PATIENT HAD CT SCAN FOR LOWER BACK PAIN WHICH REVEALED A LESION.  WIFE WAS TOLD TO BRING PT TO ORTHO ONCOLOGIST,  SHE WANTED TO BRING PATIENT TO ER FOR EVALUATION FIRST

## 2019-10-07 NOTE — ED STATDOCS - PMH
Aortic root dilatation    Aortic valve insufficiency, unspecified etiology    BPH associated with nocturia    Diverticulosis of intestine without bleeding, unspecified intestinal tract location    Essential hypertension    Fall (on) (from) other stairs and steps, sequela  03/28/2017- lumbar hemtoma  Gastroesophageal reflux disease, esophagitis presence not specified    Hard of hearing    HLD (hyperlipidemia)    [place] (hard of hearing)    Lumbar degenerative disc disease    MRSA (methicillin resistant Staphylococcus aureus)  left forearm 2012  Nerve injury  complication from right TKR surgery, 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  CAMILA (obstructive sleep apnea)  unable to tolerate nocturnal CPAP  Restless leg syndrome    Thoracic aortic aneurysm

## 2019-10-07 NOTE — ED PROVIDER NOTE - CARE PLAN
Principal Discharge DX:	Arthralgia of hip, unspecified laterality Principal Discharge DX:	Arthralgia of hip, unspecified laterality  Secondary Diagnosis:	Cyst of soft tissue

## 2019-10-07 NOTE — ED PROVIDER NOTE - OBJECTIVE STATEMENT
84 y/o male with PMHx of aortic root dilatation, aortic valve insufficiency, BPH, diverticulosis, AFib, GERD, HLD, lumbar degenerative disc disease, MRSA left forearm, CAMILA, restless leg syndrome, thoracic aortic aneurysm, fixture via resection, s/p left index finger amputation, s/p bilateral knee replacement, s/p right rotator cuff surgery presents to the ED c/o intermittent,  lower back pain. Per wife, pt has had excruciating back pain so was seen by spine Dr. Billy Durbin who sent pt for CT with contrast at [facility]. CT with contrast revealed low-attenuation lesion involving the deep portion of the right iliacus muscle and moderate to severe degeneration of the right hip, most consistent with extension of fluid from right hip joint. Told to f/u with hip oncologist in [place] but wife brought pt to [facility] since it is closer. PMD: Dr. Dennis Soriano 82 y/o male with PMHx of aortic root dilatation, aortic valve insufficiency, BPH, diverticulosis, AFib, GERD, HLD, lumbar degenerative disc disease, MRSA left forearm, CAMILA, restless leg syndrome, thoracic aortic aneurysm, fixture via resection, s/p left index finger amputation, s/p bilateral knee replacement, s/p right rotator cuff surgery presents to the ED c/o intermittent, lower back pain. Per wife, pt has had excruciating back pain so was seen by spine Dr. Billy Durbin who sent pt for CT with contrast at [facility]. CT with contrast revealed low-attenuation lesion involving the deep portion of the right iliacus muscle and moderate to severe degeneration of the right hip, most consistent with extension of fluid from right hip joint. no saddle anesthesia, no tingling in legs, no motor weakness of legs. Told to f/u with hip oncologist in [place] but wife brought pt to [facility] since it is closer. PMD: Dr. Dennis Soriano

## 2019-10-10 ENCOUNTER — APPOINTMENT (OUTPATIENT)
Age: 84
End: 2019-10-10
Payer: MEDICARE

## 2019-10-10 VITALS
SYSTOLIC BLOOD PRESSURE: 111 MMHG | BODY MASS INDEX: 29.25 KG/M2 | HEART RATE: 64 BPM | WEIGHT: 182 LBS | HEIGHT: 66 IN | DIASTOLIC BLOOD PRESSURE: 72 MMHG

## 2019-10-10 DIAGNOSIS — M79.89 OTHER SPECIFIED SOFT TISSUE DISORDERS: ICD-10-CM

## 2019-10-10 PROCEDURE — 99203 OFFICE O/P NEW LOW 30 MIN: CPT

## 2019-10-10 NOTE — PHYSICAL EXAM
[FreeTextEntry1] : Physical exam reveals a healthy-looking patient in no apparent distress patient appeared to be fully alert and oriented examination of both hips demonstrates full range of motion no swelling and no palpable mass basically at this stage patient having no pain examination of the pelvic region show no N. swelling no palpable mass no gross neuromuscular deficit at this stage the impression is patient having an incidental finding of a benign-appearing cystic mass at the level of the iliopsoas muscle right pelvis with a degenerative arthritis of the lumbar spine which may could be the soles of the pain at this stage patient was recommended to be followed conservatively and to be seen again in 4 months for followup

## 2019-10-10 NOTE — HISTORY OF PRESENT ILLNESS
[FreeTextEntry1] : This is the first 2 cc of an 83 years old male who presented for evaluation of intermittent pain and tenderness over the right he felt his gluteal area no history of trauma or injury recent CAT scan of the pelvis demonstrated a small fluid-filled cystic area involving the medial capsule as a muscle which suggest a possible incidental unrelated to finding do some time x-ray of the spine demonstrated degenerative arthritic changes with a disc disease peripheral osteophytes which may include cause pinched nerve and shooting pain to the right gluteal area

## 2019-11-11 ENCOUNTER — APPOINTMENT (OUTPATIENT)
Dept: ELECTROPHYSIOLOGY | Facility: CLINIC | Age: 84
End: 2019-11-11
Payer: MEDICARE

## 2019-11-11 PROCEDURE — 93299: CPT

## 2019-11-11 PROCEDURE — 93298 REM INTERROG DEV EVAL SCRMS: CPT

## 2019-12-12 ENCOUNTER — APPOINTMENT (OUTPATIENT)
Dept: ELECTROPHYSIOLOGY | Facility: CLINIC | Age: 84
End: 2019-12-12
Payer: MEDICARE

## 2019-12-12 PROCEDURE — 93298 REM INTERROG DEV EVAL SCRMS: CPT

## 2019-12-12 PROCEDURE — 93299: CPT

## 2020-01-12 ENCOUNTER — APPOINTMENT (OUTPATIENT)
Dept: ELECTROPHYSIOLOGY | Facility: CLINIC | Age: 85
End: 2020-01-12
Payer: MEDICARE

## 2020-01-12 PROCEDURE — G2066: CPT

## 2020-01-12 PROCEDURE — 93298 REM INTERROG DEV EVAL SCRMS: CPT

## 2020-02-06 ENCOUNTER — INPATIENT (INPATIENT)
Facility: HOSPITAL | Age: 85
LOS: 4 days | Discharge: HOME HEALTH | DRG: 177 | End: 2020-02-11
Attending: INTERNAL MEDICINE | Admitting: INTERNAL MEDICINE
Payer: MEDICARE

## 2020-02-06 VITALS — WEIGHT: 190.04 LBS | HEIGHT: 67 IN

## 2020-02-06 DIAGNOSIS — Z96.659 PRESENCE OF UNSPECIFIED ARTIFICIAL KNEE JOINT: Chronic | ICD-10-CM

## 2020-02-06 DIAGNOSIS — Z98.890 OTHER SPECIFIED POSTPROCEDURAL STATES: Chronic | ICD-10-CM

## 2020-02-06 DIAGNOSIS — S68.119A COMPLETE TRAUMATIC METACARPOPHALANGEAL AMPUTATION OF UNSPECIFIED FINGER, INITIAL ENCOUNTER: Chronic | ICD-10-CM

## 2020-02-06 DIAGNOSIS — J20.5 ACUTE BRONCHITIS DUE TO RESPIRATORY SYNCYTIAL VIRUS: ICD-10-CM

## 2020-02-06 DIAGNOSIS — Z95.2 PRESENCE OF PROSTHETIC HEART VALVE: Chronic | ICD-10-CM

## 2020-02-06 DIAGNOSIS — Z98.49 CATARACT EXTRACTION STATUS, UNSPECIFIED EYE: Chronic | ICD-10-CM

## 2020-02-06 LAB
ALBUMIN SERPL ELPH-MCNC: 3.5 G/DL — SIGNIFICANT CHANGE UP (ref 3.3–5)
ALP SERPL-CCNC: 87 U/L — SIGNIFICANT CHANGE UP (ref 40–120)
ALT FLD-CCNC: 16 U/L — SIGNIFICANT CHANGE UP (ref 12–78)
ANION GAP SERPL CALC-SCNC: 6 MMOL/L — SIGNIFICANT CHANGE UP (ref 5–17)
AST SERPL-CCNC: 20 U/L — SIGNIFICANT CHANGE UP (ref 15–37)
BASOPHILS # BLD AUTO: 0.05 K/UL — SIGNIFICANT CHANGE UP (ref 0–0.2)
BASOPHILS NFR BLD AUTO: 0.7 % — SIGNIFICANT CHANGE UP (ref 0–2)
BILIRUB SERPL-MCNC: 0.8 MG/DL — SIGNIFICANT CHANGE UP (ref 0.2–1.2)
BUN SERPL-MCNC: 26 MG/DL — HIGH (ref 7–23)
CALCIUM SERPL-MCNC: 9.1 MG/DL — SIGNIFICANT CHANGE UP (ref 8.5–10.1)
CHLORIDE SERPL-SCNC: 108 MMOL/L — SIGNIFICANT CHANGE UP (ref 96–108)
CO2 SERPL-SCNC: 26 MMOL/L — SIGNIFICANT CHANGE UP (ref 22–31)
CREAT SERPL-MCNC: 1.13 MG/DL — SIGNIFICANT CHANGE UP (ref 0.5–1.3)
EOSINOPHIL # BLD AUTO: 0.29 K/UL — SIGNIFICANT CHANGE UP (ref 0–0.5)
EOSINOPHIL NFR BLD AUTO: 4 % — SIGNIFICANT CHANGE UP (ref 0–6)
GLUCOSE SERPL-MCNC: 105 MG/DL — HIGH (ref 70–99)
HCT VFR BLD CALC: 40.3 % — SIGNIFICANT CHANGE UP (ref 39–50)
HGB BLD-MCNC: 12.9 G/DL — LOW (ref 13–17)
IMM GRANULOCYTES NFR BLD AUTO: 0.4 % — SIGNIFICANT CHANGE UP (ref 0–1.5)
LACTATE SERPL-SCNC: 1.8 MMOL/L — SIGNIFICANT CHANGE UP (ref 0.7–2)
LYMPHOCYTES # BLD AUTO: 0.7 K/UL — LOW (ref 1–3.3)
LYMPHOCYTES # BLD AUTO: 9.6 % — LOW (ref 13–44)
MCHC RBC-ENTMCNC: 30.1 PG — SIGNIFICANT CHANGE UP (ref 27–34)
MCHC RBC-ENTMCNC: 32 GM/DL — SIGNIFICANT CHANGE UP (ref 32–36)
MCV RBC AUTO: 94.2 FL — SIGNIFICANT CHANGE UP (ref 80–100)
MONOCYTES # BLD AUTO: 1.04 K/UL — HIGH (ref 0–0.9)
MONOCYTES NFR BLD AUTO: 14.3 % — HIGH (ref 2–14)
NEUTROPHILS # BLD AUTO: 5.17 K/UL — SIGNIFICANT CHANGE UP (ref 1.8–7.4)
NEUTROPHILS NFR BLD AUTO: 71 % — SIGNIFICANT CHANGE UP (ref 43–77)
NT-PROBNP SERPL-SCNC: 1305 PG/ML — HIGH (ref 0–450)
PLATELET # BLD AUTO: 166 K/UL — SIGNIFICANT CHANGE UP (ref 150–400)
POTASSIUM SERPL-MCNC: 4.2 MMOL/L — SIGNIFICANT CHANGE UP (ref 3.5–5.3)
POTASSIUM SERPL-SCNC: 4.2 MMOL/L — SIGNIFICANT CHANGE UP (ref 3.5–5.3)
PROT SERPL-MCNC: 6.9 GM/DL — SIGNIFICANT CHANGE UP (ref 6–8.3)
RAPID RVP RESULT: DETECTED
RBC # BLD: 4.28 M/UL — SIGNIFICANT CHANGE UP (ref 4.2–5.8)
RBC # FLD: 17.1 % — HIGH (ref 10.3–14.5)
RSV RNA SPEC QL NAA+PROBE: DETECTED
SODIUM SERPL-SCNC: 140 MMOL/L — SIGNIFICANT CHANGE UP (ref 135–145)
TROPONIN I SERPL-MCNC: <0.015 NG/ML — SIGNIFICANT CHANGE UP (ref 0.01–0.04)
TROPONIN I SERPL-MCNC: <0.015 NG/ML — SIGNIFICANT CHANGE UP (ref 0.01–0.04)
WBC # BLD: 7.28 K/UL — SIGNIFICANT CHANGE UP (ref 3.8–10.5)
WBC # FLD AUTO: 7.28 K/UL — SIGNIFICANT CHANGE UP (ref 3.8–10.5)

## 2020-02-06 PROCEDURE — 74019 RADEX ABDOMEN 2 VIEWS: CPT

## 2020-02-06 PROCEDURE — 97162 PT EVAL MOD COMPLEX 30 MIN: CPT | Mod: GP

## 2020-02-06 PROCEDURE — 85025 COMPLETE CBC W/AUTO DIFF WBC: CPT

## 2020-02-06 PROCEDURE — 74018 RADEX ABDOMEN 1 VIEW: CPT

## 2020-02-06 PROCEDURE — 70450 CT HEAD/BRAIN W/O DYE: CPT

## 2020-02-06 PROCEDURE — 71250 CT THORAX DX C-: CPT | Mod: 26

## 2020-02-06 PROCEDURE — 97116 GAIT TRAINING THERAPY: CPT | Mod: GP

## 2020-02-06 PROCEDURE — 71045 X-RAY EXAM CHEST 1 VIEW: CPT

## 2020-02-06 PROCEDURE — 83735 ASSAY OF MAGNESIUM: CPT

## 2020-02-06 PROCEDURE — 71250 CT THORAX DX C-: CPT

## 2020-02-06 PROCEDURE — 71046 X-RAY EXAM CHEST 2 VIEWS: CPT | Mod: 26

## 2020-02-06 PROCEDURE — 80053 COMPREHEN METABOLIC PANEL: CPT

## 2020-02-06 PROCEDURE — 74176 CT ABD & PELVIS W/O CONTRAST: CPT

## 2020-02-06 PROCEDURE — 94640 AIRWAY INHALATION TREATMENT: CPT

## 2020-02-06 PROCEDURE — 80048 BASIC METABOLIC PNL TOTAL CA: CPT

## 2020-02-06 PROCEDURE — C9113: CPT

## 2020-02-06 PROCEDURE — 99497 ADVNCD CARE PLAN 30 MIN: CPT | Mod: 25

## 2020-02-06 PROCEDURE — 85027 COMPLETE CBC AUTOMATED: CPT

## 2020-02-06 PROCEDURE — 36415 COLL VENOUS BLD VENIPUNCTURE: CPT

## 2020-02-06 PROCEDURE — 84484 ASSAY OF TROPONIN QUANT: CPT

## 2020-02-06 PROCEDURE — 93005 ELECTROCARDIOGRAM TRACING: CPT

## 2020-02-06 PROCEDURE — 99223 1ST HOSP IP/OBS HIGH 75: CPT

## 2020-02-06 RX ORDER — IPRATROPIUM/ALBUTEROL SULFATE 18-103MCG
3 AEROSOL WITH ADAPTER (GRAM) INHALATION
Refills: 0 | Status: COMPLETED | OUTPATIENT
Start: 2020-02-06 | End: 2020-02-06

## 2020-02-06 RX ORDER — ACETAMINOPHEN 500 MG
650 TABLET ORAL EVERY 6 HOURS
Refills: 0 | Status: DISCONTINUED | OUTPATIENT
Start: 2020-02-06 | End: 2020-02-06

## 2020-02-06 RX ORDER — METOPROLOL TARTRATE 50 MG
2.5 TABLET ORAL EVERY 6 HOURS
Refills: 0 | Status: DISCONTINUED | OUTPATIENT
Start: 2020-02-06 | End: 2020-02-08

## 2020-02-06 RX ORDER — CEFTRIAXONE 500 MG/1
1000 INJECTION, POWDER, FOR SOLUTION INTRAMUSCULAR; INTRAVENOUS EVERY 24 HOURS
Refills: 0 | Status: DISCONTINUED | OUTPATIENT
Start: 2020-02-06 | End: 2020-02-06

## 2020-02-06 RX ORDER — FERROUS SULFATE 325(65) MG
325 TABLET ORAL DAILY
Refills: 0 | Status: DISCONTINUED | OUTPATIENT
Start: 2020-02-06 | End: 2020-02-06

## 2020-02-06 RX ORDER — POTASSIUM CHLORIDE 20 MEQ
10 PACKET (EA) ORAL
Refills: 0 | Status: DISCONTINUED | OUTPATIENT
Start: 2020-02-06 | End: 2020-02-06

## 2020-02-06 RX ORDER — PANTOPRAZOLE SODIUM 20 MG/1
40 TABLET, DELAYED RELEASE ORAL DAILY
Refills: 0 | Status: DISCONTINUED | OUTPATIENT
Start: 2020-02-07 | End: 2020-02-08

## 2020-02-06 RX ORDER — AZITHROMYCIN 500 MG/1
500 TABLET, FILM COATED ORAL EVERY 24 HOURS
Refills: 0 | Status: DISCONTINUED | OUTPATIENT
Start: 2020-02-07 | End: 2020-02-10

## 2020-02-06 RX ORDER — AZITHROMYCIN 500 MG/1
500 TABLET, FILM COATED ORAL ONCE
Refills: 0 | Status: COMPLETED | OUTPATIENT
Start: 2020-02-06 | End: 2020-02-06

## 2020-02-06 RX ORDER — METOPROLOL TARTRATE 50 MG
25 TABLET ORAL
Refills: 0 | Status: DISCONTINUED | OUTPATIENT
Start: 2020-02-06 | End: 2020-02-06

## 2020-02-06 RX ORDER — ONDANSETRON 8 MG/1
4 TABLET, FILM COATED ORAL EVERY 8 HOURS
Refills: 0 | Status: DISCONTINUED | OUTPATIENT
Start: 2020-02-06 | End: 2020-02-11

## 2020-02-06 RX ORDER — AZITHROMYCIN 500 MG/1
TABLET, FILM COATED ORAL
Refills: 0 | Status: DISCONTINUED | OUTPATIENT
Start: 2020-02-06 | End: 2020-02-10

## 2020-02-06 RX ORDER — DONEPEZIL HYDROCHLORIDE 10 MG/1
10 TABLET, FILM COATED ORAL AT BEDTIME
Refills: 0 | Status: DISCONTINUED | OUTPATIENT
Start: 2020-02-06 | End: 2020-02-06

## 2020-02-06 RX ORDER — GABAPENTIN 400 MG/1
100 CAPSULE ORAL THREE TIMES A DAY
Refills: 0 | Status: DISCONTINUED | OUTPATIENT
Start: 2020-02-06 | End: 2020-02-06

## 2020-02-06 RX ORDER — ENOXAPARIN SODIUM 100 MG/ML
40 INJECTION SUBCUTANEOUS DAILY
Refills: 0 | Status: DISCONTINUED | OUTPATIENT
Start: 2020-02-06 | End: 2020-02-11

## 2020-02-06 RX ORDER — ROPINIROLE 8 MG/1
2 TABLET, FILM COATED, EXTENDED RELEASE ORAL
Refills: 0 | Status: DISCONTINUED | OUTPATIENT
Start: 2020-02-06 | End: 2020-02-06

## 2020-02-06 RX ORDER — FUROSEMIDE 40 MG
2 TABLET ORAL
Qty: 0 | Refills: 0 | DISCHARGE

## 2020-02-06 RX ORDER — AMPICILLIN SODIUM AND SULBACTAM SODIUM 250; 125 MG/ML; MG/ML
3 INJECTION, POWDER, FOR SUSPENSION INTRAMUSCULAR; INTRAVENOUS EVERY 6 HOURS
Refills: 0 | Status: DISCONTINUED | OUTPATIENT
Start: 2020-02-06 | End: 2020-02-11

## 2020-02-06 RX ORDER — MEMANTINE HYDROCHLORIDE 10 MG/1
10 TABLET ORAL DAILY
Refills: 0 | Status: DISCONTINUED | OUTPATIENT
Start: 2020-02-06 | End: 2020-02-06

## 2020-02-06 RX ORDER — TIMOLOL 0.5 %
1 DROPS OPHTHALMIC (EYE)
Refills: 0 | Status: DISCONTINUED | OUTPATIENT
Start: 2020-02-06 | End: 2020-02-11

## 2020-02-06 RX ORDER — BRIMONIDINE TARTRATE 2 MG/MG
1 SOLUTION/ DROPS OPHTHALMIC
Refills: 0 | Status: DISCONTINUED | OUTPATIENT
Start: 2020-02-06 | End: 2020-02-11

## 2020-02-06 RX ORDER — MEMANTINE HYDROCHLORIDE 10 MG/1
1 TABLET ORAL
Qty: 0 | Refills: 0 | DISCHARGE

## 2020-02-06 RX ORDER — FUROSEMIDE 40 MG
40 TABLET ORAL
Refills: 0 | Status: DISCONTINUED | OUTPATIENT
Start: 2020-02-06 | End: 2020-02-06

## 2020-02-06 RX ORDER — FLUTICASONE PROPIONATE 50 MCG
2 SPRAY, SUSPENSION NASAL
Qty: 0 | Refills: 0 | DISCHARGE

## 2020-02-06 RX ORDER — GABAPENTIN 400 MG/1
1 CAPSULE ORAL
Qty: 0 | Refills: 0 | DISCHARGE

## 2020-02-06 RX ORDER — ATORVASTATIN CALCIUM 80 MG/1
20 TABLET, FILM COATED ORAL AT BEDTIME
Refills: 0 | Status: DISCONTINUED | OUTPATIENT
Start: 2020-02-06 | End: 2020-02-06

## 2020-02-06 RX ORDER — PANTOPRAZOLE SODIUM 20 MG/1
40 TABLET, DELAYED RELEASE ORAL
Refills: 0 | Status: DISCONTINUED | OUTPATIENT
Start: 2020-02-06 | End: 2020-02-06

## 2020-02-06 RX ADMIN — Medication 3 MILLILITER(S): at 12:20

## 2020-02-06 RX ADMIN — Medication 3 MILLILITER(S): at 12:19

## 2020-02-06 RX ADMIN — Medication 125 MILLIGRAM(S): at 12:23

## 2020-02-06 RX ADMIN — Medication 3 MILLILITER(S): at 12:23

## 2020-02-06 NOTE — ED ADULT TRIAGE NOTE - CHIEF COMPLAINT QUOTE
sent in by doctor for increased wheezing in past 2 days to r/o PN vs CHF, HX: heart failure, EF 55%-60%, AVR, AFIB, not on blood thinner due to GI bleed, has had sick contact at home, O2 sat in triage 86% on RA, pulse 59

## 2020-02-06 NOTE — ED PROVIDER NOTE - PMH
Aortic root dilatation    Aortic valve insufficiency, unspecified etiology    BPH associated with nocturia    Diverticulosis of intestine without bleeding, unspecified intestinal tract location    Essential hypertension    Fall (on) (from) other stairs and steps, sequela  03/28/2017- lumbar hemtoma  Gastroesophageal reflux disease, esophagitis presence not specified    Hard of hearing    HLD (hyperlipidemia)    [place] (hard of hearing)    Lumbar degenerative disc disease    MRSA (methicillin resistant Staphylococcus aureus)  left forearm 2012  Nerve injury  complication from right TKR surgery, 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  CAMILA (obstructive sleep apnea)  unable to tolerate nocturnal CPAP  Restless leg syndrome    Thoracic aortic aneurysm

## 2020-02-06 NOTE — H&P ADULT - NSHPPOAPULMEMBOLUS_GEN_A_CORE
Phone call placed to patient to let patient know that Dr Clay wants to change the date of his pacemaker appointment to Monday January 5 th at 7 am with an arrival at 5 am. Tana his wife was very happy to move the date up. Contacted [employer] to let them know the date change also.   no

## 2020-02-06 NOTE — H&P ADULT - NSHPPHYSICALEXAM_GEN_ALL_CORE
Vital Signs Last 24 Hrs  T(C): 36.3 (06 Feb 2020 15:04), Max: 36.6 (06 Feb 2020 11:46)  T(F): 97.4 (06 Feb 2020 15:04), Max: 97.9 (06 Feb 2020 11:46)  HR: 57 (06 Feb 2020 15:04) (56 - 57)  BP: 106/75 (06 Feb 2020 15:04) (106/75 - 147/121)  BP(mean): 131 (06 Feb 2020 11:46) (131 - 131)  RR: 19 (06 Feb 2020 15:04) (19 - 21)  SpO2: 97% (06 Feb 2020 15:04) (91% - 97%)

## 2020-02-06 NOTE — ED ADULT NURSE REASSESSMENT NOTE - NS ED NURSE REASSESS COMMENT FT1
report received from previous rn. color good, skin warm and dry, respirations even and unlabored. patient in no acute distress. vital signs stable. patient safety maintained. will continue to monitor.

## 2020-02-06 NOTE — ED PROVIDER NOTE - OBJECTIVE STATEMENT
85 y/o M with a PMHx of HTN, MRSA, BPH, CAMILA, thoracic aortic aneurysm, Afib, GI bleed, HLD, CHF presents to the ED BIBEMS sent by MD for increased wheezing over past 2 days. MD wants to r/o PNA vs CHF. +sick contact. Wife with URI type symptoms. Pt with cough and SOB x2 days. Had routine appt with MD Lynne and was found to be wheezing. Denies CP or increased BLE edema. +tactile fever yesterday. Pt is not anticoagulated.

## 2020-02-06 NOTE — H&P ADULT - NSICDXPASTSURGICALHX_GEN_ALL_CORE_FT
PAST SURGICAL HISTORY:  Amputation finger left index 1970    History of fundoplication 2000    S/P debridement left forearm -mrsa    S/P knee replacement right    S/P knee replacement left    S/P shoulder surgery right rotatotor cuff injury    Status post cataract extraction, unspecified laterality bilat PAST SURGICAL HISTORY:  Amputation finger left index 1970    History of fundoplication 2000    S/P AVR (aortic valve replacement)     S/P debridement left forearm -mrsa    S/P knee replacement right    S/P knee replacement left    S/P shoulder surgery right rotatotor cuff injury    Status post cataract extraction, unspecified laterality bilat PAST SURGICAL HISTORY:  Amputation finger left index 1970    History of fundoplication 2000    S/P AVR (aortic valve replacement)     S/P debridement left forearm -mrsa    S/P knee replacement right    S/P knee replacement left    S/P shoulder surgery right rotatotor cuff injury    S/P thoracic aortic aneurysm repair     Status post cataract extraction, unspecified laterality bilat

## 2020-02-06 NOTE — ED ADULT NURSE NOTE - CHIEF COMPLAINT QUOTE
sent in by doctor for increased wheezing in past 2 days to r/o PN vs CHF, HX: heart failure, EF 55%-60%, AVR, AFIB, not on blood thinner due to GI bleed, has had sick contact at home, O2 sat in triage 86% on RA, pulse 59 Xeljanz Counseling: I discussed with the patient the risks of Xeljanz therapy including increased risk of infection, liver issues, headache, diarrhea, or cold symptoms. Live vaccines should be avoided. They were instructed to call if they have any problems.

## 2020-02-06 NOTE — H&P ADULT - ASSESSMENT
83 y/o M PMHx significant for non-obstructive CAD, aortic root dilation, aortic valve insufficiency, s/p bioprosthetic aortic valve replacement and ascending aortic root graft, A-fib (not on AC due to recent GIBleed), [place] (s/p cochlear implant), chronic imbalance, Hypertension, Restless Leg Syndrome, CAMILA unable to tolerate CPAP, was referred by his Cardiologist to  for further evaluation and management of progressive shortness of breath associated with increased wheezing, subjective fevers, and cough over the past 2 days. The patient admits to recent sick contacts with his wife with URI symptoms at home. The patient denies any associated chest pain, lower extremity edema. Labs => BUN/Cr 26/1.13, proBNP 1305, RVP was (+) RSV. In the ED the patient was given Methylprednisolone 125mg IVP x 1, and Combivent nebs x 3. 85 y/o M PMHx significant for non-obstructive CAD, aortic root dilation, aortic valve insufficiency, s/p bioprosthetic aortic valve replacement and ascending aortic root graft, A-fib (not on AC due to recent GIBleed), [place] (s/p cochlear implant), chronic imbalance, Hypertension, Restless Leg Syndrome, CAMILA unable to tolerate CPAP, was referred by his Cardiologist to  for further evaluation and management of progressive shortness of breath associated with increased wheezing, subjective fevers, and cough over the past 2 days. The patient admits to recent sick contacts with his wife with URI symptoms at home. The patient denies any associated chest pain, lower extremity edema. Labs => BUN/Cr 26/1.13, proBNP 1305, RVP was (+) RSV. In the ED the patient was given Methylprednisolone 125mg IVP x 1, and Combivent nebs x 3.    #Acute RSV Bronchiolitis  ~admit to Medicine  ~cont. supplemental O2 prn  ~pulse oximetry q4h  ~encourage pulmonary toileting  ~cont. anti-tussives prn  ~cont. anti-pyretics prn  ~cont. Combivent nebs prn    #CAD/AFib  ~as noted not on A/C due to recent GI bleeding  ~cont. Metoprolol 25mg po bid    #Hyperlipidemia  ~cont. statin therapy w/ Atorvastatin 20mg po qhs    #Dementia  ~cont. Donepezil 10mg po qhs  ~cont. Memantine 10mg po daily    #RLS - Restless Leg Syndrome  ~cont. Ropinerole 2mg po 6x/daily    #GERD  ~cont. PPI w/ Pantoprazole 40mg po daily    #Glaucoma  ~cont. Brimonidine 0.2% 1gtt OS bid  ~cont. Timolol 0.5% 1gtt OS bid    #Vte ppx  ~IMPROVE VTE Risk Score is 2  [  ] Previous VTE                                                  3  [  ] Thrombophilia                                               2  [  ] Lower limb paralysis                                      2        (unable to hold up >15 seconds)    [  ] Current Cancer                                              2         (within 6 months)  [X] Immobilization > 24 hrs                                1  [  ] ICU/CCU stay > 24 hours                              1  [X] Age > 60                                                      1  ~cont. LMWH sq 83 y/o M PMHx significant for non-obstructive CAD, aortic root dilation, aortic valve insufficiency, s/p bioprosthetic aortic valve replacement and ascending aortic root graft, A-fib (not on AC due to recent GIBleed), [place] (s/p cochlear implant), chronic imbalance, Hypertension, Restless Leg Syndrome, CAMILA unable to tolerate CPAP, was referred by his Cardiologist to  for further evaluation and management of progressive shortness of breath associated with increased wheezing, subjective fevers, and cough over the past 2 days. The patient admits to recent sick contacts with his wife with URI symptoms at home. The patient denies any associated chest pain, lower extremity edema. Labs => BUN/Cr 26/1.13, proBNP 1305, RVP was (+) RSV. In the ED the patient was given Methylprednisolone 125mg IVP x 1, and Combivent nebs x 3.    #Acute RSV Bronchiolitis  ~admit to Medicine  ~cont. supplemental O2 prn  ~pulse oximetry q4h  ~encourage pulmonary toileting  ~cont. anti-tussives prn  ~cont. anti-pyretics prn  ~cont. Combivent nebs prn    #CAD/AFib  ~as noted not on A/C due to recent GI bleeding  ~cont. Metoprolol 25mg po bid    #CHF (compensated)  ~cont. Furosemide (reassess and dose daily)  ~cont. Potassium supplementation (reassess and dose daily)    #Hyperlipidemia  ~cont. statin therapy w/ Atorvastatin 20mg po qhs    #Dementia  ~cont. Donepezil 10mg po qhs  ~cont. Memantine 10mg po daily    #RLS - Restless Leg Syndrome  ~cont. Ropinerole 2mg po 6x/daily    #GERD  ~cont. PPI w/ Pantoprazole 40mg po daily    #Glaucoma  ~cont. Brimonidine 0.2% 1gtt OS bid  ~cont. Timolol 0.5% 1gtt OS bid    #Vte ppx  ~IMPROVE VTE Risk Score is 2  [  ] Previous VTE                                                  3  [  ] Thrombophilia                                               2  [  ] Lower limb paralysis                                      2        (unable to hold up >15 seconds)    [  ] Current Cancer                                              2         (within 6 months)  [X] Immobilization > 24 hrs                                1  [  ] ICU/CCU stay > 24 hours                              1  [X] Age > 60                                                      1  ~cont. LMWH sq 83 y/o M PMHx significant for non-obstructive CAD, aortic root dilation, aortic valve insufficiency, s/p bioprosthetic aortic valve replacement and ascending aortic root graft, A-fib (not on AC due to recent GIBleed), [place] (s/p cochlear implant), chronic imbalance, Hypertension, Restless Leg Syndrome, ACMILA unable to tolerate CPAP, was referred by his Cardiologist to  for further evaluation and management of progressive shortness of breath associated with increased wheezing, subjective fevers, and cough over the past 2 days. The patient admits to recent sick contacts with his wife with URI symptoms at home. The patient denies any associated chest pain, lower extremity edema. Labs => BUN/Cr 26/1.13, proBNP 1305, RVP was (+) RSV. In the ED the patient was given Methylprednisolone 125mg IVP x 1, and Combivent nebs x 3.    #Acute RSV Bronchiolitis  #Acute Hypoxic Respiratory Failure  ~admit to Medicine  ~cont. supplemental O2 prn  ~pulse oximetry q4h  ~encourage pulmonary toileting  ~cont. anti-tussives prn  ~cont. anti-pyretics prn  ~cont. Combivent nebs prn  ~f/u CT chest    #CAD/AFib  ~as noted not on A/C due to recent GI bleeding  ~cont. Metoprolol 25mg po bid    #CHF (compensated)  ~cont. Furosemide (reassess and dose daily)  ~cont. Potassium supplementation (reassess and dose daily)    #Hyperlipidemia  ~cont. statin therapy w/ Atorvastatin 20mg po qhs    #Dementia  ~cont. Donepezil 10mg po qhs  ~cont. Memantine 10mg po daily    #RLS - Restless Leg Syndrome  ~cont. Ropinerole 2mg po 6x/daily    #GERD  ~cont. PPI w/ Pantoprazole 40mg po daily    #Glaucoma  ~cont. Brimonidine 0.2% 1gtt OS bid  ~cont. Timolol 0.5% 1gtt OS bid    #Vte ppx  ~IMPROVE VTE Risk Score is 2  [  ] Previous VTE                                                  3  [  ] Thrombophilia                                               2  [  ] Lower limb paralysis                                      2        (unable to hold up >15 seconds)    [  ] Current Cancer                                              2         (within 6 months)  [X] Immobilization > 24 hrs                                1  [  ] ICU/CCU stay > 24 hours                              1  [X] Age > 60                                                      1  ~cont. LMWH sq 85 y/o M PMHx significant for non-obstructive CAD, aortic root dilation, aortic valve insufficiency, s/p bioprosthetic aortic valve replacement and ascending aortic root graft, A-fib (not on AC due to recent GIBleed), [place] (s/p cochlear implant), chronic imbalance, Hypertension, Restless Leg Syndrome, CAMILA unable to tolerate CPAP, was referred by his Cardiologist to  for further evaluation and management of progressive shortness of breath associated with increased wheezing, subjective fevers, and cough over the past 2 days. The patient admits to recent sick contacts with his wife with URI symptoms at home. The patient denies any associated chest pain, lower extremity edema. Labs => BUN/Cr 26/1.13, proBNP 1305, RVP was (+) RSV. In the ED the patient was given Methylprednisolone 125mg IVP x 1, and Combivent nebs x 3.    #Acute RSV Bronchiolitis complicated by Pneumonia  #Acute Hypoxic Respiratory Failure  ~admit to Medicine  ~cont. supplemental O2 prn  ~pulse oximetry q4h  ~encourage pulmonary toileting  ~cont. anti-tussives prn  ~cont. anti-pyretics prn  ~cont. Combivent nebs prn  ~f/u PAN C+S  ~cont. IV ab    #Esophageal Dilatation  ~as noted on CT patient was found to have signs of severe esophageal dilatation and concern for gastric outlet obstruction  ~NPO for now  ~f/u w/ GI and Surgery consultations  ~consider NGT insertion    #CAD/AFib  ~as noted not on A/C due to recent GI bleeding  ~cont. Metoprolol 25mg po bid    #CHF (compensated)  ~cont. Furosemide (reassess and dose daily)  ~cont. Potassium supplementation (reassess and dose daily)    #Hyperlipidemia  ~cont. statin therapy w/ Atorvastatin 20mg po qhs    #Dementia  ~cont. Donepezil 10mg po qhs  ~cont. Memantine 10mg po daily    #RLS - Restless Leg Syndrome  ~cont. Ropinerole 2mg po 6x/daily    #GERD  ~cont. PPI w/ Pantoprazole 40mg po daily    #Glaucoma  ~cont. Brimonidine 0.2% 1gtt OS bid  ~cont. Timolol 0.5% 1gtt OS bid    #Advance Directives  ~per patient and patient's wife MOLST form signed revealed DNR/DNI/No feeding tube. The patient is agreeable to IV abx/IV fluids/future hospitalizations. MOLST form refilled as not present on this admission.   Total Time; 16 minutes    #Vte ppx  ~IMPROVE VTE Risk Score is 2  [  ] Previous VTE                                                  3  [  ] Thrombophilia                                               2  [  ] Lower limb paralysis                                      2        (unable to hold up >15 seconds)    [  ] Current Cancer                                              2         (within 6 months)  [X] Immobilization > 24 hrs                                1  [  ] ICU/CCU stay > 24 hours                              1  [X] Age > 60                                                      1  ~cont. LMWH sq 85 y/o M PMHx significant for non-obstructive CAD, aortic root dilation, aortic valve insufficiency, s/p bioprosthetic aortic valve replacement and ascending aortic root graft, A-fib (not on AC due to recent GIBleed), [place] (s/p cochlear implant), chronic imbalance, Hypertension, Restless Leg Syndrome, CAMILA unable to tolerate CPAP, was referred by his Cardiologist to  for further evaluation and management of progressive shortness of breath associated with increased wheezing, subjective fevers, and cough over the past 2 days. The patient admits to recent sick contacts with his wife with URI symptoms at home. The patient denies any associated chest pain, lower extremity edema. Labs => BUN/Cr 26/1.13, proBNP 1305, RVP was (+) RSV. In the ED the patient was given Methylprednisolone 125mg IVP x 1, and Combivent nebs x 3.    #Acute RSV Bronchiolitis complicated by Pneumonia  #Acute Hypoxic Respiratory Failure  ~admit to Medicine  ~cont. supplemental O2 prn  ~pulse oximetry q4h  ~encourage pulmonary toileting  ~cont. anti-tussives prn  ~cont. anti-pyretics prn  ~cont. Combivent nebs prn  ~f/u PAN C+S  ~cont. IV ab    #Esophageal Dilatation  ~as noted on CT patient was found to have signs of severe esophageal dilatation and concern for gastric outlet obstruction vs bowel obstruction  ~f/u CT ABD/Pelvis  ~NPO for now  ~f/u w/ GI and Surgery consultations  ~NGT insertion to LWS for now please    #CAD/AFib  ~as noted not on A/C due to recent GI bleeding  ~cont. Metoprolol 25mg po bid    #CHF (compensated)  ~cont. Furosemide (reassess and dose daily)  ~cont. Potassium supplementation (reassess and dose daily)    #Hyperlipidemia  ~cont. statin therapy w/ Atorvastatin 20mg po qhs    #Dementia  ~cont. Donepezil 10mg po qhs  ~cont. Memantine 10mg po daily    #RLS - Restless Leg Syndrome  ~cont. Ropinerole 2mg po 6x/daily    #GERD  ~cont. PPI w/ Pantoprazole 40mg po daily    #Glaucoma  ~cont. Brimonidine 0.2% 1gtt OS bid  ~cont. Timolol 0.5% 1gtt OS bid    #Advance Directives  ~per patient and patient's wife MOLST form signed revealed DNR/DNI/No feeding tube. The patient is agreeable to IV abx/IV fluids/future hospitalizations. MOLST form refilled as not present on this admission.   Total Time; 16 minutes    #Vte ppx  ~IMPROVE VTE Risk Score is 2  [  ] Previous VTE                                                  3  [  ] Thrombophilia                                               2  [  ] Lower limb paralysis                                      2        (unable to hold up >15 seconds)    [  ] Current Cancer                                              2         (within 6 months)  [X] Immobilization > 24 hrs                                1  [  ] ICU/CCU stay > 24 hours                              1  [X] Age > 60                                                      1  ~cont. LMWH sq

## 2020-02-06 NOTE — H&P ADULT - NSICDXPASTMEDICALHX_GEN_ALL_CORE_FT
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 PAST MEDICAL HISTORY:  Afib Not on A/C 2/2 hx of GIbleeding    Aortic root dilatation     Aortic valve insufficiency, unspecified etiology     BPH associated with nocturia     Chronic anemia     DDD (degenerative disc disease), lumbar     Diverticulosis of intestine without bleeding, unspecified intestinal tract location     Essential hypertension     Fall (on) (from) other stairs and steps, sequela 03/28/2017- lumbar hemtoma    Gastroesophageal reflux disease, esophagitis presence not specified Hx of GI bleed    H/O thoracic aortic aneurysm repair     HLD (hyperlipidemia)     [place] (hard of hearing)     Lumbar degenerative disc disease     MRSA (methicillin resistant Staphylococcus aureus) left forearm 2012    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 PAST MEDICAL HISTORY:  Afib Not on A/C 2/2 hx of GIbleeding    Aortic root dilatation     Aortic valve insufficiency, unspecified etiology     BPH associated with nocturia     Chronic anemia     DDD (degenerative disc disease), lumbar     Diverticulosis of intestine without bleeding, unspecified intestinal tract location     Essential hypertension     Fall (on) (from) other stairs and steps, sequela 03/28/2017- lumbar hemtoma    Gastroesophageal reflux disease, esophagitis presence not specified Hx of GI bleed    HLD (hyperlipidemia)     [place] (hard of hearing)     Lumbar degenerative disc disease     MRSA (methicillin resistant Staphylococcus aureus) left forearm 2012    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 PAST MEDICAL HISTORY:  Afib Not on A/C 2/2 hx of GIbleeding    Aortic root dilatation     Aortic valve insufficiency, unspecified etiology     BPH associated with nocturia     CAD (coronary artery disease) (-) cardiac stress and 2DECHO 2019    Chronic anemia     DDD (degenerative disc disease), lumbar     Diastolic CHF LVEF 55-60% 6/2019    Diverticulosis of intestine without bleeding, unspecified intestinal tract location     Essential hypertension     Fall (on) (from) other stairs and steps, sequela 03/28/2017- lumbar hemtoma    Gastroesophageal reflux disease, esophagitis presence not specified Hx of GI bleed    HLD (hyperlipidemia)     [place] (hard of hearing)     Lumbar degenerative disc disease     MRSA (methicillin resistant Staphylococcus aureus) left forearm 2012    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

## 2020-02-06 NOTE — H&P ADULT - HISTORY OF PRESENT ILLNESS
83 y/o M PMHx significant for non-obstructive CAD, aortic root dilation, aortic valve insufficiency, s/p bioprosthetic aortic valve replacement and ascending aortic root graft, A-fib (not on AC due to recent GIBleed), [place] (s/p cochlear implant), chronic imbalance, Hypertension, Restless Leg Syndrome, CAMILA unable to tolerate CPAP, was referred by his Cardiologist to  for further evaluation and management of progressive shortness of breath associated with increased wheezing, subjective fevers, and cough over the past 2 days. The patient admits to recent sick contacts with his wife with URI symptoms at home. The patient denies any associated chest pain, lower extremity edema. Labs => BUN/Cr 26/1.13, proBNP 1305, RVP was (+) RSV. In the ED the patient was given Methylprednisolone 125mg IVP x 1, and Combivent nebs x 3. 85 y/o M PMHx significant for non-obstructive CAD, CHF (HFpEF LVEF  55-60% 6/2019), aortic root dilation, aortic valve insufficiency, s/p bioprosthetic aortic valve replacement and ascending aortic root graft, A-fib (not on AC due to recent GIBleed), [place] (s/p cochlear implant), chronic imbalance, Hypertension, Restless Leg Syndrome, CAMILA unable to tolerate CPAP, was referred by his Cardiologist to  for further evaluation and management of progressive shortness of breath associated with increased wheezing, subjective fevers, and cough over the past 2 days. The patient admits to recent sick contacts with his wife with URI symptoms at home. The patient denies any associated chest pain, lower extremity edema. Labs => BUN/Cr 26/1.13, proBNP 1305, RVP was (+) RSV. In the ED the patient was given Methylprednisolone 125mg IVP x 1, and Combivent nebs x 3.

## 2020-02-07 LAB
ALBUMIN SERPL ELPH-MCNC: 3.5 G/DL — SIGNIFICANT CHANGE UP (ref 3.3–5)
ALP SERPL-CCNC: 79 U/L — SIGNIFICANT CHANGE UP (ref 40–120)
ALT FLD-CCNC: 17 U/L — SIGNIFICANT CHANGE UP (ref 12–78)
ANION GAP SERPL CALC-SCNC: 6 MMOL/L — SIGNIFICANT CHANGE UP (ref 5–17)
AST SERPL-CCNC: 21 U/L — SIGNIFICANT CHANGE UP (ref 15–37)
BASOPHILS # BLD AUTO: 0.01 K/UL — SIGNIFICANT CHANGE UP (ref 0–0.2)
BASOPHILS NFR BLD AUTO: 0.1 % — SIGNIFICANT CHANGE UP (ref 0–2)
BILIRUB SERPL-MCNC: 0.8 MG/DL — SIGNIFICANT CHANGE UP (ref 0.2–1.2)
BUN SERPL-MCNC: 26 MG/DL — HIGH (ref 7–23)
CALCIUM SERPL-MCNC: 9.2 MG/DL — SIGNIFICANT CHANGE UP (ref 8.5–10.1)
CHLORIDE SERPL-SCNC: 110 MMOL/L — HIGH (ref 96–108)
CO2 SERPL-SCNC: 25 MMOL/L — SIGNIFICANT CHANGE UP (ref 22–31)
CREAT SERPL-MCNC: 0.96 MG/DL — SIGNIFICANT CHANGE UP (ref 0.5–1.3)
EOSINOPHIL # BLD AUTO: 0 K/UL — SIGNIFICANT CHANGE UP (ref 0–0.5)
EOSINOPHIL NFR BLD AUTO: 0 % — SIGNIFICANT CHANGE UP (ref 0–6)
GLUCOSE SERPL-MCNC: 147 MG/DL — HIGH (ref 70–99)
HCT VFR BLD CALC: 39.7 % — SIGNIFICANT CHANGE UP (ref 39–50)
HGB BLD-MCNC: 12.5 G/DL — LOW (ref 13–17)
IMM GRANULOCYTES NFR BLD AUTO: 0.7 % — SIGNIFICANT CHANGE UP (ref 0–1.5)
LYMPHOCYTES # BLD AUTO: 0.5 K/UL — LOW (ref 1–3.3)
LYMPHOCYTES # BLD AUTO: 5 % — LOW (ref 13–44)
MAGNESIUM SERPL-MCNC: 2.3 MG/DL — SIGNIFICANT CHANGE UP (ref 1.6–2.6)
MCHC RBC-ENTMCNC: 29.4 PG — SIGNIFICANT CHANGE UP (ref 27–34)
MCHC RBC-ENTMCNC: 31.5 GM/DL — LOW (ref 32–36)
MCV RBC AUTO: 93.4 FL — SIGNIFICANT CHANGE UP (ref 80–100)
MONOCYTES # BLD AUTO: 0.75 K/UL — SIGNIFICANT CHANGE UP (ref 0–0.9)
MONOCYTES NFR BLD AUTO: 7.6 % — SIGNIFICANT CHANGE UP (ref 2–14)
NEUTROPHILS # BLD AUTO: 8.59 K/UL — HIGH (ref 1.8–7.4)
NEUTROPHILS NFR BLD AUTO: 86.6 % — HIGH (ref 43–77)
PLATELET # BLD AUTO: 195 K/UL — SIGNIFICANT CHANGE UP (ref 150–400)
POTASSIUM SERPL-MCNC: 4.4 MMOL/L — SIGNIFICANT CHANGE UP (ref 3.5–5.3)
POTASSIUM SERPL-SCNC: 4.4 MMOL/L — SIGNIFICANT CHANGE UP (ref 3.5–5.3)
PROT SERPL-MCNC: 6.9 GM/DL — SIGNIFICANT CHANGE UP (ref 6–8.3)
RBC # BLD: 4.25 M/UL — SIGNIFICANT CHANGE UP (ref 4.2–5.8)
RBC # FLD: 16.9 % — HIGH (ref 10.3–14.5)
SODIUM SERPL-SCNC: 141 MMOL/L — SIGNIFICANT CHANGE UP (ref 135–145)
WBC # BLD: 9.92 K/UL — SIGNIFICANT CHANGE UP (ref 3.8–10.5)
WBC # FLD AUTO: 9.92 K/UL — SIGNIFICANT CHANGE UP (ref 3.8–10.5)

## 2020-02-07 PROCEDURE — 93010 ELECTROCARDIOGRAM REPORT: CPT

## 2020-02-07 PROCEDURE — 74018 RADEX ABDOMEN 1 VIEW: CPT | Mod: 26,59

## 2020-02-07 PROCEDURE — 74176 CT ABD & PELVIS W/O CONTRAST: CPT | Mod: 26

## 2020-02-07 PROCEDURE — 99223 1ST HOSP IP/OBS HIGH 75: CPT

## 2020-02-07 PROCEDURE — 99233 SBSQ HOSP IP/OBS HIGH 50: CPT

## 2020-02-07 PROCEDURE — 74019 RADEX ABDOMEN 2 VIEWS: CPT | Mod: 26

## 2020-02-07 RX ORDER — ALBUTEROL 90 UG/1
2.5 AEROSOL, METERED ORAL EVERY 6 HOURS
Refills: 0 | Status: DISCONTINUED | OUTPATIENT
Start: 2020-02-07 | End: 2020-02-11

## 2020-02-07 RX ORDER — FUROSEMIDE 40 MG
40 TABLET ORAL
Refills: 0 | Status: DISCONTINUED | OUTPATIENT
Start: 2020-02-07 | End: 2020-02-11

## 2020-02-07 RX ORDER — LACTOBACILLUS ACIDOPHILUS 100MM CELL
1 CAPSULE ORAL
Refills: 0 | Status: DISCONTINUED | OUTPATIENT
Start: 2020-02-07 | End: 2020-02-11

## 2020-02-07 RX ORDER — LIDOCAINE 4 G/100G
5 CREAM TOPICAL THREE TIMES A DAY
Refills: 0 | Status: DISCONTINUED | OUTPATIENT
Start: 2020-02-07 | End: 2020-02-11

## 2020-02-07 RX ORDER — POTASSIUM CHLORIDE 20 MEQ
20 PACKET (EA) ORAL DAILY
Refills: 0 | Status: DISCONTINUED | OUTPATIENT
Start: 2020-02-07 | End: 2020-02-11

## 2020-02-07 RX ORDER — ROPINIROLE 8 MG/1
2 TABLET, FILM COATED, EXTENDED RELEASE ORAL
Refills: 0 | Status: DISCONTINUED | OUTPATIENT
Start: 2020-02-07 | End: 2020-02-11

## 2020-02-07 RX ORDER — IPRATROPIUM/ALBUTEROL SULFATE 18-103MCG
3 AEROSOL WITH ADAPTER (GRAM) INHALATION EVERY 6 HOURS
Refills: 0 | Status: DISCONTINUED | OUTPATIENT
Start: 2020-02-07 | End: 2020-02-11

## 2020-02-07 RX ADMIN — AZITHROMYCIN 255 MILLIGRAM(S): 500 TABLET, FILM COATED ORAL at 21:07

## 2020-02-07 RX ADMIN — ROPINIROLE 2 MILLIGRAM(S): 8 TABLET, FILM COATED, EXTENDED RELEASE ORAL at 17:57

## 2020-02-07 RX ADMIN — Medication 1 DROP(S): at 06:51

## 2020-02-07 RX ADMIN — AMPICILLIN SODIUM AND SULBACTAM SODIUM 200 GRAM(S): 250; 125 INJECTION, POWDER, FOR SUSPENSION INTRAMUSCULAR; INTRAVENOUS at 13:18

## 2020-02-07 RX ADMIN — Medication 2.5 MILLIGRAM(S): at 20:24

## 2020-02-07 RX ADMIN — BRIMONIDINE TARTRATE 1 DROP(S): 2 SOLUTION/ DROPS OPHTHALMIC at 07:11

## 2020-02-07 RX ADMIN — Medication 1 TABLET(S): at 17:57

## 2020-02-07 RX ADMIN — ENOXAPARIN SODIUM 40 MILLIGRAM(S): 100 INJECTION SUBCUTANEOUS at 17:58

## 2020-02-07 RX ADMIN — AMPICILLIN SODIUM AND SULBACTAM SODIUM 200 GRAM(S): 250; 125 INJECTION, POWDER, FOR SUSPENSION INTRAMUSCULAR; INTRAVENOUS at 23:09

## 2020-02-07 RX ADMIN — Medication 2.5 MILLIGRAM(S): at 03:36

## 2020-02-07 RX ADMIN — Medication 600 MILLIGRAM(S): at 17:57

## 2020-02-07 RX ADMIN — PANTOPRAZOLE SODIUM 40 MILLIGRAM(S): 20 TABLET, DELAYED RELEASE ORAL at 13:18

## 2020-02-07 RX ADMIN — Medication 20 MILLIEQUIVALENT(S): at 17:57

## 2020-02-07 RX ADMIN — LIDOCAINE 5 MILLILITER(S): 4 CREAM TOPICAL at 03:31

## 2020-02-07 RX ADMIN — Medication 40 MILLIGRAM(S): at 17:57

## 2020-02-07 RX ADMIN — Medication 2.5 MILLIGRAM(S): at 08:50

## 2020-02-07 RX ADMIN — Medication 2.5 MILLIGRAM(S): at 23:09

## 2020-02-07 RX ADMIN — AZITHROMYCIN 255 MILLIGRAM(S): 500 TABLET, FILM COATED ORAL at 03:35

## 2020-02-07 RX ADMIN — Medication 3 MILLILITER(S): at 20:12

## 2020-02-07 RX ADMIN — Medication 3 MILLILITER(S): at 13:56

## 2020-02-07 RX ADMIN — AMPICILLIN SODIUM AND SULBACTAM SODIUM 200 GRAM(S): 250; 125 INJECTION, POWDER, FOR SUSPENSION INTRAMUSCULAR; INTRAVENOUS at 08:50

## 2020-02-07 RX ADMIN — AMPICILLIN SODIUM AND SULBACTAM SODIUM 200 GRAM(S): 250; 125 INJECTION, POWDER, FOR SUSPENSION INTRAMUSCULAR; INTRAVENOUS at 02:09

## 2020-02-07 RX ADMIN — AMPICILLIN SODIUM AND SULBACTAM SODIUM 200 GRAM(S): 250; 125 INJECTION, POWDER, FOR SUSPENSION INTRAMUSCULAR; INTRAVENOUS at 20:24

## 2020-02-07 RX ADMIN — Medication 1 DROP(S): at 18:27

## 2020-02-07 RX ADMIN — Medication 2.5 MILLIGRAM(S): at 13:18

## 2020-02-07 RX ADMIN — BRIMONIDINE TARTRATE 1 DROP(S): 2 SOLUTION/ DROPS OPHTHALMIC at 18:27

## 2020-02-07 RX ADMIN — ROPINIROLE 2 MILLIGRAM(S): 8 TABLET, FILM COATED, EXTENDED RELEASE ORAL at 23:09

## 2020-02-07 NOTE — PROGRESS NOTE ADULT - ASSESSMENT
1) Acute hypoxic respiratory failure d/y RSV / Possible Superimposed Aspiration PNA  - cont. unasyn  - supplemental O2 prn, monitor sats on room air / incentive spirometer     2) Esophageal Dilatation  - CT w/ few loops of dilated ileum with possible transition point at the terminal ileum / possibility of a partial distal small bowel obstruction  - NGT removed by patient; d/w surgery resident keep out for now pending GI consult  - NPO for now  - GI eval  - Surgery f/u appreciated     3) CAD/AFib  - As noted not on A/C due to recent GI bleeding  - Cont. BB    4) Hx diastolic HF   - euvolemic/compensated  - resume home dose lasix/k supplements  - daily weights / I&Os     5) Hyperlipidemia  - Cont. statin     6) Dementia  - Cont. Donepezil / Memantine    7) RLS   - Requip 4x/day    8) GERD  - Cont. IV PPI while NPO    9) DVT PPX  - SQ Lovenox    MOLST - DNR/DNI/No feeding tube  POC discussed with pt's son at bedside  Pt seen with NP Aurora Trujillo, POC and dc plan discussed with NP and team at IDRs. Reviewed and agree with above Note by her, with my changes included.

## 2020-02-07 NOTE — PHYSICAL THERAPY INITIAL EVALUATION ADULT - PERTINENT HX OF CURRENT PROBLEM, REHAB EVAL
Pt is an 83 y/o M, , was referred by his Cardiologist to  for further evaluation and management of progressive SOB, wheezing, fevers, and cough.  CT patient was found to have signs of severe esophageal dilatation and concern for gastric outlet obstruction vs bowel obstruction. admitted with RSV URI with superimposed pneumonia which may be aspiration pneumonia, - ngt i Pt is an 83 y/o M, , was referred by his Cardiologist to  for further evaluation and management of progressive SOB, wheezing, fevers, and cough.  CT patient was found to have signs of severe esophageal dilatation and concern for gastric outlet obstruction vs bowel obstruction. admitted with RSV URI with superimposed pneumonia which may be aspiration pneumonia,

## 2020-02-07 NOTE — PHYSICAL THERAPY INITIAL EVALUATION ADULT - ADDITIONAL COMMENTS
[place] (s/p cochlear implant), [place] (s/p cochlear implant),  Pt had a SC at bedside. Not sure of the level of assistance needed PTA

## 2020-02-07 NOTE — CONSULT NOTE ADULT - SUBJECTIVE AND OBJECTIVE BOX
Patient is a 84y old  Male who presents with a chief complaint of Shortness of breath, Wheezing (07 Feb 2020 00:54)      HPI:  85 y/o M PMHx significant for non-obstructive CAD, CHF (HFpEF LVEF  55-60% 6/2019), aortic root dilation, aortic valve insufficiency, s/p bioprosthetic aortic valve replacement and ascending aortic root graft, A-fib (not on AC due to recent GIBleed), [place] (s/p cochlear implant), chronic imbalance, Hypertension, Restless Leg Syndrome, CAMILA unable to tolerate CPAP, was referred by his Cardiologist to  for further evaluation and management of progressive shortness of breath associated with increased wheezing, subjective fevers, and cough over the past 2 days. The patient admits to recent sick contacts with his wife with URI symptoms at home. The patient denies any associated chest pain, lower extremity edema. Labs => BUN/Cr 26/1.13, proBNP 1305, RVP was (+) RSV. In the ED the patient was given Methylprednisolone 125mg IVP x 1, and Combivent nebs x 3. (06 Feb 2020 17:47)  2/7- baseline SOB , lying flat no CP    PAST MEDICAL & SURGICAL HISTORY:  CAD (coronary artery disease): (-) cardiac stress and 2DECHO 2019  Diastolic CHF: LVEF 55-60% 6/2019  Chronic anemia  Afib: Not on A/C 2/2 hx of GIbleeding  DDD (degenerative disc disease), lumbar  [place] (hard of hearing)  Fall (on) (from) other stairs and steps, sequela: 03/28/2017- lumbar hemtoma  Essential hypertension  Nerve injury: complication from right TKR surgery, 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  Aortic root dilatation  MRSA (methicillin resistant Staphylococcus aureus): left forearm 2012  Lumbar degenerative disc disease  BPH associated with nocturia  Diverticulosis of intestine without bleeding, unspecified intestinal tract location  Gastroesophageal reflux disease, esophagitis presence not specified: Hx of GI bleed  Aortic valve insufficiency, unspecified etiology  CAMILA (obstructive sleep apnea): unable to tolerate nocturnal CPAP  Restless leg syndrome  Thoracic aortic aneurysm  HLD (hyperlipidemia)  S/P thoracic aortic aneurysm repair  S/P AVR (aortic valve replacement)  Status post cataract extraction, unspecified laterality: bilat  S/P debridement: left forearm -mrsa  History of fundoplication: 2000  Amputation finger: left index 1970  S/P knee replacement: left  S/P knee replacement: right  S/P shoulder surgery: right rotatotor cuff injury                                    MEDICATIONS  (STANDING):  ampicillin/sulbactam  IVPB 3 Gram(s) IV Intermittent every 6 hours  azithromycin  IVPB 500 milliGRAM(s) IV Intermittent every 24 hours  azithromycin  IVPB      brimonidine 0.2% Ophthalmic Solution 1 Drop(s) Left EYE two times a day  enoxaparin Injectable 40 milliGRAM(s) SubCutaneous daily  metoprolol tartrate Injectable 2.5 milliGRAM(s) IV Push every 6 hours  pantoprazole  Injectable 40 milliGRAM(s) IV Push daily  timolol 0.5% Solution 1 Drop(s) Left EYE two times a day    MEDICATIONS  (PRN):  lidocaine 2% Viscous 5 milliLiter(s) Swish and Spit three times a day PRN Throat Irritation  ondansetron Injectable 4 milliGRAM(s) IV Push every 8 hours PRN Nausea and/or Vomiting      FAMILY HISTORY:  Family history of lung cancer      SOCIAL HISTORY:    CIGARETTES:        Vital Signs Last 24 Hrs  T(C): 36.4 (07 Feb 2020 01:10), Max: 36.8 (06 Feb 2020 21:37)  T(F): 97.6 (07 Feb 2020 01:10), Max: 98.3 (06 Feb 2020 21:37)  HR: 76 (07 Feb 2020 01:10) (56 - 77)  BP: 142/83 (07 Feb 2020 01:10) (106/75 - 151/95)  BP(mean): 98 (06 Feb 2020 18:00) (98 - 131)  RR: 18 (07 Feb 2020 01:10) (16 - 21)  SpO2: 96% (07 Feb 2020 01:10) (91% - 97%)            INTERPRETATION OF TELEMETRY:    ECG:    I&O's Detail      LABS:                        12.5   9.92  )-----------( 195      ( 07 Feb 2020 07:36 )             39.7     02-07    141  |  110<H>  |  26<H>  ----------------------------<  147<H>  4.4   |  25  |  0.96    Ca    9.2      07 Feb 2020 07:36  Mg     2.3     02-07    TPro  6.9  /  Alb  3.5  /  TBili  0.8  /  DBili  x   /  AST  21  /  ALT  17  /  AlkPhos  79  02-07    CARDIAC MARKERS ( 06 Feb 2020 19:41 )  <0.015 ng/mL / x     / x     / x     / x      CARDIAC MARKERS ( 06 Feb 2020 12:08 )  <0.015 ng/mL / x     / x     / x     / x              I&O's Summary    BNPSerum Pro-Brain Natriuretic Peptide: 1305 pg/mL (02-06 @ 12:08)    RADIOLOGY & ADDITIONAL STUDIES:

## 2020-02-07 NOTE — PHYSICAL THERAPY INITIAL EVALUATION ADULT - DIAGNOSIS, PT EVAL
Acute RSV Bronchiolitis complicated by Pneumonia, Acute Hypoxic Respiratory Failure, small bowel obstruction- no sx intervention needed at this time as per chart Acute RSV Bronchiolitis complicated by Pneumonia, Acute Hypoxic Respiratory Failure, Partial Small bowel obstruction resolving on clear liquid diet

## 2020-02-07 NOTE — CONSULT NOTE ADULT - SUBJECTIVE AND OBJECTIVE BOX
CC:Patient is a 84y old  Male who presents with a chief complaint of Shortness of breath, Wheezing (06 Feb 2020 17:47)      Subjective:    85 y/o M PMHx significant for non-obstructive CAD, CHF (HFpEF LVEF  55-60% 6/2019), aortic root dilation, aortic valve insufficiency, s/p bioprosthetic aortic valve replacement and ascending aortic root graft, A-fib (not on AC due to recent GIBleed), [place] (s/p cochlear implant), chronic imbalance, Hypertension, Restless Leg Syndrome, CAMILA unable to tolerate CPAP, was referred by his Cardiologist to  for further evaluation and management of progressive shortness of breath associated with increased wheezing, subjective fevers, and cough over the past 2 days. The patient admits to recent sick contacts with his wife with URI symptoms at home. The patient denies any associated chest pain, lower extremity edema. Labs => BUN/Cr 26/1.13, proBNP 1305, RVP was (+) RSV. In the ED the patient was given Methylprednisolone 125mg IVP x 1, and Combivent nebs x 3.    Pt seen and examined at bedside. Pt is awake, alert, comfortable appearing, uncooperative/agitated at time of exam, pt in no acute distress. Pt denied abd pain, N/V/D, extremity pain or dysfunction, hemoptysis, hematemesis, hematuria, hematochexia, headache, diplopia, vertigo, dizzyness. Pt stated tolerating diet, (+) void, (+) ambulation, (+) bowel function    ROS: as abovementioned otherwise negative ROS    PMH: CAD, CHF, afib, GI bleed, HTN, CAMILA, HLD, BPH, DDD, GERD  PSH: aortic valve repair, right rotator cuff repair, thoracic aortic aneurysm repair  No Known Allergies    SH: no reported etoh, tobacco, illicit drug use  FH:  h/o lung ca    Vital Signs Last 24 Hrs  T(C): 36.8 (06 Feb 2020 21:37), Max: 36.8 (06 Feb 2020 21:37)  T(F): 98.3 (06 Feb 2020 21:37), Max: 98.3 (06 Feb 2020 21:37)  HR: 65 (06 Feb 2020 21:37) (56 - 77)  BP: 140/78 (06 Feb 2020 21:37) (106/75 - 151/95)  BP(mean): 98 (06 Feb 2020 18:00) (98 - 131)  RR: 17 (06 Feb 2020 21:37) (16 - 21)  SpO2: 95% (06 Feb 2020 21:37) (91% - 97%)    Labs:      CARDIAC MARKERS ( 06 Feb 2020 19:41 )  <0.015 ng/mL / x     / x     / x     / x      CARDIAC MARKERS ( 06 Feb 2020 12:08 )  <0.015 ng/mL / x     / x     / x     / x                                12.9   7.28  )-----------( 166      ( 06 Feb 2020 12:08 )             40.3     CBC Full  -  ( 06 Feb 2020 12:08 )  WBC Count : 7.28 K/uL  RBC Count : 4.28 M/uL  Hemoglobin : 12.9 g/dL  Hematocrit : 40.3 %  Platelet Count - Automated : 166 K/uL  Mean Cell Volume : 94.2 fl  Mean Cell Hemoglobin : 30.1 pg  Mean Cell Hemoglobin Concentration : 32.0 gm/dL  Auto Neutrophil # : 5.17 K/uL  Auto Lymphocyte # : 0.70 K/uL  Auto Monocyte # : 1.04 K/uL  Auto Eosinophil # : 0.29 K/uL  Auto Basophil # : 0.05 K/uL  Auto Neutrophil % : 71.0 %  Auto Lymphocyte % : 9.6 %  Auto Monocyte % : 14.3 %  Auto Eosinophil % : 4.0 %  Auto Basophil % : 0.7 %    02-06    140  |  108  |  26<H>  ----------------------------<  105<H>  4.2   |  26  |  1.13    Ca    9.1      06 Feb 2020 12:08    TPro  6.9  /  Alb  3.5  /  TBili  0.8  /  DBili  x   /  AST  20  /  ALT  16  /  AlkPhos  87  02-06    LIVER FUNCTIONS - ( 06 Feb 2020 12:08 )  Alb: 3.5 g/dL / Pro: 6.9 gm/dL / ALK PHOS: 87 U/L / ALT: 16 U/L / AST: 20 U/L / GGT: x                 Meds:  ampicillin/sulbactam  IVPB 3 Gram(s) IV Intermittent every 6 hours  azithromycin  IVPB 500 milliGRAM(s) IV Intermittent once  azithromycin  IVPB 500 milliGRAM(s) IV Intermittent every 24 hours  azithromycin  IVPB      brimonidine 0.2% Ophthalmic Solution 1 Drop(s) Left EYE two times a day  enoxaparin Injectable 40 milliGRAM(s) SubCutaneous daily  metoprolol tartrate Injectable 2.5 milliGRAM(s) IV Push every 6 hours  ondansetron Injectable 4 milliGRAM(s) IV Push every 8 hours PRN  pantoprazole  Injectable 40 milliGRAM(s) IV Push daily  timolol 0.5% Solution 1 Drop(s) Left EYE two times a day      Radiology:  < from: CT Chest No Cont (02.06.20 @ 20:49) >  EXAM:  CT CHEST                            PROCEDURE DATE:  02/06/2020          INTERPRETATION:  CLINICAL INDICATION: 84 years  Male with Severe dyspnea.     COMPARISON: 7/13/2019    PROCEDURE:   CT of the Chest was performed without intravenous contrast.  Sagittal and coronal reformats were performed.      FINDINGS:    LUNGS AND AIRWAYS: Patent central airways.  Medial basilar left lower lobe infiltrate and left lower lobe discoid atelectasis..    PLEURA: No pleural effusion.    MEDIASTINUM AND KISHOR: Enlarged pretracheal lymph node measuring up to 1.8 cm in short axis unchanged from the prior study. No other mediastinal or hilar adenopathy allowing for lack of IV contrast. The esophagus is diffusely distended with debris measuring up to 3.7 cm in diameter. There is risk for aspiration.    VESSELS: The aorta is atherosclerotic but not aneurysmal. Coronary atherosclerosis is present.    HEART: Mildly enlarged No pericardial effusion.    CHEST WALL AND LOWER NECK: Within normal limits.    VISUALIZED UPPER ABDOMEN: The stomach is distended with debris. Cholelithiasis are present    BONES: Cervical and thoracic degenerative changes.    IMPRESSION:     Diffusely dilated esophagus and stomach with risk for aspiration. Rule out gastric outlet obstruction.    Left lower lobe infiltrate.    Stable mediastinal adenopathy.    Findings were discussed with Dr. NIELS HDZ [phone] 2/6/2020 8:58 PM by Dr. Heath with read back confirmation.                  LEANN HEATH   This document has been electronically signed. Feb 6 2020  9:05PM    < end of copied text >      Physical exam:    Pt in no acute distress  Neuro: CNII-XII grossly intact   Psych: agitated at time of exam  HEENT: Normocephalic, atraumatic, MARISSA, EOM wnl  Neck: No crepitus, no ecchymosis, no hematoma, to exam, no JVD, no tracheal deviation  Cardiovascular: S1S2 Present  Respiratory: Respiratory Effort normal; no wheezes, rales or rhonchi to exam, CTAB  ABD: bowel sounds (+), soft, nontender, non distended, no rebound, no guarding, no rigidity, no skin changes to exam. No pelvic instability to exam, no skin changes, negative chacon's sign to exam. NGT demonstrates appropriate output  Musculoskeletal: All digits are warm and well perfused. Pt demonstrates grossly intact sensoromotor function. Pt has good capillary refill to digits, no calf edema or tenderness to exam.  Skin: no gross jaundice or icteric sclera to exam b/l, no skin changes to exam

## 2020-02-07 NOTE — CONSULT NOTE ADULT - SUBJECTIVE AND OBJECTIVE BOX
HPI:  83 y/o M PMHx significant for non-obstructive CAD, CHF (HFpEF LVEF  55-60% 6/2019), aortic root dilation, aortic valve insufficiency, s/p bioprosthetic aortic valve replacement and ascending aortic root graft, A-fib (not on AC due to recent GIBleed), [place] (s/p cochlear implant), chronic imbalance, Hypertension, Restless Leg Syndrome, CAMILA unable to tolerate CPAP, was referred by his Cardiologist to  for further evaluation and management of progressive shortness of breath associated with increased wheezing, subjective fevers, and cough over the past 2 days. The patient admits to recent sick contacts with his wife with URI symptoms at home. The patient denies any associated chest pain, lower extremity edema. Labs => BUN/Cr 26/1.13, proBNP 1305, RVP was (+) RSV. In the ED the patient was given Methylprednisolone 125mg IVP x 1, and Combivent nebs x 3. (06 Feb 2020 17:47)      PAST MEDICAL & SURGICAL HISTORY:  CAD (coronary artery disease): (-) cardiac stress and 2DECHO 2019  Diastolic CHF: LVEF 55-60% 6/2019  Chronic anemia  Afib: Not on A/C 2/2 hx of GIbleeding  DDD (degenerative disc disease), lumbar  [place] (hard of hearing)  Fall (on) (from) other stairs and steps, sequela: 03/28/2017- lumbar hemtoma  Essential hypertension  Nerve injury: complication from right TKR surgery, 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  Aortic root dilatation  MRSA (methicillin resistant Staphylococcus aureus): left forearm 2012  Lumbar degenerative disc disease  BPH associated with nocturia  Diverticulosis of intestine without bleeding, unspecified intestinal tract location  Gastroesophageal reflux disease, esophagitis presence not specified: Hx of GI bleed  Aortic valve insufficiency, unspecified etiology  CAMILA (obstructive sleep apnea): unable to tolerate nocturnal CPAP  Restless leg syndrome  Thoracic aortic aneurysm  HLD (hyperlipidemia)  S/P thoracic aortic aneurysm repair  S/P AVR (aortic valve replacement)  Status post cataract extraction, unspecified laterality: bilat  S/P debridement: left forearm -mrsa  History of fundoplication: 2000  Amputation finger: left index 1970  S/P knee replacement: left  S/P knee replacement: right  S/P shoulder surgery: right rotatotor cuff injury      MEDICATIONS  (STANDING):  albuterol/ipratropium for Nebulization 3 milliLiter(s) Nebulizer every 6 hours  ampicillin/sulbactam  IVPB 3 Gram(s) IV Intermittent every 6 hours  azithromycin  IVPB 500 milliGRAM(s) IV Intermittent every 24 hours  azithromycin  IVPB      brimonidine 0.2% Ophthalmic Solution 1 Drop(s) Left EYE two times a day  enoxaparin Injectable 40 milliGRAM(s) SubCutaneous daily  furosemide    Tablet 40 milliGRAM(s) Oral two times a day  guaiFENesin  milliGRAM(s) Oral every 12 hours  lactobacillus acidophilus 1 Tablet(s) Oral two times a day with meals  metoprolol tartrate Injectable 2.5 milliGRAM(s) IV Push every 6 hours  pantoprazole  Injectable 40 milliGRAM(s) IV Push daily  potassium chloride    Tablet ER 20 milliEquivalent(s) Oral daily  rOPINIRole 2 milliGRAM(s) Oral four times a day  timolol 0.5% Solution 1 Drop(s) Left EYE two times a day    MEDICATIONS  (PRN):  ALBUTerol    0.083% 2.5 milliGRAM(s) Nebulizer every 6 hours PRN Shortness of Breath and/or Wheezing  benzonatate 100 milliGRAM(s) Oral every 8 hours PRN Cough  lidocaine 2% Viscous 5 milliLiter(s) Swish and Spit three times a day PRN Throat Irritation  ondansetron Injectable 4 milliGRAM(s) IV Push every 8 hours PRN Nausea and/or Vomiting      Allergies    No Known Allergies    Intolerances        SOCIAL HISTORY:    FAMILY HISTORY:  Family history of lung cancer   Non-contributory    REVIEW OF SYSTEMS      General:	    Respiratory and Thorax:  	  Cardiovascular:	    Gastrointestinal:	    Musculoskeletal:	   Vital Signs Last 24 Hrs  T(C): 36.8 (07 Feb 2020 12:15), Max: 36.8 (06 Feb 2020 21:37)  T(F): 98.2 (07 Feb 2020 12:15), Max: 98.3 (06 Feb 2020 21:37)  HR: 75 (07 Feb 2020 12:15) (60 - 82)  BP: 132/88 (07 Feb 2020 12:15) (132/84 - 151/95)  BP(mean): 98 (06 Feb 2020 18:00) (98 - 98)  RR: 17 (07 Feb 2020 12:15) (16 - 21)  SpO2: 98% (07 Feb 2020 12:15) (92% - 98%)    HEENT :No Pallor.No icterus. EOMI,PERLAA  Chest : Clear to Auscultation  CVS : S1S2 Normal.No murmurs.  Abdomen: Soft.Non tender .Normal bowel sounds.No Organomegaly.  CNS: Alert.Oriented to Time,Place and Person.No focal deficit.  EXT: Normal Range of motion.No pitting edema.    LABS:                        12.5   9.92  )-----------( 195      ( 07 Feb 2020 07:36 )             39.7     02-07    141  |  110<H>  |  26<H>  ----------------------------<  147<H>  4.4   |  25  |  0.96    Ca    9.2      07 Feb 2020 07:36  Mg     2.3     02-07    TPro  6.9  /  Alb  3.5  /  TBili  0.8  /  DBili  x   /  AST  21  /  ALT  17  /  AlkPhos  79  02-07      LIVER FUNCTIONS - ( 07 Feb 2020 07:36 )  Alb: 3.5 g/dL / Pro: 6.9 gm/dL / ALK PHOS: 79 U/L / ALT: 17 U/L / AST: 21 U/L / GGT: x             RADIOLOGY & ADDITIONAL STUDIES:

## 2020-02-07 NOTE — PROGRESS NOTE ADULT - SUBJECTIVE AND OBJECTIVE BOX
CHIEF COMPLAINT: SOB / RSV / Aspiration PNA    HPI: 83 y/o M PMHx significant for non-obstructive CAD, CHF (HFpEF LVEF  55-60% 6/2019), aortic root dilation, aortic valve insufficiency, s/p bioprosthetic aortic valve replacement and ascending aortic root graft, A-fib (not on AC due to recent GIBleed), [place] (s/p cochlear implant), chronic imbalance, Hypertension, Restless Leg Syndrome, CAMILA unable to tolerate CPAP, was referred by his Cardiologist to  for further evaluation and management of progressive shortness of breath associated with increased wheezing, subjective fevers, and cough over the past 2 days. The patient admits to recent sick contacts with his wife with URI symptoms at home. The patient denies any associated chest pain, lower extremity edema.    Found to have in ED, BUN/Cr 26/1.13, proBNP 1305, RVP was (+) RSV. In the ED the patient was given Methylprednisolone 125mg IVP x 1, and Combivent nebs x 3.    SUBJECTIVE:   2/7 - denies cp, palp, abdominal pain. NGT removed by patient. continue 1:1, monitor for N/V. GI consult pending     REVIEW OF SYSTEMS: All other 10-point  review of systems is negative unless indicated above    Vital Signs Last 24 Hrs  T(C): 36.8 (07 Feb 2020 12:15), Max: 36.8 (06 Feb 2020 21:37)  T(F): 98.2 (07 Feb 2020 12:15), Max: 98.3 (06 Feb 2020 21:37)  HR: 82 (07 Feb 2020 08:40) (57 - 82)  BP: 132/88 (07 Feb 2020 12:15) (106/75 - 151/95)  BP(mean): 98 (06 Feb 2020 18:00) (98 - 98)  RR: 17 (07 Feb 2020 12:15) (16 - 21)  SpO2: 98% (07 Feb 2020 12:15) (92% - 98%)    PHYSICAL EXAM:    Constitutional: NAD, awake and alert, well-developed, obese  HEENT: PERR, EOMI, [place]  Neck: Soft and supple, No LAD, No JVD  Respiratory: Breath sounds rhonchi b/l / wheezing   Cardiovascular: S1 and S2, regular rate and rhythm, no Murmurs, gallops or rubs  Gastrointestinal: Bowel Sounds present, soft, nontender, nondistended, no guarding, no rebound  Extremities: trace peripheral edema  Vascular: 2+ peripheral pulses  Neurological: A/O x 3, no focal deficits  Musculoskeletal: 5/5 strength b/l upper and lower extremities  Skin: No rashes    MEDICATIONS  (STANDING):  ampicillin/sulbactam  IVPB 3 Gram(s) IV Intermittent every 6 hours  azithromycin  IVPB 500 milliGRAM(s) IV Intermittent every 24 hours  azithromycin  IVPB      brimonidine 0.2% Ophthalmic Solution 1 Drop(s) Left EYE two times a day  enoxaparin Injectable 40 milliGRAM(s) SubCutaneous daily  metoprolol tartrate Injectable 2.5 milliGRAM(s) IV Push every 6 hours  pantoprazole  Injectable 40 milliGRAM(s) IV Push daily  timolol 0.5% Solution 1 Drop(s) Left EYE two times a day    MEDICATIONS  (PRN):  lidocaine 2% Viscous 5 milliLiter(s) Swish and Spit three times a day PRN Throat Irritation  ondansetron Injectable 4 milliGRAM(s) IV Push every 8 hours PRN Nausea and/or Vomiting      LABS: All Labs Reviewed:                        12.5   9.92  )-----------( 195      ( 07 Feb 2020 07:36 )             39.7     02-07    141  |  110<H>  |  26<H>  ----------------------------<  147<H>  4.4   |  25  |  0.96    Ca    9.2      07 Feb 2020 07:36  Mg     2.3     02-07    TPro  6.9  /  Alb  3.5  /  TBili  0.8  /  DBili  x   /  AST  21  /  ALT  17  /  AlkPhos  79  02-07      CARDIAC MARKERS ( 06 Feb 2020 19:41 )  <0.015 ng/mL / x     / x     / x     / x      CARDIAC MARKERS ( 06 Feb 2020 12:08 )  <0.015 ng/mL / x     / x     / x     / x            Blood Culture:   RADIOLOGY/EKG:  < from: CT Abdomen and Pelvis No Cont (02.07.20 @ 02:01) >  IMPRESSION:   There are a few contiguous loops of dilated ileum with possible transition point at the terminal ileum, raising the possibility of a partial distal small bowel obstruction. Interval insertion of NG tube with decompression of the esophagus and stomach compared to chest CT.    Left lower lobe atelectasis and/or pneumonia.    Multiple additional findings as above.    < end of copied text >                Assessment and Plan:    83 y/o male p/w    1) Acute hypoxic respiratory failure d/y RSV / Possible Superimposed Aspiration PNA  - cont. ceftriaxone / unasyn  - supplemental O2 prn, monitor sats on room air / incentive spirometer   - Maintain isolation  - Cont. supportive care: nebs / mucinex / tessalon pearls   - Bcx pending   - ID f/u appreciated     2) Esophageal Dilatation  - CT w/ few loops of dilated ileum with possible transition point at the terminal ileum / possibility of a partial distal small bowel obstruction  - NGT removed by patient; d/w surgery resident keep out for now pending GI consult  - NPO for now  - GI eval  - Surgery f/u appreciated     3) CAD/AFib  - As noted not on A/C due to recent GI bleeding  - Cont. BB    4) Hx diastolic HF   - euvolemic/compensated  - resume home dose lasix/k supplements  - daily weights / I&Os     5) Hyperlipidemia  - Cont. statin     6) Dementia  - Cont. Donepezil / Memantine    7) RLS   - Requip 4x/day    8) GERD  - Cont. IV PPI while NPO    9) DVT PPX  - SQ Lovenox    MOLST - DNR/DNI/No feeding tube CHIEF COMPLAINT: SOB / RSV / Aspiration PNA    HPI: 83 y/o M PMHx significant for non-obstructive CAD, CHF (HFpEF LVEF  55-60% 6/2019), aortic root dilation, aortic valve insufficiency, s/p bioprosthetic aortic valve replacement and ascending aortic root graft, A-fib (not on AC due to recent GIBleed), [place] (s/p cochlear implant), chronic imbalance, Hypertension, Restless Leg Syndrome, CAMILA unable to tolerate CPAP, was referred by his Cardiologist to  for further evaluation and management of progressive shortness of breath associated with increased wheezing, subjective fevers, and cough over the past 2 days. The patient admits to recent sick contacts with his wife with URI symptoms at home. The patient denies any associated chest pain, lower extremity edema.    Found to have in ED, BUN/Cr 26/1.13, proBNP 1305, RVP was (+) RSV. In the ED the patient was given Methylprednisolone 125mg IVP x 1, and Combivent nebs x 3.    SUBJECTIVE:   2/7 - denies cp, palp, abdominal pain. NGT removed by patient. continue 1:1, monitor for N/V. GI consult pending       PHYSICAL EXAM:  Constitutional: NAD, awake and alert, pleasantly confused  HEENT: PERR, EOMI, [place]  Neck: Soft and supple, No LAD, No JVD  Respiratory: Breath sounds rhonchi b/l / wheezing   Cardiovascular: S1 and S2, regular rate and rhythm, no Murmurs, gallops or rubs  Gastrointestinal: Bowel Sounds present, soft, nontender, nondistended, no guarding, no rebound  Extremities: trace peripheral edema  Vascular: 2+ peripheral pulses  Neurological: A/O x 3, no focal deficits  Musculoskeletal: 5/5 strength b/l upper and lower extremities  Skin: No rashes    MEDICATIONS  (STANDING):  ampicillin/sulbactam  IVPB 3 Gram(s) IV Intermittent every 6 hours  azithromycin  IVPB 500 milliGRAM(s) IV Intermittent every 24 hours  azithromycin  IVPB      brimonidine 0.2% Ophthalmic Solution 1 Drop(s) Left EYE two times a day  enoxaparin Injectable 40 milliGRAM(s) SubCutaneous daily  metoprolol tartrate Injectable 2.5 milliGRAM(s) IV Push every 6 hours  pantoprazole  Injectable 40 milliGRAM(s) IV Push daily  timolol 0.5% Solution 1 Drop(s) Left EYE two times a day    MEDICATIONS  (PRN):  lidocaine 2% Viscous 5 milliLiter(s) Swish and Spit three times a day PRN Throat Irritation  ondansetron Injectable 4 milliGRAM(s) IV Push every 8 hours PRN Nausea and/or Vomiting      LABS: All Labs Reviewed:                        12.5   9.92  )-----------( 195      ( 07 Feb 2020 07:36 )             39.7     02-07    141  |  110<H>  |  26<H>  ----------------------------<  147<H>  4.4   |  25  |  0.96    Ca    9.2      07 Feb 2020 07:36  Mg     2.3     02-07    TPro  6.9  /  Alb  3.5  /  TBili  0.8  /  DBili  x   /  AST  21  /  ALT  17  /  AlkPhos  79  02-07      CARDIAC MARKERS ( 06 Feb 2020 19:41 )  <0.015 ng/mL / x     / x     / x     / x      CARDIAC MARKERS ( 06 Feb 2020 12:08 )  <0.015 ng/mL / x     / x     / x     / x            Blood Culture:   RADIOLOGY/EKG:  < from: CT Abdomen and Pelvis No Cont (02.07.20 @ 02:01) >  IMPRESSION:   There are a few contiguous loops of dilated ileum with possible transition point at the terminal ileum, raising the possibility of a partial distal small bowel obstruction. Interval insertion of NG tube with decompression of the esophagus and stomach compared to chest CT.    CT chest rev by me - LLRADAH infiltrate  Tele 2/7 rev by me - afib rate controlled

## 2020-02-07 NOTE — PROGRESS NOTE ADULT - SUBJECTIVE AND OBJECTIVE BOX
Patient is a 84y old  Male who presents with a chief complaint of Shortness of breath, Wheezing (07 Feb 2020 15:06)      HPI:  85 y/o M PMHx significant for non-obstructive CAD, CHF (HFpEF LVEF  55-60% 6/2019), aortic root dilation, aortic valve insufficiency, s/p bioprosthetic aortic valve replacement and ascending aortic root graft, A-fib (not on AC due to recent GIBleed), [place] (s/p cochlear implant), chronic imbalance, Hypertension, Restless Leg Syndrome, CAMILA unable to tolerate CPAP, was referred by his Cardiologist to  for further evaluation and management of progressive shortness of breath associated with increased wheezing, subjective fevers, and cough over the past 2 days. The patient admits to recent sick contacts with his wife with URI symptoms at home. The patient denies any associated chest pain, lower extremity edema. Labs => BUN/Cr 26/1.13, proBNP 1305, RVP was (+) RSV. In the ED the patient was given Methylprednisolone 125mg IVP x 1, and Combivent nebs x 3. (06 Feb 2020 17:47)  2/7: Pt seen and examined, not C/O any abdominal pain, thinks  he is passing gas, no BM, no nausea , no vomiting since AM, wants to eat .  ROS:.  [] A ten-point review of systems was otherwise negative except as noted.  Systemic:	[ ] Fever	[ ] Chills	[ ] Night sweats    [ ] Fatigue	[ ] Other  [] Cardiovascular:  [] Pulmonary:  [] Renal/Urologic:  [] Gastrointestinal: abdominal pain, vomiting  [] Metabolic:  [] Neurologic:  [] Hematologic:  [] ENT:  [] Ophthalmologic:  [] Musculoskeletal:    [X ] Due to altered mental status/intubation, subjective information were not able to be obtained from the patient. History was obtained, to the extent possible, from review of the chart and collateral sources of information.( Very [place], communicated by writing)    All other system review is negative .  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, 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  Aortic root dilatation  MRSA (methicillin resistant Staphylococcus aureus): left forearm 2012  Lumbar degenerative disc disease  BPH associated with nocturia  Diverticulosis of intestine without bleeding, unspecified intestinal tract location  Gastroesophageal reflux disease, esophagitis presence not specified: Hx of GI bleed  Aortic valve insufficiency, unspecified etiology  CAMILA (obstructive sleep apnea): unable to tolerate nocturnal CPAP  Restless leg syndrome  Thoracic aortic aneurysm  HLD (hyperlipidemia)  S/P thoracic aortic aneurysm repair  S/P AVR (aortic valve replacement)  Status post cataract extraction, unspecified laterality: bilat  S/P debridement: left forearm -mrsa  History of fundoplication: 2000  Amputation finger: left index 1970  S/P knee replacement: left  S/P knee replacement: right  S/P shoulder surgery: right rotatotor cuff injury    FAMILY HISTORY:  Family history of lung cancer    Social History:     Alcohol: Denied  Smoking: Denied  Drug Use: Denied        Vital Signs Last 24 Hrs  T(C): 36.3 (07 Feb 2020 17:41), Max: 36.8 (06 Feb 2020 21:37)  T(F): 97.4 (07 Feb 2020 17:41), Max: 98.3 (06 Feb 2020 21:37)  HR: 85 (07 Feb 2020 17:41) (65 - 85)  BP: 135/69 (07 Feb 2020 17:41) (132/88 - 151/95)  BP(mean): --  RR: 17 (07 Feb 2020 17:41) (16 - 18)  SpO2: 92% (07 Feb 2020 17:41) (92% - 98%)    I&O's Summary      PHYSICAL EXAM:  Constitutional: NAD, GCS: 15/15, Obese , very [place] .   AOX3  Eyes:  WNL  ENMT:  WNL  Neck:  WNL, non tender  Back: Non tender  Respiratory: CTABL  Cardiovascular:  S1+S2+0  Gastrointestinal: Soft, ND, NT   Genitourinary:  WNL  Extremities: NV intact  Vascular:  Intact  Neurological: No focal neurological deficit,  CN, motor and sensory system grossly intact.  Skin: WNL  Musculoskeletal: WNL  Psychiatric: Grossly WNL        Labs:                          12.5   9.92  )-----------( 195      ( 07 Feb 2020 07:36 )             39.7       02-07    141  |  110<H>  |  26<H>  ----------------------------<  147<H>  4.4   |  25  |  0.96    Ca    9.2      07 Feb 2020 07:36  Mg     2.3     02-07    TPro  6.9  /  Alb  3.5  /  TBili  0.8  /  DBili  x   /  AST  21  /  ALT  17  /  AlkPhos  79  02-07      < from: CT Abdomen and Pelvis No Cont (02.07.20 @ 02:01) >    IMPRESSION:   There are a few contiguous loops of dilated ileum with possible transition point at the terminal ileum, raising the possibility of a partial distal small bowel obstruction. Interval insertion of NG tube with decompression of the esophagus and stomach compared to chest CT.    Left lower lobe atelectasis and/or pneumonia.    Multiple additional findings as above.            < end of copied text >

## 2020-02-07 NOTE — PHYSICAL THERAPY INITIAL EVALUATION ADULT - IMPAIRMENTS CONTRIBUTING TO GAIT DEVIATIONS, PT EVAL
* To start Azacitidine today at Dr. Monroy's office. Needs 5 days.  * Heme/Onc recs appreciated. decreased strength

## 2020-02-07 NOTE — PROGRESS NOTE ADULT - ASSESSMENT
84 Y old male with multiple medical problems, with respiratory tract infection, found to have RSV, RVP infection , passing gas, no BM, no nausea    Serial exam   NPO except ice chips, sips  AXR  if X ray better trial of CLD  Medical management per primary service.   Will follow

## 2020-02-07 NOTE — CONSULT NOTE ADULT - ASSESSMENT
83 y/o M PMHx significant for non-obstructive CAD, CHF (HFpEF LVEF  55-60% 6/2019), aortic root dilation, aortic valve insufficiency, s/p bioprosthetic aortic valve replacement and ascending aortic root graft, A-fib (not on AC due to recent GIBleed), [place] (s/p cochlear implant), chronic imbalance, Hypertension, Restless Leg Syndrome, CAMILA unable to tolerate CPAP, was admitted on 2/6 from his Cardiologist for evaluation of progressive shortness of breath, wheezing and fever associated with cough over prior two days. Notes his wife is sick with URI symptoms. Imaging done on admission revealed pneumonia and bowel obstruction; patient had ngt placed. History per medical record as patient is poor historian.   1. Patient admitted with RSV URI with superimposed pneumonia which may be aspiration pneumonia; also with small bowel obstruction  - follow up cultures   - oxygen and nebs as needed   - iv hydration and supportive care   - serial cbc and monitor temperature   - reviewed prior medical records to evaluate for resistant or atypical pathogens   - agree with unasyn as ordered  - zithromax for atypical pathogens  2. Small bowel obstruction  - ngt in place  - workup in place  3. other issues: per medicine
88M with dilate esopahgus on chest ct, surgery consulted to rule out goo    - npo  - ivf  - control nausea  - gi consult for possible egd   - gi ppx   - ngt lws  - will need ct abd/pelvis  - will continue to follow   - no urgent surgical intervention needed at this point.     plan discussed with surgery team
A/P:  R/O Gastric Outlet obstruction  NGT decompression  GI consult strongly advised  Advise dedicated ct abd/pelvis for further evaluation  Medical management per primary service  Advise appropriate GI/DVT prophylaxis  Serial abd exams  Medical comorbidities of CAD, CHF, afib, GI bleed, HTN, CAMILA, HLD, BPH, DDD, GERD
Partial Small bowel obstruction resolving  REC  Trial of clear liquid diet
85 y/o M PMHx significant for non-obstructive CAD, CHF (HFpEF LVEF  55-60% 6/2019), aortic root dilation, aortic valve insufficiency, s/p bioprosthetic aortic valve replacement and ascending aortic root graft, A-fib (not on AC due to recent GIBleed), [place] (s/p cochlear implant), chronic imbalance, Hypertension, Restless Leg Syndrome, CAMILA unable to tolerate CPAP, was referred by his Cardiologist to  for further evaluation and management of progressive shortness of breath associated with increased wheezing, subjective fevers, and cough over the past 2 days. The patient admits to recent sick contacts with his wife with URI symptoms at home. The patient denies any associated chest pain, lower extremity edema. Labs => BUN/Cr 26/1.13, proBNP 1305, RVP was (+) RSV. In the ED the patient was given Methylprednisolone 125mg IVP x 1, and Combivent nebs x 3. (06 Feb 2020 17:47)  2/7- baseline SOB , lying flat no CP    1) tretamnet for PNA with IV ABX concern for aspiration  2) h/o chronic diastolic HF cont home meds   3) no active cardiac issues , can DC tele . We will follow PRN

## 2020-02-07 NOTE — PHYSICAL THERAPY INITIAL EVALUATION ADULT - ASR EQUIP NEEDS DISCH PT EVAL
pt has a SC, anticipate that this will be sufficient as pt heals. If weakness persists pt may need a RW assessment

## 2020-02-07 NOTE — PHYSICAL THERAPY INITIAL EVALUATION ADULT - GENERAL OBSERVATIONS, REHAB EVAL
Pt seen on IP contact and droplet, on 3E, NAD, Very [place] and communication was challenging, +IV, pt reports being very hungry but on a clear liquid diet.

## 2020-02-07 NOTE — PHYSICAL THERAPY INITIAL EVALUATION ADULT - MODALITIES TREATMENT COMMENTS
Pt OOB in chair after session, denies pain, very [place], +alarm, IV disconnected by RN for session, NAD, +wheezing throughout session. CBIR, tray table in front.

## 2020-02-07 NOTE — CONSULT NOTE ADULT - REASON FOR ADMISSION
Shortness of breath, Wheezing

## 2020-02-07 NOTE — CONSULT NOTE ADULT - SUBJECTIVE AND OBJECTIVE BOX
Patient is a 84y old  Male who presents with a chief complaint of Shortness of breath, Wheezing (07 Feb 2020 08:33)    HPI:  83 y/o M PMHx significant for non-obstructive CAD, CHF (HFpEF LVEF  55-60% 6/2019), aortic root dilation, aortic valve insufficiency, s/p bioprosthetic aortic valve replacement and ascending aortic root graft, A-fib (not on AC due to recent GIBleed), [place] (s/p cochlear implant), chronic imbalance, Hypertension, Restless Leg Syndrome, CAMILA unable to tolerate CPAP, was admitted on 2/6 from his Cardiologist for evaluation of progressive shortness of breath, wheezing and fever associated with cough over prior two days. Notes his wife is sick with URI symptoms. Imaging done on admission revealed pneumonia and bowel obstruction; patient had ngt placed. History per medical record as patient is poor historian.         PMH: as above  PSH: as above  Meds: per reconciliation sheet, noted below  MEDICATIONS  (STANDING):  ampicillin/sulbactam  IVPB 3 Gram(s) IV Intermittent every 6 hours  azithromycin  IVPB 500 milliGRAM(s) IV Intermittent every 24 hours  azithromycin  IVPB      brimonidine 0.2% Ophthalmic Solution 1 Drop(s) Left EYE two times a day  enoxaparin Injectable 40 milliGRAM(s) SubCutaneous daily  metoprolol tartrate Injectable 2.5 milliGRAM(s) IV Push every 6 hours  pantoprazole  Injectable 40 milliGRAM(s) IV Push daily  timolol 0.5% Solution 1 Drop(s) Left EYE two times a day    MEDICATIONS  (PRN):  lidocaine 2% Viscous 5 milliLiter(s) Swish and Spit three times a day PRN Throat Irritation  ondansetron Injectable 4 milliGRAM(s) IV Push every 8 hours PRN Nausea and/or Vomiting    Allergies    No Known Allergies    Intolerances      Social: no smoking, no alcohol, no illegal drugs; no recent travel, no exposure to TB  FAMILY HISTORY:  Family history of lung cancer     no history of premature cardiovascular disease in first degree relatives  ROS: unable to obtain secondary to patient medical condition     Vital Signs Last 24 Hrs  T(C): 36.4 (07 Feb 2020 01:10), Max: 36.8 (06 Feb 2020 21:37)  T(F): 97.6 (07 Feb 2020 01:10), Max: 98.3 (06 Feb 2020 21:37)  HR: 82 (07 Feb 2020 08:40) (56 - 82)  BP: 138/79 (07 Feb 2020 08:40) (106/75 - 151/95)  BP(mean): 98 (06 Feb 2020 18:00) (98 - 131)  RR: 18 (07 Feb 2020 01:10) (16 - 21)  SpO2: 96% (07 Feb 2020 01:10) (91% - 97%)  Daily Height in cm: 170.18 (06 Feb 2020 11:38)    Daily     PE:    Constitutional: frail looking  HEENT: NC/AT, EOMI, PERRLA, conjunctivae clear; ears and nose atraumatic; pharynx clear; ngt in place  Neck: supple; thyroid not palpable  Back: no tenderness  Respiratory: respiratory effort normal; scattered coarse breath sounds with expiratory wheezing  Cardiovascular: S1S2 regular, no murmurs  Abdomen: soft, not tender, not distended, positive BS; no liver or spleen organomegaly  Genitourinary: no suprapubic tenderness  Musculoskeletal: no muscle tenderness, no joint swelling or tenderness  Neurological/ Psychiatric:   moving all extremities  Skin: no rashes; no palpable lesions    Labs: all available labs reviewed                        12.5   9.92  )-----------( 195      ( 07 Feb 2020 07:36 )             39.7     02-07    141  |  110<H>  |  26<H>  ----------------------------<  147<H>  4.4   |  25  |  0.96    Ca    9.2      07 Feb 2020 07:36  Mg     2.3     02-07    TPro  6.9  /  Alb  3.5  /  TBili  0.8  /  DBili  x   /  AST  21  /  ALT  17  /  AlkPhos  79  02-07     LIVER FUNCTIONS - ( 07 Feb 2020 07:36 )  Alb: 3.5 g/dL / Pro: 6.9 gm/dL / ALK PHOS: 79 U/L / ALT: 17 U/L / AST: 21 U/L / GGT: x           < from: CT Abdomen and Pelvis No Cont (02.07.20 @ 02:01) >    EXAM:  CT ABDOMEN AND PELVIS                            PROCEDURE DATE:  02/07/2020          INTERPRETATION:    Exam: CT Abdomen And Pelvis Without Contrast   Exam date and time: 2/7/2020 1:39 AM   Age: 84 years old   Clinical indication: Abdominaltenderness     TECHNIQUE:   Imaging protocol: Computed tomography of the abdomen and pelvis without contrast.     COMPARISON: Chest CT performed on the same day.    FINDINGS:     Lung bases: Small left basilar compressive atelectasis and/or consolidation.  Tubes, catheters and devices: NG tube tip is in the lumen of the mid-distal stomach.   Mediastinum: Small to moderate hiatal hernia.     Liver: Few subcentimeter hepatic hypodensities, too small to characterize.   Gallbladder and bile ducts: Cholelithiasis. No cholecystitis or biliary ductal dilatation.   Pancreas: Normal. No ductal dilation.   Spleen: Multiple small hypoattenuating lesions in the spleen, indeterminate, statistically likely benign.   Adrenals: Normal. No mass.   Kidneys andureters: Normal. No hydronephrosis.   Stomach and bowel: Colonic diverticulosis. No diverticulitis. There are a few contiguous loops of dilated ileum with possible transition point at the terminal ileum, raising the possibility of a partial distal small bowel obstruction. No bowel wall thickening.   Appendix: Appendix not visualized. No evidence of appendicitis.   Intraperitoneal space: Unremarkable. No free air. No significant fluid collection.   Vasculature: Atherosclerotic changes of the aortaand branching vessels. Limited evaluation without contrast..   Lymph nodes: Unremarkable. No enlarged lymph nodes.     Bladder: Underdistended.  Reproductive: Prostate and seminal vesicles are within normal limits.   Bones/joints: Multilevel degenerative changes.   Soft tissues: Small umbilical hernia containing fat and nonobstructed portion of small bowel.     IMPRESSION:   There are a few contiguous loops of dilated ileum with possible transition point at the terminal ileum, raising the possibility of a partial distal small bowel obstruction. Interval insertion of NG tube with decompression of the esophagus and stomach compared to chest CT.    Left lower lobe atelectasis and/or pneumonia.    Multiple additional findings as above.      < end of copied text >        < from: CT Chest No Cont (02.06.20 @ 20:49) >    EXAM:  CT CHEST                            PROCEDURE DATE:  02/06/2020          INTERPRETATION:  CLINICAL INDICATION: 84 years  Male with Severe dyspnea.     COMPARISON: 7/13/2019    PROCEDURE:   CT of the Chest was performed without intravenous contrast.  Sagittal and coronal reformats were performed.      FINDINGS:    LUNGS AND AIRWAYS: Patent central airways.  Medial basilar left lower lobe infiltrate and left lower lobe discoid atelectasis..    PLEURA: No pleural effusion.    MEDIASTINUM AND KISHOR: Enlarged pretracheal lymph node measuring up to 1.8 cm in short axis unchanged from the prior study. No other mediastinal or hilar adenopathy allowing for lack of IV contrast. The esophagus is diffusely distended with debris measuring up to 3.7 cm in diameter. There is risk for aspiration.    VESSELS: The aorta is atherosclerotic but not aneurysmal. Coronary atherosclerosis is present.    HEART: Mildly enlarged No pericardial effusion.    CHEST WALL AND LOWER NECK: Within normal limits.    VISUALIZED UPPER ABDOMEN: The stomach is distended with debris. Cholelithiasis are present    BONES: Cervical and thoracic degenerative changes.    IMPRESSION:     Diffusely dilated esophagus and stomach with risk for aspiration. Rule out gastric outlet obstruction.    Left lower lobe infiltrate.    Stable mediastinal adenopathy.    < end of copied text >        Radiology: all available radiological tests reviewed    Advanced directives addressed: DNR

## 2020-02-08 LAB
ANION GAP SERPL CALC-SCNC: 5 MMOL/L — SIGNIFICANT CHANGE UP (ref 5–17)
BUN SERPL-MCNC: 23 MG/DL — SIGNIFICANT CHANGE UP (ref 7–23)
CALCIUM SERPL-MCNC: 9.1 MG/DL — SIGNIFICANT CHANGE UP (ref 8.5–10.1)
CHLORIDE SERPL-SCNC: 104 MMOL/L — SIGNIFICANT CHANGE UP (ref 96–108)
CO2 SERPL-SCNC: 31 MMOL/L — SIGNIFICANT CHANGE UP (ref 22–31)
CREAT SERPL-MCNC: 1.02 MG/DL — SIGNIFICANT CHANGE UP (ref 0.5–1.3)
GLUCOSE SERPL-MCNC: 94 MG/DL — SIGNIFICANT CHANGE UP (ref 70–99)
HCT VFR BLD CALC: 40.3 % — SIGNIFICANT CHANGE UP (ref 39–50)
HGB BLD-MCNC: 12.6 G/DL — LOW (ref 13–17)
MAGNESIUM SERPL-MCNC: 1.8 MG/DL — SIGNIFICANT CHANGE UP (ref 1.6–2.6)
MCHC RBC-ENTMCNC: 29.3 PG — SIGNIFICANT CHANGE UP (ref 27–34)
MCHC RBC-ENTMCNC: 31.3 GM/DL — LOW (ref 32–36)
MCV RBC AUTO: 93.7 FL — SIGNIFICANT CHANGE UP (ref 80–100)
PLATELET # BLD AUTO: 196 K/UL — SIGNIFICANT CHANGE UP (ref 150–400)
POTASSIUM SERPL-MCNC: 3.6 MMOL/L — SIGNIFICANT CHANGE UP (ref 3.5–5.3)
POTASSIUM SERPL-SCNC: 3.6 MMOL/L — SIGNIFICANT CHANGE UP (ref 3.5–5.3)
RBC # BLD: 4.3 M/UL — SIGNIFICANT CHANGE UP (ref 4.2–5.8)
RBC # FLD: 17.2 % — HIGH (ref 10.3–14.5)
SODIUM SERPL-SCNC: 140 MMOL/L — SIGNIFICANT CHANGE UP (ref 135–145)
WBC # BLD: 9.65 K/UL — SIGNIFICANT CHANGE UP (ref 3.8–10.5)
WBC # FLD AUTO: 9.65 K/UL — SIGNIFICANT CHANGE UP (ref 3.8–10.5)

## 2020-02-08 PROCEDURE — 99232 SBSQ HOSP IP/OBS MODERATE 35: CPT

## 2020-02-08 PROCEDURE — 99233 SBSQ HOSP IP/OBS HIGH 50: CPT

## 2020-02-08 RX ORDER — METOPROLOL TARTRATE 50 MG
25 TABLET ORAL
Refills: 0 | Status: DISCONTINUED | OUTPATIENT
Start: 2020-02-08 | End: 2020-02-11

## 2020-02-08 RX ORDER — PANTOPRAZOLE SODIUM 20 MG/1
40 TABLET, DELAYED RELEASE ORAL
Refills: 0 | Status: DISCONTINUED | OUTPATIENT
Start: 2020-02-08 | End: 2020-02-11

## 2020-02-08 RX ADMIN — BRIMONIDINE TARTRATE 1 DROP(S): 2 SOLUTION/ DROPS OPHTHALMIC at 18:50

## 2020-02-08 RX ADMIN — Medication 3 MILLILITER(S): at 13:46

## 2020-02-08 RX ADMIN — Medication 40 MILLIGRAM(S): at 18:50

## 2020-02-08 RX ADMIN — Medication 3 MILLILITER(S): at 07:41

## 2020-02-08 RX ADMIN — ROPINIROLE 2 MILLIGRAM(S): 8 TABLET, FILM COATED, EXTENDED RELEASE ORAL at 20:03

## 2020-02-08 RX ADMIN — Medication 1 TABLET(S): at 13:51

## 2020-02-08 RX ADMIN — Medication 2.5 MILLIGRAM(S): at 05:17

## 2020-02-08 RX ADMIN — Medication 40 MILLIGRAM(S): at 05:16

## 2020-02-08 RX ADMIN — AMPICILLIN SODIUM AND SULBACTAM SODIUM 200 GRAM(S): 250; 125 INJECTION, POWDER, FOR SUSPENSION INTRAMUSCULAR; INTRAVENOUS at 05:16

## 2020-02-08 RX ADMIN — ROPINIROLE 2 MILLIGRAM(S): 8 TABLET, FILM COATED, EXTENDED RELEASE ORAL at 05:17

## 2020-02-08 RX ADMIN — Medication 2.5 MILLIGRAM(S): at 13:52

## 2020-02-08 RX ADMIN — Medication 1 DROP(S): at 05:17

## 2020-02-08 RX ADMIN — ENOXAPARIN SODIUM 40 MILLIGRAM(S): 100 INJECTION SUBCUTANEOUS at 13:52

## 2020-02-08 RX ADMIN — Medication 20 MILLIEQUIVALENT(S): at 13:51

## 2020-02-08 RX ADMIN — AZITHROMYCIN 255 MILLIGRAM(S): 500 TABLET, FILM COATED ORAL at 20:04

## 2020-02-08 RX ADMIN — Medication 600 MILLIGRAM(S): at 18:50

## 2020-02-08 RX ADMIN — AMPICILLIN SODIUM AND SULBACTAM SODIUM 200 GRAM(S): 250; 125 INJECTION, POWDER, FOR SUSPENSION INTRAMUSCULAR; INTRAVENOUS at 18:50

## 2020-02-08 RX ADMIN — PANTOPRAZOLE SODIUM 40 MILLIGRAM(S): 20 TABLET, DELAYED RELEASE ORAL at 13:52

## 2020-02-08 RX ADMIN — Medication 60 MILLIGRAM(S): at 16:53

## 2020-02-08 RX ADMIN — ROPINIROLE 2 MILLIGRAM(S): 8 TABLET, FILM COATED, EXTENDED RELEASE ORAL at 13:51

## 2020-02-08 RX ADMIN — AMPICILLIN SODIUM AND SULBACTAM SODIUM 200 GRAM(S): 250; 125 INJECTION, POWDER, FOR SUSPENSION INTRAMUSCULAR; INTRAVENOUS at 13:53

## 2020-02-08 RX ADMIN — Medication 1 TABLET(S): at 18:50

## 2020-02-08 RX ADMIN — Medication 600 MILLIGRAM(S): at 05:17

## 2020-02-08 RX ADMIN — Medication 25 MILLIGRAM(S): at 18:50

## 2020-02-08 RX ADMIN — Medication 1 DROP(S): at 20:46

## 2020-02-08 RX ADMIN — BRIMONIDINE TARTRATE 1 DROP(S): 2 SOLUTION/ DROPS OPHTHALMIC at 05:17

## 2020-02-08 NOTE — PROVIDER CONTACT NOTE (OTHER) - ASSESSMENT
Pt verbalizing multiple times stating he wants to leave, pt leaving from isolated room. Pt becoming combative.
Pt becoming resistant to nursing care. Refusing IV ABX, oxygen, vital signs. Pt becoming combative trying to hit nursing staff. Pt walked out of room isolation. Pt refused to wear mask in hallway.

## 2020-02-08 NOTE — PROGRESS NOTE ADULT - SUBJECTIVE AND OBJECTIVE BOX
Patient is a 84y old  Male who presents with a chief complaint of Shortness of breath, Wheezing (08 Feb 2020 09:24)      HPI:  85 y/o M PMHx significant for non-obstructive CAD, CHF (HFpEF LVEF  55-60% 6/2019), aortic root dilation, aortic valve insufficiency, s/p bioprosthetic aortic valve replacement and ascending aortic root graft, A-fib (not on AC due to recent GIBleed), [place] (s/p cochlear implant), chronic imbalance, Hypertension, Restless Leg Syndrome, CAMILA unable to tolerate CPAP, was referred by his Cardiologist to  for further evaluation and management of progressive shortness of breath associated with increased wheezing, subjective fevers, and cough over the past 2 days. The patient admits to recent sick contacts with his wife with URI symptoms at home. The patient denies any associated chest pain, lower extremity edema. Labs => BUN/Cr 26/1.13, proBNP 1305, RVP was (+) RSV. In the ED the patient was given Methylprednisolone 125mg IVP x 1, and Combivent nebs x 3. (06 Feb 2020 17:47)  2/8: pt seen and examined, no abdominal pain, no  nausea, passing gas. Tolerating CLD.  ROS:.  [X] A ten-point review of systems was otherwise negative except as noted.  Systemic:	[ ] Fever	[ ] Chills	[ ] Night sweats    [ ] Fatigue	[ ] Other  [] Cardiovascular:  [] Pulmonary:  [] Renal/Urologic:  [] Gastrointestinal: abdominal pain, vomiting  [] Metabolic:  [] Neurologic:  [] Hematologic:  [] ENT:  [] Ophthalmologic:  [] Musculoskeletal:    [ ] Due to altered mental status/intubation, subjective information were not able to be obtained from the patient. History was obtained, to the extent possible, from review of the chart and collateral sources of information.    All other system review is negative .  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, 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  Aortic root dilatation  MRSA (methicillin resistant Staphylococcus aureus): left forearm 2012  Lumbar degenerative disc disease  BPH associated with nocturia  Diverticulosis of intestine without bleeding, unspecified intestinal tract location  Gastroesophageal reflux disease, esophagitis presence not specified: Hx of GI bleed  Aortic valve insufficiency, unspecified etiology  CAMILA (obstructive sleep apnea): unable to tolerate nocturnal CPAP  Restless leg syndrome  Thoracic aortic aneurysm  HLD (hyperlipidemia)  S/P thoracic aortic aneurysm repair  S/P AVR (aortic valve replacement)  Status post cataract extraction, unspecified laterality: bilat  S/P debridement: left forearm -mrsa  History of fundoplication: 2000  Amputation finger: left index 1970  S/P knee replacement: left  S/P knee replacement: right  S/P shoulder surgery: right rotatotor cuff injury    FAMILY HISTORY:  Family history of lung cancer    Social History:     Alcohol: Denied  Smoking: Denied  Drug Use: Denied        Vital Signs Last 24 Hrs  T(C): 36.7 (08 Feb 2020 05:15), Max: 36.8 (07 Feb 2020 12:15)  T(F): 98.1 (08 Feb 2020 05:15), Max: 98.2 (07 Feb 2020 12:15)  HR: 87 (08 Feb 2020 05:15) (75 - 88)  BP: 118/83 (08 Feb 2020 05:15) (118/83 - 135/69)  BP(mean): --  RR: 17 (08 Feb 2020 05:15) (17 - 18)  SpO2: 97% (08 Feb 2020 05:15) (92% - 98%)    I&O's Summary      PHYSICAL EXAM:  Constitutional: NAD, GCS: 15/15, Obese  AOX3  Eyes:  WNL  ENMT:  WNL  Neck:  WNL, non tender  Back: Non tender  Respiratory: CTABL, B/L ronchi  Cardiovascular:  S1+S2+0  Gastrointestinal: Soft, ND , NT, reducible umbilical hernia.   Genitourinary:  WNL  Extremities: NV intact  Vascular:  Intact  Neurological: No focal neurological deficit,  CN, motor and sensory system grossly intact.  Skin: WNL  Musculoskeletal: WNL  Psychiatric: Grossly WNL        Labs:                          12.6   9.65  )-----------( 196      ( 08 Feb 2020 07:50 )             40.3       02-08    140  |  104  |  23  ----------------------------<  94  3.6   |  31  |  1.02    Ca    9.1      08 Feb 2020 07:50  Mg     1.8     02-08    TPro  6.9  /  Alb  3.5  /  TBili  0.8  /  DBili  x   /  AST  21  /  ALT  17  /  AlkPhos  79  02-07    < from: Xray Abdomen 2 Views (02.07.20 @ 16:05) >  IMPRESSION:      There is a large degree of retained stool. Bowel gas pattern is otherwise unremarkable without evidence of SBO. Rest of the findings are unchanged.        < end of copied text >

## 2020-02-08 NOTE — PROGRESS NOTE ADULT - ASSESSMENT
83 y/o M PMHx significant for non-obstructive CAD, CHF (HFpEF LVEF  55-60% 6/2019), aortic root dilation, aortic valve insufficiency, s/p bioprosthetic aortic valve replacement and ascending aortic root graft, A-fib (not on AC due to recent GIBleed), [place] (s/p cochlear implant), chronic imbalance, Hypertension, Restless Leg Syndrome, CAMILA unable to tolerate CPAP, was referred by his Cardiologist to  for further evaluation and management of progressive shortness of breath associated with increased wheezing, subjective fevers, and cough over the past 2 days. The patient admits to recent sick contacts with his wife with URI symptoms at home. The patient denies any associated chest pain, lower extremity edema. Labs => BUN/Cr 26/1.13, proBNP 1305, RVP was (+) RSV. In the ED the patient was given Methylprednisolone 125mg IVP x 1, and Combivent nebs x 3. (06 Feb 2020 17:47)  2/7- baseline SOB , lying flat no CP    1) tretamnet for PNA with IV ABX concern for aspiration  2) h/o chronic diastolic HF cont home meds   3) no active cardiac issues , can DC tele . We will follow PRN

## 2020-02-08 NOTE — PROGRESS NOTE ADULT - SUBJECTIVE AND OBJECTIVE BOX
Patient is a 84y old  Male who presents with a chief complaint of Shortness of breath, Wheezing (07 Feb 2020 15:06)    2/8- still with a wheeze     MEDICATIONS  (STANDING):  albuterol/ipratropium for Nebulization 3 milliLiter(s) Nebulizer every 6 hours  ampicillin/sulbactam  IVPB 3 Gram(s) IV Intermittent every 6 hours  azithromycin  IVPB 500 milliGRAM(s) IV Intermittent every 24 hours  azithromycin  IVPB      brimonidine 0.2% Ophthalmic Solution 1 Drop(s) Left EYE two times a day  enoxaparin Injectable 40 milliGRAM(s) SubCutaneous daily  furosemide    Tablet 40 milliGRAM(s) Oral two times a day  guaiFENesin  milliGRAM(s) Oral every 12 hours  lactobacillus acidophilus 1 Tablet(s) Oral two times a day with meals  metoprolol tartrate Injectable 2.5 milliGRAM(s) IV Push every 6 hours  pantoprazole  Injectable 40 milliGRAM(s) IV Push daily  potassium chloride    Tablet ER 20 milliEquivalent(s) Oral daily  rOPINIRole 2 milliGRAM(s) Oral four times a day  timolol 0.5% Solution 1 Drop(s) Left EYE two times a day    MEDICATIONS  (PRN):  ALBUTerol    0.083% 2.5 milliGRAM(s) Nebulizer every 6 hours PRN Shortness of Breath and/or Wheezing  benzonatate 100 milliGRAM(s) Oral every 8 hours PRN Cough  lidocaine 2% Viscous 5 milliLiter(s) Swish and Spit three times a day PRN Throat Irritation  ondansetron Injectable 4 milliGRAM(s) IV Push every 8 hours PRN Nausea and/or Vomiting            Vital Signs Last 24 Hrs  T(C): 36.7 (08 Feb 2020 05:15), Max: 36.8 (07 Feb 2020 12:15)  T(F): 98.1 (08 Feb 2020 05:15), Max: 98.2 (07 Feb 2020 12:15)  HR: 87 (08 Feb 2020 05:15) (75 - 88)  BP: 118/83 (08 Feb 2020 05:15) (118/83 - 135/69)  BP(mean): --  RR: 17 (08 Feb 2020 05:15) (17 - 18)  SpO2: 97% (08 Feb 2020 05:15) (92% - 98%)            INTERPRETATION OF TELEMETRY:    ECG:        LABS:                        12.6   9.65  )-----------( 196      ( 08 Feb 2020 07:50 )             40.3     02-08    140  |  104  |  23  ----------------------------<  94  3.6   |  31  |  1.02    Ca    9.1      08 Feb 2020 07:50  Mg     1.8     02-08    TPro  6.9  /  Alb  3.5  /  TBili  0.8  /  DBili  x   /  AST  21  /  ALT  17  /  AlkPhos  79  02-07    CARDIAC MARKERS ( 06 Feb 2020 19:41 )  <0.015 ng/mL / x     / x     / x     / x      CARDIAC MARKERS ( 06 Feb 2020 12:08 )  <0.015 ng/mL / x     / x     / x     / x              I&O's Summary    BNP  RADIOLOGY & ADDITIONAL STUDIES:

## 2020-02-08 NOTE — PROVIDER CONTACT NOTE (OTHER) - SITUATION
Pt stated he wanted to leave, put clothing on and walked out of room. While in hallway pt became combative trying to hit nursing staff and swung cane at nursing staff. Code flight/gray called
 is on floor- aware of consult
Pt becoming resistant to nursing care. Pt demanded to stop IV ABX and remove IV, IV removed due to pt refusal. Pt refused breathing treatment and refused to wear oxygen.
spoke with Alphonso in office. aware of consult
spoke with Lillie in answering service. aware of consult

## 2020-02-08 NOTE — PROGRESS NOTE ADULT - ASSESSMENT
84 Y old male with multiple medical problems, with respiratory tract infection, found to have RSV, RVP infection , passing gas,  no nausea tolerating CLD    AXR, no SBO   Advance diet as tolerated  Bowel regemien  Medical management per primary service.   No surgical intervention

## 2020-02-08 NOTE — PROGRESS NOTE ADULT - SUBJECTIVE AND OBJECTIVE BOX
CHIEF COMPLAINT: SOB / RSV / Aspiration PNA    HPI: 85 y/o M PMHx significant for non-obstructive CAD, CHF (HFpEF LVEF  55-60% 6/2019), aortic root dilation, aortic valve insufficiency, s/p bioprosthetic aortic valve replacement and ascending aortic root graft, A-fib (not on AC due to recent GIBleed), [place] (s/p cochlear implant), chronic imbalance, Hypertension, Restless Leg Syndrome, CAMILA unable to tolerate CPAP, was referred by his Cardiologist to  for further evaluation and management of progressive shortness of breath associated with increased wheezing, subjective fevers, and cough over the past 2 days. The patient admits to recent sick contacts with his wife with URI symptoms at home. The patient denies any associated chest pain, lower extremity edema.  Found to have in ED, BUN/Cr 26/1.13, proBNP 1305, RVP was (+) RSV. In the ED the patient was given Methylprednisolone 125mg IVP x 1, and Combivent nebs x 3.    SUBJECTIVE:   2/7 - denies cp, palp, abdominal pain. NGT removed by patient. continue 1:1, monitor for N/V. GI consult pending   2/8 - no cp palps sob. off NGT and trial of CL diet, has wheezing on examn      PHYSICAL EXAM:  Constitutional: NAD, awake and alert, pleasantly confused  HEENT: PERR, EOMI, [place]  Neck: Soft and supple, No LAD, No JVD  Respiratory: Breath sounds rhonchi b/l / wheezing   Cardiovascular: S1 and S2, regular rate and rhythm, no Murmurs, gallops or rubs  Gastrointestinal: Bowel Sounds present, soft, nontender, nondistended, no guarding, no rebound  Extremities: trace peripheral edema  Vascular: 2+ peripheral pulses  Neurological: A/O x 3, no focal deficits  Musculoskeletal: 5/5 strength b/l upper and lower extremities      RADIOLOGY/EKG:  < from: CT Abdomen and Pelvis No Cont (02.07.20 @ 02:01) >  IMPRESSION:   There are a few contiguous loops of dilated ileum with possible transition point at the terminal ileum, raising the possibility of a partial distal small bowel obstruction. Interval insertion of NG tube with decompression of the esophagus and stomach compared to chest CT.    CT chest rev by me - LLL infiltrate  Tele 2/7 rev by me - afib rate controlled  Tele 2/8 - ib    LABS: All Labs Reviewed:                        12.6   9.65  )-----------( 196      ( 08 Feb 2020 07:50 )             40.3     02-08    140  |  104  |  23  ----------------------------<  94  3.6   |  31  |  1.02      ALBUTerol    0.083% 2.5 milliGRAM(s) Nebulizer every 6 hours PRN  albuterol/ipratropium for Nebulization 3 milliLiter(s) Nebulizer every 6 hours  ampicillin/sulbactam  IVPB 3 Gram(s) IV Intermittent every 6 hours  azithromycin  IVPB 500 milliGRAM(s) IV Intermittent every 24 hours  azithromycin  IVPB      benzonatate 100 milliGRAM(s) Oral every 8 hours PRN  brimonidine 0.2% Ophthalmic Solution 1 Drop(s) Left EYE two times a day  enoxaparin Injectable 40 milliGRAM(s) SubCutaneous daily  furosemide    Tablet 40 milliGRAM(s) Oral two times a day  guaiFENesin  milliGRAM(s) Oral every 12 hours  lactobacillus acidophilus 1 Tablet(s) Oral two times a day with meals  lidocaine 2% Viscous 5 milliLiter(s) Swish and Spit three times a day PRN  metoprolol tartrate Injectable 2.5 milliGRAM(s) IV Push every 6 hours  ondansetron Injectable 4 milliGRAM(s) IV Push every 8 hours PRN  pantoprazole  Injectable 40 milliGRAM(s) IV Push daily  potassium chloride    Tablet ER 20 milliEquivalent(s) Oral daily  rOPINIRole 2 milliGRAM(s) Oral four times a day  timolol 0.5% Solution 1 Drop(s) Left EYE two times a day

## 2020-02-08 NOTE — PROVIDER CONTACT NOTE (OTHER) - ACTION/TREATMENT ORDERED:
Code flight/gray called. Tried to redirect patient, explaining concern. Phone call made to Pts wife and son. Constant observation ordered at this time. Will continue to monitor pt and will reassess
MD made aware. Code gray/rakan called. Talked with pt, stating we are concerned about him and we want to care for him. Pt still resistant to nursing care. Constant observation to be ordered.

## 2020-02-08 NOTE — PROVIDER CONTACT NOTE (OTHER) - RECOMMENDATIONS
Tried to redirect patient multiple times, explaining we are concerned about breathing. Pt refusing to wear oxygen at this time and refusing to check vital signs. Refusing to place new IV for IV ABX.
Redirected pt. Stating we are concerned about his breathing. Explained to pt he needs oxygen and ABX to get better and go home. Pt still refusing oxygen and ABX.

## 2020-02-08 NOTE — PROVIDER CONTACT NOTE (OTHER) - BACKGROUND
Pt admitted for RSV. Pt also has small bowel obstruction, pt pulled out NGT on 2/7.
Pt on contact/droplet precautions for RSV, small bowel obstruction also present. pt pulled out NGT on 2/7. Pt verbalized wanting to go home and states he feels fine.

## 2020-02-08 NOTE — PROGRESS NOTE ADULT - SUBJECTIVE AND OBJECTIVE BOX
Interval History:    MEDICATIONS  (STANDING):  albuterol/ipratropium for Nebulization 3 milliLiter(s) Nebulizer every 6 hours  ampicillin/sulbactam  IVPB 3 Gram(s) IV Intermittent every 6 hours  azithromycin  IVPB 500 milliGRAM(s) IV Intermittent every 24 hours  azithromycin  IVPB      brimonidine 0.2% Ophthalmic Solution 1 Drop(s) Left EYE two times a day  enoxaparin Injectable 40 milliGRAM(s) SubCutaneous daily  furosemide    Tablet 40 milliGRAM(s) Oral two times a day  guaiFENesin  milliGRAM(s) Oral every 12 hours  lactobacillus acidophilus 1 Tablet(s) Oral two times a day with meals  metoprolol tartrate Injectable 2.5 milliGRAM(s) IV Push every 6 hours  pantoprazole  Injectable 40 milliGRAM(s) IV Push daily  potassium chloride    Tablet ER 20 milliEquivalent(s) Oral daily  rOPINIRole 2 milliGRAM(s) Oral four times a day  timolol 0.5% Solution 1 Drop(s) Left EYE two times a day    MEDICATIONS  (PRN):  ALBUTerol    0.083% 2.5 milliGRAM(s) Nebulizer every 6 hours PRN Shortness of Breath and/or Wheezing  benzonatate 100 milliGRAM(s) Oral every 8 hours PRN Cough  lidocaine 2% Viscous 5 milliLiter(s) Swish and Spit three times a day PRN Throat Irritation  ondansetron Injectable 4 milliGRAM(s) IV Push every 8 hours PRN Nausea and/or Vomiting      Daily     Daily Weight in k.7 (2020 08:25)  BMI: 28.9 ( @ 21:37)  Change in Weight:  Vital Signs Last 24 Hrs  T(C): 36.6 (2020 11:18), Max: 36.7 (2020 05:15)  T(F): 97.9 (2020 11:18), Max: 98.1 (2020 05:15)  HR: 90 (2020 11:18) (77 - 90)  BP: 105/71 (2020 11:18) (105/71 - 135/69)  BP(mean): --  RR: 18 (2020 11:18) (17 - 18)  SpO2: 98% (2020 11:18) (92% - 98%)  I&O's Detail      PHYSICAL EXAM  General:  Well developed, well nourished, alert and active, no pallor, NAD.  HEENT:    Normal appearance of conjunctiva, ears, nose, lips, oropharynx, and oral mucosa, anicteric.  Neck:  No masses, no asymmetry.  Lymph Nodes:  No lymphadenopathy.   Cardiovascular:  RRR normal S1/S2, no murmur.  Respiratory:  CTA B/L, normal respiratory effort.   Abdominal:   soft, no masses or tenderness, normoactive BS, NT/ND, no HSM.  Extremities:   No clubbing or cyanosis, normal capillary refill, no edema.   Skin:   No rash, jaundice, lesions, eczema.   Musculoskeletal:  No joint swelling, erythema or tenderness.   Other:     Lab Results:                        12.6   9.65  )-----------( 196      ( 2020 07:50 )             40.3     02-08    140  |  104  |  23  ----------------------------<  94  3.6   |  31  |  1.02    Ca    9.1      2020 07:50  Mg     1.8     02-08    TPro  6.9  /  Alb  3.5  /  TBili  0.8  /  DBili  x   /  AST  21  /  ALT  17  /  AlkPhos  79  02-07    LIVER FUNCTIONS - ( 2020 07:36 )  Alb: 3.5 g/dL / Pro: 6.9 gm/dL / ALK PHOS: 79 U/L / ALT: 17 U/L / AST: 21 U/L / GGT: x                 Stool Results:          RADIOLOGY RESULTS:    SURGICAL PATHOLOGY:

## 2020-02-08 NOTE — PROGRESS NOTE ADULT - ASSESSMENT
1) Acute hypoxic respiratory failure due to acute bronchitis from RSV / Superimposed CAP - LLL inifltrate on imaging  - cont. unasyn  - supplemental O2 prn, monitor sats on room air / incentive spirometer   - still wheezing, add steroids; cont nebs    2) Esophageal Dilatation  - CT w/ few loops of dilated ileum with possible transition point at the terminal ileum / possibility of a partial distal small bowel obstruction  - NGT removed by patient; d/w surgery resident keep out for now pending GI consult  - GI eval - advance diet    3) CAD/AFib  - As noted not on A/C due to recent GI bleeding  - Cont. BB, dc tele    4) Hx diastolic HF   - euvolemic/compensated  - resume home dose lasix/k supplements  - daily weights / I&Os     5) Hyperlipidemia  - Cont. statin     6) Dementia  - Cont. Donepezil / Memantine    7) RLS   - Requip 4x/day    8) GERD  - Cont. IV PPI while NPO    9) DVT PPX  - SQ Lovenox    MOLST - DNR/DNI/No feeding tube  POC discussed with RN

## 2020-02-09 DIAGNOSIS — F03.91 UNSPECIFIED DEMENTIA WITH BEHAVIORAL DISTURBANCE: ICD-10-CM

## 2020-02-09 PROCEDURE — 99222 1ST HOSP IP/OBS MODERATE 55: CPT

## 2020-02-09 PROCEDURE — 71045 X-RAY EXAM CHEST 1 VIEW: CPT | Mod: 26

## 2020-02-09 PROCEDURE — 99233 SBSQ HOSP IP/OBS HIGH 50: CPT

## 2020-02-09 RX ORDER — HALOPERIDOL DECANOATE 100 MG/ML
1 INJECTION INTRAMUSCULAR EVERY 6 HOURS
Refills: 0 | Status: DISCONTINUED | OUTPATIENT
Start: 2020-02-09 | End: 2020-02-11

## 2020-02-09 RX ORDER — HALOPERIDOL DECANOATE 100 MG/ML
1 INJECTION INTRAMUSCULAR ONCE
Refills: 0 | Status: DISCONTINUED | OUTPATIENT
Start: 2020-02-09 | End: 2020-02-09

## 2020-02-09 RX ADMIN — AMPICILLIN SODIUM AND SULBACTAM SODIUM 200 GRAM(S): 250; 125 INJECTION, POWDER, FOR SUSPENSION INTRAMUSCULAR; INTRAVENOUS at 00:35

## 2020-02-09 RX ADMIN — HALOPERIDOL DECANOATE 1 MILLIGRAM(S): 100 INJECTION INTRAMUSCULAR at 21:52

## 2020-02-09 RX ADMIN — ROPINIROLE 2 MILLIGRAM(S): 8 TABLET, FILM COATED, EXTENDED RELEASE ORAL at 11:21

## 2020-02-09 RX ADMIN — HALOPERIDOL DECANOATE 1 MILLIGRAM(S): 100 INJECTION INTRAMUSCULAR at 09:10

## 2020-02-09 RX ADMIN — Medication 1 DROP(S): at 05:58

## 2020-02-09 RX ADMIN — Medication 1 DROP(S): at 17:42

## 2020-02-09 RX ADMIN — BRIMONIDINE TARTRATE 1 DROP(S): 2 SOLUTION/ DROPS OPHTHALMIC at 17:38

## 2020-02-09 RX ADMIN — AMPICILLIN SODIUM AND SULBACTAM SODIUM 200 GRAM(S): 250; 125 INJECTION, POWDER, FOR SUSPENSION INTRAMUSCULAR; INTRAVENOUS at 11:21

## 2020-02-09 RX ADMIN — Medication 600 MILLIGRAM(S): at 17:38

## 2020-02-09 RX ADMIN — ROPINIROLE 2 MILLIGRAM(S): 8 TABLET, FILM COATED, EXTENDED RELEASE ORAL at 00:35

## 2020-02-09 RX ADMIN — HALOPERIDOL DECANOATE 1 MILLIGRAM(S): 100 INJECTION INTRAMUSCULAR at 15:00

## 2020-02-09 RX ADMIN — Medication 25 MILLIGRAM(S): at 05:57

## 2020-02-09 RX ADMIN — Medication 40 MILLIGRAM(S): at 05:57

## 2020-02-09 RX ADMIN — ROPINIROLE 2 MILLIGRAM(S): 8 TABLET, FILM COATED, EXTENDED RELEASE ORAL at 05:58

## 2020-02-09 RX ADMIN — Medication 40 MILLIGRAM(S): at 17:38

## 2020-02-09 RX ADMIN — Medication 60 MILLIGRAM(S): at 05:57

## 2020-02-09 RX ADMIN — Medication 20 MILLIEQUIVALENT(S): at 11:21

## 2020-02-09 RX ADMIN — AZITHROMYCIN 255 MILLIGRAM(S): 500 TABLET, FILM COATED ORAL at 21:37

## 2020-02-09 RX ADMIN — AMPICILLIN SODIUM AND SULBACTAM SODIUM 200 GRAM(S): 250; 125 INJECTION, POWDER, FOR SUSPENSION INTRAMUSCULAR; INTRAVENOUS at 05:58

## 2020-02-09 RX ADMIN — Medication 25 MILLIGRAM(S): at 17:38

## 2020-02-09 RX ADMIN — ENOXAPARIN SODIUM 40 MILLIGRAM(S): 100 INJECTION SUBCUTANEOUS at 11:21

## 2020-02-09 RX ADMIN — PANTOPRAZOLE SODIUM 40 MILLIGRAM(S): 20 TABLET, DELAYED RELEASE ORAL at 05:57

## 2020-02-09 RX ADMIN — Medication 100 MILLIGRAM(S): at 21:52

## 2020-02-09 RX ADMIN — ROPINIROLE 2 MILLIGRAM(S): 8 TABLET, FILM COATED, EXTENDED RELEASE ORAL at 17:38

## 2020-02-09 RX ADMIN — BRIMONIDINE TARTRATE 1 DROP(S): 2 SOLUTION/ DROPS OPHTHALMIC at 05:58

## 2020-02-09 RX ADMIN — Medication 600 MILLIGRAM(S): at 05:56

## 2020-02-09 RX ADMIN — Medication 3 MILLILITER(S): at 20:39

## 2020-02-09 RX ADMIN — Medication 1 TABLET(S): at 17:38

## 2020-02-09 RX ADMIN — AMPICILLIN SODIUM AND SULBACTAM SODIUM 200 GRAM(S): 250; 125 INJECTION, POWDER, FOR SUSPENSION INTRAMUSCULAR; INTRAVENOUS at 17:43

## 2020-02-09 NOTE — PROGRESS NOTE ADULT - ASSESSMENT
1) Acute hypoxic respiratory failure due to acute bronchitis from RSV / Superimposed CAP - LLL inifltrate on imaging  - cont. unasyn  - supplemental O2 prn, monitor sats on room air / incentive spirometer   - still wheezing, cont steroids; cont nebs    2) Esophageal Dilatation  - CT w/ few loops of dilated ileum with possible transition point at the terminal ileum / possibility of a partial distal small bowel obstruction  - NGT removed by patient; d/w surgery resident keep out for now pending GI consult  - GI eval - advance diet    3) CAD/AFib  - As noted not on A/C due to recent GI bleeding  - Cont. BB, dc tele    4) Hx diastolic HF   - euvolemic/compensated  - resume home dose lasix/k supplements  - daily weights / I&Os     5) Hyperlipidemia  - Cont. statin     6) Dementia  - Cont. Donepezil / Memantine  PT HAS NO CAPCAITY TO SIGN OUT AMA, discussed with Psychiatry, will cont Haldol prn     7) RLS   - Requip 4x/day    8) GERD  - Cont. IV PPI while NPO    9) DVT PPX  - SQ Lovenox    MOLST - DNR/DNI/No feeding tube  POC discussed with RN and team    Pt not stabel for discharge home; eventually to go home with VNS in 1-2 days   CHECK PULSE OX AND F/U CXR IN AM 1) Acute hypoxic respiratory failure due to acute bronchitis from RSV / Superimposed CAP - LLL inifltrate on imaging  - cont. unasyn  - supplemental O2 prn, monitor sats on room air / incentive spirometer   - still wheezing, cont steroids; cont nebs    2) Esophageal Dilatation  - CT w/ few loops of dilated ileum with possible transition point at the terminal ileum / possibility of a partial distal small bowel obstruction  - NGT removed by patient; d/w surgery resident keep out for now pending GI consult  - GI eval - advance diet    3) CAD/AFib  - As noted not on A/C due to recent GI bleeding  - Cont. BB, dc tele    4) Hx diastolic HF   - euvolemic/compensated  - resume home dose lasix/k supplements  - daily weights / I&Os     5) Hyperlipidemia  - Cont. statin     6) Dementia  - Cont. Donepezil / Memantine  PT HAS NO CAPCAITY TO SIGN OUT AMA, discussed with Psychiatry, will cont Haldol prn     7) RLS   - Requip 4x/day    8) GERD  - Cont. IV PPI while NPO    9) DVT PPX  - SQ Lovenox    MOLST - DNR/DNI/No feeding tube  POC discussed with RN and team    Pt not stabel for discharge home; eventually to go home with VNS in 1-2 days   CHECK PULSE OX IN AM

## 2020-02-09 NOTE — PATIENT PROFILE ADULT - HAVE YOU EXPERIENCED A TRAUMATIC EVENT?
PATIENT:NEVILLE ESTEBAN                    DATE OF SERVICE: 08/01/19
SEX: M                                  MEDICAL RECORD: [id number]
DATE OF BIRTH: [birth year]                        LOCATION:D.M2      D.213
AGE OF PATIENT: 81                             ADMISSION DATE: 08/01/19
 
REFERRING PHYSICIAN:                               
 
INTERPRETING PHYSICIAN: TORREY IBANEZ MD             
 
 
 
                             ECHOCARDIOGRAM REPORT
  ECHO CHARGES                                               Date:         
 
 
 
CLINICAL DIAGNOSIS:                               
 
                         ECHOCARDIOGRAPHIC MEASUREMENTS
      (adult normal given)
   AC root (d.<3.7cm)      cm   LV Septum d (<1.2 cm>      cm
      Valve Excursion      cm     LV Septum (systole)      cm
Left Atria (s.<4.0cm>      cm          LVPW d(<1.2cm)      cm
        RV (d.<2.3cm)      cm           LVPW (sytole)      cm
  LV diastole(<5.6CM)      cm       MV E-F(>70mm/sec)      cm
           LV systole      cm           LVOT Diameter      cm
       MV exc.(>10mm)      cm
Est.ejection fraction (50-75%)     %
 
   DOPPLER:
     LVIT      cm/sec A      cm/sec E       cm/sec
       LA      cm/sec      RVSP      mmHg
     LVOT      cm/sec   AOP1/2T      m/s
  Asc. Ao      cm/sec
     RVOT      cm/sec
       RA      cm/sec
       PA      cm/sec
 AV Gradient Peak      mmHg  AV Mean      mmHg  AV Area      cm
 MV Gradient Peak      mmHg  MV Mean      mmHg  MV Area      cm
   COMMENTS:                                              
 
 
 Cardiac Sonographer:                                          
      Cardiologist:                                     
             TAPE#                
                                       Pericardial Effusion                          
 
 
DATE OF SERVICE:  08/02/2019
 
FINDINGS:
1.  Left ventricular chamber size is within normal limits.  Left ventricular
systolic function is mildly depressed at 40% to 45%.
2.  Left atrium is enlarged at 5.0 cm.  Right atrium and right ventricular
chamber sizes are as well moderately dilated.
3.  Valvular structures have normal structure and motion.
4.  Doppler interrogation reveals mild-to-moderate mitral regurgitation,
mild-to-moderate tricuspid regurgitation, no other valvular insufficiency or
 
 
 
ECHOCARDIOGRAM REPORT                          [id number]    NEVILLE ESTEBAN            
 
 
stenosis.  Pulmonary systolic pressure is estimated at 46 mmHg.
5.  No evidence of pericardial effusion or left ventricular thrombus.
 
TRANSINT:[id number] Voice Confirmation ID: [id number] DOCUMENT ID: [id number]
                                           
                                           TORREY IBANEZ MD             
 
 
 
Electronically Signed by TORREY IBANEZ on 08/06/19 at 0953
 
 
 
 
 
 
 
 
 
 
 
 
 
 
 
 
 
 
 
 
 
 
 
 
 
 
 
 
 
 
 
 
 
 
 
 
CC:                                                             [id number]
DICTATION DATE: 08/02/19 1850     :     08/02/19 2306      ADM IN  
                                                                              
[facility]                                          
[address] [id number] no

## 2020-02-09 NOTE — PROGRESS NOTE ADULT - SUBJECTIVE AND OBJECTIVE BOX
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 The patient admits to recent sick contacts with his wife with URI symptoms at home. The patient denies any associated chest pain, lower extremity edema.  Found to have in ED, BUN/Cr 26/1.13, proBNP 1305, RVP was (+) RSV. In the ED the patient was given Methylprednisolone 125mg IVP x 1, and Combivent nebs x 3.    SUBJECTIVE:   2/7 - denies cp, palp, abdominal pain. NGT removed by patient. continue 1:1, monitor for N/V. GI consult pending   2/8 - no cp palps sob. off NGT and trial of CL diet, has wheezing on examn  2/9 - flight risk, per psych no capacity, will cont haldol, still with rhonchi, confused      PHYSICAL EXAM:  Constitutional: NAD, awake and alert, pleasantly confused  HEENT: PERR, EOMI, [place]  Neck: Soft and supple, No LAD, No JVD  Respiratory: Breath sounds rhonchi b/l / wheezing - some improvement   Cardiovascular: S1 and S2, regular rate and rhythm, no Murmurs, gallops or rubs  Gastrointestinal: Bowel Sounds present, soft, nontender, nondistended, no guarding, no rebound  Extremities: trace peripheral edema  Vascular: 2+ peripheral pulses  Neurological: A/O x 3, no focal deficits  Musculoskeletal: 5/5 strength b/l upper and lower extremities      RADIOLOGY/EKG:  < from: CT Abdomen and Pelvis No Cont (02.07.20 @ 02:01) >  IMPRESSION:   There are a few contiguous loops of dilated ileum with possible transition point at the terminal ileum, raising the possibility of a partial distal small bowel obstruction. Interval insertion of NG tube with decompression of the esophagus and stomach compared to chest CT.    CT chest rev by me - LLL infiltrate  Tele 2/7 rev by me - afib rate controlled  Tele 2/8 - afib  TELE 2/9 - CONTINUE OFF TELE     LABS: All Labs Reviewed:                  12.6   9.65  )-----------( 196      ( 08 Feb 2020 07:50 )             40.3     02-08    140  |  104  |  23  ----------------------------<  94  3.6   |  31  |  1.02    Ca    9.1      08 Feb 2020 07:50  Mg     1.8     02-08      ALBUTerol    0.083% 2.5 milliGRAM(s) Nebulizer every 6 hours PRN  albuterol/ipratropium for Nebulization 3 milliLiter(s) Nebulizer every 6 hours  ampicillin/sulbactam  IVPB 3 Gram(s) IV Intermittent every 6 hours  azithromycin  IVPB 500 milliGRAM(s) IV Intermittent every 24 hours  azithromycin  IVPB      benzonatate 100 milliGRAM(s) Oral every 8 hours PRN  brimonidine 0.2% Ophthalmic Solution 1 Drop(s) Left EYE two times a day  enoxaparin Injectable 40 milliGRAM(s) SubCutaneous daily  furosemide    Tablet 40 milliGRAM(s) Oral two times a day  guaiFENesin  milliGRAM(s) Oral every 12 hours  haloperidol    Injectable 1 milliGRAM(s) IntraMuscular every 6 hours PRN  lactobacillus acidophilus 1 Tablet(s) Oral two times a day with meals  lidocaine 2% Viscous 5 milliLiter(s) Swish and Spit three times a day PRN  metoprolol tartrate 25 milliGRAM(s) Oral two times a day  ondansetron Injectable 4 milliGRAM(s) IV Push every 8 hours PRN  pantoprazole    Tablet 40 milliGRAM(s) Oral before breakfast  potassium chloride    Tablet ER 20 milliEquivalent(s) Oral daily  predniSONE   Tablet 60 milliGRAM(s) Oral daily  rOPINIRole 2 milliGRAM(s) Oral four times a day  timolol 0.5% Solution 1 Drop(s) Left EYE two times a day CHIEF COMPLAINT: SOB / RSV / Aspiration PNA    HPI: 85 y/o M PMHx significant for non-obstructive CAD, CHF (HFpEF LVEF  55-60% 6/2019), aortic root dilation, aortic valve insufficiency, 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 The patient admits to recent sick contacts with his wife with URI symptoms at home. The patient denies any associated chest pain, lower extremity edema.  Found to have in ED, BUN/Cr 26/1.13, proBNP 1305, RVP was (+) RSV. In the ED the patient was given Methylprednisolone 125mg IVP x 1, and Combivent nebs x 3.    SUBJECTIVE:   2/7 - denies cp, palp, abdominal pain. NGT removed by patient. continue 1:1, monitor for N/V. GI consult pending   2/8 - no cp palps sob. off NGT and trial of CL diet, has wheezing on examn  2/9 - flight risk, per psych no capacity, will cont haldol, still with rhonchi, confused      PHYSICAL EXAM:  Constitutional: NAD, awake and alert, pleasantly confused  HEENT: PERR, EOMI, [place]  Neck: Soft and supple, No LAD, No JVD  Respiratory: Breath sounds rhonchi b/l / wheezing - some improvement   Cardiovascular: S1 and S2, regular rate and rhythm, no Murmurs, gallops or rubs  Gastrointestinal: Bowel Sounds present, soft, nontender, nondistended, no guarding, no rebound  Extremities: trace peripheral edema  Vascular: 2+ peripheral pulses  Neurological: A/O x 3, no focal deficits  Musculoskeletal: 5/5 strength b/l upper and lower extremities      RADIOLOGY/EKG:  < from: CT Abdomen and Pelvis No Cont (02.07.20 @ 02:01) >  IMPRESSION:   There are a few contiguous loops of dilated ileum with possible transition point at the terminal ileum, raising the possibility of a partial distal small bowel obstruction. Interval insertion of NG tube with decompression of the esophagus and stomach compared to chest CT.    CT chest rev by me - LLL infiltrate  Tele 2/7 rev by me - afib rate controlled  Tele 2/8 - afib  TELE 2/9 - CONTINUE OFF TELE     < from: Xray Chest 1 View- PORTABLE-Routine (02.09.20 @ 11:25) >  The lungs are clear. There is a loop recorder overlying left chest wall. Patient is status post sternotomy with multiple clips in the mediastinum. Heart size is mildly enlarged. Patient is status post right-sided rotator cuff repair.        LABS: All Labs Reviewed:                  12.6   9.65  )-----------( 196      ( 08 Feb 2020 07:50 )             40.3     02-08    140  |  104  |  23  ----------------------------<  94  3.6   |  31  |  1.02    Ca    9.1      08 Feb 2020 07:50  Mg     1.8     02-08      ALBUTerol    0.083% 2.5 milliGRAM(s) Nebulizer every 6 hours PRN  albuterol/ipratropium for Nebulization 3 milliLiter(s) Nebulizer every 6 hours  ampicillin/sulbactam  IVPB 3 Gram(s) IV Intermittent every 6 hours  azithromycin  IVPB 500 milliGRAM(s) IV Intermittent every 24 hours  azithromycin  IVPB      benzonatate 100 milliGRAM(s) Oral every 8 hours PRN  brimonidine 0.2% Ophthalmic Solution 1 Drop(s) Left EYE two times a day  enoxaparin Injectable 40 milliGRAM(s) SubCutaneous daily  furosemide    Tablet 40 milliGRAM(s) Oral two times a day  guaiFENesin  milliGRAM(s) Oral every 12 hours  haloperidol    Injectable 1 milliGRAM(s) IntraMuscular every 6 hours PRN  lactobacillus acidophilus 1 Tablet(s) Oral two times a day with meals  lidocaine 2% Viscous 5 milliLiter(s) Swish and Spit three times a day PRN  metoprolol tartrate 25 milliGRAM(s) Oral two times a day  ondansetron Injectable 4 milliGRAM(s) IV Push every 8 hours PRN  pantoprazole    Tablet 40 milliGRAM(s) Oral before breakfast  potassium chloride    Tablet ER 20 milliEquivalent(s) Oral daily  predniSONE   Tablet 60 milliGRAM(s) Oral daily  rOPINIRole 2 milliGRAM(s) Oral four times a day  timolol 0.5% Solution 1 Drop(s) Left EYE two times a day

## 2020-02-09 NOTE — BEHAVIORAL HEALTH ASSESSMENT NOTE - RISK ASSESSMENT
Low Acute Suicide Risk no formal psychiatric history; no history of suicide attempts or self harming behaviors.  Patient with strong family support;

## 2020-02-09 NOTE — BEHAVIORAL HEALTH ASSESSMENT NOTE - SUICIDE PROTECTIVE FACTORS
Responsibility to family and others/Supportive social network of family or friends/Fear of death or the actual act of killing self/Has future plans/Identifies reasons for living/Cultural, spiritual and/or moral attitudes against suicide

## 2020-02-09 NOTE — BEHAVIORAL HEALTH ASSESSMENT NOTE - NSBHCHARTREVIEWLAB_PSY_A_CORE FT
CBC Full  -  ( 08 Feb 2020 07:50 )  WBC Count : 9.65 K/uL  RBC Count : 4.30 M/uL  Hemoglobin : 12.6 g/dL  Hematocrit : 40.3 %  Platelet Count - Automated : 196 K/uL  Mean Cell Volume : 93.7 fl  Mean Cell Hemoglobin : 29.3 pg  Mean Cell Hemoglobin Concentration : 31.3 gm/dL  Auto Neutrophil # : x  Auto Lymphocyte # : x  Auto Monocyte # : x  Auto Eosinophil # : x  Auto Basophil # : x  Auto Neutrophil % : x  Auto Lymphocyte % : x  Auto Monocyte % : x  Auto Eosinophil % : x  Auto Basophil % : x    02-08    140  |  104  |  23  ----------------------------<  94  3.6   |  31  |  1.02    Ca    9.1      08 Feb 2020 07:50  Mg     1.8     02-08

## 2020-02-09 NOTE — BEHAVIORAL HEALTH ASSESSMENT NOTE - HPI (INCLUDE ILLNESS QUALITY, SEVERITY, DURATION, TIMING, CONTEXT, MODIFYING FACTORS, ASSOCIATED SIGNS AND SYMPTOMS)
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 The patient admits to recent sick contacts with his wife with URI symptoms at home. The patient denies any associated chest pain, lower extremity edema.    On current evaluation, patient recently medicated with Haldol 1 mg IM after becoming agitated, attempting to  hit staff with his cane and attempting to leave the hospital;  Patient does not want to remain in the hospital to complete treatment for RSV and small bowel obstruction.  He does not offer alternatives of treatment, only focused on returning home.  He is unable to provide how he would improve his health, only to stated "It'll be fine if I leave.  I'm hungry, I want breakfast.  The food here is terrible".  Son at bedside, reinforced need for the patient to remain in the hospital and complete treatment for the aforementioned illnesses.

## 2020-02-09 NOTE — BEHAVIORAL HEALTH ASSESSMENT NOTE - SUMMARY
Patient is a 84 year old retired, ,  Male admitted for RSV and small bowel obstruction;  Patient with a pre-existing dx of Dementia, on Nam[place] and Aricept;   Lives at home with his wife;  No significant psychiatric history/treatment.  Patient agitated, attempting to hit staff and wants to stop medical treatment and leave AMA.    Patient was evaluated for capacity to make decisions;  At present time, this patient does not have capacity to made decisions re: stopping his medical treatment and leaving AMA. The patient is not able to communicate a consistent choice, understand the relevant information, appreciate his medical situation, and manipulate the information in a rational manner. The patient is unable to verbalize the relevant risks/benefits about stopping medical treatment for RSV/Small bowel obstruction.

## 2020-02-09 NOTE — BEHAVIORAL HEALTH ASSESSMENT NOTE - NSBHREFERDETAILS_PSY_A_CORE_FT
assess for capacity as patient is agitated and wants to leave ama;  Patient with underlying diagnosis of dementia;

## 2020-02-09 NOTE — BEHAVIORAL HEALTH ASSESSMENT NOTE - DETAILS
per son, no px history, no prior SI/P/I or attempts; patient agitated, attempting to hit staff with cane; advance to low residue diet today due to small bowel obstruction [place]; wear hearing aides; RSV: difficulty breathing, requiring neb treatments

## 2020-02-09 NOTE — BEHAVIORAL HEALTH ASSESSMENT NOTE - NSBHCHARTREVIEWVS_PSY_A_CORE FT
Vital Signs Last 24 Hrs  T(C): 36.7 (09 Feb 2020 06:00), Max: 36.7 (09 Feb 2020 00:47)  T(F): 98 (09 Feb 2020 06:00), Max: 98 (09 Feb 2020 00:47)  HR: 110 (09 Feb 2020 06:00) (75 - 110)  BP: 127/81 (09 Feb 2020 06:00) (105/71 - 127/81)  BP(mean): --  RR: 18 (09 Feb 2020 06:00) (17 - 18)  SpO2: 94% (09 Feb 2020 06:00) (91% - 98%)

## 2020-02-09 NOTE — BEHAVIORAL HEALTH ASSESSMENT NOTE - NSBHCONSULTOBSREASON_PSY_A_CORE FT
Patient agitated, attempting to leave AMA and does not have capacity to make medical decisions at this time.

## 2020-02-09 NOTE — BEHAVIORAL HEALTH ASSESSMENT NOTE - NSBHCONSULTMEDSEVERE_PSY_A_CORE FT
IM: Haldol 1 mg q 6 for severe agitation;  If ineffective, increase to 2 mg q 6 hours prn;  Patient with elevated QTc, would not utilize alternative medications;

## 2020-02-10 ENCOUNTER — TRANSCRIPTION ENCOUNTER (OUTPATIENT)
Age: 85
End: 2020-02-10

## 2020-02-10 PROCEDURE — 99239 HOSP IP/OBS DSCHRG MGMT >30: CPT

## 2020-02-10 PROCEDURE — 70450 CT HEAD/BRAIN W/O DYE: CPT | Mod: 26

## 2020-02-10 RX ORDER — METOPROLOL TARTRATE 50 MG
0.5 TABLET ORAL
Qty: 0 | Refills: 0 | DISCHARGE
Start: 2020-02-10

## 2020-02-10 RX ORDER — METOPROLOL TARTRATE 50 MG
1 TABLET ORAL
Qty: 0 | Refills: 0 | DISCHARGE
Start: 2020-02-10

## 2020-02-10 RX ADMIN — ROPINIROLE 2 MILLIGRAM(S): 8 TABLET, FILM COATED, EXTENDED RELEASE ORAL at 00:25

## 2020-02-10 RX ADMIN — Medication 600 MILLIGRAM(S): at 05:19

## 2020-02-10 RX ADMIN — BRIMONIDINE TARTRATE 1 DROP(S): 2 SOLUTION/ DROPS OPHTHALMIC at 05:19

## 2020-02-10 RX ADMIN — ENOXAPARIN SODIUM 40 MILLIGRAM(S): 100 INJECTION SUBCUTANEOUS at 18:41

## 2020-02-10 RX ADMIN — Medication 40 MILLIGRAM(S): at 18:42

## 2020-02-10 RX ADMIN — Medication 50 MILLIGRAM(S): at 05:18

## 2020-02-10 RX ADMIN — ROPINIROLE 2 MILLIGRAM(S): 8 TABLET, FILM COATED, EXTENDED RELEASE ORAL at 18:43

## 2020-02-10 RX ADMIN — Medication 1 TABLET(S): at 18:42

## 2020-02-10 RX ADMIN — Medication 3 MILLILITER(S): at 20:25

## 2020-02-10 RX ADMIN — BRIMONIDINE TARTRATE 1 DROP(S): 2 SOLUTION/ DROPS OPHTHALMIC at 18:42

## 2020-02-10 RX ADMIN — ROPINIROLE 2 MILLIGRAM(S): 8 TABLET, FILM COATED, EXTENDED RELEASE ORAL at 22:14

## 2020-02-10 RX ADMIN — Medication 600 MILLIGRAM(S): at 18:43

## 2020-02-10 RX ADMIN — Medication 25 MILLIGRAM(S): at 05:16

## 2020-02-10 RX ADMIN — AMPICILLIN SODIUM AND SULBACTAM SODIUM 200 GRAM(S): 250; 125 INJECTION, POWDER, FOR SUSPENSION INTRAMUSCULAR; INTRAVENOUS at 00:25

## 2020-02-10 RX ADMIN — Medication 1 DROP(S): at 05:20

## 2020-02-10 RX ADMIN — Medication 3 MILLILITER(S): at 13:36

## 2020-02-10 RX ADMIN — HALOPERIDOL DECANOATE 1 MILLIGRAM(S): 100 INJECTION INTRAMUSCULAR at 10:57

## 2020-02-10 RX ADMIN — AZITHROMYCIN 255 MILLIGRAM(S): 500 TABLET, FILM COATED ORAL at 21:00

## 2020-02-10 RX ADMIN — Medication 1 TABLET(S): at 09:08

## 2020-02-10 RX ADMIN — HALOPERIDOL DECANOATE 1 MILLIGRAM(S): 100 INJECTION INTRAMUSCULAR at 22:21

## 2020-02-10 RX ADMIN — AMPICILLIN SODIUM AND SULBACTAM SODIUM 200 GRAM(S): 250; 125 INJECTION, POWDER, FOR SUSPENSION INTRAMUSCULAR; INTRAVENOUS at 05:15

## 2020-02-10 RX ADMIN — AMPICILLIN SODIUM AND SULBACTAM SODIUM 200 GRAM(S): 250; 125 INJECTION, POWDER, FOR SUSPENSION INTRAMUSCULAR; INTRAVENOUS at 13:33

## 2020-02-10 RX ADMIN — AMPICILLIN SODIUM AND SULBACTAM SODIUM 200 GRAM(S): 250; 125 INJECTION, POWDER, FOR SUSPENSION INTRAMUSCULAR; INTRAVENOUS at 18:41

## 2020-02-10 RX ADMIN — ROPINIROLE 2 MILLIGRAM(S): 8 TABLET, FILM COATED, EXTENDED RELEASE ORAL at 13:29

## 2020-02-10 RX ADMIN — Medication 25 MILLIGRAM(S): at 18:43

## 2020-02-10 RX ADMIN — Medication 40 MILLIGRAM(S): at 05:16

## 2020-02-10 RX ADMIN — ROPINIROLE 2 MILLIGRAM(S): 8 TABLET, FILM COATED, EXTENDED RELEASE ORAL at 05:15

## 2020-02-10 RX ADMIN — PANTOPRAZOLE SODIUM 40 MILLIGRAM(S): 20 TABLET, DELAYED RELEASE ORAL at 05:16

## 2020-02-10 RX ADMIN — Medication 1 DROP(S): at 18:43

## 2020-02-10 RX ADMIN — Medication 20 MILLIEQUIVALENT(S): at 13:29

## 2020-02-10 NOTE — DISCHARGE NOTE PROVIDER - CARE PROVIDER_API CALL
Beatrice Mendiola)  Gastroenterology  [address]  [place] [address]  Phone: [phone]  Fax: [phone]  Follow Up Time:     Adalberto Mckoy)  Critical Care Medicine; Internal Medicine; Pulmonary Disease; Sleep Medicine  [address] [place] [address]  Phone: [phone]  Fax: [phone]  Follow Up Time:     Dennis Soriano ()  Internal Medicine  [address] [place] [address]  Phone: [phone]  Fax: [phone]  Follow Up Time: Beatirce Mendiola)  Gastroenterology  [address]  [place] [address]  Phone: [phone]  Fax: [phone]  Follow Up Time:     Adalberto Mckoy)  Critical Care Medicine; Internal Medicine; Pulmonary Disease; Sleep Medicine  [address] [place] [address]  Phone: [phone]  Fax: [phone]  Follow Up Time:     Dennis Soriano ()  Internal Medicine  [address] [place] [address]  Phone: [phone]  Fax: [phone]  Follow Up Time:     Allyssa Garzon)  Cardiovascular Disease; Internal Medicine  [address] [place] [address]  Phone: [phone]  Fax: [phone]  Follow Up Time:

## 2020-02-10 NOTE — DISCHARGE NOTE PROVIDER - PROVIDER TOKENS
PROVIDER:[TOKEN:[[id number]]],PROVIDER:[TOKEN:[[id number]]],PROVIDER:[TOKEN:[[id number]]] PROVIDER:[TOKEN:[[id number]]],PROVIDER:[TOKEN:[[id number]]],PROVIDER:[TOKEN:[[id number]]],PROVIDER:[TOKEN:[[id number]]]

## 2020-02-10 NOTE — CHART NOTE - NSCHARTNOTEFT_GEN_A_CORE
Rapid response called due to fall with head trauma. Per nursing staff patient was trying to leave and fell hitting his head. Pt evaluated at bedside with no acute complaints. Denies pain, LOC.    Vitals:  108/45    O2 95%    Physical Exam:  General: confused, but per staff is at baseline; NAD  HEENT: no scalp tenderness; atraumatic  Heart: S1/S2+  Lungs: wheeze  Abd: soft, non tender  Neuro: nonfocal    A/P:  84 year old male admitted for acute hypoxic resp failure. Rapid response called due to fall w/ head trauma w/o LOC. On DVT ppx. Doubt intracranial bleed.    - management per hospitalist who was at bedside during rapid response     D/w Dr. Moreira Rapid response called due to fall with head trauma. Per nursing staff patient was trying to leave and fell hitting his head. Pt evaluated at bedside with no acute complaints. Denies pain, LOC.    Vitals:  108/45    O2 95%    Physical Exam:  General: confused, but per staff is at baseline; NAD  HEENT: no scalp tenderness; atraumatic  Heart: S1/S2+  Lungs: wheeze  Abd: soft, non tender  Neuro: nonfocal    A/P:  84 year old male admitted for acute hypoxic resp failure 2/2 acute bronchitis from RSV / Superimposed CAP. Rapid response called due to likely mechanical fall w/ head trauma w/o LOC. On DVT ppx. Doubt intracranial bleed.    - management per hospitalist who was at bedside during rapid response     D/w Dr. Moreira Rapid response called due to fall with head trauma. Per nursing staff patient was trying to leave and fell hitting his head. Pt evaluated at bedside with no acute complaints. Denies pain, LOC.    Vitals:  108/45    O2 95%    Physical Exam:  General: confused, but per staff is at baseline; NAD  HEENT: no scalp tenderness; atraumatic  Heart: S1/S2+  Lungs: wheeze  Abd: soft, non tender  Neuro: nonfocal    A/P:  84 year old male admitted for acute hypoxic resp failure 2/2 acute bronchitis from RSV / Superimposed CAP. Rapid response called due to likely mechanical fall w/ head trauma w/o LOC. On DVT ppx.     - management per hospitalist who was at bedside during rapid response     D/w Dr. Moreira Rapid response called due to fall with head trauma. Per nursing staff patient was trying to leave and fell hitting his head. Pt evaluated at bedside with no acute complaints. Denies pain, LOC.    Vitals:  108/45    O2 95%    Physical Exam:  General: confused, but per staff is at baseline; NAD  HEENT: no scalp tenderness; atraumatic  Heart: S1/S2+  Lungs: wheeze  Abd: soft, nontender  Neuro: nonfocal    A/P:  84 year old male admitted for acute hypoxic resp failure 2/2 acute bronchitis from RSV / Superimposed CAP. Rapid response called due to likely mechanical fall w/ head trauma w/o LOC. On DVT ppx.     - management per hospitalist who was at bedside during rapid response     D/w Dr. Moreira Rapid response called due to fall with head trauma. Per nursing staff patient was trying to leave and fell hitting his head. Pt evaluated at bedside with no acute complaints. Denies pain, LOC.    Vitals:  108/45    O2 95%    Physical Exam:  General: confused, but per staff is at baseline; NAD  HEENT: no scalp tenderness; atraumatic  Heart: S1/S2+  Lungs: wheeze  Abd: soft, nontender  Neuro: nonfocal    A/P:  84 year old male admitted for acute hypoxic resp failure 2/2 acute bronchitis from RSV / Superimposed CAP. Rapid response called due to likely mechanical fall w/ head trauma w/o LOC. On DVT ppx.     - management per hospitalist who was at bedside during rapid response     D/w Dr. Moreira      As above, plan formulated at bedside.  Case d/w primary MD during RRT.  No need for ICU tx.  Further Rx as per primary team

## 2020-02-10 NOTE — PROGRESS NOTE ADULT - SUBJECTIVE AND OBJECTIVE BOX
Date of service: 02-10-20 @ 12:33            ROS: no fever or chills; denies dizziness, no HA, no SOB or cough, no abdominal pain, no diarrhea or constipation; no dysuria, no urinary frequency, no legs pain, no rashes    MEDICATIONS  (STANDING):  albuterol/ipratropium for Nebulization 3 milliLiter(s) Nebulizer every 6 hours  ampicillin/sulbactam  IVPB 3 Gram(s) IV Intermittent every 6 hours  azithromycin  IVPB 500 milliGRAM(s) IV Intermittent every 24 hours  azithromycin  IVPB      brimonidine 0.2% Ophthalmic Solution 1 Drop(s) Left EYE two times a day  enoxaparin Injectable 40 milliGRAM(s) SubCutaneous daily  furosemide    Tablet 40 milliGRAM(s) Oral two times a day  guaiFENesin  milliGRAM(s) Oral every 12 hours  lactobacillus acidophilus 1 Tablet(s) Oral two times a day with meals  metoprolol tartrate 25 milliGRAM(s) Oral two times a day  pantoprazole    Tablet 40 milliGRAM(s) Oral before breakfast  potassium chloride    Tablet ER 20 milliEquivalent(s) Oral daily  predniSONE   Tablet 50 milliGRAM(s) Oral daily  rOPINIRole 2 milliGRAM(s) Oral four times a day  timolol 0.5% Solution 1 Drop(s) Left EYE two times a day    MEDICATIONS  (PRN):  ALBUTerol    0.083% 2.5 milliGRAM(s) Nebulizer every 6 hours PRN Shortness of Breath and/or Wheezing  benzonatate 100 milliGRAM(s) Oral every 8 hours PRN Cough  haloperidol    Injectable 1 milliGRAM(s) IntraMuscular every 6 hours PRN Agitation  lidocaine 2% Viscous 5 milliLiter(s) Swish and Spit three times a day PRN Throat Irritation  ondansetron Injectable 4 milliGRAM(s) IV Push every 8 hours PRN Nausea and/or Vomiting      Vital Signs Last 24 Hrs  T(C): 36.4 (10 Feb 2020 05:27), Max: 36.6 (09 Feb 2020 20:33)  T(F): 97.5 (10 Feb 2020 05:27), Max: 97.8 (09 Feb 2020 20:33)  HR: 85 (10 Feb 2020 05:27) (70 - 85)  BP: 132/87 (10 Feb 2020 05:27) (132/87 - 134/87)  BP(mean): --  RR: 18 (10 Feb 2020 05:27) (18 - 18)  SpO2: 93% (10 Feb 2020 05:27) (93% - 97%)    Physical Exam:          Constitutional: frail looking  HEENT: NC/AT, EOMI, PERRLA, conjunctivae clear; ears and nose atraumatic; pharynx clear; ngt in place  Neck: supple; thyroid not palpable  Back: no tenderness  Respiratory: respiratory effort normal; scattered coarse breath sounds with expiratory wheezing  Cardiovascular: S1S2 regular, no murmurs  Abdomen: soft, not tender, not distended, positive BS; no liver or spleen organomegaly  Genitourinary: no suprapubic tenderness  Musculoskeletal: no muscle tenderness, no joint swelling or tenderness  Neurological/ Psychiatric:   moving all extremities  Skin: no rashes; no palpable lesions    Labs: all available labs reviewed                        Labs:                 Cultures:       Culture - Blood (collected 02-06-20 @ 13:45)  Source: .Blood None  Preliminary Report (02-07-20 @ 18:01):    No growth to date.    Culture - Blood (collected 02-06-20 @ 12:08)  Source: .Blood Blood-Venous  Preliminary Report (02-07-20 @ 16:01):    No growth to date.            < from: CT Abdomen and Pelvis No Cont (02.07.20 @ 02:01) >    EXAM:  CT ABDOMEN AND PELVIS                            PROCEDURE DATE:  02/07/2020          INTERPRETATION:    Exam: CT Abdomen And Pelvis Without Contrast   Exam date and time: 2/7/2020 1:39 AM   Age: 84 years old   Clinical indication: Abdominaltenderness     TECHNIQUE:   Imaging protocol: Computed tomography of the abdomen and pelvis without contrast.     COMPARISON: Chest CT performed on the same day.    FINDINGS:     Lung bases: Small left basilar compressive atelectasis and/or consolidation.  Tubes, catheters and devices: NG tube tip is in the lumen of the mid-distal stomach.   Mediastinum: Small to moderate hiatal hernia.     Liver: Few subcentimeter hepatic hypodensities, too small to characterize.   Gallbladder and bile ducts: Cholelithiasis. No cholecystitis or biliary ductal dilatation.   Pancreas: Normal. No ductal dilation.   Spleen: Multiple small hypoattenuating lesions in the spleen, indeterminate, statistically likely benign.   Adrenals: Normal. No mass.   Kidneys andureters: Normal. No hydronephrosis.   Stomach and bowel: Colonic diverticulosis. No diverticulitis. There are a few contiguous loops of dilated ileum with possible transition point at the terminal ileum, raising the possibility of a partial distal small bowel obstruction. No bowel wall thickening.   Appendix: Appendix not visualized. No evidence of appendicitis.   Intraperitoneal space: Unremarkable. No free air. No significant fluid collection.   Vasculature: Atherosclerotic changes of the aortaand branching vessels. Limited evaluation without contrast..   Lymph nodes: Unremarkable. No enlarged lymph nodes.     Bladder: Underdistended.  Reproductive: Prostate and seminal vesicles are within normal limits.   Bones/joints: Multilevel degenerative changes.   Soft tissues: Small umbilical hernia containing fat and nonobstructed portion of small bowel.     IMPRESSION:   There are a few contiguous loops of dilated ileum with possible transition point at the terminal ileum, raising the possibility of a partial distal small bowel obstruction. Interval insertion of NG tube with decompression of the esophagus and stomach compared to chest CT.    Left lower lobe atelectasis and/or pneumonia.    Multiple additional findings as above.      < end of copied text >        < from: CT Chest No Cont (02.06.20 @ 20:49) >    EXAM:  CT CHEST                            PROCEDURE DATE:  02/06/2020          INTERPRETATION:  CLINICAL INDICATION: 84 years  Male with Severe dyspnea.     COMPARISON: 7/13/2019    PROCEDURE:   CT of the Chest was performed without intravenous contrast.  Sagittal and coronal reformats were performed.      FINDINGS:    LUNGS AND AIRWAYS: Patent central airways.  Medial basilar left lower lobe infiltrate and left lower lobe discoid atelectasis..    PLEURA: No pleural effusion.    MEDIASTINUM AND KISHOR: Enlarged pretracheal lymph node measuring up to 1.8 cm in short axis unchanged from the prior study. No other mediastinal or hilar adenopathy allowing for lack of IV contrast. The esophagus is diffusely distended with debris measuring up to 3.7 cm in diameter. There is risk for aspiration.    VESSELS: The aorta is atherosclerotic but not aneurysmal. Coronary atherosclerosis is present.    HEART: Mildly enlarged No pericardial effusion.    CHEST WALL AND LOWER NECK: Within normal limits.    VISUALIZED UPPER ABDOMEN: The stomach is distended with debris. Cholelithiasis are present    BONES: Cervical and thoracic degenerative changes.    IMPRESSION:     Diffusely dilated esophagus and stomach with risk for aspiration. Rule out gastric outlet obstruction.    Left lower lobe infiltrate.    Stable mediastinal adenopathy.    < end of copied text >        Radiology: all available radiological tests reviewed    Advanced directives addressed: DNR

## 2020-02-10 NOTE — PROGRESS NOTE ADULT - ASSESSMENT
SOB, PNA and RSV - no evidenece of volume overload on exa./  ABX and RX of RSV.    Atrial fibrillation- rate controlled   continue home meds  OFF AC secondary to GI bleed.    HFPEF- appears euvolemic.  Continue home dose of lasix.    Other medical issues- Management per primary team.   Thank you for allowing me to participate in the care of this patient. Please feel free to contact me with any questions.

## 2020-02-10 NOTE — DISCHARGE NOTE PROVIDER - HOSPITAL COURSE
CHIEF COMPLAINT: SOB / RSV / Aspiration PNA        HPI: 83 y/o M PMHx significant for non-obstructive CAD, CHF (HFpEF LVEF  55-60% 6/2019), aortic root dilation, aortic valve insufficiency, s/p bioprosthetic aortic valve replacement and ascending aortic root graft, A-fib (not on AC due to recent GIBleed), [place] (s/p cochlear implant), chronic imbalance, Hypertension, Restless Leg Syndrome, CAMILA unable to tolerate CPAP, was referred by his Cardiologist to  for further evaluation and management of progressive shortness of breath associated with increased wheezing, subjective fevers, and cough over the past 2 days. The patient admits to recent sick contacts with his wife with URI symptoms at home. The patient denies any associated chest pain, lower extremity edema.    Found to have in ED, BUN/Cr 26/1.13, proBNP 1305, RVP was (+) RSV. In the ED the patient was given Methylprednisolone 125mg IVP x 1, and Combivent nebs x 3.        SUBJECTIVE:     2/7 - denies cp, palp, abdominal pain. NGT removed by patient. continue 1:1, monitor for N/V. GI consult pending     2/8 - no cp palps sob. off NGT and trial of CL diet, has wheezing on examn    2/9 - flight risk, per psych no capacity, will cont haldol, still with rhonchi, confused    2/10 - s/p fall, [facility] w/o acute bleed. cont. 1:1 for safety, d/c planning        PHYSICAL EXAM:    Constitutional: NAD, awake and alert, pleasantly confused    HEENT: PERR, EOMI, [place]    Neck: Soft and supple, No LAD, No JVD    Respiratory: Breath sounds rhonchi b/l / wheezing - some improvement     Cardiovascular: S1 and S2, regular rate and rhythm, no Murmurs, gallops or rubs    Gastrointestinal: Bowel Sounds present, soft, nontender, nondistended, no guarding, no rebound    Extremities: trace peripheral edema    Vascular: 2+ peripheral pulses    Neurological: A/O x 3, no focal deficits    Musculoskeletal: 5/5 strength b/l upper and lower extremities        Assessment and Plan:        1) Acute hypoxic respiratory failure due to acute bronchitis from RSV / Superimposed CAP - LLL inifltrate on imaging    - cont. unasyn    - supplemental O2 prn, monitor sats on room air / incentive spirometer     - still wheezing, cont steroids; cont nebs    - d/c on steroid taper. abx complete d/w ID team.         2) Esophageal Dilatation    - CT w/ few loops of dilated ileum with possible transition point at the terminal ileum / possibility of a partial distal small bowel obstruction    - NGT removed by patient; d/w surgery resident keep out for now pending GI consult    - GI eval - advance diet        3) CAD/AFib    - As noted not on A/C due to recent GI bleeding    - Cont. BB, dc tele        4) Hx diastolic HF     - euvolemic/compensated    - resume home dose lasix/k supplements    - daily weights / I&Os         5) Hyperlipidemia    - Cont. statin         6) Dementia    - Cont. Donepezil / Memantine    PT HAS NO CAPCAITY TO SIGN OUT AMA, discussed with Psychiatry, will cont Haldol prn         7) RLS     - Requip 4x/day        8) GERD    - Cont. IV PPI while NPO        9) DVT PPX    - SQ Lovenox        MOLST - DNR/DNI/No feeding tube    POC discussed with RN and team        Dispo: discharge         Final diagnosis, treatment plan, and follow-up recommendations were discussed and explained to the patient. The patient was given an opportunity to ask questions concerning the diagnosis and treatment plan. The patient acknowledged understanding of the diagnosis, treatment, and follow-up recommendations. The patient was advised to seek urgent care upon discharge if worsening symptoms develop prior to scheduled follow-up. Time spent on discharge included time with the patient, and also coordinating discharge care as outlined below.        Total time spent: 50 min CHIEF COMPLAINT: SOB / RSV / Aspiration PNA        HPI: 85 y/o M PMHx significant for non-obstructive CAD, CHF (HFpEF LVEF  55-60% 6/2019), aortic root dilation, aortic valve insufficiency, s/p bioprosthetic aortic valve replacement and ascending aortic root graft, A-fib (not on AC due to recent GIBleed), [place] (s/p cochlear implant), chronic imbalance, Hypertension, Restless Leg Syndrome, CAMILA unable to tolerate CPAP, was referred by his Cardiologist to  for further evaluation and management of progressive shortness of breath associated with increased wheezing, subjective fevers, and cough over the past 2 days. The patient admits to recent sick contacts with his wife with URI symptoms at home. The patient denies any associated chest pain, lower extremity edema.    Found to have in ED, BUN/Cr 26/1.13, proBNP 1305, RVP was (+) RSV. In the ED the patient was given Methylprednisolone 125mg IVP x 1, and Combivent nebs x 3.    Pt was Acute hypoxic respiratory failure due to acute bronchitis from RSV / Superimposed CAP - LLL inifltrate on imaging got unasyn and can complete 1 day of augmentin as OP, then stop antibiotics. Cont steroids and taper as OP.     He was seen by Sx for Esophageal Dilatation/PSBO - got NGT , now tolerating a po diet, has been having bowel movements.    Pt stable for discharge but wife would you like to have patient monitored today and reassess tomorrow.  Details below            SUBJECTIVE:     2/7 - denies cp, palp, abdominal pain. NGT removed by patient. continue 1:1, monitor for N/V. GI consult pending     2/8 - no cp palps sob. off NGT and trial of CL diet, has wheezing on examn    2/9 - flight risk, per psych no capacity, will cont haldol, still with rhonchi, confused    2/10 - s/p fall after pushing the PCA who was doing the 1:1, [facility] w/o acute bleed. cont. 1:1 for safety, d/c planning        PHYSICAL EXAM:    Constitutional: NAD, awake and alert, pleasantly confused    HEENT: PERR, EOMI, [place]    Neck: Soft and supple, No LAD, No JVD    Respiratory: Breath sounds rhonchi b/l / wheezing - some improvement     Cardiovascular: S1 and S2, regular rate and rhythm, no Murmurs, gallops or rubs    Gastrointestinal: Bowel Sounds present, soft, nontender, nondistended, no guarding, no rebound    Extremities: trace peripheral edema    Vascular: 2+ peripheral pulses    Neurological: A/O x 3, no focal deficits    Musculoskeletal: 5/5 strength b/l upper and lower extremities        Assessment and Plan:        1) Acute hypoxic respiratory failure due to acute bronchitis from RSV / Superimposed CAP - LLL inifltrate on imaging    - cont. unasyn and stop antibiotics on discharge    - supplemental O2 prn, monitor sats on room air / incentive spirometer - RA SATS 95% ON AMBULATION     - still wheezing, cont steroids; cont nebs    - d/c on steroid taper. abx complete d/w ID team.         2) Esophageal Dilatation/PSBO    - CT w/ few loops of dilated ileum with possible transition point at the terminal ileum / possibility of a partial distal small bowel obstruction    - NGT removed by patient; d/w surgery resident keep out for now pending GI consult    - GI eval - advance diet        3) CAD/AFib    - As noted not on A/C due to recent GI bleeding    - Cont. BB, dc tele        4) Hx diastolic HF     - euvolemic/compensated    - resume home dose lasix/k supplements    - daily weights / I&Os         5) Hyperlipidemia    - Cont. statin         6) Dementia    - Cont. Donepezil / Memantine    FLight risk:    PT HAS NO CAPCAITY TO SIGN OUT AMA, discussed with Psychiatry, will cont Haldol prn         7) RLS     - Requip 4x/day        8) GERD    - Cont. IV PPI while NPO        9) DVT PPX    - SQ Lovenox        MOLST - DNR/DNI/No feeding tube    POC discussed with RN and team        Dispo: discharge         Final diagnosis, treatment plan, and follow-up recommendations were discussed and explained to the patient. The patient was given an opportunity to ask questions concerning the diagnosis and treatment plan. The patient acknowledged understanding of the diagnosis, treatment, and follow-up recommendations. The patient was advised to seek urgent care upon discharge if worsening symptoms develop prior to scheduled follow-up. Time spent on discharge included time with the patient, and also coordinating discharge care as outlined below.        Total time spent: 50 min    discussed with team at [facility] CHIEF COMPLAINT: SOB / RSV / Aspiration PNA        HPI: 85 y/o M PMHx significant for non-obstructive CAD, CHF (HFpEF LVEF  55-60% 6/2019), aortic root dilation, aortic valve insufficiency, s/p bioprosthetic aortic valve replacement and ascending aortic root graft, A-fib (not on AC due to recent GIBleed), [place] (s/p cochlear implant), chronic imbalance, Hypertension, Restless Leg Syndrome, CAMILA unable to tolerate CPAP, was referred by his Cardiologist to  for further evaluation and management of progressive shortness of breath associated with increased wheezing, subjective fevers, and cough over the past 2 days. The patient admits to recent sick contacts with his wife with URI symptoms at home. The patient denies any associated chest pain, lower extremity edema.    Found to have in ED, BUN/Cr 26/1.13, proBNP 1305, RVP was (+) RSV. In the ED the patient was given Methylprednisolone 125mg IVP x 1, and Combivent nebs x 3.    Pt was Acute hypoxic respiratory failure due to acute bronchitis from RSV / Superimposed CAP - LLL inifltrate on imaging got unasyn and can complete 1 day of augmentin as OP, then stop antibiotics. Cont steroids and taper as OP.     He was seen by Sx for Esophageal Dilatation/PSBO - got NGT , now tolerating a po diet, has been having bowel movements.    Pt stable for discharge but wife would you like to have patient monitored today and reassess tomorrow.  Details below            SUBJECTIVE:     2/7 - denies cp, palp, abdominal pain. NGT removed by patient. continue 1:1, monitor for N/V. GI consult pending     2/8 - no cp palps sob. off NGT and trial of CL diet, has wheezing on examn    2/9 - flight risk, per psych no capacity, will cont haldol, still with rhonchi, confused    2/10 - s/p fall after pushing the PCA who was doing the 1:1, [facility] w/o acute bleed. cont. 1:1 for safety, d/c planning        PHYSICAL EXAM:    Constitutional: NAD, awake and alert, pleasantly confused    HEENT: PERR, EOMI, [place]    Neck: Soft and supple, No LAD, No JVD    Respiratory: Breath sounds rhonchi b/l / wheezing - some improvement     Cardiovascular: S1 and S2, regular rate and rhythm, no Murmurs, gallops or rubs    Gastrointestinal: Bowel Sounds present, soft, nontender, nondistended, no guarding, no rebound    Extremities: trace peripheral edema    Vascular: 2+ peripheral pulses    Neurological: A/O x 3, no focal deficits    Musculoskeletal: 5/5 strength b/l upper and lower extremities        Assessment and Plan:        1) Acute hypoxic respiratory failure due to acute bronchitis from RSV / Superimposed CAP - LLL inifltrate on imaging    - cont. unasyn and stop antibiotics on discharge    - supplemental O2 prn, monitor sats on room air / incentive spirometer - RA SATS 95% ON AMBULATION     - still wheezing, cont steroids; cont nebs    - d/c on steroid taper. abx complete d/w ID team.         2) Esophageal Dilatation/PSBO    - CT w/ few loops of dilated ileum with possible transition point at the terminal ileum / possibility of a partial distal small bowel obstruction    - NGT removed by patient; d/w surgery resident keep out for now pending GI consult    - GI eval - advance diet        3) CAD/AFib    - As noted not on A/C due to recent GI bleeding    - Cont. BB, dc tele        4) Hx diastolic HF     - euvolemic/compensated    - resume home dose lasix/k supplements    - daily weights / I&Os         5) Hyperlipidemia    - Cont. statin         6) Dementia    - Cont. Donepezil / Memantine    FLight risk:    PT HAS NO CAPCAITY TO SIGN OUT AMA, discussed with Psychiatry, will cont Haldol prn         7) RLS     - Requip 4x/day        8) GERD    - Cont. IV PPI while NPO        9) DVT PPX    - SQ Lovenox        MOLST - DNR/DNI/No feeding tube    POC discussed with RN and team        Dispo: discharge         Final diagnosis, treatment plan, and follow-up recommendations were discussed and explained to the patient. The patient was given an opportunity to ask questions concerning the diagnosis and treatment plan. The patient acknowledged understanding of the diagnosis, treatment, and follow-up recommendations. The patient was advised to seek urgent care upon discharge if worsening symptoms develop prior to scheduled follow-up. Time spent on discharge included time with the patient, and also coordinating discharge care as outlined below.        Total time spent: 50 min    discussed with team at [facility]        Pt seen with NP Aurora Trujillo, POC and dc plan discussed with NP and team at [facility]. Reviewed and agree with above DC Note by her, with my changes included.     DC note updated, numerous conversations over the course of the day with the team including cardiology, ID, pt's wife Michelle. Pt revisited twice. Stable for discharge in both my visits with patient, satting well on RA on ambualtion. Not a candidate for rehab, does not want rehab, advised pt's wife that he will get VNS and she has to make arrangements for son to help at home if understandably overwhelmed at this time. MOnitor overnight and dc in AM.

## 2020-02-10 NOTE — DISCHARGE NOTE PROVIDER - NSDCMRMEDTOKEN_GEN_ALL_CORE_FT
Aspercreme 10% topical cream: Apply topically to affected area of back twice daily as needed  atorvastatin 20 mg oral tablet: 1 tab(s) orally once a day  brimonidine 0.2% ophthalmic solution: 1 drop(s) in the left eye 2 times a day  donepezil 10 mg oral tablet: 1 tab(s) orally once a day (at bedtime)  ferrous sulfate 325 mg (65 mg elemental iron) oral tablet: 1 tab(s) orally once a day  furosemide 40 mg oral tablet: 1 tab(s) orally 2 times a day  gabapentin 100 mg oral capsule: 1-2 cap(s) orally 3 times a day  guaiFENesin 600 mg oral tablet, extended release: 1 tab(s) orally every 12 hours  Klor-Con M10 oral tablet, extended release: 1 tab(s) orally 2 times a day with food  memantine 10 mg oral tablet: 1 tab(s) orally once a day  metoprolol tartrate 25 mg oral tablet: 1 tab(s) orally 2 times a day  pantoprazole 40 mg oral delayed release tablet: 1 tab(s) orally once a day  predniSONE 10 mg oral tablet: Taper: 4 tabs x 2 days, 3  tabs x 2 days, 2  tab x 2 days ,1 tab x 1 day   rOPINIRole 2 mg oral tablet: 1 tab(s) orally 6 times a day  ***no set times***  timolol maleate 0.5% ophthalmic solution: 1 drop(s) in the left eye 2 times a day

## 2020-02-10 NOTE — PROGRESS NOTE ADULT - SUBJECTIVE AND OBJECTIVE BOX
Patient is a 84y old  Male who presents with a chief complaint of Shortness of breath, Wheezing.       HPI:  85 y/o M PMHx significant for non-obstructive CAD, CHF (HFpEF LVEF  55-60% 6/2019), aortic root dilation, aortic valve insufficiency, s/p bioprosthetic aortic valve replacement and ascending aortic root graft, A-fib (not on AC due to recent GIBleed), [place] (s/p cochlear implant), chronic imbalance, Hypertension, Restless Leg Syndrome, CAMILA unable to tolerate CPAP, was referred by his Cardiologist to  for further evaluation and management of progressive shortness of breath associated with increased wheezing, subjective fevers, and cough over the past 2 days.     Pt was diagnosed with PNA and RSV and being treated for it.    He appears confused this am. no distress   PAST MEDICAL & SURGICAL HISTORY:  CAD (coronary artery disease): (-) cardiac stress and 2DECHO 2019  Diastolic CHF: LVEF 55-60% 6/2019  Chronic anemia  Afib: Not on A/C 2/2 hx of GIbleeding  DDD (degenerative disc disease), lumbar  [place] (hard of hearing)  Fall (on) (from) other stairs and steps, sequela: 03/28/2017- lumbar hemtoma  Essential hypertension  Nerve injury: complication from right TKR surgery, 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  Aortic root dilatation  MRSA (methicillin resistant Staphylococcus aureus): left forearm 2012  Lumbar degenerative disc disease  BPH associated with nocturia  Diverticulosis of intestine without bleeding, unspecified intestinal tract location  Gastroesophageal reflux disease, esophagitis presence not specified: Hx of GI bleed  Aortic valve insufficiency, unspecified etiology  CAMILA (obstructive sleep apnea): unable to tolerate nocturnal CPAP  Restless leg syndrome  Thoracic aortic aneurysm  HLD (hyperlipidemia)  S/P thoracic aortic aneurysm repair  S/P AVR (aortic valve replacement)  Status post cataract extraction, unspecified laterality: bilat  S/P debridement: left forearm -mrsa  History of fundoplication: 2000  Amputation finger: left index 1970  S/P knee replacement: left  S/P knee replacement: right  S/P shoulder surgery: right rotatotor cuff injury      MEDICATIONS  (STANDING):  albuterol/ipratropium for Nebulization 3 milliLiter(s) Nebulizer every 6 hours  ampicillin/sulbactam  IVPB 3 Gram(s) IV Intermittent every 6 hours  azithromycin  IVPB 500 milliGRAM(s) IV Intermittent every 24 hours  azithromycin  IVPB      brimonidine 0.2% Ophthalmic Solution 1 Drop(s) Left EYE two times a day  enoxaparin Injectable 40 milliGRAM(s) SubCutaneous daily  furosemide    Tablet 40 milliGRAM(s) Oral two times a day  guaiFENesin  milliGRAM(s) Oral every 12 hours  lactobacillus acidophilus 1 Tablet(s) Oral two times a day with meals  metoprolol tartrate 25 milliGRAM(s) Oral two times a day  pantoprazole    Tablet 40 milliGRAM(s) Oral before breakfast  potassium chloride    Tablet ER 20 milliEquivalent(s) Oral daily  predniSONE   Tablet 50 milliGRAM(s) Oral daily  rOPINIRole 2 milliGRAM(s) Oral four times a day  timolol 0.5% Solution 1 Drop(s) Left EYE two times a day    MEDICATIONS  (PRN):  ALBUTerol    0.083% 2.5 milliGRAM(s) Nebulizer every 6 hours PRN Shortness of Breath and/or Wheezing  benzonatate 100 milliGRAM(s) Oral every 8 hours PRN Cough  haloperidol    Injectable 1 milliGRAM(s) IntraMuscular every 6 hours PRN Agitation  lidocaine 2% Viscous 5 milliLiter(s) Swish and Spit three times a day PRN Throat Irritation  ondansetron Injectable 4 milliGRAM(s) IV Push every 8 hours PRN Nausea and/or Vomiting      FAMILY HISTORY:  Family history of lung cancer      SOCIAL HISTORY:    REVIEW OF SYSTEMS:  CONSTITUTIONAL:    No fatigue, malaise, lethargy.  c/o fever or chills.    RESPIRATORY:  c/o cough.  No wheeze.  No hemoptysis.  c/o shortness of breath.  CARDIOVASCULAR:  No chest pains.  No palpitations. c/o shortness of breath, No orthopnea or PND.  GASTROINTESTINAL:  No abdominal pain.  No nausea or vomiting.    GENITOURINARY:    No hematuria.    MUSCULOSKELETAL:  No musculoskeletal pain.  No joint swelling.  No arthritis.  NEUROLOGICAL:  No tingling or numbness or weakness.  PSYCHIATRIC:  c/o confusion        Vital Signs Last 24 Hrs  T(C): 36.4 (10 Feb 2020 05:27), Max: 36.6 (09 Feb 2020 20:33)  T(F): 97.5 (10 Feb 2020 05:27), Max: 97.8 (09 Feb 2020 20:33)  HR: 85 (10 Feb 2020 05:27) (70 - 86)  BP: 132/87 (10 Feb 2020 05:27) (124/75 - 134/87)  BP(mean): --  RR: 18 (10 Feb 2020 05:27) (17 - 18)  SpO2: 93% (10 Feb 2020 05:27) (93% - 97%)    PHYSICAL EXAM-    Constitutional: ill looking male. no acute distress     Head: Head is normocephalic and atraumatic.      Neck: No JVD.     Cardiovascular: Regular rate and rhythm without S3, S4. No murmurs or rubs are appreciated.      Respiratory:b/l all lung fields wheezing.    Abdomen: Soft, nontender, nondistended with positive bowel sounds.      Extremity: No tenderness. No  pitting edema     Neurologic: The patient is confused      Psychiatric: The patient appears to be emotionally stable.      INTERPRETATION OF TELEMETRY:  afib     ECG:    I&O's Detail      LABS:                  I&O's Summary    BNP  RADIOLOGY & ADDITIONAL STUDIES:  < from: Xray Chest 1 View- PORTABLE-Routine (02.09.20 @ 11:25) >    EXAM:  XR CHEST PORTABLE ROUTINE 1V                            PROCEDURE DATE:  02/09/2020          INTERPRETATION:  History: Bronchitis. Pneumonia.    AP view of the chest was obtained.    Comparison: February 6, 2020.     Findings:     The lungs are clear. There is a loop recorder overlying left chest wall. Patient is status post sternotomy with multiple clips in the mediastinum. Heart size is mildly enlarged. Patient is status post right-sided rotator cuff repair.    Impression:    Cardiomegaly. Clear lungs.                DEONTE CYR M.D., ATTENDING RADIOLOGIST  This document has been electronically signed. Feb 9 2020 11:44AM              < end of copied text >

## 2020-02-10 NOTE — DISCHARGE NOTE NURSING/CASE MANAGEMENT/SOCIAL WORK - PATIENT PORTAL LINK FT
You can access the FollowMyHealth Patient Portal offered by [facility] by registering at the following website: [email or web address]. By joining [email or web address]’s FollowMyHealth portal, you will also be able to view your health information using other applications (apps) compatible with our system.

## 2020-02-10 NOTE — DISCHARGE NOTE PROVIDER - NSDCFUSCHEDAPPT_GEN_ALL_CORE_FT
[place] ; 02/12/2020 ; [facility] [address] ; 02/20/2020 ; [facility] [address] [place] ; 02/12/2020 ; [facility] [address] ; 02/20/2020 ; [facility] [address]

## 2020-02-10 NOTE — PROGRESS NOTE ADULT - ASSESSMENT
83 y/o M PMHx significant for non-obstructive CAD, CHF (HFpEF LVEF  55-60% 6/2019), aortic root dilation, aortic valve insufficiency, s/p bioprosthetic aortic valve replacement and ascending aortic root graft, A-fib (not on AC due to recent GIBleed), [place] (s/p cochlear implant), chronic imbalance, Hypertension, Restless Leg Syndrome, CAMILA unable to tolerate CPAP, was admitted on 2/6 from his Cardiologist for evaluation of progressive shortness of breath, wheezing and fever associated with cough over prior two days. Notes his wife is sick with URI symptoms. Imaging done on admission revealed pneumonia and bowel obstruction; patient had ngt placed. History per medical record as patient is poor historian.   1. Patient admitted with RSV URI with superimposed pneumonia which may be aspiration pneumonia; also with small bowel obstruction  - follow up cultures   - oxygen and nebs as needed   - iv hydration and supportive care   - serial cbc and monitor temperature   - reviewed prior medical records to evaluate for resistant or atypical pathogens   - day #5 zithromax and unasyn  - tolerating antibiotics without rashes or side effects   - will stop  zithromax today; continue isolation given the patient with significant cough  2. Small bowel obstruction  - ngt in place  - workup in place  3. other issues: per medicine

## 2020-02-10 NOTE — DISCHARGE NOTE PROVIDER - NSDCCPCAREPLAN_GEN_ALL_CORE_FT
PRINCIPAL DISCHARGE DIAGNOSIS  Diagnosis: RSV bronchitis  Assessment and Plan of Treatment: You were admitted due to viral RSV and pneumonia.   - You completed your course of antibiotics  - Continue a take a prednisone taper as prescribed  - Follow up with your pulmonologist Dr. Mckoy within 1 week after discharge for further management.      SECONDARY DISCHARGE DIAGNOSES  Diagnosis: Diastolic CHF, chronic  Assessment and Plan of Treatment: You had history of heart failure.   - Continue to monitor your weights at home daily  - Maintain a low sodium diet and low residue  - Continue to follow your CHF  Zone Chart in your CHF packet accordingly.   - Your discharge weight is 179.8lbs.    Diagnosis: Bowel obstruction  Assessment and Plan of Treatment: You were found to have a small bowel obstruction which has resolved.   - Continue a low residue diet  - Follow up with your gastroenterologist Dr. Mendiola within 1 week after discharge for further management.

## 2020-02-11 VITALS
RESPIRATION RATE: 17 BRPM | OXYGEN SATURATION: 94 % | SYSTOLIC BLOOD PRESSURE: 127 MMHG | DIASTOLIC BLOOD PRESSURE: 81 MMHG | TEMPERATURE: 98 F | HEART RATE: 105 BPM

## 2020-02-11 LAB
CULTURE RESULTS: SIGNIFICANT CHANGE UP
CULTURE RESULTS: SIGNIFICANT CHANGE UP
SPECIMEN SOURCE: SIGNIFICANT CHANGE UP
SPECIMEN SOURCE: SIGNIFICANT CHANGE UP

## 2020-02-11 RX ORDER — ACETAMINOPHEN 500 MG
650 TABLET ORAL EVERY 6 HOURS
Refills: 0 | Status: DISCONTINUED | OUTPATIENT
Start: 2020-02-11 | End: 2020-02-11

## 2020-02-11 RX ADMIN — Medication 40 MILLIGRAM(S): at 07:53

## 2020-02-11 RX ADMIN — ROPINIROLE 2 MILLIGRAM(S): 8 TABLET, FILM COATED, EXTENDED RELEASE ORAL at 11:52

## 2020-02-11 RX ADMIN — ROPINIROLE 2 MILLIGRAM(S): 8 TABLET, FILM COATED, EXTENDED RELEASE ORAL at 07:53

## 2020-02-11 RX ADMIN — BRIMONIDINE TARTRATE 1 DROP(S): 2 SOLUTION/ DROPS OPHTHALMIC at 07:53

## 2020-02-11 RX ADMIN — Medication 50 MILLIGRAM(S): at 07:53

## 2020-02-11 RX ADMIN — ENOXAPARIN SODIUM 40 MILLIGRAM(S): 100 INJECTION SUBCUTANEOUS at 11:52

## 2020-02-11 RX ADMIN — Medication 1 TABLET(S): at 08:34

## 2020-02-11 RX ADMIN — Medication 1 DROP(S): at 07:53

## 2020-02-11 RX ADMIN — PANTOPRAZOLE SODIUM 40 MILLIGRAM(S): 20 TABLET, DELAYED RELEASE ORAL at 07:53

## 2020-02-11 RX ADMIN — Medication 600 MILLIGRAM(S): at 07:52

## 2020-02-11 RX ADMIN — AMPICILLIN SODIUM AND SULBACTAM SODIUM 200 GRAM(S): 250; 125 INJECTION, POWDER, FOR SUSPENSION INTRAMUSCULAR; INTRAVENOUS at 07:53

## 2020-02-11 RX ADMIN — ALBUTEROL 2.5 MILLIGRAM(S): 90 AEROSOL, METERED ORAL at 06:55

## 2020-02-11 RX ADMIN — Medication 20 MILLIEQUIVALENT(S): at 11:52

## 2020-02-11 RX ADMIN — Medication 25 MILLIGRAM(S): at 07:53

## 2020-02-11 RX ADMIN — AMPICILLIN SODIUM AND SULBACTAM SODIUM 200 GRAM(S): 250; 125 INJECTION, POWDER, FOR SUSPENSION INTRAMUSCULAR; INTRAVENOUS at 01:07

## 2020-02-11 NOTE — PROGRESS NOTE ADULT - REASON FOR ADMISSION
Shortness of breath, Wheezing

## 2020-02-11 NOTE — PROGRESS NOTE ADULT - SUBJECTIVE AND OBJECTIVE BOX
Patient is a 84y old  Male who presents with a chief complaint of Shortness of breath, Wheezing.       HPI:  83 y/o M PMHx significant for non-obstructive CAD, CHF (HFpEF LVEF  55-60% 6/2019), aortic root dilation, aortic valve insufficiency, s/p bioprosthetic aortic valve replacement and ascending aortic root graft, A-fib (not on AC due to recent GIBleed), [place] (s/p cochlear implant), chronic imbalance, Hypertension, Restless Leg Syndrome, CAMILA unable to tolerate CPAP, was referred by his Cardiologist to  for further evaluation and management of progressive shortness of breath associated with increased wheezing, subjective fevers, and cough over the past 2 days.   2/10-   Pt was diagnosed with PNA and RSV and being treated for it.    He appears confused this am. no distress   2/11- pt seen and examined by me this am.  He was sitting in the chair, he states he is feeling well.  Overnight needed multiple nebs per overnight RN.    PAST MEDICAL & SURGICAL HISTORY:  CAD (coronary artery disease): (-) cardiac stress and 2DECHO 2019  Diastolic CHF: LVEF 55-60% 6/2019  Chronic anemia  Afib: Not on A/C 2/2 hx of GIbleeding  DDD (degenerative disc disease), lumbar  [place] (hard of hearing)  Fall (on) (from) other stairs and steps, sequela: 03/28/2017- lumbar hemtoma  Essential hypertension  Nerve injury: complication from right TKR surgery, 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  Aortic root dilatation  MRSA (methicillin resistant Staphylococcus aureus): left forearm 2012  Lumbar degenerative disc disease  BPH associated with nocturia  Diverticulosis of intestine without bleeding, unspecified intestinal tract location  Gastroesophageal reflux disease, esophagitis presence not specified: Hx of GI bleed  Aortic valve insufficiency, unspecified etiology  CAMILA (obstructive sleep apnea): unable to tolerate nocturnal CPAP  Restless leg syndrome  Thoracic aortic aneurysm  HLD (hyperlipidemia)  S/P thoracic aortic aneurysm repair  S/P AVR (aortic valve replacement)  Status post cataract extraction, unspecified laterality: bilat  S/P debridement: left forearm -mrsa  History of fundoplication: 2000  Amputation finger: left index 1970  S/P knee replacement: left  S/P knee replacement: right  S/P shoulder surgery: right rotatotor cuff injury      MEDICATIONS  (STANDING):  albuterol/ipratropium for Nebulization 3 milliLiter(s) Nebulizer every 6 hours  ampicillin/sulbactam  IVPB 3 Gram(s) IV Intermittent every 6 hours  azithromycin  IVPB 500 milliGRAM(s) IV Intermittent every 24 hours  azithromycin  IVPB      brimonidine 0.2% Ophthalmic Solution 1 Drop(s) Left EYE two times a day  enoxaparin Injectable 40 milliGRAM(s) SubCutaneous daily  furosemide    Tablet 40 milliGRAM(s) Oral two times a day  guaiFENesin  milliGRAM(s) Oral every 12 hours  lactobacillus acidophilus 1 Tablet(s) Oral two times a day with meals  metoprolol tartrate 25 milliGRAM(s) Oral two times a day  pantoprazole    Tablet 40 milliGRAM(s) Oral before breakfast  potassium chloride    Tablet ER 20 milliEquivalent(s) Oral daily  predniSONE   Tablet 50 milliGRAM(s) Oral daily  rOPINIRole 2 milliGRAM(s) Oral four times a day  timolol 0.5% Solution 1 Drop(s) Left EYE two times a day    MEDICATIONS  (PRN):  ALBUTerol    0.083% 2.5 milliGRAM(s) Nebulizer every 6 hours PRN Shortness of Breath and/or Wheezing  benzonatate 100 milliGRAM(s) Oral every 8 hours PRN Cough  haloperidol    Injectable 1 milliGRAM(s) IntraMuscular every 6 hours PRN Agitation  lidocaine 2% Viscous 5 milliLiter(s) Swish and Spit three times a day PRN Throat Irritation  ondansetron Injectable 4 milliGRAM(s) IV Push every 8 hours PRN Nausea and/or Vomiting      FAMILY HISTORY:  Family history of lung cancer      SOCIAL HISTORY:    REVIEW OF SYSTEMS:  CONSTITUTIONAL:    No fatigue, malaise, lethargy.  c/o fever or chills.    RESPIRATORY:  no cough.  No wheeze.  No hemoptysis.  no shortness of breath.  CARDIOVASCULAR:  No chest pains.  No palpitations. no shortness of breath, No orthopnea or PND.  GASTROINTESTINAL:  No abdominal pain.  No nausea or vomiting.    GENITOURINARY:    No hematuria.    MUSCULOSKELETAL:  No musculoskeletal pain.  No joint swelling.  No arthritis.  NEUROLOGICAL:  No tingling or numbness or weakness.  PSYCHIATRIC:  no confusion      ICU Vital Signs Last 24 Hrs  T(C): 36.6 (10 Feb 2020 11:42), Max: 36.6 (10 Feb 2020 11:42)  T(F): 97.8 (10 Feb 2020 11:42), Max: 97.8 (10 Feb 2020 11:42)  HR: 79 (11 Feb 2020 06:57) (77 - 107)  BP: 108/45 (10 Feb 2020 11:42) (108/45 - 108/45)  BP(mean): --  ABP: --  ABP(mean): --  RR: 18 (10 Feb 2020 11:42) (18 - 18)  SpO2: 95% (10 Feb 2020 20:30) (95% - 95%)      PHYSICAL EXAM-    Constitutional: ill looking male. no acute distress     Head: Head is normocephalic and atraumatic.      Neck: No JVD.     Cardiovascular: Regular rate and rhythm without S3, S4. No murmurs or rubs are appreciated.      Respiratory:b/l  wheezing in upper and mid lung fields .    Abdomen: Soft, nontender, nondistended with positive bowel sounds.      Extremity: No tenderness. No  pitting edema     Neurologic: The patient is alert       Psychiatric: The patient appears to be emotionally stable.      INTERPRETATION OF TELEMETRY:  afib     ECG:    I&O's Detail      LABS:                  I&O's Summary    BNP  RADIOLOGY & ADDITIONAL STUDIES:  < from: Xray Chest 1 View- PORTABLE-Routine (02.09.20 @ 11:25) >    EXAM:  XR CHEST PORTABLE ROUTINE 1V                            PROCEDURE DATE:  02/09/2020          INTERPRETATION:  History: Bronchitis. Pneumonia.    AP view of the chest was obtained.    Comparison: February 6, 2020.     Findings:     The lungs are clear. There is a loop recorder overlying left chest wall. Patient is status post sternotomy with multiple clips in the mediastinum. Heart size is mildly enlarged. Patient is status post right-sided rotator cuff repair.    Impression:    Cardiomegaly. Clear lungs.                DEONTE CYR M.D., ATTENDING RADIOLOGIST  This document has been electronically signed. Feb 9 2020 11:44AM              < end of copied text >  < from: Transthoracic Echocardiogram (07.15.19 @ 10:03) >   Summary     The left ventricle is normal in size, wall thickness, wall motion and   contractility.   Estimated left ventricular ejection fraction is 60-65 %.   The left atrium appears mildly to moderately dilated.   The right atrium appears moderately dilated.   The right ventricle exhibits mild dilation, mild diffuse hypokinesis, and   mild depression of contractility.     Well seated bioprosthetic valve (TAVR)in the aortic position.   Peak trans-prosthetic gradient is within normal limitations for this type   of prosthesis.   Fibrocalcific changes noted to the mitral valve leaflets with preserved   leaflet excursion.   Mild (1+) mitral regurgitation is present.   The tricuspid valve leaflets appear mildly thickened and/or calcified,   but   open well.   Moderate Tricuspid regurgitation is present.   Moderate pulmonary hypertension.   Mild pulmonic valvular regurgitation (1+) is present.     No evidence of pericardial effusion.   The IVC is mildly dilated with decreased respiratory variation.     Signature     ----------------------------------------------------------------   Electronically signed by Víctor Chadwick DO(Interpreting   physician) on 07/15/2019 03:29 PM   ----------------------------------------------------------------    < end of copied text >

## 2020-02-11 NOTE — PROGRESS NOTE ADULT - ASSESSMENT
SOB, PNA and RSV - no evidenece of volume overload on exam.  he has wheezing but per staff yesterday his O2 sat was 97% with ambulation on room air.  ABX and RX of RSV.    Atrial fibrillation- rate controlled   continue home meds  OFF AC secondary to GI bleed.    HFPEF- appears euvolemic.  Continue home dose of lasix.    Other medical issues- Management per primary team.   Thank you for allowing me to participate in the care of this patient. Please feel free to contact me with any questions.

## 2020-02-12 ENCOUNTER — EMERGENCY (EMERGENCY)
Facility: HOSPITAL | Age: 85
LOS: 0 days | Discharge: HOME | End: 2020-02-12
Attending: EMERGENCY MEDICINE
Payer: MEDICARE

## 2020-02-12 ENCOUNTER — APPOINTMENT (OUTPATIENT)
Dept: ELECTROPHYSIOLOGY | Facility: CLINIC | Age: 85
End: 2020-02-12
Payer: MEDICARE

## 2020-02-12 VITALS
SYSTOLIC BLOOD PRESSURE: 127 MMHG | RESPIRATION RATE: 20 BRPM | OXYGEN SATURATION: 94 % | TEMPERATURE: 98 F | HEART RATE: 79 BPM | DIASTOLIC BLOOD PRESSURE: 80 MMHG

## 2020-02-12 VITALS
SYSTOLIC BLOOD PRESSURE: 121 MMHG | HEART RATE: 72 BPM | DIASTOLIC BLOOD PRESSURE: 69 MMHG | TEMPERATURE: 98 F | OXYGEN SATURATION: 95 % | RESPIRATION RATE: 17 BRPM

## 2020-02-12 DIAGNOSIS — E78.5 HYPERLIPIDEMIA, UNSPECIFIED: ICD-10-CM

## 2020-02-12 DIAGNOSIS — I25.10 ATHEROSCLEROTIC HEART DISEASE OF NATIVE CORONARY ARTERY WITHOUT ANGINA PECTORIS: ICD-10-CM

## 2020-02-12 DIAGNOSIS — I11.0 HYPERTENSIVE HEART DISEASE WITH HEART FAILURE: ICD-10-CM

## 2020-02-12 DIAGNOSIS — Z95.2 PRESENCE OF PROSTHETIC HEART VALVE: Chronic | ICD-10-CM

## 2020-02-12 DIAGNOSIS — Z96.653 PRESENCE OF ARTIFICIAL KNEE JOINT, BILATERAL: ICD-10-CM

## 2020-02-12 DIAGNOSIS — R06.2 WHEEZING: ICD-10-CM

## 2020-02-12 DIAGNOSIS — Z96.659 PRESENCE OF UNSPECIFIED ARTIFICIAL KNEE JOINT: Chronic | ICD-10-CM

## 2020-02-12 DIAGNOSIS — Z98.890 OTHER SPECIFIED POSTPROCEDURAL STATES: Chronic | ICD-10-CM

## 2020-02-12 DIAGNOSIS — I50.32 CHRONIC DIASTOLIC (CONGESTIVE) HEART FAILURE: ICD-10-CM

## 2020-02-12 DIAGNOSIS — N40.0 BENIGN PROSTATIC HYPERPLASIA WITHOUT LOWER URINARY TRACT SYMPTOMS: ICD-10-CM

## 2020-02-12 DIAGNOSIS — G47.33 OBSTRUCTIVE SLEEP APNEA (ADULT) (PEDIATRIC): ICD-10-CM

## 2020-02-12 DIAGNOSIS — R06.02 SHORTNESS OF BREATH: ICD-10-CM

## 2020-02-12 DIAGNOSIS — K21.9 GASTRO-ESOPHAGEAL REFLUX DISEASE WITHOUT ESOPHAGITIS: ICD-10-CM

## 2020-02-12 DIAGNOSIS — Z98.49 CATARACT EXTRACTION STATUS, UNSPECIFIED EYE: Chronic | ICD-10-CM

## 2020-02-12 DIAGNOSIS — S68.119A COMPLETE TRAUMATIC METACARPOPHALANGEAL AMPUTATION OF UNSPECIFIED FINGER, INITIAL ENCOUNTER: Chronic | ICD-10-CM

## 2020-02-12 PROBLEM — I48.91 UNSPECIFIED ATRIAL FIBRILLATION: Chronic | Status: ACTIVE | Noted: 2020-02-06

## 2020-02-12 PROBLEM — D64.9 ANEMIA, UNSPECIFIED: Chronic | Status: ACTIVE | Noted: 2020-02-06

## 2020-02-12 PROBLEM — I50.30 UNSPECIFIED DIASTOLIC (CONGESTIVE) HEART FAILURE: Chronic | Status: ACTIVE | Noted: 2020-02-06

## 2020-02-12 PROBLEM — M51.36 OTHER INTERVERTEBRAL DISC DEGENERATION, LUMBAR REGION: Chronic | Status: ACTIVE | Noted: 2020-02-06

## 2020-02-12 LAB
ALBUMIN SERPL ELPH-MCNC: 3.6 G/DL — SIGNIFICANT CHANGE UP (ref 3.3–5)
ALP SERPL-CCNC: 88 U/L — SIGNIFICANT CHANGE UP (ref 40–120)
ALT FLD-CCNC: 57 U/L — SIGNIFICANT CHANGE UP (ref 12–78)
ANION GAP SERPL CALC-SCNC: 9 MMOL/L — SIGNIFICANT CHANGE UP (ref 5–17)
APTT BLD: 28 SEC — SIGNIFICANT CHANGE UP (ref 27.5–36.3)
AST SERPL-CCNC: 39 U/L — HIGH (ref 15–37)
BASOPHILS # BLD AUTO: 0.03 K/UL — SIGNIFICANT CHANGE UP (ref 0–0.2)
BASOPHILS NFR BLD AUTO: 0.3 % — SIGNIFICANT CHANGE UP (ref 0–2)
BILIRUB SERPL-MCNC: 0.8 MG/DL — SIGNIFICANT CHANGE UP (ref 0.2–1.2)
BUN SERPL-MCNC: 34 MG/DL — HIGH (ref 7–23)
CALCIUM SERPL-MCNC: 9.4 MG/DL — SIGNIFICANT CHANGE UP (ref 8.5–10.1)
CHLORIDE SERPL-SCNC: 106 MMOL/L — SIGNIFICANT CHANGE UP (ref 96–108)
CO2 SERPL-SCNC: 26 MMOL/L — SIGNIFICANT CHANGE UP (ref 22–31)
CREAT SERPL-MCNC: 1.31 MG/DL — HIGH (ref 0.5–1.3)
EOSINOPHIL # BLD AUTO: 0.01 K/UL — SIGNIFICANT CHANGE UP (ref 0–0.5)
EOSINOPHIL NFR BLD AUTO: 0.1 % — SIGNIFICANT CHANGE UP (ref 0–6)
GLUCOSE SERPL-MCNC: 213 MG/DL — HIGH (ref 70–99)
HCT VFR BLD CALC: 47.3 % — SIGNIFICANT CHANGE UP (ref 39–50)
HGB BLD-MCNC: 14.7 G/DL — SIGNIFICANT CHANGE UP (ref 13–17)
IMM GRANULOCYTES NFR BLD AUTO: 1.2 % — SIGNIFICANT CHANGE UP (ref 0–1.5)
INR BLD: 1.17 RATIO — HIGH (ref 0.88–1.16)
LYMPHOCYTES # BLD AUTO: 1.04 K/UL — SIGNIFICANT CHANGE UP (ref 1–3.3)
LYMPHOCYTES # BLD AUTO: 9.4 % — LOW (ref 13–44)
MAGNESIUM SERPL-MCNC: 2 MG/DL — SIGNIFICANT CHANGE UP (ref 1.6–2.6)
MCHC RBC-ENTMCNC: 29.2 PG — SIGNIFICANT CHANGE UP (ref 27–34)
MCHC RBC-ENTMCNC: 31.1 GM/DL — LOW (ref 32–36)
MCV RBC AUTO: 94 FL — SIGNIFICANT CHANGE UP (ref 80–100)
MONOCYTES # BLD AUTO: 0.27 K/UL — SIGNIFICANT CHANGE UP (ref 0–0.9)
MONOCYTES NFR BLD AUTO: 2.5 % — SIGNIFICANT CHANGE UP (ref 2–14)
NEUTROPHILS # BLD AUTO: 9.54 K/UL — HIGH (ref 1.8–7.4)
NEUTROPHILS NFR BLD AUTO: 86.5 % — HIGH (ref 43–77)
NT-PROBNP SERPL-SCNC: 1037 PG/ML — HIGH (ref 0–450)
PLATELET # BLD AUTO: 258 K/UL — SIGNIFICANT CHANGE UP (ref 150–400)
POTASSIUM SERPL-MCNC: 4 MMOL/L — SIGNIFICANT CHANGE UP (ref 3.5–5.3)
POTASSIUM SERPL-SCNC: 4 MMOL/L — SIGNIFICANT CHANGE UP (ref 3.5–5.3)
PROT SERPL-MCNC: 7.5 GM/DL — SIGNIFICANT CHANGE UP (ref 6–8.3)
PROTHROM AB SERPL-ACNC: 13 SEC — HIGH (ref 10–12.9)
RBC # BLD: 5.03 M/UL — SIGNIFICANT CHANGE UP (ref 4.2–5.8)
RBC # FLD: 16.1 % — HIGH (ref 10.3–14.5)
SODIUM SERPL-SCNC: 141 MMOL/L — SIGNIFICANT CHANGE UP (ref 135–145)
TROPONIN I SERPL-MCNC: <0.015 NG/ML — SIGNIFICANT CHANGE UP (ref 0.01–0.04)
WBC # BLD: 11.02 K/UL — HIGH (ref 3.8–10.5)
WBC # FLD AUTO: 11.02 K/UL — HIGH (ref 3.8–10.5)

## 2020-02-12 PROCEDURE — 84484 ASSAY OF TROPONIN QUANT: CPT

## 2020-02-12 PROCEDURE — G2066: CPT

## 2020-02-12 PROCEDURE — 93005 ELECTROCARDIOGRAM TRACING: CPT

## 2020-02-12 PROCEDURE — 99284 EMERGENCY DEPT VISIT MOD MDM: CPT | Mod: 25

## 2020-02-12 PROCEDURE — 93298 REM INTERROG DEV EVAL SCRMS: CPT

## 2020-02-12 PROCEDURE — 83735 ASSAY OF MAGNESIUM: CPT

## 2020-02-12 PROCEDURE — 94640 AIRWAY INHALATION TREATMENT: CPT

## 2020-02-12 PROCEDURE — 36415 COLL VENOUS BLD VENIPUNCTURE: CPT

## 2020-02-12 PROCEDURE — 85730 THROMBOPLASTIN TIME PARTIAL: CPT

## 2020-02-12 PROCEDURE — 85610 PROTHROMBIN TIME: CPT

## 2020-02-12 PROCEDURE — 71045 X-RAY EXAM CHEST 1 VIEW: CPT | Mod: 26

## 2020-02-12 PROCEDURE — 71045 X-RAY EXAM CHEST 1 VIEW: CPT

## 2020-02-12 PROCEDURE — 80053 COMPREHEN METABOLIC PANEL: CPT

## 2020-02-12 PROCEDURE — 83880 ASSAY OF NATRIURETIC PEPTIDE: CPT

## 2020-02-12 PROCEDURE — 93010 ELECTROCARDIOGRAM REPORT: CPT

## 2020-02-12 PROCEDURE — 99284 EMERGENCY DEPT VISIT MOD MDM: CPT

## 2020-02-12 PROCEDURE — 85025 COMPLETE CBC W/AUTO DIFF WBC: CPT

## 2020-02-12 RX ORDER — ALBUTEROL 90 UG/1
2 AEROSOL, METERED ORAL ONCE
Refills: 0 | Status: COMPLETED | OUTPATIENT
Start: 2020-02-12 | End: 2020-02-12

## 2020-02-12 RX ORDER — ALBUTEROL 90 UG/1
2.5 AEROSOL, METERED ORAL ONCE
Refills: 0 | Status: COMPLETED | OUTPATIENT
Start: 2020-02-12 | End: 2020-02-12

## 2020-02-12 RX ADMIN — ALBUTEROL 2 PUFF(S): 90 AEROSOL, METERED ORAL at 20:00

## 2020-02-12 RX ADMIN — ALBUTEROL 2.5 MILLIGRAM(S): 90 AEROSOL, METERED ORAL at 19:15

## 2020-02-12 NOTE — ED PROVIDER NOTE - PATIENT PORTAL LINK FT
You can access the FollowMyHealth Patient Portal offered by [facility] by registering at the following website: [email or web address]. By joining [employer]’s FollowMyHealth portal, you will also be able to view your health information using other applications (apps) compatible with our system.

## 2020-02-12 NOTE — ED STATDOCS - PMH
Afib  Not on A/C 2/2 hx of GIbleeding  Aortic root dilatation    Aortic valve insufficiency, unspecified etiology    BPH associated with nocturia    CAD (coronary artery disease)  (-) cardiac stress and 2DECHO 2019  Chronic anemia    DDD (degenerative disc disease), lumbar    Diastolic CHF  LVEF 55-60% 6/2019  Diverticulosis of intestine without bleeding, unspecified intestinal tract location    Essential hypertension    Fall (on) (from) other stairs and steps, sequela  03/28/2017- lumbar hemtoma  Gastroesophageal reflux disease, esophagitis presence not specified  Hx of GI bleed  HLD (hyperlipidemia)    [place] (hard of hearing)    Lumbar degenerative disc disease    MRSA (methicillin resistant Staphylococcus aureus)  left forearm 2012  Nerve injury  complication from right TKR surgery, 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  CAMILA (obstructive sleep apnea)  unable to tolerate nocturnal CPAP  Restless leg syndrome    Thoracic aortic aneurysm

## 2020-02-12 NOTE — ED PROVIDER NOTE - PROGRESS NOTE DETAILS
Vivienne AS for Dr. Del Rosario: pt reassessed is breathing well, family informed of lab results, and pt to be discharged home on albuterol inhaler prn for likely bronchitis.

## 2020-02-12 NOTE — ED PROVIDER NOTE - NS_ ATTENDINGSCRIBEDETAILS _ED_A_ED_FT
I, Shiv Del Rosario MD,  performed the initial face to face bedside interview with this patient regarding history of present illness, review of symptoms and relevant past medical, social and family history.  I completed an independent physical examination.    The history, relevant review of systems, past medical and surgical history, medical decision making, and physical examination was documented by the scribe in my presence and I attest to the accuracy of the documentation.

## 2020-02-12 NOTE — ED ADULT NURSE NOTE - OBJECTIVE STATEMENT
Pt presents to the ED for eval of sob worse with exertion. As per family at bedside, pt was home, was seen by home care nurse and was walking up the stairs when pt became very sob with exertion. Pt was recently hospitalized for pneumonia, was on IV abx and is currently still taking oral prednisone. As per family, pt had audible wheezes. Pt currently with +crackles to LLL. SOB to 88%, has hx of CHF and takes lasix qdaily. Pt placed on O2 therapy 2/2 to sob and hypoxia.

## 2020-02-12 NOTE — ED STATDOCS - PSH
Amputation finger  left index 1970  History of fundoplication  2000  S/P AVR (aortic valve replacement)    S/P debridement  left forearm -mrsa  S/P knee replacement  left  S/P knee replacement  right  S/P shoulder surgery  right rotatotor cuff injury  S/P thoracic aortic aneurysm repair    Status post cataract extraction, unspecified laterality  bilat

## 2020-02-12 NOTE — ED PEDIATRIC TRIAGE NOTE - CHIEF COMPLAINT QUOTE
Pt's mother c/o subjective fever , headache  x 1 day , pt had 1 episode of vomiting in triage yellow emesis

## 2020-02-12 NOTE — ED PROVIDER NOTE - OBJECTIVE STATEMENT
83 y/o M with a PMHx of HTN, MRSA, BPH, CAMILA, thoracic aortic aneurysm, Afib, GI bleed, HLD, CHF presents to the ED sent by home RN after hearing pt still had wheeze s/p being admitted for 5 days in [facility] for "viral infection/PNA" and DC yesterday. Pt with no recent changes to medication.

## 2020-02-12 NOTE — ED ADULT TRIAGE NOTE - CHIEF COMPLAINT QUOTE
pt c/o chf exacerbation , d/c yesterday , VNS RN evaluated pt today states sob and lung with crackles

## 2020-02-12 NOTE — ED STATDOCS - PROGRESS NOTE DETAILS
Vivienne ROGER for Dr. Morales: 3 y/o M with a PMHx of HTN, MRSA, BPH, CAMILA, thoracic aortic aneurysm, Afib, GI bleed, HLD, CHF presents to the ED sent by home RN after hearing pt still had wheeze s/p being admitted for 5 days in [facility] for "viral infection/PNA" and DC yesterday. Will send pt to main ED for further evaluation. Vivienne ROGER for Dr. Morales: 83 y/o M with a PMHx of HTN, MRSA, BPH, CAMILA, thoracic aortic aneurysm, Afib, GI bleed, HLD, CHF presents to the ED sent by home RN after hearing pt still had wheeze s/p being admitted for 5 days in [facility] for "viral infection/PNA" and DC yesterday. Will send pt to main ED for further evaluation.

## 2020-02-12 NOTE — ED PROVIDER NOTE - PMH
Afib  Not on A/C 2/2 hx of GIbleeding  Aortic root dilatation    Aortic valve insufficiency, unspecified etiology    BPH associated with nocturia    CAD (coronary artery disease)  (-) cardiac stress and 2DECHO 2019  Chronic anemia    DDD (degenerative disc disease), lumbar    Diastolic CHF  LVEF 55-60% 6/2019  Diverticulosis of intestine without bleeding, unspecified intestinal tract location    Essential hypertension    Fall (on) (from) other stairs and steps, sequela  03/28/2017- lumbar hemtoma  Gastroesophageal reflux disease, esophagitis presence not specified  Hx of GI bleed  HLD (hyperlipidemia)    [place] (hard of hearing)    Lumbar degenerative disc disease    MRSA (methicillin resistant Staphylococcus aureus)  left forearm 2012  Nerve injury  complication from right TKR surgery, 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  CAMILA (obstructive sleep apnea)  unable to tolerate nocturnal CPAP  Restless leg syndrome    Thoracic aortic aneurysm

## 2020-02-14 ENCOUNTER — NON-APPOINTMENT (OUTPATIENT)
Age: 85
End: 2020-02-14

## 2020-02-14 ENCOUNTER — APPOINTMENT (OUTPATIENT)
Dept: INTERNAL MEDICINE | Facility: CLINIC | Age: 85
End: 2020-02-14
Payer: MEDICARE

## 2020-02-14 VITALS
TEMPERATURE: 97.7 F | HEART RATE: 74 BPM | RESPIRATION RATE: 18 BRPM | WEIGHT: 175 LBS | SYSTOLIC BLOOD PRESSURE: 110 MMHG | OXYGEN SATURATION: 95 % | HEIGHT: 66 IN | DIASTOLIC BLOOD PRESSURE: 68 MMHG | BODY MASS INDEX: 28.12 KG/M2

## 2020-02-14 DIAGNOSIS — R06.02 SHORTNESS OF BREATH: ICD-10-CM

## 2020-02-14 DIAGNOSIS — D64.9 ANEMIA, UNSPECIFIED: ICD-10-CM

## 2020-02-14 DIAGNOSIS — K56.600 PARTIAL INTESTINAL OBSTRUCTION, UNSPECIFIED AS TO CAUSE: ICD-10-CM

## 2020-02-14 DIAGNOSIS — J96.01 ACUTE RESPIRATORY FAILURE WITH HYPOXIA: ICD-10-CM

## 2020-02-14 DIAGNOSIS — I25.10 ATHEROSCLEROTIC HEART DISEASE OF NATIVE CORONARY ARTERY WITHOUT ANGINA PECTORIS: ICD-10-CM

## 2020-02-14 DIAGNOSIS — Z89.022 ACQUIRED ABSENCE OF LEFT FINGER(S): ICD-10-CM

## 2020-02-14 DIAGNOSIS — K21.9 GASTRO-ESOPHAGEAL REFLUX DISEASE WITHOUT ESOPHAGITIS: ICD-10-CM

## 2020-02-14 DIAGNOSIS — Z66 DO NOT RESUSCITATE: ICD-10-CM

## 2020-02-14 DIAGNOSIS — I50.32 CHRONIC DIASTOLIC (CONGESTIVE) HEART FAILURE: ICD-10-CM

## 2020-02-14 DIAGNOSIS — K22.8 OTHER SPECIFIED DISEASES OF ESOPHAGUS: ICD-10-CM

## 2020-02-14 DIAGNOSIS — Z86.19 PERSONAL HISTORY OF OTHER INFECTIOUS AND PARASITIC DISEASES: ICD-10-CM

## 2020-02-14 DIAGNOSIS — G25.81 RESTLESS LEGS SYNDROME: ICD-10-CM

## 2020-02-14 DIAGNOSIS — J21.0 ACUTE BRONCHIOLITIS DUE TO RESPIRATORY SYNCYTIAL VIRUS: ICD-10-CM

## 2020-02-14 DIAGNOSIS — E78.5 HYPERLIPIDEMIA, UNSPECIFIED: ICD-10-CM

## 2020-02-14 DIAGNOSIS — Z87.19 PERSONAL HISTORY OF OTHER DISEASES OF THE DIGESTIVE SYSTEM: ICD-10-CM

## 2020-02-14 DIAGNOSIS — I48.91 UNSPECIFIED ATRIAL FIBRILLATION: ICD-10-CM

## 2020-02-14 DIAGNOSIS — Z96.641 PRESENCE OF RIGHT ARTIFICIAL HIP JOINT: ICD-10-CM

## 2020-02-14 DIAGNOSIS — Z95.3 PRESENCE OF XENOGENIC HEART VALVE: ICD-10-CM

## 2020-02-14 DIAGNOSIS — Z96.653 PRESENCE OF ARTIFICIAL KNEE JOINT, BILATERAL: ICD-10-CM

## 2020-02-14 DIAGNOSIS — F03.91 UNSPECIFIED DEMENTIA WITH BEHAVIORAL DISTURBANCE: ICD-10-CM

## 2020-02-14 DIAGNOSIS — G47.33 OBSTRUCTIVE SLEEP APNEA (ADULT) (PEDIATRIC): ICD-10-CM

## 2020-02-14 DIAGNOSIS — G47.30 SLEEP APNEA, UNSPECIFIED: ICD-10-CM

## 2020-02-14 DIAGNOSIS — H40.9 UNSPECIFIED GLAUCOMA: ICD-10-CM

## 2020-02-14 DIAGNOSIS — I11.0 HYPERTENSIVE HEART DISEASE WITH HEART FAILURE: ICD-10-CM

## 2020-02-14 DIAGNOSIS — N40.0 BENIGN PROSTATIC HYPERPLASIA WITHOUT LOWER URINARY TRACT SYMPTOMS: ICD-10-CM

## 2020-02-14 DIAGNOSIS — J69.0 PNEUMONITIS DUE TO INHALATION OF FOOD AND VOMIT: ICD-10-CM

## 2020-02-14 DIAGNOSIS — M51.36 OTHER INTERVERTEBRAL DISC DEGENERATION, LUMBAR REGION: ICD-10-CM

## 2020-02-14 DIAGNOSIS — R05 COUGH: ICD-10-CM

## 2020-02-14 PROCEDURE — 94060 EVALUATION OF WHEEZING: CPT

## 2020-02-14 PROCEDURE — 99214 OFFICE O/P EST MOD 30 MIN: CPT | Mod: 25

## 2020-02-14 RX ORDER — GLUC/MSM/COLGN2/HYAL/ANTIARTH3 375-375-20
TABLET ORAL
Refills: 0 | Status: ACTIVE | COMMUNITY

## 2020-02-17 NOTE — ASSESSMENT
[FreeTextEntry1] : Mr. Combs presents for hospital followup evaluation accompanied by his wife. He had respiratory syncytial virus infection. He will continue use of metered-dose inhaler therapy. Patient does not require further antibiotics. He does not require further steroids. Followup as scheduled.

## 2020-02-17 NOTE — PROCEDURE
Assault, Physical [FreeTextEntry1] : Spirometry pre-and postbronchodilator therapy shows moderate obstructive lung disease. FEV1 is 1.07 L which is 48% predicted. FEV1 percent is 65%. There is no significant bronchodilator response.

## 2020-02-17 NOTE — PHYSICAL EXAM
[No Acute Distress] : no acute distress [Normal Oropharynx] : normal oropharynx [Normal Appearance] : normal appearance [No Neck Mass] : no neck mass [Normal Rate/Rhythm] : normal rate/rhythm [Normal S1, S2] : normal s1, s2 [No Murmurs] : no murmurs [No Resp Distress] : no resp distress [Rhonchi] : rhonchi [Benign] : benign [No Abnormalities] : no abnormalities [No Cyanosis] : no cyanosis [No Clubbing] : no clubbing [Normal Gait] : normal gait [FROM] : FROM [No Edema] : no edema [Normal Color/ Pigmentation] : normal color/ pigmentation [Oriented x3] : oriented x3 [Normal Affect] : normal affect [No Focal Deficits] : no focal deficits

## 2020-02-17 NOTE — HISTORY OF PRESENT ILLNESS
[TextBox_4] : Mr. Davison presents for a followup evaluation accompanied by his wife. He was then admitted to the hospital within the past 2 weeks with cough and shortness of breath. He was found to have respiratory syncytial virus infection. The patient has a history of significant dementia. Currently, he is somewhat lethargic but is in no acute distress. He was treated with bronchodilators, steroids and antibiotics and discharged.

## 2020-02-21 ENCOUNTER — APPOINTMENT (OUTPATIENT)
Dept: OTOLARYNGOLOGY | Facility: CLINIC | Age: 85
End: 2020-02-21
Payer: MEDICARE

## 2020-02-21 VITALS
WEIGHT: 170 LBS | DIASTOLIC BLOOD PRESSURE: 82 MMHG | BODY MASS INDEX: 27.32 KG/M2 | SYSTOLIC BLOOD PRESSURE: 115 MMHG | HEIGHT: 66 IN | HEART RATE: 75 BPM

## 2020-02-21 PROCEDURE — 99213 OFFICE O/P EST LOW 20 MIN: CPT | Mod: 25

## 2020-02-21 PROCEDURE — 69210 REMOVE IMPACTED EAR WAX UNI: CPT

## 2020-02-21 NOTE — PROCEDURE
[Cerumen Impaction] : Cerumen Impaction [FreeTextEntry6] : Large amount cerumen cleared left ear instrumentation and suction.\par Ear canals and tympanic membranes  unremarkable.\par

## 2020-02-21 NOTE — PHYSICAL EXAM
[Midline] : trachea located in midline position [Normal] : no rashes [de-identified] : cerumen impacted as canal irrigation x 2

## 2020-02-25 ENCOUNTER — APPOINTMENT (OUTPATIENT)
Dept: ELECTROPHYSIOLOGY | Facility: CLINIC | Age: 85
End: 2020-02-25
Payer: MEDICARE

## 2020-02-25 VITALS
HEIGHT: 66 IN | OXYGEN SATURATION: 96 % | HEART RATE: 59 BPM | SYSTOLIC BLOOD PRESSURE: 100 MMHG | BODY MASS INDEX: 27.32 KG/M2 | DIASTOLIC BLOOD PRESSURE: 69 MMHG | WEIGHT: 170 LBS

## 2020-02-25 VITALS — DIASTOLIC BLOOD PRESSURE: 72 MMHG | SYSTOLIC BLOOD PRESSURE: 103 MMHG

## 2020-02-25 DIAGNOSIS — I63.9 CEREBRAL INFARCTION, UNSPECIFIED: ICD-10-CM

## 2020-02-25 PROCEDURE — 99212 OFFICE O/P EST SF 10 MIN: CPT

## 2020-02-25 PROCEDURE — 93285 PRGRMG DEV EVAL SCRMS IP: CPT

## 2020-02-25 RX ORDER — ATORVASTATIN CALCIUM 20 MG/1
20 TABLET, FILM COATED ORAL DAILY
Qty: 30 | Refills: 3 | Status: ACTIVE | COMMUNITY

## 2020-02-25 RX ORDER — UBIDECARENONE 200 MG
500 CAPSULE ORAL
Refills: 0 | Status: DISCONTINUED | COMMUNITY
End: 2020-02-25

## 2020-02-25 NOTE — ASSESSMENT
[FreeTextEntry1] : 84 year old with Afib not on AC due to history of GI bleed, RSV, cryptogenic stroke, bioprosthetic AVR, with ILR revealing tachy episodes maintained on Metoprolol, pauses mostly with sleep and asymptomatic.

## 2020-02-25 NOTE — HISTORY OF PRESENT ILLNESS
[FreeTextEntry1] : 84 year old with Afib not on OAC due to history of GI bleed, cryptogenic stroke, bioprosthetic AVR, CAMILA rarely uses CPAP.  Patient recently hospitalized with RSV. Patient presents for ILR follow up.\par Per wife, patient continues to have fatigue since hospitalization and sleeps frequently during the day.\par Denies any CP, palpitations, SOB, dizziness, lightheadedness.\par ILR interrogation revealed: AF burden 97.8%, some tachy episodes, multiple pauses most frequently with sleep from 3-4seconds.  One pause noted on 2/14 six seconds at 17:00, per wife patient was asymptomatic and was possibly sleeping.

## 2020-02-25 NOTE — DISCUSSION/SUMMARY
[FreeTextEntry1] : AF:\par Continue Metoprolol 12.5mg BID\par Discussed with wife potential need for pacemaker in the future but made her aware PPM is not indicated at this time.\par Encouraged patient to use CPAP\par ILR montly monitoring, clinic follow up in six months.

## 2020-02-25 NOTE — PHYSICAL EXAM
[Normal Appearance] : normal appearance [General Appearance - In No Acute Distress] : no acute distress [] : no respiratory distress [Auscultation Breath Sounds / Voice Sounds] : lungs were clear to auscultation bilaterally [Respiration, Rhythm And Depth] : normal respiratory rhythm and effort [Heart Rate And Rhythm] : heart rate and rhythm were normal [Heart Sounds] : normal S1 and S2 [Edema] : no peripheral edema present [Bowel Sounds] : normal bowel sounds [Abdomen Soft] : soft [Abdomen Tenderness] : non-tender [Oriented To Time, Place, And Person] : oriented to person, place, and time

## 2020-02-27 ENCOUNTER — APPOINTMENT (OUTPATIENT)
Dept: OTOLARYNGOLOGY | Facility: CLINIC | Age: 85
End: 2020-02-27
Payer: MEDICARE

## 2020-02-27 VITALS
HEART RATE: 69 BPM | HEIGHT: 66 IN | BODY MASS INDEX: 27.32 KG/M2 | WEIGHT: 170 LBS | SYSTOLIC BLOOD PRESSURE: 94 MMHG | DIASTOLIC BLOOD PRESSURE: 62 MMHG

## 2020-02-27 DIAGNOSIS — H90.3 SENSORINEURAL HEARING LOSS, BILATERAL: ICD-10-CM

## 2020-02-27 DIAGNOSIS — H61.22 IMPACTED CERUMEN, LEFT EAR: ICD-10-CM

## 2020-02-27 PROCEDURE — 99213 OFFICE O/P EST LOW 20 MIN: CPT

## 2020-02-27 NOTE — PHYSICAL EXAM
[de-identified] : irrigated as clear [Midline] : trachea located in midline position [Normal] : orientation to person, place, and time: normal

## 2020-02-27 NOTE — HISTORY OF PRESENT ILLNESS
[de-identified] : Patient is complaining of ear plugging of the left  ear over the past few weeks.\par There is no hearing loss or tinnitus.\par The history is significant for au sn loss ad cochlear implant.\par

## 2020-03-14 ENCOUNTER — RESULT REVIEW (OUTPATIENT)
Age: 85
End: 2020-03-14

## 2020-03-16 ENCOUNTER — APPOINTMENT (OUTPATIENT)
Dept: ELECTROPHYSIOLOGY | Facility: CLINIC | Age: 85
End: 2020-03-16
Payer: MEDICARE

## 2020-03-16 PROCEDURE — G2066: CPT

## 2020-03-16 PROCEDURE — 93298 REM INTERROG DEV EVAL SCRMS: CPT

## 2020-03-24 NOTE — H&P PST ADULT - BACK
Abnormal gait / station
Abnormal gait / station
Normal gait / station
Normal gait / station
No deformity or limitation of movement

## 2020-04-13 RX ORDER — AZTREONAM 2 G
1 VIAL (EA) INJECTION
Qty: 0 | Refills: 0 | DISCHARGE
Start: 2020-04-13 | End: 2020-04-22

## 2020-04-16 ENCOUNTER — APPOINTMENT (OUTPATIENT)
Dept: ELECTROPHYSIOLOGY | Facility: CLINIC | Age: 85
End: 2020-04-16
Payer: MEDICARE

## 2020-04-16 PROCEDURE — G2066: CPT

## 2020-04-16 PROCEDURE — 93298 REM INTERROG DEV EVAL SCRMS: CPT

## 2020-05-08 ENCOUNTER — INPATIENT (INPATIENT)
Facility: HOSPITAL | Age: 85
LOS: 12 days | Discharge: NURSING, REHAB OR LONG-TERM CARE | DRG: 178 | End: 2020-05-21
Attending: FAMILY MEDICINE | Admitting: FAMILY MEDICINE
Payer: MEDICARE

## 2020-05-08 VITALS
HEART RATE: 77 BPM | RESPIRATION RATE: 18 BRPM | WEIGHT: 169.98 LBS | TEMPERATURE: 98 F | DIASTOLIC BLOOD PRESSURE: 98 MMHG | HEIGHT: 68 IN | SYSTOLIC BLOOD PRESSURE: 135 MMHG | OXYGEN SATURATION: 96 %

## 2020-05-08 DIAGNOSIS — Z95.2 PRESENCE OF PROSTHETIC HEART VALVE: Chronic | ICD-10-CM

## 2020-05-08 DIAGNOSIS — J18.9 PNEUMONIA, UNSPECIFIED ORGANISM: ICD-10-CM

## 2020-05-08 DIAGNOSIS — Z98.890 OTHER SPECIFIED POSTPROCEDURAL STATES: Chronic | ICD-10-CM

## 2020-05-08 DIAGNOSIS — Z96.659 PRESENCE OF UNSPECIFIED ARTIFICIAL KNEE JOINT: Chronic | ICD-10-CM

## 2020-05-08 DIAGNOSIS — Z98.49 CATARACT EXTRACTION STATUS, UNSPECIFIED EYE: Chronic | ICD-10-CM

## 2020-05-08 DIAGNOSIS — S68.119A COMPLETE TRAUMATIC METACARPOPHALANGEAL AMPUTATION OF UNSPECIFIED FINGER, INITIAL ENCOUNTER: Chronic | ICD-10-CM

## 2020-05-08 LAB
ADD ON TEST-SPECIMEN IN LAB: SIGNIFICANT CHANGE UP
ALBUMIN SERPL ELPH-MCNC: 2.8 G/DL — LOW (ref 3.3–5)
ALP SERPL-CCNC: 77 U/L — SIGNIFICANT CHANGE UP (ref 40–120)
ALT FLD-CCNC: 15 U/L — SIGNIFICANT CHANGE UP (ref 12–78)
ANION GAP SERPL CALC-SCNC: 6 MMOL/L — SIGNIFICANT CHANGE UP (ref 5–17)
APTT BLD: 31.9 SEC — SIGNIFICANT CHANGE UP (ref 27.5–36.3)
AST SERPL-CCNC: 13 U/L — LOW (ref 15–37)
BASOPHILS # BLD AUTO: 0.07 K/UL — SIGNIFICANT CHANGE UP (ref 0–0.2)
BASOPHILS NFR BLD AUTO: 0.5 % — SIGNIFICANT CHANGE UP (ref 0–2)
BILIRUB SERPL-MCNC: 0.8 MG/DL — SIGNIFICANT CHANGE UP (ref 0.2–1.2)
BUN SERPL-MCNC: 14 MG/DL — SIGNIFICANT CHANGE UP (ref 7–23)
CALCIUM SERPL-MCNC: 9.8 MG/DL — SIGNIFICANT CHANGE UP (ref 8.5–10.1)
CHLORIDE SERPL-SCNC: 101 MMOL/L — SIGNIFICANT CHANGE UP (ref 96–108)
CO2 SERPL-SCNC: 31 MMOL/L — SIGNIFICANT CHANGE UP (ref 22–31)
CREAT SERPL-MCNC: 0.88 MG/DL — SIGNIFICANT CHANGE UP (ref 0.5–1.3)
EOSINOPHIL # BLD AUTO: 0.13 K/UL — SIGNIFICANT CHANGE UP (ref 0–0.5)
EOSINOPHIL NFR BLD AUTO: 0.9 % — SIGNIFICANT CHANGE UP (ref 0–6)
GLUCOSE SERPL-MCNC: 101 MG/DL — HIGH (ref 70–99)
HCT VFR BLD CALC: 41.8 % — SIGNIFICANT CHANGE UP (ref 39–50)
HGB BLD-MCNC: 13.8 G/DL — SIGNIFICANT CHANGE UP (ref 13–17)
IMM GRANULOCYTES NFR BLD AUTO: 1.1 % — SIGNIFICANT CHANGE UP (ref 0–1.5)
LACTATE SERPL-SCNC: 1.5 MMOL/L — SIGNIFICANT CHANGE UP (ref 0.7–2)
LYMPHOCYTES # BLD AUTO: 1.3 K/UL — SIGNIFICANT CHANGE UP (ref 1–3.3)
LYMPHOCYTES # BLD AUTO: 9.1 % — LOW (ref 13–44)
MCHC RBC-ENTMCNC: 31.1 PG — SIGNIFICANT CHANGE UP (ref 27–34)
MCHC RBC-ENTMCNC: 33 GM/DL — SIGNIFICANT CHANGE UP (ref 32–36)
MCV RBC AUTO: 94.1 FL — SIGNIFICANT CHANGE UP (ref 80–100)
MONOCYTES # BLD AUTO: 1.6 K/UL — HIGH (ref 0–0.9)
MONOCYTES NFR BLD AUTO: 11.2 % — SIGNIFICANT CHANGE UP (ref 2–14)
NEUTROPHILS # BLD AUTO: 11.02 K/UL — HIGH (ref 1.8–7.4)
NEUTROPHILS NFR BLD AUTO: 77.2 % — HIGH (ref 43–77)
PLATELET # BLD AUTO: 215 K/UL — SIGNIFICANT CHANGE UP (ref 150–400)
POTASSIUM SERPL-MCNC: 4.4 MMOL/L — SIGNIFICANT CHANGE UP (ref 3.5–5.3)
POTASSIUM SERPL-SCNC: 4.4 MMOL/L — SIGNIFICANT CHANGE UP (ref 3.5–5.3)
PROT SERPL-MCNC: 6.9 GM/DL — SIGNIFICANT CHANGE UP (ref 6–8.3)
RBC # BLD: 4.44 M/UL — SIGNIFICANT CHANGE UP (ref 4.2–5.8)
RBC # FLD: 15.1 % — HIGH (ref 10.3–14.5)
SARS-COV-2 RNA SPEC QL NAA+PROBE: SIGNIFICANT CHANGE UP
SODIUM SERPL-SCNC: 138 MMOL/L — SIGNIFICANT CHANGE UP (ref 135–145)
WBC # BLD: 14.28 K/UL — HIGH (ref 3.8–10.5)
WBC # FLD AUTO: 14.28 K/UL — HIGH (ref 3.8–10.5)

## 2020-05-08 PROCEDURE — 97116 GAIT TRAINING THERAPY: CPT | Mod: GP

## 2020-05-08 PROCEDURE — 85652 RBC SED RATE AUTOMATED: CPT

## 2020-05-08 PROCEDURE — 97530 THERAPEUTIC ACTIVITIES: CPT | Mod: GP

## 2020-05-08 PROCEDURE — 74177 CT ABD & PELVIS W/CONTRAST: CPT | Mod: 26

## 2020-05-08 PROCEDURE — 85610 PROTHROMBIN TIME: CPT

## 2020-05-08 PROCEDURE — 93970 EXTREMITY STUDY: CPT

## 2020-05-08 PROCEDURE — 82728 ASSAY OF FERRITIN: CPT

## 2020-05-08 PROCEDURE — 87040 BLOOD CULTURE FOR BACTERIA: CPT

## 2020-05-08 PROCEDURE — 99223 1ST HOSP IP/OBS HIGH 75: CPT | Mod: AI

## 2020-05-08 PROCEDURE — 93010 ELECTROCARDIOGRAM REPORT: CPT

## 2020-05-08 PROCEDURE — 85027 COMPLETE CBC AUTOMATED: CPT

## 2020-05-08 PROCEDURE — U0003: CPT

## 2020-05-08 PROCEDURE — 84100 ASSAY OF PHOSPHORUS: CPT

## 2020-05-08 PROCEDURE — 86140 C-REACTIVE PROTEIN: CPT

## 2020-05-08 PROCEDURE — 80048 BASIC METABOLIC PNL TOTAL CA: CPT

## 2020-05-08 PROCEDURE — 85730 THROMBOPLASTIN TIME PARTIAL: CPT

## 2020-05-08 PROCEDURE — 83880 ASSAY OF NATRIURETIC PEPTIDE: CPT

## 2020-05-08 PROCEDURE — 36415 COLL VENOUS BLD VENIPUNCTURE: CPT

## 2020-05-08 PROCEDURE — 84550 ASSAY OF BLOOD/URIC ACID: CPT

## 2020-05-08 PROCEDURE — 71260 CT THORAX DX C+: CPT | Mod: 26

## 2020-05-08 PROCEDURE — 72128 CT CHEST SPINE W/O DYE: CPT

## 2020-05-08 PROCEDURE — 70450 CT HEAD/BRAIN W/O DYE: CPT | Mod: 26

## 2020-05-08 PROCEDURE — 72131 CT LUMBAR SPINE W/O DYE: CPT

## 2020-05-08 PROCEDURE — 82550 ASSAY OF CK (CPK): CPT

## 2020-05-08 PROCEDURE — 97163 PT EVAL HIGH COMPLEX 45 MIN: CPT | Mod: GP

## 2020-05-08 PROCEDURE — 83735 ASSAY OF MAGNESIUM: CPT

## 2020-05-08 PROCEDURE — 72125 CT NECK SPINE W/O DYE: CPT | Mod: 26

## 2020-05-08 PROCEDURE — 85379 FIBRIN DEGRADATION QUANT: CPT

## 2020-05-08 RX ORDER — AZITHROMYCIN 500 MG/1
500 TABLET, FILM COATED ORAL ONCE
Refills: 0 | Status: COMPLETED | OUTPATIENT
Start: 2020-05-08 | End: 2020-05-08

## 2020-05-08 RX ORDER — FUROSEMIDE 40 MG
1 TABLET ORAL
Qty: 0 | Refills: 0 | DISCHARGE

## 2020-05-08 RX ORDER — TIMOLOL 0.5 %
1 DROPS OPHTHALMIC (EYE)
Refills: 0 | Status: DISCONTINUED | OUTPATIENT
Start: 2020-05-08 | End: 2020-05-21

## 2020-05-08 RX ORDER — METOPROLOL TARTRATE 50 MG
25 TABLET ORAL
Refills: 0 | Status: DISCONTINUED | OUTPATIENT
Start: 2020-05-08 | End: 2020-05-20

## 2020-05-08 RX ORDER — ONDANSETRON 8 MG/1
4 TABLET, FILM COATED ORAL THREE TIMES A DAY
Refills: 0 | Status: DISCONTINUED | OUTPATIENT
Start: 2020-05-08 | End: 2020-05-21

## 2020-05-08 RX ORDER — ACETAMINOPHEN 500 MG
650 TABLET ORAL EVERY 4 HOURS
Refills: 0 | Status: DISCONTINUED | OUTPATIENT
Start: 2020-05-08 | End: 2020-05-21

## 2020-05-08 RX ORDER — BRIMONIDINE TARTRATE 2 MG/MG
1 SOLUTION/ DROPS OPHTHALMIC
Refills: 0 | Status: DISCONTINUED | OUTPATIENT
Start: 2020-05-08 | End: 2020-05-21

## 2020-05-08 RX ORDER — ROPINIROLE 8 MG/1
2 TABLET, FILM COATED, EXTENDED RELEASE ORAL
Refills: 0 | Status: DISCONTINUED | OUTPATIENT
Start: 2020-05-08 | End: 2020-05-21

## 2020-05-08 RX ORDER — ACETAMINOPHEN 500 MG
650 TABLET ORAL ONCE
Refills: 0 | Status: COMPLETED | OUTPATIENT
Start: 2020-05-08 | End: 2020-05-08

## 2020-05-08 RX ORDER — GABAPENTIN 400 MG/1
1 CAPSULE ORAL
Qty: 0 | Refills: 0 | DISCHARGE

## 2020-05-08 RX ORDER — POTASSIUM CHLORIDE 20 MEQ
1 PACKET (EA) ORAL
Qty: 0 | Refills: 0 | DISCHARGE

## 2020-05-08 RX ORDER — LIDOCAINE 4 G/100G
1 CREAM TOPICAL THREE TIMES A DAY
Refills: 0 | Status: DISCONTINUED | OUTPATIENT
Start: 2020-05-08 | End: 2020-05-21

## 2020-05-08 RX ORDER — ATORVASTATIN CALCIUM 80 MG/1
20 TABLET, FILM COATED ORAL AT BEDTIME
Refills: 0 | Status: DISCONTINUED | OUTPATIENT
Start: 2020-05-08 | End: 2020-05-21

## 2020-05-08 RX ORDER — CEFTRIAXONE 500 MG/1
1000 INJECTION, POWDER, FOR SOLUTION INTRAMUSCULAR; INTRAVENOUS ONCE
Refills: 0 | Status: COMPLETED | OUTPATIENT
Start: 2020-05-08 | End: 2020-05-08

## 2020-05-08 RX ORDER — ESCITALOPRAM OXALATE 10 MG/1
10 TABLET, FILM COATED ORAL DAILY
Refills: 0 | Status: DISCONTINUED | OUTPATIENT
Start: 2020-05-08 | End: 2020-05-21

## 2020-05-08 RX ORDER — MEMANTINE HYDROCHLORIDE 10 MG/1
10 TABLET ORAL DAILY
Refills: 0 | Status: DISCONTINUED | OUTPATIENT
Start: 2020-05-08 | End: 2020-05-21

## 2020-05-08 RX ORDER — TROLAMINE SALICYLATE 10 %
0 CREAM (GRAM) TOPICAL
Qty: 0 | Refills: 0 | DISCHARGE

## 2020-05-08 RX ORDER — DONEPEZIL HYDROCHLORIDE 10 MG/1
10 TABLET, FILM COATED ORAL AT BEDTIME
Refills: 0 | Status: DISCONTINUED | OUTPATIENT
Start: 2020-05-08 | End: 2020-05-21

## 2020-05-08 RX ORDER — ENOXAPARIN SODIUM 100 MG/ML
40 INJECTION SUBCUTANEOUS DAILY
Refills: 0 | Status: DISCONTINUED | OUTPATIENT
Start: 2020-05-08 | End: 2020-05-13

## 2020-05-08 RX ORDER — TRAMADOL HYDROCHLORIDE 50 MG/1
50 TABLET ORAL EVERY 8 HOURS
Refills: 0 | Status: DISCONTINUED | OUTPATIENT
Start: 2020-05-08 | End: 2020-05-10

## 2020-05-08 RX ORDER — FERROUS SULFATE 325(65) MG
1 TABLET ORAL
Qty: 0 | Refills: 0 | DISCHARGE

## 2020-05-08 RX ORDER — PANTOPRAZOLE SODIUM 20 MG/1
40 TABLET, DELAYED RELEASE ORAL
Refills: 0 | Status: DISCONTINUED | OUTPATIENT
Start: 2020-05-08 | End: 2020-05-21

## 2020-05-08 RX ADMIN — AZITHROMYCIN 500 MILLIGRAM(S): 500 TABLET, FILM COATED ORAL at 17:34

## 2020-05-08 RX ADMIN — CEFTRIAXONE 1000 MILLIGRAM(S): 500 INJECTION, POWDER, FOR SOLUTION INTRAMUSCULAR; INTRAVENOUS at 16:18

## 2020-05-08 RX ADMIN — AZITHROMYCIN 255 MILLIGRAM(S): 500 TABLET, FILM COATED ORAL at 16:21

## 2020-05-08 RX ADMIN — ENOXAPARIN SODIUM 40 MILLIGRAM(S): 100 INJECTION SUBCUTANEOUS at 22:18

## 2020-05-08 RX ADMIN — Medication 650 MILLIGRAM(S): at 14:50

## 2020-05-08 NOTE — ED PROVIDER NOTE - OBJECTIVE STATEMENT
83 y/o male with a PMHx of CAD, diastolic CHF, chronic anemia, chronic back pain, Afib, DDD, HTN, MRSA, BPH, GERD, diverticulosis, CAMILA, thoracic aortic aneurysm, [place], HLD, presents to the ED BIBEMS from home s/p slip and fall out of recliner chair today. Pt lives at home with his wife and has been living in a recliner x2 weeks due to worsening chronic back pain. Pt has been unable to get an epidural and is on pain meds at home. Pt slipped out of his chair last night and twice today and EMS was called by pt's wife. Pt c/o upper and lower back pain. Denies LOC. No other complaints at this time. NKDA. PCP: Dennis Soriano.

## 2020-05-08 NOTE — ED ADULT NURSE REASSESSMENT NOTE - NS ED NURSE REASSESS COMMENT FT1
Pt AAOx4, ambulatory, independent at baseline. Pt has a history of pancreatic cancer and was sent in by his oncologist for admission. Pending CT and COVID-19 swab results at this time. Urine specimen sent. Infusing IVF 2L boluses of normal saline. Endorses RLQ abd pain; Dr. Mercado made aware. Pain medication is to be administered and pain reassessed. VSS, afebrile.

## 2020-05-08 NOTE — H&P ADULT - ASSESSMENT
Pt is admitted w/    RUL Pna, Leukocytosis DDX COVID Pna  Weakness, Fall   Chronic Back pain  - follow up COVID- 19 testing   - s/p Rocephin and Zithromax in the ED, will cont  - follow blood and u cx  - airborne isolation.  - if O2 sats < 94% , give  O2 as needed via nasal cannula and up-titrate as needed.   - Obtain daily room air O2 saturations  - Acetaminophen 650 mg PO q4h PRN fever. Limit use of NSAIDs.  - HFA albuterol Q6 hour PRN via MDI. Would avoid nebulized preparations to limit risk of aerosol formation.  - Defer systemic steroids/interleukin inhibitor at this time; would consider if inflammatory markers are elevated and patient has worsening hypoxia.  - Labs Testing: Daily or As Needed: CBC w/ diff, CMP; Every 3 days: CRP, Ferritin, Procalcitonin.  - Start pharmacologic DVT PPx: Lovenox 40 mg SQ daily if BMI < 30; Lovenox 40 mg SQ BID if BMI > 30. If CrCl < 15, use heparin. Will consider need for extended prophylaxis prior to discharge based on D-Dimer and calculated VTE risk.  - Goals of Care discussion had with patient/surrogate: Pt is admitted w/    RUL Pna, Leukocytosis DDX COVID Pna  Weakness, Fall   Chronic Back pain  Infl markers : Ferritin wnl 395, DD 1771,  CRP 10.98, lactate 1.5  - result COVID- 19 testing in the ED is neg  - s/p Rocephin and Zithromax in the ED, will cont  - follow blood and u cx  - airborne isolation.  - if O2 sats < 94% , give  O2 as needed via nasal cannula and up-titrate as needed.   - Obtain daily room air O2 saturations  - Acetaminophen 650 mg PO q4h PRN fever. Limit use of NSAIDs.  - HFA albuterol Q6 hour PRN via MDI. Would avoid nebulized preparations to limit risk of aerosol formation.  - Defer systemic steroids/interleukin inhibitor at this time; would consider if inflammatory markers are elevated and patient has worsening hypoxia.  - Start pharmacologic DVT PPx: Lovenox 40 mg SQ daily if BMI < 30; Lovenox 40 mg SQ BID if BMI > 30. If CrCl < 15, use heparin. Will consider need for extended prophylaxis prior to discharge based on D-Dimer and calculated VTE risk.  - Goals of Care : Full  Code

## 2020-05-08 NOTE — ED PROVIDER NOTE - CONSTITUTIONAL, MLM
normal... Well appearing, awake, alert, oriented to person, place, time/situation and in no apparent distress. +Very hard of hearing

## 2020-05-08 NOTE — H&P ADULT - HISTORY OF PRESENT ILLNESS
83 y/o male with a PMHx of CAD, diastolic CHF, chronic anemia, chronic back pain, Afib, DDD, HTN, MRSA, BPH, GERD, diverticulosis, CAMILA, thoracic aortic aneurysm, [place], HLD, presents to the ED BIBEMS from home s/p slip and fall out of recliner chair today. Pt lives at home with his wife and has been living in a recliner x2 weeks due to worsening chronic back pain. Pt has been unable to get an epidural and is on pain meds at home. Pt slipped out of his chair last night and twice today and EMS was called by pt's wife. Pt c/o upper and lower back pain. Denies LOC. No other complaints at this time.       NKDA. Pt is an 85 y/o M with a PMHx of CAD, diastolic CHF, chronic anemia, chronic back pain, Afib, DDD, HTN, MRSA, BPH, GERD, diverticulosis, CAMILA, thoracic aortic aneurysm, [place] who  presents via  EMS after slipping and falling out of his recliner chair.  Pt has severe back pain and due to the COVID -19 pandemic he was  unable to get an epidural.      His pain is so severe, he spends most of the day in his recliner chair.  Pt's wife call 911, as he had fallen out of his recliner chair three times in the last 24 hours.  No preceding cpain, no SOB,  no  LOC or syncope.  No fever or other complaints.         In the ED, pt is found to have a RUL Pna on CT scan and leukocytosis.       NKDA.

## 2020-05-08 NOTE — H&P ADULT - NSHPPHYSICALEXAM_GEN_ALL_CORE
ICU Vital Signs Last 24 Hrs    T(F): 98.7 (08 May 2020 17:06), Max: 98.7 (08 May 2020 17:06)  HR: 63 (08 May 2020 17:06) (63 - 77)  BP: 103/83 (08 May 2020 17:06) (103/83 - 135/98)  -  RR: 17 (08 May 2020 17:06) (17 - 18)  SpO2: 98% (08 May 2020 17:06) (96% - 98%)

## 2020-05-08 NOTE — H&P ADULT - NSICDXPASTMEDICALHX_GEN_ALL_CORE_FT
PAST MEDICAL HISTORY:  Afib Not on A/C 2/2 hx of GIbleeding    Aortic root dilatation     Aortic valve insufficiency, unspecified etiology     BPH associated with nocturia     CAD (coronary artery disease) (-) cardiac stress and 2DECHO 2019    Chronic anemia     DDD (degenerative disc disease), lumbar     Diastolic CHF LVEF 55-60% 6/2019    Diverticulosis of intestine without bleeding, unspecified intestinal tract location     Essential hypertension     Fall (on) (from) other stairs and steps, sequela 03/28/2017- lumbar hemtoma    Gastroesophageal reflux disease, esophagitis presence not specified Hx of GI bleed    HLD (hyperlipidemia)     [place] (hard of hearing)     Lumbar degenerative disc disease     MRSA (methicillin resistant Staphylococcus aureus) left forearm 2012    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

## 2020-05-08 NOTE — H&P ADULT - NSHPLABSRESULTS_GEN_ALL_CORE
< from: CT Abdomen and Pelvis w/ IV Cont (05.08.20 @ 15:21) >  EXAM:  CT ABDOMEN AND PELVIS IC                          EXAM:  CT CHEST IC                        PROCEDURE DATE:  05/08/2020    INTERPRETATION:  CLINICAL INFORMATION: Trauma    COMPARISON: CT abdomen dated 02/07/2020 and CT chest dated 02/06/2020.    PROCEDURE:   CT of the Chest, Abdomen and Pelvis was performed with intravenous contrast.   Imaging was performed through the chest in the arterial phase followed by imaging of the abdomen and pelvis in the portal venous phase.  Intravenous contrast: 90 ml Omnipaque 350. 10 ml discarded.  Oral contrast: None.  Sagittal and coronal reformats were performed.    FINDINGS:    CHEST:   LUNGS AND LARGE AIRWAYS: Patent central airways. Patchy infiltrate right upper lobe posteriorly. Thisis a new finding. Subsegmental atelectasis/scarring along the posterior medial aspect of left lower lobe, stable. Trace reticular scarring right lung base.  PLEURA: No pleural effusion.  VESSELS: Coronary artery calcification. No evidence of aortic dissection. Unremarkable central pulmonary arteries.  HEART: Heart size is normal. No pericardial effusion. Aortic valve prosthesis.  MEDIASTINUM AND KISHOR: No lymphadenopathy. Mild hiatal hernia.  CHEST WALL AND LOWER NECK: Within normal limits.    ABDOMEN AND PELVIS:  LIVER: Evidence of a few low density lesions that are too small to be characterized.  BILE DUCTS: Normal caliber.  GALLBLADDER: Cholelithiasis.  SPLEEN: 2 small hypodense lesions measuring 1 cm and 1.7 cm that cannot be characterized.  PANCREAS: Within normal limits.  ADRENALS: Within normal limits.  KIDNEYS/URETERS: A few small low densities involving the renal cortex bilaterally, too small to be characterized.    BLADDER: Suspect prior TURP.  REPRODUCTIVE ORGANS: Prostate within normal limits.  BOWEL: No bowel obstruction. Appendix is not visualized. No evidence of inflammation in the pericecal region.  PERITONEUM: No ascites.  VESSELS: Atherosclerotic changes.  RETROPERITONEUM/LYMPH NODES: No lymphadenopathy.    ABDOMINAL WALL: Small fat-containing umbilical hernia  BONES: Degenerative changes.    IMPRESSION:     No evidence of a posttraumatic abnormality.    Patchy right upper lobe infiltrate.    Additional findings as above.    DORIE RIVERS M.D.,ATTENDING RADIOLOGIST  This document has been electronically signed. May  8 2020  3:54PM  < end of copied text > EKG: Afib, pvcs 63 bpm      < from: CT Abdomen and Pelvis w/ IV Cont (05.08.20 @ 15:21) >  EXAM:  CT ABDOMEN AND PELVIS IC                          EXAM:  CT CHEST IC                        PROCEDURE DATE:  05/08/2020    INTERPRETATION:  CLINICAL INFORMATION: Trauma    COMPARISON: CT abdomen dated 02/07/2020 and CT chest dated 02/06/2020.    PROCEDURE:   CT of the Chest, Abdomen and Pelvis was performed with intravenous contrast.   Imaging was performed through the chest in the arterial phase followed by imaging of the abdomen and pelvis in the portal venous phase.  Intravenous contrast: 90 ml Omnipaque 350. 10 ml discarded.  Oral contrast: None.  Sagittal and coronal reformats were performed.    FINDINGS:    CHEST:   LUNGS AND LARGE AIRWAYS: Patent central airways. Patchy infiltrate right upper lobe posteriorly. Thisis a new finding. Subsegmental atelectasis/scarring along the posterior medial aspect of left lower lobe, stable. Trace reticular scarring right lung base.  PLEURA: No pleural effusion.  VESSELS: Coronary artery calcification. No evidence of aortic dissection. Unremarkable central pulmonary arteries.  HEART: Heart size is normal. No pericardial effusion. Aortic valve prosthesis.  MEDIASTINUM AND KISHOR: No lymphadenopathy. Mild hiatal hernia.  CHEST WALL AND LOWER NECK: Within normal limits.    ABDOMEN AND PELVIS:  LIVER: Evidence of a few low density lesions that are too small to be characterized.  BILE DUCTS: Normal caliber.  GALLBLADDER: Cholelithiasis.  SPLEEN: 2 small hypodense lesions measuring 1 cm and 1.7 cm that cannot be characterized.  PANCREAS: Within normal limits.  ADRENALS: Within normal limits.  KIDNEYS/URETERS: A few small low densities involving the renal cortex bilaterally, too small to be characterized.    BLADDER: Suspect prior TURP.  REPRODUCTIVE ORGANS: Prostate within normal limits.  BOWEL: No bowel obstruction. Appendix is not visualized. No evidence of inflammation in the pericecal region.  PERITONEUM: No ascites.  VESSELS: Atherosclerotic changes.  RETROPERITONEUM/LYMPH NODES: No lymphadenopathy.    ABDOMINAL WALL: Small fat-containing umbilical hernia  BONES: Degenerative changes.    IMPRESSION:     No evidence of a posttraumatic abnormality.    Patchy right upper lobe infiltrate.    Additional findings as above.    DORIE RIVERS M.D.,ATTENDING RADIOLOGIST  This document has been electronically signed. May  8 2020  3:54PM  < end of copied text >

## 2020-05-08 NOTE — H&P ADULT - NSICDXPASTSURGICALHX_GEN_ALL_CORE_FT
PAST SURGICAL HISTORY:  Amputation finger left index 1970    History of fundoplication 2000    S/P AVR (aortic valve replacement)     S/P debridement left forearm -mrsa    S/P knee replacement right    S/P knee replacement left    S/P shoulder surgery right rotatotor cuff injury    S/P thoracic aortic aneurysm repair     Status post cataract extraction, unspecified laterality bilat

## 2020-05-08 NOTE — PATIENT PROFILE ADULT - DISASTER - DOES PATIENT HAVE ADVANCE DIRECTIVE
no no/patient with left hearing aid, right cochlear implant/hearing aid, rechargeable battery/ for right cochlear implant hearing aid

## 2020-05-08 NOTE — ED ADULT NURSE NOTE - OBJECTIVE STATEMENT
Pt from home presents sp fall out of chair this morning, c/o lower back pain.  Pt denies head injury, No abrasions or hematoma noted.  VSS, pt A/Ox2 as per baseline.

## 2020-05-08 NOTE — ED PROVIDER NOTE - PMH
Afib  Not on A/C 2/2 hx of GIbleeding  Aortic root dilatation    Aortic valve insufficiency, unspecified etiology    BPH associated with nocturia    CAD (coronary artery disease)  (-) cardiac stress and 2DECHO 2019  Chronic anemia    DDD (degenerative disc disease), lumbar    Diastolic CHF  LVEF 55-60% 6/2019  Diverticulosis of intestine without bleeding, unspecified intestinal tract location    Essential hypertension    Fall (on) (from) other stairs and steps, sequela  03/28/2017- lumbar hemtoma  Gastroesophageal reflux disease, esophagitis presence not specified  Hx of GI bleed  HLD (hyperlipidemia)    [place] (hard of hearing)    Lumbar degenerative disc disease    MRSA (methicillin resistant Staphylococcus aureus)  left forearm 2012  Nerve injury  complication from right TKR surgery, 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  CMAILA (obstructive sleep apnea)  unable to tolerate nocturnal CPAP  Restless leg syndrome    Thoracic aortic aneurysm

## 2020-05-09 LAB
-  COAGULASE NEGATIVE STAPHYLOCOCCUS: SIGNIFICANT CHANGE UP
CULTURE RESULTS: SIGNIFICANT CHANGE UP
GRAM STN FLD: SIGNIFICANT CHANGE UP
GRAM STN FLD: SIGNIFICANT CHANGE UP
METHOD TYPE: SIGNIFICANT CHANGE UP
SPECIMEN SOURCE: SIGNIFICANT CHANGE UP
SPECIMEN SOURCE: SIGNIFICANT CHANGE UP

## 2020-05-09 PROCEDURE — 99233 SBSQ HOSP IP/OBS HIGH 50: CPT

## 2020-05-09 RX ORDER — CEFTRIAXONE 500 MG/1
1000 INJECTION, POWDER, FOR SOLUTION INTRAMUSCULAR; INTRAVENOUS EVERY 24 HOURS
Refills: 0 | Status: COMPLETED | OUTPATIENT
Start: 2020-05-09 | End: 2020-05-13

## 2020-05-09 RX ORDER — VANCOMYCIN HCL 1 G
1000 VIAL (EA) INTRAVENOUS ONCE
Refills: 0 | Status: COMPLETED | OUTPATIENT
Start: 2020-05-09 | End: 2020-05-09

## 2020-05-09 RX ORDER — AZITHROMYCIN 500 MG/1
500 TABLET, FILM COATED ORAL EVERY 24 HOURS
Refills: 0 | Status: DISCONTINUED | OUTPATIENT
Start: 2020-05-09 | End: 2020-05-14

## 2020-05-09 RX ADMIN — ENOXAPARIN SODIUM 40 MILLIGRAM(S): 100 INJECTION SUBCUTANEOUS at 11:32

## 2020-05-09 RX ADMIN — Medication 25 MILLIGRAM(S): at 05:56

## 2020-05-09 RX ADMIN — MEMANTINE HYDROCHLORIDE 10 MILLIGRAM(S): 10 TABLET ORAL at 11:32

## 2020-05-09 RX ADMIN — TRAMADOL HYDROCHLORIDE 50 MILLIGRAM(S): 50 TABLET ORAL at 20:31

## 2020-05-09 RX ADMIN — CEFTRIAXONE 1000 MILLIGRAM(S): 500 INJECTION, POWDER, FOR SOLUTION INTRAMUSCULAR; INTRAVENOUS at 11:31

## 2020-05-09 RX ADMIN — DONEPEZIL HYDROCHLORIDE 10 MILLIGRAM(S): 10 TABLET, FILM COATED ORAL at 20:29

## 2020-05-09 RX ADMIN — DONEPEZIL HYDROCHLORIDE 10 MILLIGRAM(S): 10 TABLET, FILM COATED ORAL at 00:02

## 2020-05-09 RX ADMIN — Medication 1 DROP(S): at 17:16

## 2020-05-09 RX ADMIN — BRIMONIDINE TARTRATE 1 DROP(S): 2 SOLUTION/ DROPS OPHTHALMIC at 05:56

## 2020-05-09 RX ADMIN — ATORVASTATIN CALCIUM 20 MILLIGRAM(S): 80 TABLET, FILM COATED ORAL at 20:29

## 2020-05-09 RX ADMIN — BRIMONIDINE TARTRATE 1 DROP(S): 2 SOLUTION/ DROPS OPHTHALMIC at 17:16

## 2020-05-09 RX ADMIN — PANTOPRAZOLE SODIUM 40 MILLIGRAM(S): 20 TABLET, DELAYED RELEASE ORAL at 05:56

## 2020-05-09 RX ADMIN — Medication 25 MILLIGRAM(S): at 17:16

## 2020-05-09 RX ADMIN — AZITHROMYCIN 255 MILLIGRAM(S): 500 TABLET, FILM COATED ORAL at 12:00

## 2020-05-09 RX ADMIN — ESCITALOPRAM OXALATE 10 MILLIGRAM(S): 10 TABLET, FILM COATED ORAL at 11:32

## 2020-05-09 RX ADMIN — Medication 1 DROP(S): at 05:56

## 2020-05-09 NOTE — PROGRESS NOTE ADULT - SUBJECTIVE AND OBJECTIVE BOX
HOSPITALIST PROGRESS NOTE:  DOS: 5/9/2020  SUBJECTIVE:  PCP:  Chief Complaint: Patient is a 84y old  Male who presents with a chief complaint of RUL Pna, Leukocytosis  Weakness, Fall, Chronic Back pain (08 May 2020 18:20)    HPI: Pt is an 83 y/o M with a PMHx of CAD, diastolic CHF, chronic anemia, chronic back pain, Afib, DDD, HTN, MRSA, BPH, GERD, diverticulosis, CAMILA, thoracic aortic aneurysm, [place] who  presents via  EMS after slipping and falling out of his recliner chair.  Pt has severe back pain and due to the COVID -19 pandemic he was  unable to get an epidural.      His pain is so severe, he spends most of the day in his recliner chair.  Pt's wife call 911, as he had fallen out of his recliner chair three times in the last 24 hours.  No preceding cpain, no SOB,  no  LOC or syncope.  No fever or other complaints.         In the ED, pt is found to have a RUL Pna on CT scan and leukocytosis.      Allergies:  No Known Allergies    REVIEW OF SYSTEMS:  See HPI. All other review of systems is negative unless indicated above.     OBJECTIVE  Physical Exam:  Vital Signs Last 24 Hrs  T(C): 37.2 (09 May 2020 09:54), Max: 37.6 (09 May 2020 08:52)  T(F): 99 (09 May 2020 09:54), Max: 99.6 (09 May 2020 08:52)  HR: 72 (09 May 2020 09:54) (63 - 77)  BP: 102/72 (09 May 2020 09:54) (102/72 - 135/98)  BP(mean): --  RR: 16 (09 May 2020 09:54) (16 - 18)  SpO2: 100% (09 May 2020 09:54) (96% - 100%)  I&O's Summary    Constitutional: NAD, awake and alert, well-developed  Neurological: AAO x 3, no focal deficits  HEENT: PERRLA, EOMI, MMM  Neck: Soft and supple, No LAD, No JVD  Respiratory: Breath sounds are clear bilaterally, No wheezing, rales or rhonchi  Cardiovascular: S1 and S2, regular rate and rhythm; no Murmurs, gallops or rubs  Gastrointestinal: Bowel Sounds present, soft, nontender, nondistended, no guarding, no rebound tenderness  Back: No CVA tenderness   Extremities: No peripheral edema  Vascular: 2+ peripheral pulses  Musculoskeletal: 5/5 strength b/l upper and lower extremities  Skin: No rashes  Breast: Deferred  Rectal: Deferred    MEDICATIONS  (STANDING):  atorvastatin 20 milliGRAM(s) Oral at bedtime  azithromycin  IVPB 500 milliGRAM(s) IV Intermittent every 24 hours  brimonidine 0.2% Ophthalmic Solution 1 Drop(s) Left EYE two times a day  cefTRIAXone Injectable. 1000 milliGRAM(s) IV Push every 24 hours  donepezil 10 milliGRAM(s) Oral at bedtime  enoxaparin Injectable 40 milliGRAM(s) SubCutaneous daily  escitalopram 10 milliGRAM(s) Oral daily  memantine 10 milliGRAM(s) Oral daily  metoprolol tartrate 25 milliGRAM(s) Oral two times a day  pantoprazole    Tablet 40 milliGRAM(s) Oral before breakfast  timolol 0.5% Solution 1 Drop(s) Left EYE two times a day    MEDICATIONS  (PRN):  acetaminophen   Tablet .. 650 milliGRAM(s) Oral every 4 hours PRN Temp greater or equal to 38.5C (101.3F)  lidocaine 4% Cream 1 Application(s) Topical three times a day PRN for moderate pain  ondansetron   Disintegrating Tablet 4 milliGRAM(s) Oral three times a day PRN for nausea  rOPINIRole 2 milliGRAM(s) Oral four times a day PRN Restless legs  traMADol 50 milliGRAM(s) Oral every 8 hours PRN pain    LABS: All Labs Reviewed:             13.8   14.28 )-----------( 215      ( 08 May 2020 14:47 )             41.8     05-08  138  |  101  |  14  ----------------------------<  101<H>  4.4   |  31  |  0.88  Ca    9.8      08 May 2020 14:47    TPro  6.9  /  Alb  2.8<L>  /  TBili  0.8  /  DBili  x   /  AST  13<L>  /  ALT  15  /  AlkPhos  77  05-08    PTT - ( 08 May 2020 14:47 )  PTT:31.9 sec    D-Dimer Assay, Quantitative (05.08.20 @ 20:15)    D-Dimer Assay, Quantitative: 1771 ng/mL DDU    Ferritin, Serum (05.08.20 @ 20:15)    Ferritin, Serum: 395 ng/mL    Lactate, Blood (05.08.20 @ 16:14)    Lactate, Blood: 1.5 mmol/L    Sedimentation Rate, Erythrocyte (05.08.20 @ 20:15)    Sedimentation Rate, Erythrocyte: 44 mm/hr    CARDIAC MARKERS ( 08 May 2020 20:15 )  x     / x     / 76 U/L / x     / x        COVID-19 PCR . (05.08.20 @ 16:14)    COVID-19 PCR: NotDetec:    RADIOLOGY/EKG:    < from: CT Abdomen and Pelvis w/ IV Cont (05.08.20 @ 15:21) >  < from: CT Chest w/ IV Cont (05.08.20 @ 15:21) >        IMPRESSION:   No evidence of a posttraumatic abnormality.  Patchy right upper lobe infiltrate.  < end of copied text >      < from: CT Cervical Spine No Cont (05.08.20 @ 15:09) >  IMPRESSION:  No acute findings    < from: CT Head No Cont (05.08.20 @ 15:07) >  Impression:  Unremarkable noncontrast CT scan of the brain.  < end of copied text > HOSPITALIST PROGRESS NOTE:  DOS: 5/9/2020  SUBJECTIVE: patient wanting to sleep , tired;   Chief Complaint: Patient is a 84y old  Male who presents with a chief complaint of RUL Pna, Leukocytosis  Weakness, Fall, Chronic Back pain (08 May 2020 18:20)    HPI: Pt is an 83 y/o M with a PMHx of CAD, diastolic CHF, chronic anemia, chronic back pain, Afib, DDD, HTN, MRSA, BPH, GERD, diverticulosis, CAMILA, thoracic aortic aneurysm, [place] who  presents via  EMS after slipping and falling out of his recliner chair.  Pt has severe back pain and due to the COVID -19 pandemic he was  unable to get an epidural.      His pain is so severe, he spends most of the day in his recliner chair.  Pt's wife call 911, as he had fallen out of his recliner chair three times in the last 24 hours.  No preceding cpain, no SOB,  no  LOC or syncope.  No fever or other complaints.         In the ED, pt is found to have a RUL Pna on CT scan and leukocytosis.      Allergies:  No Known Allergies    REVIEW OF SYSTEMS:  See HPI. All other review of systems is negative unless indicated above.     OBJECTIVE  Physical Exam:  Vital Signs Last 24 Hrs  T(C): 37.2 (09 May 2020 09:54), Max: 37.6 (09 May 2020 08:52)  T(F): 99 (09 May 2020 09:54), Max: 99.6 (09 May 2020 08:52)  HR: 72 (09 May 2020 09:54) (63 - 77)  BP: 102/72 (09 May 2020 09:54) (102/72 - 135/98)  BP(mean): --  RR: 16 (09 May 2020 09:54) (16 - 18)  SpO2: 100% (09 May 2020 09:54) (96% - 100%)  I&O's Summary    Constitutional: NAD, awake and alert, well-developed  Neurological: AAO x 3, no focal deficits  HEENT: PERRLA, EOMI, MMM  Neck: Soft and supple, No LAD, No JVD  Respiratory: Breath sounds are clear bilaterally, No wheezing, rales or rhonchi  Cardiovascular: S1 and S2, regular rate and rhythm; no Murmurs, gallops or rubs  Gastrointestinal: Bowel Sounds present, soft, nontender, nondistended, no guarding, no rebound tenderness  Back: No CVA tenderness   Extremities: No peripheral edema  Vascular: 2+ peripheral pulses  Musculoskeletal: 5/5 strength b/l upper and lower extremities  Skin: No rashes  Breast: Deferred  Rectal: Deferred    MEDICATIONS  (STANDING):  atorvastatin 20 milliGRAM(s) Oral at bedtime  azithromycin  IVPB 500 milliGRAM(s) IV Intermittent every 24 hours  brimonidine 0.2% Ophthalmic Solution 1 Drop(s) Left EYE two times a day  cefTRIAXone Injectable. 1000 milliGRAM(s) IV Push every 24 hours  donepezil 10 milliGRAM(s) Oral at bedtime  enoxaparin Injectable 40 milliGRAM(s) SubCutaneous daily  escitalopram 10 milliGRAM(s) Oral daily  memantine 10 milliGRAM(s) Oral daily  metoprolol tartrate 25 milliGRAM(s) Oral two times a day  pantoprazole    Tablet 40 milliGRAM(s) Oral before breakfast  timolol 0.5% Solution 1 Drop(s) Left EYE two times a day    MEDICATIONS  (PRN):  acetaminophen   Tablet .. 650 milliGRAM(s) Oral every 4 hours PRN Temp greater or equal to 38.5C (101.3F)  lidocaine 4% Cream 1 Application(s) Topical three times a day PRN for moderate pain  ondansetron   Disintegrating Tablet 4 milliGRAM(s) Oral three times a day PRN for nausea  rOPINIRole 2 milliGRAM(s) Oral four times a day PRN Restless legs  traMADol 50 milliGRAM(s) Oral every 8 hours PRN pain    LABS: All Labs Reviewed:             13.8   14.28 )-----------( 215      ( 08 May 2020 14:47 )             41.8     05-08  138  |  101  |  14  ----------------------------<  101<H>  4.4   |  31  |  0.88  Ca    9.8      08 May 2020 14:47    TPro  6.9  /  Alb  2.8<L>  /  TBili  0.8  /  DBili  x   /  AST  13<L>  /  ALT  15  /  AlkPhos  77  05-08    PTT - ( 08 May 2020 14:47 )  PTT:31.9 sec    D-Dimer Assay, Quantitative (05.08.20 @ 20:15)    D-Dimer Assay, Quantitative: 1771 ng/mL DDU    Ferritin, Serum (05.08.20 @ 20:15)    Ferritin, Serum: 395 ng/mL    Lactate, Blood (05.08.20 @ 16:14)    Lactate, Blood: 1.5 mmol/L    Sedimentation Rate, Erythrocyte (05.08.20 @ 20:15)    Sedimentation Rate, Erythrocyte: 44 mm/hr    CARDIAC MARKERS ( 08 May 2020 20:15 )  x     / x     / 76 U/L / x     / x        COVID-19 PCR . (05.08.20 @ 16:14)    COVID-19 PCR: NotDetec:    RADIOLOGY/EKG:    < from: CT Abdomen and Pelvis w/ IV Cont (05.08.20 @ 15:21) >  < from: CT Chest w/ IV Cont (05.08.20 @ 15:21) >        IMPRESSION:   No evidence of a posttraumatic abnormality.  Patchy right upper lobe infiltrate.  < end of copied text >      < from: CT Cervical Spine No Cont (05.08.20 @ 15:09) >  IMPRESSION:  No acute findings    < from: CT Head No Cont (05.08.20 @ 15:07) >  Impression:  Unremarkable noncontrast CT scan of the brain.  < end of copied text >

## 2020-05-09 NOTE — PROVIDER CONTACT NOTE (CRITICAL VALUE NOTIFICATION) - ACTION/TREATMENT ORDERED:
SALO Moreno made aware of critical lab. no further orders at this time. SALO Moreno made aware of critical lab. IV Vancomycin ordered and ID consult. no further orders at this time.

## 2020-05-09 NOTE — PROGRESS NOTE ADULT - ASSESSMENT
83 yo M Pt is admitted w/    RUL Pna, Leukocytosis DDX COVID Pna  Weakness, Fall   Chronic Back pain  Infl markers : Ferritin wnl 395, DD 1771,  CRP 10.98, lactate 1.5  - result COVID- 19 testing in the ED is neg  - Continue Rocephin IV 1gm qd, until 5/14  - Continue Zithromax  mg qd  - follow blood and u cx  - airborne isolation.  - if O2 sats < 94% , give  O2 as needed via nasal cannula and up-titrate as needed.   - Obtain daily room air O2 saturations  - Acetaminophen 650 mg PO q4h PRN fever. Limit use of NSAIDs.  - HFA albuterol Q6 hour PRN via MDI. Would avoid nebulized preparations to limit risk of aerosol formation.  - Defer systemic steroids/interleukin inhibitor at this time; would consider if inflammatory markers are elevated and patient has worsening hypoxia.        # DVT PPx:   - Lovenox 40 mg SQ daily if BMI < 30; Lovenox 40 mg SQ BID if BMI > 30. If CrCl < 15, use heparin.   - Will consider need for extended prophylaxis prior to discharge based on D-Dimer and calculated VTE risk.      # Goals of Care:   - Full  Code 85 yo M Pt is admitted w/      Infl markers : Ferritin wnl 395, DD 1771,  CRP 10.98, lactate 1.5    1-Weakness secondary to possible CAP, gram pos/ gram neg VS  COVID19 viral pna  Weakness, Fall   - result COVID- 19 testing in the ED is neg >> however given imaging with CT and elevated Dimer and CRP, high clincial suspicion for COVID, place on isolation  - Continue Rocephin IV 1gm qd, until 5/14  - Continue Zithromax  mg qd  -sating well on 3l nc , not a candidate for steroids or IL inhibitors    #Chronic Back pain  -stable.     # DVT PPx:   - Lovenox 40 mg SQ daily if BMI < 30; Lovenox 40 mg SQ BID if BMI > 30. If CrCl < 15, use heparin.   - Will consider need for extended prophylaxis prior to discharge based on D-Dimer and calculated VTE risk.      # Goals of Care:   - Full  Code

## 2020-05-09 NOTE — PROVIDER CONTACT NOTE (CRITICAL VALUE NOTIFICATION) - TEST AND RESULT REPORTED:
Blood culture 5/8: preliminary results growth in the anerobic bottle gram positive rods and growth in the aerobic bottle gram positive cocci in clusters Blood culture 5/8: preliminary results growth in the anaerobic bottle gram positive rods and growth in the aerobic bottle gram positive cocci in clusters

## 2020-05-10 LAB
ANION GAP SERPL CALC-SCNC: 6 MMOL/L — SIGNIFICANT CHANGE UP (ref 5–17)
BUN SERPL-MCNC: 13 MG/DL — SIGNIFICANT CHANGE UP (ref 7–23)
CALCIUM SERPL-MCNC: 9.4 MG/DL — SIGNIFICANT CHANGE UP (ref 8.5–10.1)
CHLORIDE SERPL-SCNC: 102 MMOL/L — SIGNIFICANT CHANGE UP (ref 96–108)
CO2 SERPL-SCNC: 30 MMOL/L — SIGNIFICANT CHANGE UP (ref 22–31)
CREAT SERPL-MCNC: 0.81 MG/DL — SIGNIFICANT CHANGE UP (ref 0.5–1.3)
CULTURE RESULTS: SIGNIFICANT CHANGE UP
GLUCOSE SERPL-MCNC: 97 MG/DL — SIGNIFICANT CHANGE UP (ref 70–99)
HCT VFR BLD CALC: 40.4 % — SIGNIFICANT CHANGE UP (ref 39–50)
HGB BLD-MCNC: 13.3 G/DL — SIGNIFICANT CHANGE UP (ref 13–17)
MCHC RBC-ENTMCNC: 30.9 PG — SIGNIFICANT CHANGE UP (ref 27–34)
MCHC RBC-ENTMCNC: 32.9 GM/DL — SIGNIFICANT CHANGE UP (ref 32–36)
MCV RBC AUTO: 93.7 FL — SIGNIFICANT CHANGE UP (ref 80–100)
ORGANISM # SPEC MICROSCOPIC CNT: SIGNIFICANT CHANGE UP
ORGANISM # SPEC MICROSCOPIC CNT: SIGNIFICANT CHANGE UP
PLATELET # BLD AUTO: 231 K/UL — SIGNIFICANT CHANGE UP (ref 150–400)
POTASSIUM SERPL-MCNC: 4.2 MMOL/L — SIGNIFICANT CHANGE UP (ref 3.5–5.3)
POTASSIUM SERPL-SCNC: 4.2 MMOL/L — SIGNIFICANT CHANGE UP (ref 3.5–5.3)
RBC # BLD: 4.31 M/UL — SIGNIFICANT CHANGE UP (ref 4.2–5.8)
RBC # FLD: 14.7 % — HIGH (ref 10.3–14.5)
SODIUM SERPL-SCNC: 138 MMOL/L — SIGNIFICANT CHANGE UP (ref 135–145)
SPECIMEN SOURCE: SIGNIFICANT CHANGE UP
WBC # BLD: 9.1 K/UL — SIGNIFICANT CHANGE UP (ref 3.8–10.5)
WBC # FLD AUTO: 9.1 K/UL — SIGNIFICANT CHANGE UP (ref 3.8–10.5)

## 2020-05-10 PROCEDURE — 99232 SBSQ HOSP IP/OBS MODERATE 35: CPT

## 2020-05-10 RX ORDER — TRAMADOL HYDROCHLORIDE 50 MG/1
50 TABLET ORAL THREE TIMES A DAY
Refills: 0 | Status: DISCONTINUED | OUTPATIENT
Start: 2020-05-10 | End: 2020-05-13

## 2020-05-10 RX ORDER — MORPHINE SULFATE 50 MG/1
2 CAPSULE, EXTENDED RELEASE ORAL EVERY 4 HOURS
Refills: 0 | Status: DISCONTINUED | OUTPATIENT
Start: 2020-05-10 | End: 2020-05-12

## 2020-05-10 RX ADMIN — Medication 1 DROP(S): at 05:26

## 2020-05-10 RX ADMIN — CEFTRIAXONE 1000 MILLIGRAM(S): 500 INJECTION, POWDER, FOR SOLUTION INTRAMUSCULAR; INTRAVENOUS at 10:23

## 2020-05-10 RX ADMIN — TRAMADOL HYDROCHLORIDE 50 MILLIGRAM(S): 50 TABLET ORAL at 20:10

## 2020-05-10 RX ADMIN — ESCITALOPRAM OXALATE 10 MILLIGRAM(S): 10 TABLET, FILM COATED ORAL at 10:23

## 2020-05-10 RX ADMIN — BRIMONIDINE TARTRATE 1 DROP(S): 2 SOLUTION/ DROPS OPHTHALMIC at 05:26

## 2020-05-10 RX ADMIN — BRIMONIDINE TARTRATE 1 DROP(S): 2 SOLUTION/ DROPS OPHTHALMIC at 16:36

## 2020-05-10 RX ADMIN — DONEPEZIL HYDROCHLORIDE 10 MILLIGRAM(S): 10 TABLET, FILM COATED ORAL at 20:10

## 2020-05-10 RX ADMIN — ATORVASTATIN CALCIUM 20 MILLIGRAM(S): 80 TABLET, FILM COATED ORAL at 20:10

## 2020-05-10 RX ADMIN — Medication 250 MILLIGRAM(S): at 00:30

## 2020-05-10 RX ADMIN — ENOXAPARIN SODIUM 40 MILLIGRAM(S): 100 INJECTION SUBCUTANEOUS at 10:23

## 2020-05-10 RX ADMIN — AZITHROMYCIN 255 MILLIGRAM(S): 500 TABLET, FILM COATED ORAL at 10:22

## 2020-05-10 RX ADMIN — Medication 1 DROP(S): at 16:36

## 2020-05-10 RX ADMIN — Medication 25 MILLIGRAM(S): at 05:49

## 2020-05-10 RX ADMIN — MEMANTINE HYDROCHLORIDE 10 MILLIGRAM(S): 10 TABLET ORAL at 10:23

## 2020-05-10 RX ADMIN — PANTOPRAZOLE SODIUM 40 MILLIGRAM(S): 20 TABLET, DELAYED RELEASE ORAL at 05:29

## 2020-05-10 NOTE — PROGRESS NOTE ADULT - ASSESSMENT
85 yo M Pt is admitted w/      Infl markers : Ferritin wnl 395, DD 1771,  CRP 10.98, lactate 1.5    #Weakness secondary to possible CAP, gram pos/ gram neg VS  COVID19 viral pna  Weakness, Fall   - result COVID- 19 testing in the ED is neg >> however given imaging with CT and elevated Dimer and CRP, high clincial suspicion for COVID, place on isolation  - Continue Rocephin IV 1gm qd, until 5/14  - Continue Zithromax  mg qd  - Sating well on 3l nc , not a candidate for steroids or IL inhibitors    #Chronic Back pain  - stable.     # DVT PPx:   - Lovenox 40 mg SQ daily if BMI < 30; Lovenox 40 mg SQ BID if BMI > 30. If CrCl < 15, use heparin.   - Will consider need for extended prophylaxis prior to discharge based on D-Dimer and calculated VTE risk.      # Goals of Care:   - Full  Code 85 yo M Pt is admitted w/      Infl markers : Ferritin wnl 395, DD 1771,  CRP 10.98, lactate 1.5    #Weakness secondary to possible CAP, gram pos/ gram neg VS  COVID19 viral pna  Weakness, Fall   - result COVID- 19 testing in the ED is neg >> however given imaging with CT and elevated Dimer and CRP, high clincial suspicion for COVID, place on isolation  - Continue Rocephin IV 1gm qd, until 5/14  - Continue Zithromax  mg qd  - Sating well on 3l nc , not a candidate for steroids or IL inhibitors    #Chronic Back pain  - stable.     # DVT PPx:   -c/w lovenox      # Goals of Care:   - Full  Code 85 yo M Pt is admitted w/      Infl markers : Ferritin wnl 395, DD 1771,  CRP 10.98, lactate 1.5    #Weakness secondary to possible CAP, gram pos/ gram neg VS  COVID19 viral pna  Weakness, Fall   - result COVID- 19 testing in the ED is neg >> however given imaging with CT and elevated Dimer and CRP, high clincial suspicion for COVID, place on isolation  - Continue Rocephin IV 1gm qd, until 5/14  - Continue Zithromax  mg qd  - Sating well on room air , not a candidate for steroids or IL inhibitors    #Chronic Back pain  - stable.     # DVT PPx:   -c/w lovenox    #positive cultures  -possible contaminant  -repeat blood cultures    # Goals of Care:   - Full  Code

## 2020-05-10 NOTE — CONSULT NOTE ADULT - SUBJECTIVE AND OBJECTIVE BOX
Patient is a 84y old  Male who presents with a chief complaint of RUL Pna, Leukocytosis  Weakness, Fall   Chronic Back pain (10 May 2020 10:30)    HPI:  Pt is an 83 y/o M with a PMHx of CAD, diastolic CHF, chronic anemia, chronic back pain, Afib, DDD, HTN, MRSA, BPH, GERD, diverticulosis, CAMILA, thoracic aortic aneurysm, [place] admitted on 5/8 for evaluation after falling out of his recliner chair; is coughing; history per medical record as patient unable to provide history.                 PMH: as above  PSH: as above  Meds: per reconciliation sheet, noted below  MEDICATIONS  (STANDING):  atorvastatin 20 milliGRAM(s) Oral at bedtime  azithromycin  IVPB 500 milliGRAM(s) IV Intermittent every 24 hours  brimonidine 0.2% Ophthalmic Solution 1 Drop(s) Left EYE two times a day  cefTRIAXone Injectable. 1000 milliGRAM(s) IV Push every 24 hours  donepezil 10 milliGRAM(s) Oral at bedtime  enoxaparin Injectable 40 milliGRAM(s) SubCutaneous daily  escitalopram 10 milliGRAM(s) Oral daily  memantine 10 milliGRAM(s) Oral daily  metoprolol tartrate 25 milliGRAM(s) Oral two times a day  pantoprazole    Tablet 40 milliGRAM(s) Oral before breakfast  timolol 0.5% Solution 1 Drop(s) Left EYE two times a day  traMADol 50 milliGRAM(s) Oral three times a day    MEDICATIONS  (PRN):  acetaminophen   Tablet .. 650 milliGRAM(s) Oral every 4 hours PRN Temp greater or equal to 38.5C (101.3F)  lidocaine 4% Cream 1 Application(s) Topical three times a day PRN for moderate pain  morphine  - Injectable 2 milliGRAM(s) IV Push every 4 hours PRN Severe Pain (7 - 10)  ondansetron   Disintegrating Tablet 4 milliGRAM(s) Oral three times a day PRN for nausea  rOPINIRole 2 milliGRAM(s) Oral four times a day PRN Restless legs    Allergies    No Known Allergies    Intolerances      Social: no smoking, no alcohol, no illegal drugs; no recent travel, no exposure to TB  FAMILY HISTORY:  Family history of lung cancer     no history of premature cardiovascular disease in first degree relatives  ROS: unable to obtain secondary to patient medical condition     Vital Signs Last 24 Hrs  T(C): 36.1 (10 May 2020 11:51), Max: 37.1 (09 May 2020 17:41)  T(F): 96.9 (10 May 2020 11:51), Max: 98.7 (09 May 2020 17:41)  HR: 86 (10 May 2020 11:51) (67 - 102)  BP: 94/64 (10 May 2020 11:51) (94/64 - 117/88)  BP(mean): --  RR: 18 (10 May 2020 11:51) (17 - 18)  SpO2: 97% (10 May 2020 11:51) (97% - 100%)  Daily     Daily     PE:    Constitutional: frail looking  HEENT: NC/AT, EOMI, PERRLA, conjunctivae clear; ears and nose atraumatic; pharynx clear  Neck: supple; thyroid not palpable  Back: no tenderness  Respiratory: respiratory effort normal; clear to auscultation  Cardiovascular: S1S2 regular, no murmurs  Abdomen: soft, not tender, not distended, positive BS; no liver or spleen organomegaly  Genitourinary: no suprapubic tenderness  Musculoskeletal: no muscle tenderness, no joint swelling or tenderness  Neurological/ Psychiatric:   moving all extremities  Skin: no rashes; no palpable lesions    Labs: all available labs reviewed                        13.3   9.10  )-----------( 231      ( 10 May 2020 06:22 )             40.4     05-10    138  |  102  |  13  ----------------------------<  97  4.2   |  30  |  0.81    Ca    9.4      10 May 2020 06:22     COVID-19 PCR . (05.08.20 @ 16:14)    COVID-19 PCR: NotDetec: This test has been validated by [employer] to be accurate;  though it has not been FDA cleared/approved by the usual pathway.  As  with all laboratory tests, results should be correlated with clinical  findings.  https://www.fda.gov/media/756342/download  https://www.fda.gov/media/637050/download        Culture - Blood (05.08.20 @ 16:14)    Gram Stain:   Growth in anaerobic bottle: Gram Positive Rods  Growth in aerobic bottle: Gram Positive Cocci in Clusters    -  Coagulase negative Staphylococcus: Detec    Specimen Source: .Blood None    Organism: Blood Culture PCR    Culture Results:   Growth in anaerobic bottle: Bacillus species not anthracis  Growth in aerobic bottle: Gram Positive Cocci in Clusters  "Due to technical problems, Proteus sp. will Not be reported as part of  the BCID panel until further notice"  ***Blood Panel PCRresults on this specimen are available  approximately 3 hours after the Gram stain result.***  Gram stain, PCR, and/or culture results may not always  correspond due to difference in methodologies.  ************************************************************  This PCR assay was performed using [employer].  The following targets are tested for: Enterococcus,  vancomycin resistant enterococci, Listeria monocytogenes,  coagulase negative staphylococci, S. aureus,  methicillin resistant S. aureus, Streptococcus agalactiae  (Group B), S. pneumoniae, S. pyogenes (Group A),  Acinetobacter baumannii, Enterobacter cloacae, E. coli,  Klebsiella oxytoca, K. pneumoniae, Proteus sp.,  Serratia marcescens, Haemophilus influenzae,  Neisseria meningitidis, Pseudomonas aeruginosa, Candida  albicans, C. glabrata, C krusei, C parapsilosis,  C. tropicalis and the KPC resistance gene.    Organism Identification: Blood Culture PCR    Method Type: PCR              < from: CT Chest w/ IV Cont (05.08.20 @ 15:21) >    EXAM:  CT ABDOMEN AND PELVIS IC                          EXAM:  CT CHEST IC                            PROCEDURE DATE:  05/08/2020          INTERPRETATION:  CLINICAL INFORMATION: Trauma    COMPARISON: CT abdomen dated 02/07/2020 and CT chest dated 02/06/2020.    PROCEDURE:   CT of the Chest, Abdomen and Pelvis was performed with intravenous contrast.   Imaging was performed through the chest in the arterial phase followed by imaging of the abdomen and pelvis in the portal venous phase.  Intravenous contrast: 90 ml Omnipaque 350. 10 ml discarded.  Oral contrast: None.  Sagittal and coronal reformats were performed.    FINDINGS:    CHEST:     LUNGS AND LARGE AIRWAYS: Patent central airways. Patchy infiltrate right upper lobe posteriorly. Thisis a new finding. Subsegmental atelectasis/scarring along the posterior medial aspect of left lower lobe, stable. Trace reticular scarring right lung base.  PLEURA: No pleural effusion.  VESSELS: Coronary artery calcification. No evidence of aortic dissection. Unremarkable central pulmonary arteries.  HEART: Heart size is normal. No pericardial effusion. Aortic valve prosthesis.  MEDIASTINUM AND KISHOR: No lymphadenopathy. Mild hiatal hernia.  CHEST WALL AND LOWER NECK: Within normal limits.    ABDOMEN AND PELVIS:    LIVER: Evidence of a few low density lesions that are too small to be characterized.  BILE DUCTS: Normal caliber.  GALLBLADDER: Cholelithiasis.  SPLEEN: 2 small hypodense lesions measuring 1 cm and 1.7 cm that cannot be characterized.  PANCREAS: Within normal limits.  ADRENALS: Within normal limits.  KIDNEYS/URETERS: A few small low densities involving the renal cortex bilaterally, too small to be characterized.    BLADDER: Suspect prior TURP.  REPRODUCTIVE ORGANS: Prostate within normal limits.    BOWEL: No bowel obstruction. Appendix is not visualized. No evidence of inflammation in the pericecal region.  PERITONEUM: No ascites.  VESSELS: Atherosclerotic changes.  RETROPERITONEUM/LYMPH NODES: No lymphadenopathy.    ABDOMINAL WALL: Small fat-containing umbilical hernia  BONES: Degenerative changes.    IMPRESSION:     No evidence of a posttraumatic abnormality.    Patchy right upper lobe infiltrate.    Additional findings as above.        < end of copied text >            Radiology: all available radiological tests reviewed    Advanced directives addressed: full resuscitation

## 2020-05-10 NOTE — PROGRESS NOTE ADULT - SUBJECTIVE AND OBJECTIVE BOX
HOSPITALIST PROGRESS NOTE:  DOS: 5/10/2020  SUBJECTIVE: patient wanting to sleep , tired;   Chief Complaint: Patient is a 84y old  Male who presents with a chief complaint of RUL Pna, Leukocytosis  Weakness, Fall, Chronic Back pain (08 May 2020 18:20)    HPI: Pt is an 83 y/o M with a PMHx of CAD, diastolic CHF, chronic anemia, chronic back pain, Afib, DDD, HTN, MRSA, BPH, GERD, diverticulosis, CAMILA, thoracic aortic aneurysm, [place] who  presents via  EMS after slipping and falling out of his recliner chair.  Pt has severe back pain and due to the COVID -19 pandemic he was  unable to get an epidural.      His pain is so severe, he spends most of the day in his recliner chair.  Pt's wife call 911, as he had fallen out of his recliner chair three times in the last 24 hours.  No preceding cpain, no SOB,  no  LOC or syncope.  No fever or other complaints.         In the ED, pt is found to have a RUL Pna on CT scan and leukocytosis.      Allergies:  No Known Allergies    REVIEW OF SYSTEMS:  See HPI. All other review of systems is negative unless indicated above.     OBJECTIVE  Physical Exam:  Vital Signs Last 24 Hrs  T(C): 36.4 (10 May 2020 05:47), Max: 37.1 (09 May 2020 17:41)  T(F): 97.6 (10 May 2020 05:47), Max: 98.7 (09 May 2020 17:41)  HR: 78 (10 May 2020 05:47) (67 - 102)  BP: 117/88 (10 May 2020 05:47) (102/68 - 117/88)  RR: 17 (10 May 2020 05:47) (17 - 18)  SpO2: 98% (10 May 2020 05:47) (98% - 100%)    Constitutional: NAD, awake and alert, well-developed  Neurological: AAO x 3, no focal deficits  HEENT: PERRLA, EOMI, MMM  Neck: Soft and supple, No LAD, No JVD  Respiratory: Breath sounds are clear bilaterally, No wheezing, rales or rhonchi  Cardiovascular: S1 and S2, regular rate and rhythm; no Murmurs, gallops or rubs  Gastrointestinal: Bowel Sounds present, soft, nontender, nondistended, no guarding, no rebound tenderness  Back: No CVA tenderness   Extremities: No peripheral edema  Vascular: 2+ peripheral pulses  Musculoskeletal: 5/5 strength b/l upper and lower extremities  Skin: No rashes  Breast: Deferred  Rectal: Deferred    MEDICATIONS  (STANDING):  atorvastatin 20 milliGRAM(s) Oral at bedtime  azithromycin  IVPB 500 milliGRAM(s) IV Intermittent every 24 hours  brimonidine 0.2% Ophthalmic Solution 1 Drop(s) Left EYE two times a day  cefTRIAXone Injectable. 1000 milliGRAM(s) IV Push every 24 hours  donepezil 10 milliGRAM(s) Oral at bedtime  enoxaparin Injectable 40 milliGRAM(s) SubCutaneous daily  escitalopram 10 milliGRAM(s) Oral daily  memantine 10 milliGRAM(s) Oral daily  metoprolol tartrate 25 milliGRAM(s) Oral two times a day  pantoprazole    Tablet 40 milliGRAM(s) Oral before breakfast  timolol 0.5% Solution 1 Drop(s) Left EYE two times a day    MEDICATIONS  (PRN):  acetaminophen   Tablet .. 650 milliGRAM(s) Oral every 4 hours PRN Temp greater or equal to 38.5C (101.3F)  lidocaine 4% Cream 1 Application(s) Topical three times a day PRN for moderate pain  ondansetron   Disintegrating Tablet 4 milliGRAM(s) Oral three times a day PRN for nausea  rOPINIRole 2 milliGRAM(s) Oral four times a day PRN Restless legs  traMADol 50 milliGRAM(s) Oral every 8 hours PRN pain      LABS: All Labs Reviewed:               13.3   9.10  )-----------( 231      ( 10 May 2020 06:22 )             40.4     05-10  138  |  102  |  13  ----------------------------<  97  4.2   |  30  |  0.81  Ca    9.4      10 May 2020 06:22    TPro  6.9  /  Alb  2.8<L>  /  TBili  0.8  /  DBili  x   /  AST  13<L>  /  ALT  15  /  AlkPhos  77  05-08      PTT - ( 08 May 2020 14:47 )  PTT:31.9 sec    D-Dimer Assay, Quantitative (05.08.20 @ 20:15)    D-Dimer Assay, Quantitative: 1771 ng/mL DDU    Ferritin, Serum (05.08.20 @ 20:15)    Ferritin, Serum: 395 ng/mL    Lactate, Blood (05.08.20 @ 16:14)    Lactate, Blood: 1.5 mmol/L    Sedimentation Rate, Erythrocyte (05.08.20 @ 20:15)    Sedimentation Rate, Erythrocyte: 44 mm/hr    CARDIAC MARKERS ( 08 May 2020 20:15 )  x     / x     / 76 U/L / x     / x        COVID-19 PCR . (05.08.20 @ 16:14)    COVID-19 PCR: NotDetec:    RADIOLOGY/EKG:    < from: CT Abdomen and Pelvis w/ IV Cont (05.08.20 @ 15:21) >  < from: CT Chest w/ IV Cont (05.08.20 @ 15:21) >        IMPRESSION:   No evidence of a posttraumatic abnormality.  Patchy right upper lobe infiltrate.  < end of copied text >      < from: CT Cervical Spine No Cont (05.08.20 @ 15:09) >  IMPRESSION:  No acute findings    < from: CT Head No Cont (05.08.20 @ 15:07) >  Impression:  Unremarkable noncontrast CT scan of the brain.  < end of copied text > HOSPITALIST PROGRESS NOTE:  DOS: 5/10/2020  SUBJECTIVE:  no complaints  Chief Complaint: Patient is a 84y old  Male who presents with a chief complaint of RUL Pna, Leukocytosis  Weakness, Fall, Chronic Back pain (08 May 2020 18:20)    HPI: Pt is an 83 y/o M with a PMHx of CAD, diastolic CHF, chronic anemia, chronic back pain, Afib, DDD, HTN, MRSA, BPH, GERD, diverticulosis, CAMILA, thoracic aortic aneurysm, [place] who  presents via  EMS after slipping and falling out of his recliner chair.  Pt has severe back pain and due to the COVID -19 pandemic he was  unable to get an epidural.      His pain is so severe, he spends most of the day in his recliner chair.  Pt's wife call 911, as he had fallen out of his recliner chair three times in the last 24 hours.  No preceding cpain, no SOB,  no  LOC or syncope.  No fever or other complaints.         In the ED, pt is found to have a RUL Pna on CT scan and leukocytosis.      Allergies:  No Known Allergies    REVIEW OF SYSTEMS:  See HPI. All other review of systems is negative unless indicated above.     OBJECTIVE  Physical Exam:  Vital Signs Last 24 Hrs  T(C): 36.4 (10 May 2020 05:47), Max: 37.1 (09 May 2020 17:41)  T(F): 97.6 (10 May 2020 05:47), Max: 98.7 (09 May 2020 17:41)  HR: 78 (10 May 2020 05:47) (67 - 102)  BP: 117/88 (10 May 2020 05:47) (102/68 - 117/88)  RR: 17 (10 May 2020 05:47) (17 - 18)  SpO2: 98% (10 May 2020 05:47) (98% - 100%)    Constitutional: NAD, awake and alert, well-developed  Neurological: AAO x 3, no focal deficits  HEENT: PERRLA, EOMI, MMM  Neck: Soft and supple, No LAD, No JVD  Respiratory: Breath sounds are clear bilaterally, No wheezing, rales or rhonchi  Cardiovascular: S1 and S2, regular rate and rhythm; no Murmurs, gallops or rubs  Gastrointestinal: Bowel Sounds present, soft, nontender, nondistended, no guarding, no rebound tenderness  Back: No CVA tenderness   Extremities: No peripheral edema  Vascular: 2+ peripheral pulses  Musculoskeletal: 5/5 strength b/l upper and lower extremities  Skin: No rashes  Breast: Deferred  Rectal: Deferred    MEDICATIONS  (STANDING):  atorvastatin 20 milliGRAM(s) Oral at bedtime  azithromycin  IVPB 500 milliGRAM(s) IV Intermittent every 24 hours  brimonidine 0.2% Ophthalmic Solution 1 Drop(s) Left EYE two times a day  cefTRIAXone Injectable. 1000 milliGRAM(s) IV Push every 24 hours  donepezil 10 milliGRAM(s) Oral at bedtime  enoxaparin Injectable 40 milliGRAM(s) SubCutaneous daily  escitalopram 10 milliGRAM(s) Oral daily  memantine 10 milliGRAM(s) Oral daily  metoprolol tartrate 25 milliGRAM(s) Oral two times a day  pantoprazole    Tablet 40 milliGRAM(s) Oral before breakfast  timolol 0.5% Solution 1 Drop(s) Left EYE two times a day    MEDICATIONS  (PRN):  acetaminophen   Tablet .. 650 milliGRAM(s) Oral every 4 hours PRN Temp greater or equal to 38.5C (101.3F)  lidocaine 4% Cream 1 Application(s) Topical three times a day PRN for moderate pain  ondansetron   Disintegrating Tablet 4 milliGRAM(s) Oral three times a day PRN for nausea  rOPINIRole 2 milliGRAM(s) Oral four times a day PRN Restless legs  traMADol 50 milliGRAM(s) Oral every 8 hours PRN pain      LABS: All Labs Reviewed:               13.3   9.10  )-----------( 231      ( 10 May 2020 06:22 )             40.4     05-10  138  |  102  |  13  ----------------------------<  97  4.2   |  30  |  0.81  Ca    9.4      10 May 2020 06:22    TPro  6.9  /  Alb  2.8<L>  /  TBili  0.8  /  DBili  x   /  AST  13<L>  /  ALT  15  /  AlkPhos  77  05-08      PTT - ( 08 May 2020 14:47 )  PTT:31.9 sec    D-Dimer Assay, Quantitative (05.08.20 @ 20:15)    D-Dimer Assay, Quantitative: 1771 ng/mL DDU    Ferritin, Serum (05.08.20 @ 20:15)    Ferritin, Serum: 395 ng/mL    Lactate, Blood (05.08.20 @ 16:14)    Lactate, Blood: 1.5 mmol/L    Sedimentation Rate, Erythrocyte (05.08.20 @ 20:15)    Sedimentation Rate, Erythrocyte: 44 mm/hr    CARDIAC MARKERS ( 08 May 2020 20:15 )  x     / x     / 76 U/L / x     / x        COVID-19 PCR . (05.08.20 @ 16:14)    COVID-19 PCR: NotDetec:    RADIOLOGY/EKG:    < from: CT Abdomen and Pelvis w/ IV Cont (05.08.20 @ 15:21) >  < from: CT Chest w/ IV Cont (05.08.20 @ 15:21) >        IMPRESSION:   No evidence of a posttraumatic abnormality.  Patchy right upper lobe infiltrate.  < end of copied text >      < from: CT Cervical Spine No Cont (05.08.20 @ 15:09) >  IMPRESSION:  No acute findings    < from: CT Head No Cont (05.08.20 @ 15:07) >  Impression:  Unremarkable noncontrast CT scan of the brain.  < end of copied text > HOSPITALIST PROGRESS NOTE:  DOS: 5/10/2020  SUBJECTIVE:  no complaints; sating 95% on room air  Chief Complaint: Patient is a 84y old  Male who presents with a chief complaint of RUL Pna, Leukocytosis  Weakness, Fall, Chronic Back pain (08 May 2020 18:20)    HPI: Pt is an 85 y/o M with a PMHx of CAD, diastolic CHF, chronic anemia, chronic back pain, Afib, DDD, HTN, MRSA, BPH, GERD, diverticulosis, CAMILA, thoracic aortic aneurysm, [place] who  presents via  EMS after slipping and falling out of his recliner chair.  Pt has severe back pain and due to the COVID -19 pandemic he was  unable to get an epidural.      His pain is so severe, he spends most of the day in his recliner chair.  Pt's wife call 911, as he had fallen out of his recliner chair three times in the last 24 hours.  No preceding cpain, no SOB,  no  LOC or syncope.  No fever or other complaints.         In the ED, pt is found to have a RUL Pna on CT scan and leukocytosis.      Allergies:  No Known Allergies    REVIEW OF SYSTEMS:  See HPI. All other review of systems is negative unless indicated above.     OBJECTIVE  Physical Exam:  Vital Signs Last 24 Hrs  T(C): 36.4 (10 May 2020 05:47), Max: 37.1 (09 May 2020 17:41)  T(F): 97.6 (10 May 2020 05:47), Max: 98.7 (09 May 2020 17:41)  HR: 78 (10 May 2020 05:47) (67 - 102)  BP: 117/88 (10 May 2020 05:47) (102/68 - 117/88)  RR: 17 (10 May 2020 05:47) (17 - 18)  SpO2: 98% (10 May 2020 05:47) (98% - 100%)    Constitutional: NAD, awake and alert, well-developed  Neurological: AAO x 3, no focal deficits  HEENT: PERRLA, EOMI, MMM  Neck: Soft and supple, No LAD, No JVD  Respiratory: Breath sounds are clear bilaterally, No wheezing, rales or rhonchi  Cardiovascular: S1 and S2, regular rate and rhythm; no Murmurs, gallops or rubs  Gastrointestinal: Bowel Sounds present, soft, nontender, nondistended, no guarding, no rebound tenderness  Back: No CVA tenderness   Extremities: No peripheral edema  Vascular: 2+ peripheral pulses  Musculoskeletal: 5/5 strength b/l upper and lower extremities  Skin: No rashes  Breast: Deferred  Rectal: Deferred    MEDICATIONS  (STANDING):  atorvastatin 20 milliGRAM(s) Oral at bedtime  azithromycin  IVPB 500 milliGRAM(s) IV Intermittent every 24 hours  brimonidine 0.2% Ophthalmic Solution 1 Drop(s) Left EYE two times a day  cefTRIAXone Injectable. 1000 milliGRAM(s) IV Push every 24 hours  donepezil 10 milliGRAM(s) Oral at bedtime  enoxaparin Injectable 40 milliGRAM(s) SubCutaneous daily  escitalopram 10 milliGRAM(s) Oral daily  memantine 10 milliGRAM(s) Oral daily  metoprolol tartrate 25 milliGRAM(s) Oral two times a day  pantoprazole    Tablet 40 milliGRAM(s) Oral before breakfast  timolol 0.5% Solution 1 Drop(s) Left EYE two times a day    MEDICATIONS  (PRN):  acetaminophen   Tablet .. 650 milliGRAM(s) Oral every 4 hours PRN Temp greater or equal to 38.5C (101.3F)  lidocaine 4% Cream 1 Application(s) Topical three times a day PRN for moderate pain  ondansetron   Disintegrating Tablet 4 milliGRAM(s) Oral three times a day PRN for nausea  rOPINIRole 2 milliGRAM(s) Oral four times a day PRN Restless legs  traMADol 50 milliGRAM(s) Oral every 8 hours PRN pain      LABS: All Labs Reviewed:               13.3   9.10  )-----------( 231      ( 10 May 2020 06:22 )             40.4     05-10  138  |  102  |  13  ----------------------------<  97  4.2   |  30  |  0.81  Ca    9.4      10 May 2020 06:22    TPro  6.9  /  Alb  2.8<L>  /  TBili  0.8  /  DBili  x   /  AST  13<L>  /  ALT  15  /  AlkPhos  77  05-08      PTT - ( 08 May 2020 14:47 )  PTT:31.9 sec    D-Dimer Assay, Quantitative (05.08.20 @ 20:15)    D-Dimer Assay, Quantitative: 1771 ng/mL DDU    Ferritin, Serum (05.08.20 @ 20:15)    Ferritin, Serum: 395 ng/mL    Lactate, Blood (05.08.20 @ 16:14)    Lactate, Blood: 1.5 mmol/L    Sedimentation Rate, Erythrocyte (05.08.20 @ 20:15)    Sedimentation Rate, Erythrocyte: 44 mm/hr    CARDIAC MARKERS ( 08 May 2020 20:15 )  x     / x     / 76 U/L / x     / x        COVID-19 PCR . (05.08.20 @ 16:14)    COVID-19 PCR: NotDetec:    RADIOLOGY/EKG:    < from: CT Abdomen and Pelvis w/ IV Cont (05.08.20 @ 15:21) >  < from: CT Chest w/ IV Cont (05.08.20 @ 15:21) >        IMPRESSION:   No evidence of a posttraumatic abnormality.  Patchy right upper lobe infiltrate.  < end of copied text >      < from: CT Cervical Spine No Cont (05.08.20 @ 15:09) >  IMPRESSION:  No acute findings    < from: CT Head No Cont (05.08.20 @ 15:07) >  Impression:  Unremarkable noncontrast CT scan of the brain.  < end of copied text >

## 2020-05-11 PROCEDURE — 99232 SBSQ HOSP IP/OBS MODERATE 35: CPT

## 2020-05-11 RX ADMIN — CEFTRIAXONE 1000 MILLIGRAM(S): 500 INJECTION, POWDER, FOR SOLUTION INTRAMUSCULAR; INTRAVENOUS at 11:09

## 2020-05-11 RX ADMIN — TRAMADOL HYDROCHLORIDE 50 MILLIGRAM(S): 50 TABLET ORAL at 04:45

## 2020-05-11 RX ADMIN — TRAMADOL HYDROCHLORIDE 50 MILLIGRAM(S): 50 TABLET ORAL at 14:59

## 2020-05-11 RX ADMIN — ENOXAPARIN SODIUM 40 MILLIGRAM(S): 100 INJECTION SUBCUTANEOUS at 11:10

## 2020-05-11 RX ADMIN — TRAMADOL HYDROCHLORIDE 50 MILLIGRAM(S): 50 TABLET ORAL at 21:46

## 2020-05-11 RX ADMIN — BRIMONIDINE TARTRATE 1 DROP(S): 2 SOLUTION/ DROPS OPHTHALMIC at 18:51

## 2020-05-11 RX ADMIN — ESCITALOPRAM OXALATE 10 MILLIGRAM(S): 10 TABLET, FILM COATED ORAL at 11:10

## 2020-05-11 RX ADMIN — Medication 25 MILLIGRAM(S): at 05:20

## 2020-05-11 RX ADMIN — DONEPEZIL HYDROCHLORIDE 10 MILLIGRAM(S): 10 TABLET, FILM COATED ORAL at 21:46

## 2020-05-11 RX ADMIN — PANTOPRAZOLE SODIUM 40 MILLIGRAM(S): 20 TABLET, DELAYED RELEASE ORAL at 04:45

## 2020-05-11 RX ADMIN — Medication 25 MILLIGRAM(S): at 18:52

## 2020-05-11 RX ADMIN — ATORVASTATIN CALCIUM 20 MILLIGRAM(S): 80 TABLET, FILM COATED ORAL at 21:46

## 2020-05-11 RX ADMIN — BRIMONIDINE TARTRATE 1 DROP(S): 2 SOLUTION/ DROPS OPHTHALMIC at 04:45

## 2020-05-11 RX ADMIN — Medication 1 DROP(S): at 18:51

## 2020-05-11 RX ADMIN — MORPHINE SULFATE 2 MILLIGRAM(S): 50 CAPSULE, EXTENDED RELEASE ORAL at 19:42

## 2020-05-11 RX ADMIN — AZITHROMYCIN 255 MILLIGRAM(S): 500 TABLET, FILM COATED ORAL at 11:09

## 2020-05-11 RX ADMIN — Medication 1 DROP(S): at 04:45

## 2020-05-11 RX ADMIN — MEMANTINE HYDROCHLORIDE 10 MILLIGRAM(S): 10 TABLET ORAL at 11:10

## 2020-05-11 NOTE — PROGRESS NOTE ADULT - SUBJECTIVE AND OBJECTIVE BOX
CHIEF COMPLAINT/Diagnosis: PNA     SUBJECTIVE: no complaints; hard of hearing.     REVIEW OF SYSTEMS:    CONSTITUTIONAL: No weakness, fevers or chills  EYES/ENT: No visual changes;  No vertigo or throat pain   NECK: No pain or stiffness  RESPIRATORY: No cough, wheezing, hemoptysis; No shortness of breath  CARDIOVASCULAR: No chest pain or palpitations  GASTROINTESTINAL: No abdominal or epigastric pain. No nausea, vomiting, or hematemesis; No diarrhea or constipation. No melena or hematochezia.  GENITOURINARY: No dysuria, frequency or hematuria  NEUROLOGICAL: No numbness or weakness  SKIN: No itching, burning, rashes, or lesions   All other review of systems is negative unless indicated above    Vital Signs Last 24 Hrs  T(C): 36.1 (12 May 2020 11:47), Max: 36.7 (11 May 2020 16:40)  T(F): 97 (12 May 2020 11:47), Max: 98.1 (11 May 2020 16:40)  HR: 82 (12 May 2020 11:47) (60 - 82)  BP: 114/86 (12 May 2020 11:47) (114/86 - 136/85)  BP(mean): --  RR: 18 (12 May 2020 11:47) (18 - 18)  SpO2: 99% (12 May 2020 11:47) (97% - 99%)    I&O's Summary      CAPILLARY BLOOD GLUCOSE          PHYSICAL EXAM:    Constitutional: NAD, awake and alert, well-developed  HEENT: PERR, EOMI, Normal Hearing, MMM  Neck: Soft and supple, No LAD, No JVD  Respiratory: Breath sounds are clear bilaterally, No wheezing, rales or rhonchi  Cardiovascular: S1 and S2, regular rate and rhythm, no Murmurs, gallops or rubs  Gastrointestinal: Bowel Sounds present, soft, nontender, nondistended, no guarding, no rebound  Extremities: No peripheral edema  Vascular: 2+ peripheral pulses  Neurological: A/O x 3, no focal deficits  Musculoskeletal: 5/5 strength b/l upper and lower extremities  Skin: No rashes    MEDICATIONS:  MEDICATIONS  (STANDING):  atorvastatin 20 milliGRAM(s) Oral at bedtime  azithromycin  IVPB 500 milliGRAM(s) IV Intermittent every 24 hours  brimonidine 0.2% Ophthalmic Solution 1 Drop(s) Left EYE two times a day  cefTRIAXone Injectable. 1000 milliGRAM(s) IV Push every 24 hours  donepezil 10 milliGRAM(s) Oral at bedtime  enoxaparin Injectable 40 milliGRAM(s) SubCutaneous daily  escitalopram 10 milliGRAM(s) Oral daily  memantine 10 milliGRAM(s) Oral daily  metoprolol tartrate 25 milliGRAM(s) Oral two times a day  pantoprazole    Tablet 40 milliGRAM(s) Oral before breakfast  timolol 0.5% Solution 1 Drop(s) Left EYE two times a day  traMADol 50 milliGRAM(s) Oral three times a day      LABS: All Labs Reviewed:                Blood Culture: 05-10 @ 17:35  Organism --  Gram Stain Blood -- Gram Stain --  Specimen Source .Blood None  Culture-Blood --    05-08 @ 16:14  Organism Blood Culture PCR  Gram Stain Blood -- Gram Stain   Growth in anaerobic bottle: Gram Positive Rods  Growth in aerobic bottle: Gram Positive Cocci in Clusters  Specimen Source .Blood None  Culture-Blood --    05-08 @ 14:58  Organism --  Gram Stain Blood -- Gram Stain --  Specimen Source .Urine Clean Catch (Midstream)  Culture-Blood --            Assessment and Plan:     Pt is an 83 y/o M with a PMHx of CAD, diastolic CHF, chronic anemia, chronic back pain, Afib, DDD, HTN, MRSA, BPH, GERD, diverticulosis, CAMILA, thoracic aortic aneurysm, [place] who  presents via  EMS after slipping and falling out of his recliner chair.  Pt has severe back pain and due to the COVID -19 pandemic he was  unable to get an epidural.      His pain is so severe, he spends most of the day in his recliner chair.  Pt's wife call 911, as he had fallen out of his recliner chair three times in the last 24 hours.  No preceding cpain, no SOB,  no  LOC or syncope.  No fever or other complaints.     Infl markers : Ferritin wnl 395, DD 1771,  CRP 10.98, lactate 1.5    #Weakness secondary to possible CAP, gram pos/ gram neg VS  COVID19 viral pna  Weakness, Fall   - result COVID- 19 testing in the ED is neg >> as per ID Dr. Wasserman, remove from iso. low suspicion for covid19  - Continue Rocephin IV 1gm qd, until 5/14  - Continue Zithromax  mg qd  - Sating well on room air     #Chronic Back pain  - stable.     # DVT PPx:   -c/w lovenox    #positive cultures  -possible contaminant  -repeat blood cultures    # Goals of Care:   - Full  Code

## 2020-05-11 NOTE — PROGRESS NOTE ADULT - ASSESSMENT
Pt is an 83 y/o M with a PMHx of CAD, diastolic CHF, chronic anemia, chronic back pain, Afib, DDD, HTN, MRSA, BPH, GERD, diverticulosis, CAMILA, thoracic aortic aneurysm, [place] admitted on 5/8 for evaluation after falling out of his recliner chair; is coughing; history per medical record as patient unable to provide history.   1. Patient admitted for fall, found to have pneumonia, which will treat as community acquired pneumonia  - COVID-19 not identified upon admission  - follow up cultures   - serial cbc and monitor temperature   - oxygen and nebs as needed   - reviewed prior medical records to evaluate for resistant or atypical pathogens   - iv hydration and supportive care   - agree with ceftriaxone and zithromax as ordered, day #2  - tolerating antibiotics without rashes or side effects   - coagulase negative staph in blood is consistent with skin contaminant, no need for further vancomycin  2. other issues: per medicine

## 2020-05-11 NOTE — PROGRESS NOTE ADULT - SUBJECTIVE AND OBJECTIVE BOX
Date of service: 05-11-20 @ 12:39    Patient lying in bed; awake, alert, afebrile        ROS unable to obtain secondary to patient medical condition     MEDICATIONS  (STANDING):  atorvastatin 20 milliGRAM(s) Oral at bedtime  azithromycin  IVPB 500 milliGRAM(s) IV Intermittent every 24 hours  brimonidine 0.2% Ophthalmic Solution 1 Drop(s) Left EYE two times a day  cefTRIAXone Injectable. 1000 milliGRAM(s) IV Push every 24 hours  donepezil 10 milliGRAM(s) Oral at bedtime  enoxaparin Injectable 40 milliGRAM(s) SubCutaneous daily  escitalopram 10 milliGRAM(s) Oral daily  memantine 10 milliGRAM(s) Oral daily  metoprolol tartrate 25 milliGRAM(s) Oral two times a day  pantoprazole    Tablet 40 milliGRAM(s) Oral before breakfast  timolol 0.5% Solution 1 Drop(s) Left EYE two times a day  traMADol 50 milliGRAM(s) Oral three times a day    MEDICATIONS  (PRN):  acetaminophen   Tablet .. 650 milliGRAM(s) Oral every 4 hours PRN Temp greater or equal to 38.5C (101.3F)  lidocaine 4% Cream 1 Application(s) Topical three times a day PRN for moderate pain  morphine  - Injectable 2 milliGRAM(s) IV Push every 4 hours PRN Severe Pain (7 - 10)  ondansetron   Disintegrating Tablet 4 milliGRAM(s) Oral three times a day PRN for nausea  rOPINIRole 2 milliGRAM(s) Oral four times a day PRN Restless legs      Vital Signs Last 24 Hrs  T(C): 36.3 (11 May 2020 11:19), Max: 36.7 (10 May 2020 21:31)  T(F): 97.4 (11 May 2020 11:19), Max: 98.1 (10 May 2020 21:31)  HR: 59 (11 May 2020 11:19) (59 - 87)  BP: 122/80 (11 May 2020 11:19) (100/58 - 127/90)  BP(mean): --  RR: 18 (11 May 2020 11:19) (18 - 18)  SpO2: 95% (11 May 2020 11:19) (94% - 96%)        Physical Exam:        Constitutional: frail looking  HEENT: NC/AT, EOMI, PERRLA, conjunctivae clear; ears and nose atraumatic; pharynx clear  Neck: supple; thyroid not palpable  Back: no tenderness  Respiratory: respiratory effort normal; clear to auscultation  Cardiovascular: S1S2 regular, no murmurs  Abdomen: soft, not tender, not distended, positive BS; no liver or spleen organomegaly  Genitourinary: no suprapubic tenderness  Musculoskeletal: no muscle tenderness, no joint swelling or tenderness  Neurological/ Psychiatric:   moving all extremities  Skin: no rashes; no palpable lesions    Labs: all available labs reviewed                          Labs:                        13.3   9.10  )-----------( 231      ( 10 May 2020 06:22 )             40.4     05-10    138  |  102  |  13  ----------------------------<  97  4.2   |  30  |  0.81    Ca    9.4      10 May 2020 06:22             Cultures:       Culture - Blood (collected 05-08-20 @ 16:14)  Source: .Blood None  Gram Stain (05-09-20 @ 22:09):    Growth in anaerobic bottle: Gram Positive Rods    Growth in aerobic bottle: Gram Positive Cocci in Clusters  Final Report (05-10-20 @ 19:45):    Growth in anaerobic bottle: Bacillus species not anthracis    "Susceptibilities not performed"    Growth in aerobic bottle: Staphylococcus hominis    Coag Negative Staphylococcus    Single set isolate, possible contaminant. Contact    Microbiology if susceptibility testing clinically    indicated.    "Due to technical problems, Proteus sp. will Not be reported as part of    the BCID panel until further notice"    ***Blood Panel PCR results on this specimen are available    approximately 3 hours after the Gram stain result.***    Gram stain, PCR, and/or culture results may not always    correspond due to difference in methodologies.    ************************************************************    This PCR assay was performed using [employer].    The following targets are tested for: Enterococcus,    vancomycin resistant enterococci, Listeria monocytogenes,    coagulase negative staphylococci, S. aureus,    methicillin resistant S. aureus, Streptococcus agalactiae    (Group B), S. pneumoniae, S. pyogenes (Group A),    Acinetobacter baumannii, Enterobacter cloacae, E. coli,    Klebsiella oxytoca, K. pneumoniae, Proteus sp.,    Serratia marcescens, Haemophilus influenzae,    Neisseria meningitidis, Pseudomonas aeruginosa, Candida    albicans, C. glabrata, C krusei, C parapsilosis,    C. tropicalis and the KPC resistance gene.  Organism: Blood Culture PCR (05-10-20 @ 19:45)  Organism: Blood Culture PCR (05-10-20 @ 19:45)      -  Coagulase negative Staphylococcus: Detec      Method Type: PCR    Culture - Urine (collected 05-08-20 @ 14:58)  Source: .Urine Clean Catch (Midstream)  Final Report (05-09-20 @ 15:50):    <10,000 CFU/mL Normal Urogenital Mitzi      C-Reactive Protein, Serum: 10.98 mg/dL (05-08-20 @ 20:15)  Ferritin, Serum: 395 ng/mL (05-08-20 @ 20:15)  D-Dimer Assay, Quantitative: 1771 ng/mL DDU (05-08-20 @ 20:15)           COVID-19 PCR . (05.08.20 @ 16:14)    COVID-19 PCR: NotDetec: This test has been validated by [employer] to be accurate;  though it has not been FDA cleared/approved by the usual pathway.  As  with all laboratory tests, results should be correlated with clinical  findings.  https://www.fda.gov/media/727039/download  https://www.fda.gov/media/583835/download                  < from: CT Chest w/ IV Cont (05.08.20 @ 15:21) >    EXAM:  CT ABDOMEN AND PELVIS IC                          EXAM:  CT CHEST IC                            PROCEDURE DATE:  05/08/2020          INTERPRETATION:  CLINICAL INFORMATION: Trauma    COMPARISON: CT abdomen dated 02/07/2020 and CT chest dated 02/06/2020.    PROCEDURE:   CT of the Chest, Abdomen and Pelvis was performed with intravenous contrast.   Imaging was performed through the chest in the arterial phase followed by imaging of the abdomen and pelvis in the portal venous phase.  Intravenous contrast: 90 ml Omnipaque 350. 10 ml discarded.  Oral contrast: None.  Sagittal and coronal reformats were performed.    FINDINGS:    CHEST:     LUNGS AND LARGE AIRWAYS: Patent central airways. Patchy infiltrate right upper lobe posteriorly. Thisis a new finding. Subsegmental atelectasis/scarring along the posterior medial aspect of left lower lobe, stable. Trace reticular scarring right lung base.  PLEURA: No pleural effusion.  VESSELS: Coronary artery calcification. No evidence of aortic dissection. Unremarkable central pulmonary arteries.  HEART: Heart size is normal. No pericardial effusion. Aortic valve prosthesis.  MEDIASTINUM AND KISHOR: No lymphadenopathy. Mild hiatal hernia.  CHEST WALL AND LOWER NECK: Within normal limits.    ABDOMEN AND PELVIS:    LIVER: Evidence of a few low density lesions that are too small to be characterized.  BILE DUCTS: Normal caliber.  GALLBLADDER: Cholelithiasis.  SPLEEN: 2 small hypodense lesions measuring 1 cm and 1.7 cm that cannot be characterized.  PANCREAS: Within normal limits.  ADRENALS: Within normal limits.  KIDNEYS/URETERS: A few small low densities involving the renal cortex bilaterally, too small to be characterized.    BLADDER: Suspect prior TURP.  REPRODUCTIVE ORGANS: Prostate within normal limits.    BOWEL: No bowel obstruction. Appendix is not visualized. No evidence of inflammation in the pericecal region.  PERITONEUM: No ascites.  VESSELS: Atherosclerotic changes.  RETROPERITONEUM/LYMPH NODES: No lymphadenopathy.    ABDOMINAL WALL: Small fat-containing umbilical hernia  BONES: Degenerative changes.    IMPRESSION:     No evidence of a posttraumatic abnormality.    Patchy right upper lobe infiltrate.    Additional findings as above.        < end of copied text >            Radiology: all available radiological tests reviewed    Advanced directives addressed: full resuscitation

## 2020-05-12 PROCEDURE — 99232 SBSQ HOSP IP/OBS MODERATE 35: CPT

## 2020-05-12 RX ADMIN — BRIMONIDINE TARTRATE 1 DROP(S): 2 SOLUTION/ DROPS OPHTHALMIC at 06:09

## 2020-05-12 RX ADMIN — MEMANTINE HYDROCHLORIDE 10 MILLIGRAM(S): 10 TABLET ORAL at 13:01

## 2020-05-12 RX ADMIN — Medication 1 DROP(S): at 06:10

## 2020-05-12 RX ADMIN — DONEPEZIL HYDROCHLORIDE 10 MILLIGRAM(S): 10 TABLET, FILM COATED ORAL at 21:48

## 2020-05-12 RX ADMIN — TRAMADOL HYDROCHLORIDE 50 MILLIGRAM(S): 50 TABLET ORAL at 13:01

## 2020-05-12 RX ADMIN — TRAMADOL HYDROCHLORIDE 50 MILLIGRAM(S): 50 TABLET ORAL at 21:48

## 2020-05-12 RX ADMIN — BRIMONIDINE TARTRATE 1 DROP(S): 2 SOLUTION/ DROPS OPHTHALMIC at 18:21

## 2020-05-12 RX ADMIN — TRAMADOL HYDROCHLORIDE 50 MILLIGRAM(S): 50 TABLET ORAL at 06:07

## 2020-05-12 RX ADMIN — AZITHROMYCIN 255 MILLIGRAM(S): 500 TABLET, FILM COATED ORAL at 13:00

## 2020-05-12 RX ADMIN — PANTOPRAZOLE SODIUM 40 MILLIGRAM(S): 20 TABLET, DELAYED RELEASE ORAL at 06:08

## 2020-05-12 RX ADMIN — ENOXAPARIN SODIUM 40 MILLIGRAM(S): 100 INJECTION SUBCUTANEOUS at 13:00

## 2020-05-12 RX ADMIN — Medication 1 DROP(S): at 18:22

## 2020-05-12 RX ADMIN — ATORVASTATIN CALCIUM 20 MILLIGRAM(S): 80 TABLET, FILM COATED ORAL at 21:48

## 2020-05-12 RX ADMIN — Medication 25 MILLIGRAM(S): at 18:21

## 2020-05-12 RX ADMIN — Medication 25 MILLIGRAM(S): at 06:08

## 2020-05-12 RX ADMIN — LIDOCAINE 1 APPLICATION(S): 4 CREAM TOPICAL at 18:24

## 2020-05-12 RX ADMIN — CEFTRIAXONE 1000 MILLIGRAM(S): 500 INJECTION, POWDER, FOR SOLUTION INTRAMUSCULAR; INTRAVENOUS at 13:00

## 2020-05-12 RX ADMIN — ESCITALOPRAM OXALATE 10 MILLIGRAM(S): 10 TABLET, FILM COATED ORAL at 13:00

## 2020-05-12 NOTE — PROGRESS NOTE ADULT - ASSESSMENT
85 yo M Pt is admitted w/    #Weakness secondary to possible CAP, gram pos/ gram neg VS  COVID19 viral pna  #Weakness, Fall   - result COVID- 19 testing in the ED is neg   - CT chest RUL infiltrate   - Continue Rocephin IV 1gm qd, until 5/14  - Continue Zithromax  mg qd  - Sating well on room air , not a candidate for steroids or IL inhibitors  - FU repeat inflammatory markers     #Acute on Chronic Back pain  - Hx of Epidural injections in the past  - Ortho spine consult   - PT evaluation    #positive cultures  -coagulase negative staph in blood is consistent with skin contaminant, no need for further [place]  -repeat blood cultures NGTD    # DVT PPx:   -c/w lovenox    # Goals of Care:   - Full  Code

## 2020-05-12 NOTE — PROGRESS NOTE ADULT - SUBJECTIVE AND OBJECTIVE BOX
HOSPITALIST PROGRESS NOTE:  DOS: 5/10/2020  SUBJECTIVE:  no complaints; sating 95% on room air  Chief Complaint: Patient is a 84y old  Male who presents with a chief complaint of RUL Pna, Leukocytosis  Weakness, Fall, Chronic Back pain (08 May 2020 18:20)    HPI: Pt is an 83 y/o M with a PMHx of CAD, diastolic CHF, chronic anemia, chronic back pain, Afib, DDD, HTN, MRSA, BPH, GERD, diverticulosis, CAMILA, thoracic aortic aneurysm, [place] who  presents via  EMS after slipping and falling out of his recliner chair.  Pt has severe back pain and due to the COVID -19 pandemic he was  unable to get an epidural.      His pain is so severe, he spends most of the day in his recliner chair.  Pt's wife call 911, as he had fallen out of his recliner chair three times in the last 24 hours.  No preceding cpain, no SOB,  no  LOC or syncope.  No fever or other complaints.         In the ED, pt is found to have a RUL Pna on CT scan and leukocytosis.      5/12: Patient has no complaints for me and very sleepy; but as per the staff here patient complaining of back pain    Allergies:  No Known Allergies    REVIEW OF SYSTEMS:  See HPI. All other review of systems is negative unless indicated above.     OBJECTIVE  Physical Exam:  Vital Signs   Vital Signs Last 24 Hrs  T(C): 36.1 (12 May 2020 11:47), Max: 36.7 (11 May 2020 16:40)  T(F): 97 (12 May 2020 11:47), Max: 98.1 (11 May 2020 16:40)  HR: 82 (12 May 2020 11:47) (60 - 82)  BP: 114/86 (12 May 2020 11:47) (114/86 - 136/85)  BP(mean): --  RR: 18 (12 May 2020 11:47) (18 - 18)  SpO2: 99% (12 May 2020 11:47) (97% - 99%)    Constitutional: NAD, awake and alert, well-developed  Neurological: no focal deficits  HEENT: PERRLA, EOMI, MMM  Neck: Soft and supple, No LAD, No JVD  Respiratory: Breath sounds are clear bilaterally, No wheezing, rales or rhonchi  Cardiovascular: S1 and S2, regular rate and rhythm; no Murmurs, gallops or rubs  Gastrointestinal: Bowel Sounds present, soft, nontender, nondistended, no guarding, no rebound tenderness  Back: No CVA tenderness   Extremities: No peripheral edema  Vascular: 2+ peripheral pulses  Musculoskeletal: 5/5 strength b/l upper and lower extremities  Skin: No rashes  Breast: Deferred  Rectal: Deferred    MEDICATIONS  (STANDING):  atorvastatin 20 milliGRAM(s) Oral at bedtime  azithromycin  IVPB 500 milliGRAM(s) IV Intermittent every 24 hours  brimonidine 0.2% Ophthalmic Solution 1 Drop(s) Left EYE two times a day  cefTRIAXone Injectable. 1000 milliGRAM(s) IV Push every 24 hours  donepezil 10 milliGRAM(s) Oral at bedtime  enoxaparin Injectable 40 milliGRAM(s) SubCutaneous daily  escitalopram 10 milliGRAM(s) Oral daily  memantine 10 milliGRAM(s) Oral daily  metoprolol tartrate 25 milliGRAM(s) Oral two times a day  pantoprazole    Tablet 40 milliGRAM(s) Oral before breakfast  timolol 0.5% Solution 1 Drop(s) Left EYE two times a day    MEDICATIONS  (PRN):  acetaminophen   Tablet .. 650 milliGRAM(s) Oral every 4 hours PRN Temp greater or equal to 38.5C (101.3F)  lidocaine 4% Cream 1 Application(s) Topical three times a day PRN for moderate pain  ondansetron   Disintegrating Tablet 4 milliGRAM(s) Oral three times a day PRN for nausea  rOPINIRole 2 milliGRAM(s) Oral four times a day PRN Restless legs  traMADol 50 milliGRAM(s) Oral every 8 hours PRN pain      LABS: All Labs Reviewed:               13.3   9.10  )-----------( 231      ( 10 May 2020 06:22 )             40.4     05-10  138  |  102  |  13  ----------------------------<  97  4.2   |  30  |  0.81  Ca    9.4      10 May 2020 06:22    TPro  6.9  /  Alb  2.8<L>  /  TBili  0.8  /  DBili  x   /  AST  13<L>  /  ALT  15  /  AlkPhos  77  05-08      PTT - ( 08 May 2020 14:47 )  PTT:31.9 sec    D-Dimer Assay, Quantitative (05.08.20 @ 20:15)    D-Dimer Assay, Quantitative: 1771 ng/mL DDU    Ferritin, Serum (05.08.20 @ 20:15)    Ferritin, Serum: 395 ng/mL    Lactate, Blood (05.08.20 @ 16:14)    Lactate, Blood: 1.5 mmol/L    Sedimentation Rate, Erythrocyte (05.08.20 @ 20:15)    Sedimentation Rate, Erythrocyte: 44 mm/hr    CARDIAC MARKERS ( 08 May 2020 20:15 )  x     / x     / 76 U/L / x     / x        COVID-19 PCR . (05.08.20 @ 16:14)    COVID-19 PCR: NotDetec:    RADIOLOGY/EKG:    < from: CT Abdomen and Pelvis w/ IV Cont (05.08.20 @ 15:21) >  < from: CT Chest w/ IV Cont (05.08.20 @ 15:21) >        IMPRESSION:   No evidence of a posttraumatic abnormality.  Patchy right upper lobe infiltrate.  < end of copied text >      < from: CT Cervical Spine No Cont (05.08.20 @ 15:09) >  IMPRESSION:  No acute findings    < from: CT Head No Cont (05.08.20 @ 15:07) >  Impression:  Unremarkable noncontrast CT scan of the brain.  < end of copied text >

## 2020-05-13 LAB
ANION GAP SERPL CALC-SCNC: 9 MMOL/L — SIGNIFICANT CHANGE UP (ref 5–17)
BUN SERPL-MCNC: 15 MG/DL — SIGNIFICANT CHANGE UP (ref 7–23)
CALCIUM SERPL-MCNC: 9.2 MG/DL — SIGNIFICANT CHANGE UP (ref 8.5–10.1)
CHLORIDE SERPL-SCNC: 104 MMOL/L — SIGNIFICANT CHANGE UP (ref 96–108)
CO2 SERPL-SCNC: 25 MMOL/L — SIGNIFICANT CHANGE UP (ref 22–31)
CREAT SERPL-MCNC: 0.74 MG/DL — SIGNIFICANT CHANGE UP (ref 0.5–1.3)
CRP SERPL-MCNC: 1.57 MG/DL — HIGH (ref 0–0.4)
D DIMER BLD IA.RAPID-MCNC: 2453 NG/ML DDU — HIGH
FERRITIN SERPL-MCNC: 241 NG/ML — SIGNIFICANT CHANGE UP (ref 30–400)
GLUCOSE SERPL-MCNC: 95 MG/DL — SIGNIFICANT CHANGE UP (ref 70–99)
HCT VFR BLD CALC: 39.2 % — SIGNIFICANT CHANGE UP (ref 39–50)
HGB BLD-MCNC: 13.4 G/DL — SIGNIFICANT CHANGE UP (ref 13–17)
MAGNESIUM SERPL-MCNC: 1.8 MG/DL — SIGNIFICANT CHANGE UP (ref 1.6–2.6)
MCHC RBC-ENTMCNC: 31.7 PG — SIGNIFICANT CHANGE UP (ref 27–34)
MCHC RBC-ENTMCNC: 34.2 GM/DL — SIGNIFICANT CHANGE UP (ref 32–36)
MCV RBC AUTO: 92.7 FL — SIGNIFICANT CHANGE UP (ref 80–100)
PHOSPHATE SERPL-MCNC: 3.2 MG/DL — SIGNIFICANT CHANGE UP (ref 2.5–4.5)
PLATELET # BLD AUTO: 254 K/UL — SIGNIFICANT CHANGE UP (ref 150–400)
POTASSIUM SERPL-MCNC: 3.9 MMOL/L — SIGNIFICANT CHANGE UP (ref 3.5–5.3)
POTASSIUM SERPL-SCNC: 3.9 MMOL/L — SIGNIFICANT CHANGE UP (ref 3.5–5.3)
RBC # BLD: 4.23 M/UL — SIGNIFICANT CHANGE UP (ref 4.2–5.8)
RBC # FLD: 14.7 % — HIGH (ref 10.3–14.5)
SODIUM SERPL-SCNC: 138 MMOL/L — SIGNIFICANT CHANGE UP (ref 135–145)
WBC # BLD: 9.26 K/UL — SIGNIFICANT CHANGE UP (ref 3.8–10.5)
WBC # FLD AUTO: 9.26 K/UL — SIGNIFICANT CHANGE UP (ref 3.8–10.5)

## 2020-05-13 PROCEDURE — 93970 EXTREMITY STUDY: CPT | Mod: 26

## 2020-05-13 PROCEDURE — 99232 SBSQ HOSP IP/OBS MODERATE 35: CPT

## 2020-05-13 PROCEDURE — 99233 SBSQ HOSP IP/OBS HIGH 50: CPT

## 2020-05-13 PROCEDURE — 12345: CPT | Mod: NC

## 2020-05-13 RX ORDER — CEFTRIAXONE 500 MG/1
1000 INJECTION, POWDER, FOR SOLUTION INTRAMUSCULAR; INTRAVENOUS EVERY 24 HOURS
Refills: 0 | Status: DISCONTINUED | OUTPATIENT
Start: 2020-05-13 | End: 2020-05-13

## 2020-05-13 RX ORDER — CEFTRIAXONE 500 MG/1
1000 INJECTION, POWDER, FOR SOLUTION INTRAMUSCULAR; INTRAVENOUS EVERY 24 HOURS
Refills: 0 | Status: COMPLETED | OUTPATIENT
Start: 2020-05-13 | End: 2020-05-14

## 2020-05-13 RX ORDER — APIXABAN 2.5 MG/1
10 TABLET, FILM COATED ORAL EVERY 12 HOURS
Refills: 0 | Status: DISCONTINUED | OUTPATIENT
Start: 2020-05-13 | End: 2020-05-15

## 2020-05-13 RX ORDER — TRAMADOL HYDROCHLORIDE 50 MG/1
50 TABLET ORAL
Refills: 0 | Status: DISCONTINUED | OUTPATIENT
Start: 2020-05-13 | End: 2020-05-15

## 2020-05-13 RX ORDER — TRAMADOL HYDROCHLORIDE 50 MG/1
50 TABLET ORAL ONCE
Refills: 0 | Status: DISCONTINUED | OUTPATIENT
Start: 2020-05-13 | End: 2020-05-13

## 2020-05-13 RX ORDER — CYCLOBENZAPRINE HYDROCHLORIDE 10 MG/1
5 TABLET, FILM COATED ORAL THREE TIMES A DAY
Refills: 0 | Status: DISCONTINUED | OUTPATIENT
Start: 2020-05-13 | End: 2020-05-14

## 2020-05-13 RX ADMIN — APIXABAN 10 MILLIGRAM(S): 2.5 TABLET, FILM COATED ORAL at 17:09

## 2020-05-13 RX ADMIN — ESCITALOPRAM OXALATE 10 MILLIGRAM(S): 10 TABLET, FILM COATED ORAL at 11:07

## 2020-05-13 RX ADMIN — CYCLOBENZAPRINE HYDROCHLORIDE 5 MILLIGRAM(S): 10 TABLET, FILM COATED ORAL at 17:09

## 2020-05-13 RX ADMIN — TRAMADOL HYDROCHLORIDE 50 MILLIGRAM(S): 50 TABLET ORAL at 17:09

## 2020-05-13 RX ADMIN — Medication 1 DROP(S): at 06:16

## 2020-05-13 RX ADMIN — Medication 1 DROP(S): at 17:10

## 2020-05-13 RX ADMIN — TRAMADOL HYDROCHLORIDE 50 MILLIGRAM(S): 50 TABLET ORAL at 06:14

## 2020-05-13 RX ADMIN — ATORVASTATIN CALCIUM 20 MILLIGRAM(S): 80 TABLET, FILM COATED ORAL at 23:36

## 2020-05-13 RX ADMIN — TRAMADOL HYDROCHLORIDE 50 MILLIGRAM(S): 50 TABLET ORAL at 23:36

## 2020-05-13 RX ADMIN — TRAMADOL HYDROCHLORIDE 50 MILLIGRAM(S): 50 TABLET ORAL at 11:08

## 2020-05-13 RX ADMIN — AZITHROMYCIN 255 MILLIGRAM(S): 500 TABLET, FILM COATED ORAL at 11:08

## 2020-05-13 RX ADMIN — MEMANTINE HYDROCHLORIDE 10 MILLIGRAM(S): 10 TABLET ORAL at 11:08

## 2020-05-13 RX ADMIN — CEFTRIAXONE 1000 MILLIGRAM(S): 500 INJECTION, POWDER, FOR SOLUTION INTRAMUSCULAR; INTRAVENOUS at 11:08

## 2020-05-13 RX ADMIN — DONEPEZIL HYDROCHLORIDE 10 MILLIGRAM(S): 10 TABLET, FILM COATED ORAL at 23:36

## 2020-05-13 RX ADMIN — BRIMONIDINE TARTRATE 1 DROP(S): 2 SOLUTION/ DROPS OPHTHALMIC at 17:10

## 2020-05-13 RX ADMIN — PANTOPRAZOLE SODIUM 40 MILLIGRAM(S): 20 TABLET, DELAYED RELEASE ORAL at 06:14

## 2020-05-13 RX ADMIN — BRIMONIDINE TARTRATE 1 DROP(S): 2 SOLUTION/ DROPS OPHTHALMIC at 06:16

## 2020-05-13 RX ADMIN — Medication 25 MILLIGRAM(S): at 06:14

## 2020-05-13 NOTE — PHYSICAL THERAPY INITIAL EVALUATION ADULT - MODALITIES TREATMENT COMMENTS
pt left in bed supine post Eval @ pt request; bed alarm on; callbell in reach; pt instructed not to get up alone; call nursing for assist; john well; LBP persists; RN Estrella hurley

## 2020-05-13 NOTE — PROGRESS NOTE ADULT - SUBJECTIVE AND OBJECTIVE BOX
HOSPITALIST PROGRESS NOTE:  DOS: 5/10/2020  SUBJECTIVE:  no complaints; sating 95% on room air  Chief Complaint: Patient is a 84y old  Male who presents with a chief complaint of RUL Pna, Leukocytosis  Weakness, Fall, Chronic Back pain (08 May 2020 18:20)    HPI: Pt is an 85 y/o M with a PMHx of CAD, diastolic CHF, chronic anemia, chronic back pain, Afib, DDD, HTN, MRSA, BPH, GERD, diverticulosis, CAMILA, thoracic aortic aneurysm, [place] who  presents via  EMS after slipping and falling out of his recliner chair.  Pt has severe back pain and due to the COVID -19 pandemic he was  unable to get an epidural.      His pain is so severe, he spends most of the day in his recliner chair.  Pt's wife call 911, as he had fallen out of his recliner chair three times in the last 24 hours.  No preceding cpain, no SOB,  no  LOC or syncope.  No fever or other complaints.         In the ED, pt is found to have a RUL Pna on CT scan and leukocytosis.      5/12: Patient has no complaints for me and very sleepy; but as per the staff here patient complaining of back pain  5/13: patient found to have right DVT;  No other events. Dsicussed in length with patients wife     Allergies:  No Known Allergies    REVIEW OF SYSTEMS:  See HPI. All other review of systems is negative unless indicated above.     OBJECTIVE  Physical Exam:  Vital Signs   Vital Signs Last 24 Hrs  T(C): 36.1 (13 May 2020 11:31), Max: 36.5 (12 May 2020 18:18)  T(F): 97 (13 May 2020 11:31), Max: 97.7 (12 May 2020 18:18)  HR: 52 (13 May 2020 11:31) (52 - 59)  BP: 122/61 (13 May 2020 11:31) (122/61 - 132/83)  BP(mean): --  RR: 18 (13 May 2020 11:31) (17 - 18)  SpO2: 98% (13 May 2020 11:31) (96% - 98%)    Constitutional: NAD, awake and alert, well-developed  Neurological: no focal deficits  HEENT: PERRLA, EOMI, MMM  Neck: Soft and supple, No LAD, No JVD  Respiratory: Breath sounds are clear bilaterally, No wheezing, rales or rhonchi  Cardiovascular: S1 and S2, regular rate and rhythm; no Murmurs, gallops or rubs  Gastrointestinal: Bowel Sounds present, soft, nontender, nondistended, no guarding, no rebound tenderness  Back: No CVA tenderness   Extremities: No peripheral edema  Vascular: 2+ peripheral pulses  Musculoskeletal: 5/5 strength b/l upper and lower extremities  Skin: No rashes  Breast: Deferred  Rectal: Deferred    MEDICATIONS  (STANDING):  atorvastatin 20 milliGRAM(s) Oral at bedtime  azithromycin  IVPB 500 milliGRAM(s) IV Intermittent every 24 hours  brimonidine 0.2% Ophthalmic Solution 1 Drop(s) Left EYE two times a day  cefTRIAXone Injectable. 1000 milliGRAM(s) IV Push every 24 hours  donepezil 10 milliGRAM(s) Oral at bedtime  enoxaparin Injectable 40 milliGRAM(s) SubCutaneous daily  escitalopram 10 milliGRAM(s) Oral daily  memantine 10 milliGRAM(s) Oral daily  metoprolol tartrate 25 milliGRAM(s) Oral two times a day  pantoprazole    Tablet 40 milliGRAM(s) Oral before breakfast  timolol 0.5% Solution 1 Drop(s) Left EYE two times a day    MEDICATIONS  (PRN):  acetaminophen   Tablet .. 650 milliGRAM(s) Oral every 4 hours PRN Temp greater or equal to 38.5C (101.3F)  lidocaine 4% Cream 1 Application(s) Topical three times a day PRN for moderate pain  ondansetron   Disintegrating Tablet 4 milliGRAM(s) Oral three times a day PRN for nausea  rOPINIRole 2 milliGRAM(s) Oral four times a day PRN Restless legs  traMADol 50 milliGRAM(s) Oral every 8 hours PRN pain      LABS:     Lab Results:  CBC  CBC Full  -  ( 13 May 2020 07:15 )  WBC Count : 9.26 K/uL  RBC Count : 4.23 M/uL  Hemoglobin : 13.4 g/dL  Hematocrit : 39.2 %  Platelet Count - Automated : 254 K/uL  Mean Cell Volume : 92.7 fl  Mean Cell Hemoglobin : 31.7 pg  Mean Cell Hemoglobin Concentration : 34.2 gm/dL  Auto Neutrophil # : x  Auto Lymphocyte # : x  Auto Monocyte # : x  Auto Eosinophil # : x  Auto Basophil # : x  Auto Neutrophil % : x  Auto Lymphocyte % : x  Auto Monocyte % : x  Auto Eosinophil % : x  Auto Basophil % : x    .		Differential:	[] Automated		[] Manual  Chemistry                        13.4   9.26  )-----------( 254      ( 13 May 2020 07:15 )             39.2     05-13    138  |  104  |  15  ----------------------------<  95  3.9   |  25  |  0.74    Ca    9.2      13 May 2020 07:15  Phos  3.2     05-13  Mg     1.8     05-13                  MICROBIOLOGY/CULTURES:  Culture Results:   No growth to date. (05-10 @ 17:35)  Culture Results:   No growth to date. (05-10 @ 17:35)  Culture Results:   Growth in anaerobic bottle: Bacillus species not anthracis  "Susceptibilities not performed"  Growth in aerobic bottle: Staphylococcus hominis  Coag Negative Staphylococcus  Single set isolate, possible contaminant. Contact  Microbiology if susceptibility testing clinically  indicated.  "Due to technical problems, Proteus sp. will Not be reported as part of  the BCID panel until further notice"  ***Blood Panel PCR results on this specimen are available  approximately 3 hours after the Gram stain result.***  Gram stain, PCR, and/or culture results may not always  correspond due to difference in methodologies.  ************************************************************  This PCR assay was performed using [employer].  The following targets are tested for: Enterococcus,  vancomycin resistant enterococci, Listeria monocytogenes,  coagulase negative staphylococci, S. aureus,  methicillin resistant S. aureus, Streptococcus agalactiae  (Group B), S. pneumoniae, S. pyogenes (Group A),  Acinetobacter baumannii, Enterobacter cloacae, E. coli,  Klebsiella oxytoca, K. pneumoniae, Proteus sp.,  Serratia marcescens, Haemophilus influenzae,  Neisseria meningitidis, Pseudomonas aeruginosa, Candida  albicans, C. glabrata, C krusei, C parapsilosis,  C. tropicalis and the KPC resistance gene. (05-08 @ 16:14)  Culture Results:   <10,000 CFU/mL Normal Urogenital Mitzi (05-08 @ 14:58)      RADIOLOGY RESULTS:      D-Dimer Assay, Quantitative (05.08.20 @ 20:15)    D-Dimer Assay, Quantitative: 1771 ng/mL DDU    Ferritin, Serum (05.08.20 @ 20:15)    Ferritin, Serum: 395 ng/mL    Lactate, Blood (05.08.20 @ 16:14)    Lactate, Blood: 1.5 mmol/L    Sedimentation Rate, Erythrocyte (05.08.20 @ 20:15)    Sedimentation Rate, Erythrocyte: 44 mm/hr    CARDIAC MARKERS ( 08 May 2020 20:15 )  x     / x     / 76 U/L / x     / x        COVID-19 PCR . (05.08.20 @ 16:14)    COVID-19 PCR: NotDetec:    RADIOLOGY/EKG:    < from: CT Abdomen and Pelvis w/ IV Cont (05.08.20 @ 15:21) >  < from: CT Chest w/ IV Cont (05.08.20 @ 15:21) >        IMPRESSION:   No evidence of a posttraumatic abnormality.  Patchy right upper lobe infiltrate.  < end of copied text >      < from: CT Cervical Spine No Cont (05.08.20 @ 15:09) >  IMPRESSION:  No acute findings    < from: CT Head No Cont (05.08.20 @ 15:07) >  Impression:  Unremarkable noncontrast CT scan of the brain.  < end of copied text >    < from: US Duplex Venous Lower Ext Complete, Bilateral (05.13.20 @ 10:12) >    IMPRESSION:     Venous thrombosis in right lower extremity gastrocnemius vein.      < end of copied text >

## 2020-05-13 NOTE — PROVIDER CONTACT NOTE (OTHER) - SITUATION
DR. MARTA YATES, SPOKE WITH DAMARIS FROM MD'S OFFICE. WAS MADE AWARE PATIENT IS ADMITTED TO . WILL RELAY MESSAGE TO PCP . PLEASE FAX DISCHARGE PROVIDER NOTE AND SUMMARY -[phone]
Spoke with Nicolle and doctor office is now aware of consult.
called Dr. Smith and left message with service

## 2020-05-13 NOTE — PROGRESS NOTE ADULT - ASSESSMENT
83 yo M Pt is admitted w/    #Weakness secondary to possible CAP, gram pos/ gram neg VS  COVID19 viral pna  #Weakness, Fall   - result COVID- 19 testing in the ED is neg   - CT chest RUL infiltrate   - Continue Rocephin IV 1gm qd, until 5/14  - Continue Zithromax  mg qd  - Sating well on room air , not a candidate for steroids or IL inhibitors  - crp coming down    #Elevated D-dimer 2ndry to Right DVT  -start Eliquis and monitor for bleeding  -patient has hx of GI bleeding and off Blood thinners  -Discussed with Dr Mendiola and advised to start eliquis and monitor for GI Bleed  -Discussed with wife and understands there is a chance he might bleed but also a risk of him getting PE  -If he bleeds in the future then he may be a candidate for IVC filter    #Acute on Chronic Back pain  - Hx of Epidural injections in the past  - Ortho spine consult noted  - PT evaluation  - probably not a candidate for MRI due to cochlear impants  - increase tramadol 50mg Q6H Standing along with flexeril; if no improvement will need to start oxycodone; Discussed with wife     #positive cultures  -coagulase negative staph in blood is consistent with skin contaminant, no need for further Vanco  -repeat blood cultures NGTD    #CAD/AFib  - As noted not on A/C due to recent GI bleeding but will try eliquis now due to DVT  - Cont. BB    #Hx Chronic diastolic HF   - euvolemic/compensated  - daily weights / I&Os   - lasic was d/c as outpatient due increase urinary frequency as per his PCP and Cardiologist     #Hyperlipidemia  - Cont. statin     # DVT PPx:   -c/w lovenox    # Goals of Care:   - Full  Code

## 2020-05-13 NOTE — PROGRESS NOTE ADULT - SUBJECTIVE AND OBJECTIVE BOX
Date of service: 05-13-20 @ 10:43      Patient lying in bed; comfortable, afebrile      ROS unable to obtain secondary to patient medical condition     MEDICATIONS  (STANDING):  atorvastatin 20 milliGRAM(s) Oral at bedtime  azithromycin  IVPB 500 milliGRAM(s) IV Intermittent every 24 hours  brimonidine 0.2% Ophthalmic Solution 1 Drop(s) Left EYE two times a day  cefTRIAXone Injectable. 1000 milliGRAM(s) IV Push every 24 hours  donepezil 10 milliGRAM(s) Oral at bedtime  enoxaparin Injectable 40 milliGRAM(s) SubCutaneous daily  escitalopram 10 milliGRAM(s) Oral daily  memantine 10 milliGRAM(s) Oral daily  metoprolol tartrate 25 milliGRAM(s) Oral two times a day  pantoprazole    Tablet 40 milliGRAM(s) Oral before breakfast  timolol 0.5% Solution 1 Drop(s) Left EYE two times a day  traMADol 50 milliGRAM(s) Oral once  traMADol 50 milliGRAM(s) Oral four times a day    MEDICATIONS  (PRN):  acetaminophen   Tablet .. 650 milliGRAM(s) Oral every 4 hours PRN Temp greater or equal to 38.5C (101.3F)  lidocaine 4% Cream 1 Application(s) Topical three times a day PRN for moderate pain  ondansetron   Disintegrating Tablet 4 milliGRAM(s) Oral three times a day PRN for nausea  rOPINIRole 2 milliGRAM(s) Oral four times a day PRN Restless legs      Vital Signs Last 24 Hrs  T(C): 36.4 (13 May 2020 05:18), Max: 36.5 (12 May 2020 18:18)  T(F): 97.6 (13 May 2020 05:18), Max: 97.7 (12 May 2020 18:18)  HR: 56 (13 May 2020 05:18) (56 - 82)  BP: 125/79 (13 May 2020 05:18) (114/86 - 132/83)  BP(mean): --  RR: 18 (13 May 2020 05:18) (17 - 18)  SpO2: 96% (13 May 2020 05:18) (96% - 99%)        Physical Exam:      Constitutional: frail looking  HEENT: NC/AT, EOMI, PERRLA, conjunctivae clear; ears and nose atraumatic; pharynx clear  Neck: supple; thyroid not palpable  Back: no tenderness  Respiratory: respiratory effort normal; clear to auscultation  Cardiovascular: S1S2 regular, no murmurs  Abdomen: soft, not tender, not distended, positive BS; no liver or spleen organomegaly  Genitourinary: no suprapubic tenderness  Musculoskeletal: no muscle tenderness, no joint swelling or tenderness  Neurological/ Psychiatric:   moving all extremities  Skin: no rashes; no palpable lesions    Labs: all available labs reviewed                          Labs:                        Labs:                        13.4   9.26  )-----------( 254      ( 13 May 2020 07:15 )             39.2     05-13    138  |  104  |  15  ----------------------------<  95  3.9   |  25  |  0.74    Ca    9.2      13 May 2020 07:15  Phos  3.2     05-13  Mg     1.8     05-13             Cultures:       Culture - Blood (collected 05-10-20 @ 17:35)  Source: .Blood None  Preliminary Report (05-12-20 @ 01:02):    No growth to date.    Culture - Blood (collected 05-10-20 @ 17:35)  Source: .Blood None  Preliminary Report (05-12-20 @ 01:02):    No growth to date.    Culture - Blood (collected 05-08-20 @ 16:14)  Source: .Blood None  Gram Stain (05-09-20 @ 22:09):    Growth in anaerobic bottle: Gram Positive Rods    Growth in aerobic bottle: Gram Positive Cocci in Clusters  Final Report (05-10-20 @ 19:45):    Growth in anaerobic bottle: Bacillus species not anthracis    "Susceptibilities not performed"    Growth in aerobic bottle: Staphylococcus hominis    Coag Negative Staphylococcus    Single set isolate, possible contaminant. Contact    Microbiology if susceptibility testing clinically    indicated.    "Due to technical problems, Proteus sp. will Not be reported as part of    the BCID panel until further notice"    ***Blood Panel PCR results on this specimen are available    approximately 3 hours after the Gram stain result.***    Gram stain, PCR, and/or culture results may not always    correspond due to difference in methodologies.    ************************************************************    This PCR assay was performed using [employer] [facility].    The following targets are tested for: Enterococcus,    vancomycin resistant enterococci, Listeria monocytogenes,    coagulase negative staphylococci, S. aureus,    methicillin resistant S. aureus, Streptococcus agalactiae    (Group B), S. pneumoniae, S. pyogenes (Group A),    Acinetobacter baumannii, Enterobacter cloacae, E. coli,    Klebsiella oxytoca, K. pneumoniae, Proteus sp.,    Serratia marcescens, Haemophilus influenzae,    Neisseria meningitidis, Pseudomonas aeruginosa, Candida    albicans, C. glabrata, C krusei, C parapsilosis,    C. tropicalis and the KPC resistance gene.  Organism: Blood Culture PCR (05-10-20 @ 19:45)  Organism: Blood Culture PCR (05-10-20 @ 19:45)      -  Coagulase negative Staphylococcus: Detec      Method Type: PCR    Culture - Urine (collected 05-08-20 @ 14:58)  Source: .Urine Clean Catch (Midstream)  Final Report (05-09-20 @ 15:50):    <10,000 CFU/mL Normal Urogenital Mitzi      D-Dimer Assay, Quantitative: 2453 ng/mL DDU (05-13-20 @ 07:15)  C-Reactive Protein, Serum: 10.98 mg/dL (05-08-20 @ 20:15)  Ferritin, Serum: 395 ng/mL (05-08-20 @ 20:15)  D-Dimer Assay, Quantitative: 1771 ng/mL DDU (05-08-20 @ 20:15)           COVID-19 PCR . (05.08.20 @ 16:14)    COVID-19 PCR: NotDetec: This test has been validated by [employer] to be accurate;  though it has not been FDA cleared/approved by the usual pathway.  As  with all laboratory tests, results should be correlated with clinical  findings.  https://www.fda.gov/media/924735/download  https://www.fda.gov/media/080932/download                  < from: CT Chest w/ IV Cont (05.08.20 @ 15:21) >    EXAM:  CT ABDOMEN AND PELVIS IC                          EXAM:  CT CHEST IC                            PROCEDURE DATE:  05/08/2020          INTERPRETATION:  CLINICAL INFORMATION: Trauma    COMPARISON: CT abdomen dated 02/07/2020 and CT chest dated 02/06/2020.    PROCEDURE:   CT of the Chest, Abdomen and Pelvis was performed with intravenous contrast.   Imaging was performed through the chest in the arterial phase followed by imaging of the abdomen and pelvis in the portal venous phase.  Intravenous contrast: 90 ml Omnipaque 350. 10 ml discarded.  Oral contrast: None.  Sagittal and coronal reformats were performed.    FINDINGS:    CHEST:     LUNGS AND LARGE AIRWAYS: Patent central airways. Patchy infiltrate right upper lobe posteriorly. Thisis a new finding. Subsegmental atelectasis/scarring along the posterior medial aspect of left lower lobe, stable. Trace reticular scarring right lung base.  PLEURA: No pleural effusion.  VESSELS: Coronary artery calcification. No evidence of aortic dissection. Unremarkable central pulmonary arteries.  HEART: Heart size is normal. No pericardial effusion. Aortic valve prosthesis.  MEDIASTINUM AND KISHOR: No lymphadenopathy. Mild hiatal hernia.  CHEST WALL AND LOWER NECK: Within normal limits.    ABDOMEN AND PELVIS:    LIVER: Evidence of a few low density lesions that are too small to be characterized.  BILE DUCTS: Normal caliber.  GALLBLADDER: Cholelithiasis.  SPLEEN: 2 small hypodense lesions measuring 1 cm and 1.7 cm that cannot be characterized.  PANCREAS: Within normal limits.  ADRENALS: Within normal limits.  KIDNEYS/URETERS: A few small low densities involving the renal cortex bilaterally, too small to be characterized.    BLADDER: Suspect prior TURP.  REPRODUCTIVE ORGANS: Prostate within normal limits.    BOWEL: No bowel obstruction. Appendix is not visualized. No evidence of inflammation in the pericecal region.  PERITONEUM: No ascites.  VESSELS: Atherosclerotic changes.  RETROPERITONEUM/LYMPH NODES: No lymphadenopathy.    ABDOMINAL WALL: Small fat-containing umbilical hernia  BONES: Degenerative changes.    IMPRESSION:     No evidence of a posttraumatic abnormality.    Patchy right upper lobe infiltrate.    Additional findings as above.        < end of copied text >            Radiology: all available radiological tests reviewed    Advanced directives addressed: full resuscitation

## 2020-05-13 NOTE — PHYSICAL THERAPY INITIAL EVALUATION ADULT - CRITERIA FOR SKILLED THERAPEUTIC INTERVENTIONS
will attempt completion of Eval @ later date @ pt request; pt currently c/o severe LBP; further course of PT intervention pending

## 2020-05-13 NOTE — PROGRESS NOTE ADULT - ASSESSMENT
Pt is an 83 y/o M with a PMHx of CAD, diastolic CHF, chronic anemia, chronic back pain, Afib, DDD, HTN, MRSA, BPH, GERD, diverticulosis, CAMILA, thoracic aortic aneurysm, [place] admitted on 5/8 for evaluation after falling out of his recliner chair; is coughing; history per medical record as patient unable to provide history.   1. Patient admitted for fall, found to have pneumonia, which will treat as community acquired pneumonia  - COVID-19 not identified upon admission  - follow up cultures   - serial cbc and monitor temperature   - oxygen and nebs as needed   - reviewed prior medical records to evaluate for resistant or atypical pathogens   - iv hydration and supportive care   - agree with ceftriaxone and zithromax as ordered, day #4; expect another 24 hours antibiotics  - tolerating antibiotics without rashes or side effects   - coagulase negative staph in blood is consistent with skin contaminant, no need for further vancomycin; Bacillus species also skin contaminant  2. other issues: per medicine

## 2020-05-13 NOTE — CONSULT NOTE ADULT - SUBJECTIVE AND OBJECTIVE BOX
Pulmonary Consult    History of Present Illness:  Reason for Admission: RUL Pna, Leukocytosis  Weakness, Fall  Chronic Back pain	  History of Present Illness: 	   Pt is an 85 y/o M with a PMHx of CAD, diastolic CHF, chronic anemia, chronic back pain, Afib, DDD, HTN, MRSA, BPH, GERD, diverticulosis, CAMILA, thoracic aortic aneurysm, [place] who  presents via  EMS after slipping and falling out of his recliner chair.  Pt has severe back pain and due to the COVID -19 pandemic he was  unable to get an epidural.      His pain is so severe, he spends most of the day in his recliner chair.  Pt's wife call 911, as he had fallen out of his recliner chair three times in the last 24 hours.  No preceding cpain, no SOB,  no  LOC or syncope.  No fever or other complaints.         In the ED, pt is found to have a RUL Pna on CT scan and leukocytosis.        PAST MEDICAL & SURGICAL HISTORY:  CAD (coronary artery disease): (-) cardiac stress and 2DECHO 2019  Diastolic CHF: LVEF 55-60% 6/2019  Chronic anemia  Afib: Not on A/C 2/2 hx of GIbleeding  DDD (degenerative disc disease), lumbar  [place] (hard of hearing)  Fall (on) (from) other stairs and steps, sequela: 03/28/2017- lumbar hemtoma  Essential hypertension  Nerve injury: complication from right TKR surgery, 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  Aortic root dilatation  MRSA (methicillin resistant Staphylococcus aureus): left forearm 2012  Lumbar degenerative disc disease  BPH associated with nocturia  Diverticulosis of intestine without bleeding, unspecified intestinal tract location  Gastroesophageal reflux disease, esophagitis presence not specified: Hx of GI bleed  Aortic valve insufficiency, unspecified etiology  CAMILA (obstructive sleep apnea): unable to tolerate nocturnal CPAP  Restless leg syndrome  Thoracic aortic aneurysm  HLD (hyperlipidemia)  S/P thoracic aortic aneurysm repair  S/P AVR (aortic valve replacement)  Status post cataract extraction, unspecified laterality: bilat  S/P debridement: left forearm -mrsa  History of fundoplication: 2000  Amputation finger: left index 1970  S/P knee replacement: left  S/P knee replacement: right  S/P shoulder surgery: right rotatotor cuff injury        FAMILY HISTORY:  Family history of lung cancer      SOCIAL HISTORY:    No smoking no drug or alcohol abuse .     Allergies    No Known Allergies    Intolerances              REVIEW OF SYSTEMS:  Constitutional: No fevers or chills or weight loss.   Eyes: No itching or discharge from the eyes  ENT:  No post nasal drip. No epistaxis. No throat pain. . No difficulty swallowing.   CV: No chest pain. No palpitations.  or dizziness.   Resp: No dyspnea  No wheezing. No cough. No stridor No sputum production. No chest pain with breathing .  GI: No nausea. No vomiting. No diarrhea or abdominal pain   MSK: No joint pain or pain in any extremities  Integumentary: No skin lesions. No pedal edema.  Neurological: No gross motor weakness. No sensory changes.      OBJECTIVE:  Vital Signs Last 24 Hrs  T(C): 36.4 (13 May 2020 05:18), Max: 36.5 (12 May 2020 18:18)  T(F): 97.6 (13 May 2020 05:18), Max: 97.7 (12 May 2020 18:18)  HR: 56 (13 May 2020 05:18) (56 - 82)  BP: 125/79 (13 May 2020 05:18) (114/86 - 132/83)  BP(mean): --  RR: 18 (13 May 2020 05:18) (17 - 18)  SpO2: 96% (13 May 2020 05:18) (96% - 99%)      PHYSICAL EXAM:  General: Awake, alert, oriented X 3.   HEENT: Atraumatic, normocephalic.   Neck: No JVD no lymphadenopathy   Respiratory: normal vesicular breathing with no wheeze or rales on the exam .  Cardiovascular: S1 S2 normal. No murmurs, rubs or gallops.   Abdomen: Soft, non-tender, non-distended. No organomegaly.  Extremities: Warm to touch. Peripheral pulse palpable. No pedal edema.   Skin: No rashes or skin lesions  Neurological: Motor and sensory examination equal and normal in all four extremities.  Psychiatry: Appropriate mood and affect.    HOSPITAL MEDICATIONS:  MEDICATIONS  (STANDING):  atorvastatin 20 milliGRAM(s) Oral at bedtime  azithromycin  IVPB 500 milliGRAM(s) IV Intermittent every 24 hours  brimonidine 0.2% Ophthalmic Solution 1 Drop(s) Left EYE two times a day  donepezil 10 milliGRAM(s) Oral at bedtime  enoxaparin Injectable 40 milliGRAM(s) SubCutaneous daily  escitalopram 10 milliGRAM(s) Oral daily  memantine 10 milliGRAM(s) Oral daily  metoprolol tartrate 25 milliGRAM(s) Oral two times a day  pantoprazole    Tablet 40 milliGRAM(s) Oral before breakfast  timolol 0.5% Solution 1 Drop(s) Left EYE two times a day  traMADol 50 milliGRAM(s) Oral four times a day    MEDICATIONS  (PRN):  acetaminophen   Tablet .. 650 milliGRAM(s) Oral every 4 hours PRN Temp greater or equal to 38.5C (101.3F)  lidocaine 4% Cream 1 Application(s) Topical three times a day PRN for moderate pain  ondansetron   Disintegrating Tablet 4 milliGRAM(s) Oral three times a day PRN for nausea  rOPINIRole 2 milliGRAM(s) Oral four times a day PRN Restless legs      LABS:                        13.4   9.26  )-----------( 254      ( 13 May 2020 07:15 )             39.2     05-13    138  |  104  |  15  ----------------------------<  95  3.9   |  25  |  0.74    Ca    9.2      13 May 2020 07:15  Phos  3.2     05-13  Mg     1.8     05-13  CHEST:   	LUNGS AND LARGE AIRWAYS: Patent central airways. Patchy infiltrate right upper lobe posteriorly. Thisis a new finding. Subsegmental atelectasis/scarring along the posterior medial aspect of left lower lobe, stable. Trace reticular scarring right lung base.  	PLEURA: No pleural effusion.  	VESSELS: Coronary artery calcification. No evidence of aortic dissection. Unremarkable central pulmonary arteries.  	HEART: Heart size is normal. No pericardial effusion. Aortic valve prosthesis.  	MEDIASTINUM AND KISHOR: No lymphadenopathy. Mild hiatal hernia.  	CHEST WALL AND LOWER NECK: Within normal limits.    	ABDOMEN AND PELVIS:  	LIVER: Evidence of a few low density lesions that are too small to be characterized.  	BILE DUCTS: Normal caliber.  	GALLBLADDER: Cholelithiasis.  	SPLEEN: 2 small hypodense lesions measuring 1 cm and 1.7 cm that cannot be characterized.  	PANCREAS: Within normal limits.  	ADRENALS: Within normal limits.  	KIDNEYS/URETERS: A few small low densities involving the renal cortex bilaterally, too small to be characterized.    	BLADDER: Suspect prior TURP.  	REPRODUCTIVE ORGANS: Prostate within normal limits.  	BOWEL: No bowel obstruction. Appendix is not visualized. No evidence of inflammation in the pericecal region.  	PERITONEUM: No ascites.  	VESSELS: Atherosclerotic changes.  	RETROPERITONEUM/LYMPH NODES: No lymphadenopathy.    	ABDOMINAL WALL: Small fat-containing umbilical hernia  	BONES: Degenerative changes.    	IMPRESSION:     	No evidence of a posttraumatic abnormality.    	Patchy right upper lobe infiltrate.  < from: US Duplex Venous Lower Ext Complete, Bilateral (05.13.20 @ 10:12) >  EXAM:  US DPLX LWR EXT VEINS COMPL BI                            PROCEDURE DATE:  05/13/2020          INTERPRETATION:  CLINICAL INFORMATION: Medical clearance, elevated d-dimer    COMPARISON: None available.    TECHNIQUE: Duplex sonography of the BILATERAL LOWER extremity veins with color and spectral Doppler, with and without compression.      FINDINGS:    There is normal compressibility of the bilateral common femoral, femoral and popliteal veins.     Doppler examination shows normal spontaneous and phasic flow.    Right calf vein thrombosis is detected (gastrocnemius vein).    IMPRESSION:     Venous thrombosis in right lower extremity gastrocnemius vein.      < from: Transthoracic Echocardiogram (07.15.19 @ 10:03) >  Summary     The left ventricle is normal in size, wall thickness, wall motion and   contractility.   Estimated left ventricular ejection fraction is 60-65 %.   The left atrium appears mildly to moderately dilated.   The right atrium appears moderately dilated.   The right ventricle exhibits mild dilation, mild diffuse hypokinesis, and   mild depression of contractility.     Well seated bioprosthetic valve (TAVR)in the aortic position.   Peak trans-prosthetic gradient is within normal limitations for this type   of prosthesis.   Fibrocalcific changes noted to the mitral valve leaflets with preserved   leaflet excursion.   Mild (1+) mitral regurgitation is present.   The tricuspid valve leaflets appear mildly thickened and/or calcified,   but   open well.   Moderate Tricuspid regurgitation is present.   Moderate pulmonary hypertension.   Mild pulmonic valvular regurgitation (1+) is present.     No evidence of pericardial effusion.   The IVC is mildly dilated with decreased respiratory variation.     Signature     --------------------    < end of copied text >                  Blood Cultures Pulmonary Consult    History of Present Illness:  Reason for Admission: RUL Pna, Leukocytosis  Weakness, Fall  Chronic Back pain	  History of Present Illness: 	   Pt is an 83 y/o M with a PMHx of CAD, diastolic CHF, chronic anemia, chronic back pain, Afib, DDD, HTN, MRSA, BPH, GERD, diverticulosis, CAMILA, thoracic aortic aneurysm, [place] who  presents via  EMS after slipping and falling out of his recliner chair.  Pt has severe back pain and due to the COVID -19 pandemic he was  unable to get an epidural.      His pain is so severe, he spends most of the day in his recliner chair.  Pt's wife call 911, as he had fallen out of his recliner chair three times in the last 24 hours.  No preceding cpain, no SOB,  no  LOC or syncope.  No fever or other complaints.         In the ED, pt is found to have a RUL Pna on CT scan and leukocytosis.      Above history of present illness noted patient hard of hearing and somewhat poor historian and findings on the CT scan of the chest noted and his  covid test is reported as negative.   has a positive  vein thrombus in the gastrocnemius vein in the calf    his D-dimers remained elevated   no reported shortness of breath or reported fever or chills and his white count is improving  He does have moderate pulmonary HTN reported on the last echo .  His blood cultures are positive for the coagulase negative staphylococcus     PAST MEDICAL & SURGICAL HISTORY:  CAD (coronary artery disease): (-) cardiac stress and 2DECHO 2019  Diastolic CHF: LVEF 55-60% 6/2019  Chronic anemia  Afib: Not on A/C 2/2 hx of GIbleeding  DDD (degenerative disc disease), lumbar  [place] (hard of hearing)  Fall (on) (from) other stairs and steps, sequela: 03/28/2017- lumbar hematoma  Essential hypertension  Nerve injury: complication from right TKR surgery, 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  Aortic root dilatation  MRSA (methicillin resistant Staphylococcus aureus): left forearm 2012  Lumbar degenerative disc disease  BPH associated with nocturia  Diverticulosis of intestine without bleeding, unspecified intestinal tract location  Gastroesophageal reflux disease, esophagitis presence not specified: Hx of GI bleed  Aortic valve insufficiency, unspecified etiology  CAMILA (obstructive sleep apnea): unable to tolerate nocturnal CPAP  Restless leg syndrome  Thoracic aortic aneurysm  HLD (hyperlipidemia)  S/P thoracic aortic aneurysm repair  S/P AVR (aortic valve replacement)  Status post cataract extraction, unspecified laterality: bilat  S/P debridement: left forearm -mrsa  History of fundoplication: 2000  Amputation finger: left index 1970  S/P knee replacement: left  S/P knee replacement: right  S/P shoulder surgery: right rotatotor cuff injury        FAMILY HISTORY:  Family history of lung cancer      SOCIAL HISTORY:    no current smoking or iv drug abuse     Allergies    No Known Allergies    Intolerances    REVIEW OF SYSTEMS:  Constitutional: No fevers or chills or weight loss.   Eyes: No itching or discharge from the eyes  ENT:  No post nasal drip. No epistaxis. and hard of hearing    CV: No chest pain. No palpitations.  or dizziness.   Resp: No dyspnea  No wheezing. No cough. No stridor No sputum production. No chest pain with breathing .  GI: No nausea. No vomiting. No diarrhea or abdominal pain   MSK:has back pain and history of recurrent falls   Integumentary: No skin lesions. No pedal edema.  Neurological: denies weankess or sensory symptoms     OBJECTIVE:  Vital Signs Last 24 Hrs  T(C): 36.4 (13 May 2020 05:18), Max: 36.5 (12 May 2020 18:18)  T(F): 97.6 (13 May 2020 05:18), Max: 97.7 (12 May 2020 18:18)  HR: 56 (13 May 2020 05:18) (56 - 82)  BP: 125/79 (13 May 2020 05:18) (114/86 - 132/83)  BP(mean): --  RR: 18 (13 May 2020 05:18) (17 - 18)  SpO2: 96% (13 May 2020 05:18) (96% - 99%)      PHYSICAL EXAM:  General: Awake, alert, oriented X 3.   HEENT: Atraumatic, normocephalic.   Neck: No JVD no lymphadenopathy   Respiratory: normal vesicular breathing with poor inspiratory effort   Cardiovascular: S1 S2 normal. aortic valve click   Abdomen: Soft, non-tender, non-distended. No organomegaly.  Extremities: Warm to touch. Peripheral pulse palpable. No pedal edema.   Skin: No rashes or skin lesions  Neurological: Motor and sensory examination equal and normal in all four extremities.  Psychiatry: Appropriate mood and affect.    HOSPITAL MEDICATIONS:  MEDICATIONS  (STANDING):  atorvastatin 20 milliGRAM(s) Oral at bedtime  azithromycin  IVPB 500 milliGRAM(s) IV Intermittent every 24 hours  brimonidine 0.2% Ophthalmic Solution 1 Drop(s) Left EYE two times a day  donepezil 10 milliGRAM(s) Oral at bedtime  enoxaparin Injectable 40 milliGRAM(s) SubCutaneous daily  escitalopram 10 milliGRAM(s) Oral daily  memantine 10 milliGRAM(s) Oral daily  metoprolol tartrate 25 milliGRAM(s) Oral two times a day  pantoprazole    Tablet 40 milliGRAM(s) Oral before breakfast  timolol 0.5% Solution 1 Drop(s) Left EYE two times a day  traMADol 50 milliGRAM(s) Oral four times a day    MEDICATIONS  (PRN):  acetaminophen   Tablet .. 650 milliGRAM(s) Oral every 4 hours PRN Temp greater or equal to 38.5C (101.3F)  lidocaine 4% Cream 1 Application(s) Topical three times a day PRN for moderate pain  ondansetron   Disintegrating Tablet 4 milliGRAM(s) Oral three times a day PRN for nausea  rOPINIRole 2 milliGRAM(s) Oral four times a day PRN Restless legs      LABS:                        13.4   9.26  )-----------( 254      ( 13 May 2020 07:15 )             39.2     05-13    138  |  104  |  15  ----------------------------<  95  3.9   |  25  |  0.74    Ca    9.2      13 May 2020 07:15  Phos  3.2     05-13  Mg     1.8     05-13    CHEST:   	LUNGS AND LARGE AIRWAYS: Patent central airways. Patchy infiltrate right upper lobe posteriorly. This is a new finding. Subsegmental atelectasis/scarring along the posterior medial aspect of left lower lobe, stable. Trace reticular scarring right lung base.  	PLEURA: No pleural effusion.  	VESSELS: Coronary artery calcification. No evidence of aortic dissection. Unremarkable central pulmonary arteries.  	HEART: Heart size is normal. No pericardial effusion. Aortic valve prosthesis.  	MEDIASTINUM AND KISHOR: No lymphadenopathy. Mild hiatal hernia.  	CHEST WALL AND LOWER NECK: Within normal limits.    	ABDOMEN AND PELVIS:  	LIVER: Evidence of a few low density lesions that are too small to be characterized.  	BILE DUCTS: Normal caliber.  	GALLBLADDER: Cholelithiasis.  	SPLEEN: 2 small hypodense lesions measuring 1 cm and 1.7 cm that cannot be characterized.  	PANCREAS: Within normal limits.  	ADRENALS: Within normal limits.  	KIDNEYS/URETERS: A few small low densities involving the renal cortex bilaterally, too small to be characterized.    	BLADDER: Suspect prior TURP.  	REPRODUCTIVE ORGANS: Prostate within normal limits.  	BOWEL: No bowel obstruction. Appendix is not visualized. No evidence of inflammation in the pericecal region.  	PERITONEUM: No ascites.  	VESSELS: Atherosclerotic changes.  	RETROPERITONEUM/LYMPH NODES: No lymphadenopathy.    	ABDOMINAL WALL: Small fat-containing umbilical hernia  	BONES: Degenerative changes.    	IMPRESSION:     	No evidence of a posttraumatic abnormality.    	Patchy right upper lobe infiltrate.  < from: US Duplex Venous Lower Ext Complete, Bilateral (05.13.20 @ 10:12) >  EXAM:  US DPLX LWR EXT VEINS COMPL BI                            PROCEDURE DATE:  05/13/2020          INTERPRETATION:  CLINICAL INFORMATION: Medical clearance, elevated d-dimer    COMPARISON: None available.    TECHNIQUE: Duplex sonography of the BILATERAL LOWER extremity veins with color and spectral Doppler, with and without compression.      FINDINGS:    There is normal compressibility of the bilateral common femoral, femoral and popliteal veins.     Doppler examination shows normal spontaneous and phasic flow.    Right calf vein thrombosis is detected (gastrocnemius vein).    IMPRESSION:     Venous thrombosis in right lower extremity gastrocnemius vein.      < from: Transthoracic Echocardiogram (07.15.19 @ 10:03) >  Summary     The left ventricle is normal in size, wall thickness, wall motion and   contractility.   Estimated left ventricular ejection fraction is 60-65 %.   The left atrium appears mildly to moderately dilated.   The right atrium appears moderately dilated.   The right ventricle exhibits mild dilation, mild diffuse hypokinesis, and   mild depression of contractility.     Well seated bioprosthetic valve (TAVR)in the aortic position.   Peak trans-prosthetic gradient is within normal limitations for this type   of prosthesis.   Fibrocalcific changes noted to the mitral valve leaflets with preserved   leaflet excursion.   Mild (1+) mitral regurgitation is present.   The tricuspid valve leaflets appear mildly thickened and/or calcified,   but   open well.   Moderate Tricuspid regurgitation is present.   Moderate pulmonary hypertension.   Mild pulmonic valvular regurgitation (1+) is present.     No evidence of pericardial effusion.   The IVC is mildly dilated with decreased respiratory variation.     Signature     --------------------    < end of copied text >                  Blood Cultures

## 2020-05-13 NOTE — CONSULT NOTE ADULT - SUBJECTIVE AND OBJECTIVE BOX
Patient is a 84y old  Male who presents with a chief complaint of RUL Pna, Leukocytosis  Weakness, Fall   Chronic Back pain (13 May 2020 11:17)    HPI: Pt is an 85 y/o M with a PMHx of CAD, diastolic CHF, chronic anemia, chronic back pain, Afib, DDD, HTN, MRSA, BPH, GERD, diverticulosis, CAMILA, thoracic aortic aneurysm, [place] who  presented via  EMS after slipping and falling out of his recliner chair.  Pt has chronic severe back pain and due to the COVID -19 pandemic he was  unable to get an epidural.       His pain is so severe, he spends most of the day in his recliner chair.  Pt's wife call 911, as he had fallen out of his recliner chair three times in the 24 hours.    No preceding chest pain, no SOB,  no  LOC or syncope.  No fever or other complaints.      Pt was admitted to the Hospitalist service with Right upper lobe pneumonia and was found to have a Right lower ext DVT.  Pt has a h/o severe lower back pain, as per his wife he follows with Dr. Billy Motta a pain management physician. He had on steroid injection in the past but was unable to have another due to the COVID Crisis. Pt does spend most of his day in the recliner at home, he has only a few steps to the bathroom and was unable to make it the other day due to severe pain. Pt is extremely hard of hearing and cannot have a conversation or follow commands. He has been on tramadol at home for pain. Wife states he has not has issues with incontinence at home.     PAST MEDICAL & SURGICAL HISTORY:  CAD (coronary artery disease): (-) cardiac stress and 2DECHO 2019  Diastolic CHF: LVEF 55-60% 6/2019  Chronic anemia  Afib: Not on A/C 2/2 hx of GI bleeding  DDD (degenerative disc disease), lumbar  [place] (hard of hearing)  Fall (on) (from) other stairs and steps, sequela: 03/28/2017- lumbar hematoma  Essential hypertension  Nerve injury: complication from right TKR surgery, 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  Aortic root dilatation  MRSA (methicillin resistant Staphylococcus aureus): left forearm 2012  Lumbar degenerative disc disease  BPH associated with nocturia  Diverticulosis of intestine without bleeding, unspecified intestinal tract location  Gastroesophageal reflux disease, esophagitis presence not specified: Hx of GI bleed  Aortic valve insufficiency, unspecified etiology  CAMILA (obstructive sleep apnea): unable to tolerate nocturnal CPAP  Restless leg syndrome  Thoracic aortic aneurysm  HLD (hyperlipidemia)  S/P thoracic aortic aneurysm repair  S/P AVR (aortic valve replacement)  Status post cataract extraction, unspecified laterality: bilat  S/P debridement: left forearm -mrsa  History of fundoplication: 2000  Amputation finger: left index 1970  S/P knee replacement: left  S/P knee replacement: right  S/P shoulder surgery: right rotatotor cuff injury    FAMILY HISTORY:  Family history of lung cancer    Social Hx:  Nonsmoker, no drug or alcohol use    MEDICATIONS  (STANDING):  atorvastatin 20 milliGRAM(s) Oral at bedtime  azithromycin  IVPB 500 milliGRAM(s) IV Intermittent every 24 hours  brimonidine 0.2% Ophthalmic Solution 1 Drop(s) Left EYE two times a day  donepezil 10 milliGRAM(s) Oral at bedtime  enoxaparin Injectable 40 milliGRAM(s) SubCutaneous daily  escitalopram 10 milliGRAM(s) Oral daily  memantine 10 milliGRAM(s) Oral daily  metoprolol tartrate 25 milliGRAM(s) Oral two times a day  pantoprazole    Tablet 40 milliGRAM(s) Oral before breakfast  timolol 0.5% Solution 1 Drop(s) Left EYE two times a day  traMADol 50 milliGRAM(s) Oral four times a day    Allergies  No Known Allergies    ROS: difficult to assess, pt severely hard of hearing    Vital Signs Last 24 Hrs  T(C): 36.1 (13 May 2020 11:31), Max: 36.5 (12 May 2020 18:18)  T(F): 97 (13 May 2020 11:31), Max: 97.7 (12 May 2020 18:18)  HR: 52 (13 May 2020 11:31) (52 - 59)  BP: 122/61 (13 May 2020 11:31) (122/61 - 132/83)  RR: 18 (13 May 2020 11:31) (17 - 18)  SpO2: 98% (13 May 2020 11:31) (96% - 98%)    PHYSICAL EXAM:  Constitutional: awake and alert, resting comfortably on his back, severely hard of hearing   HEENT: PERRLA, EOMI, [place]   Neck: Supple  Respiratory: Breath sounds are clear bilaterally  Cardiovascular: S1 and S2, regular rhythm  Gastrointestinal: soft, nontender  Extremities:  no edema  Vascular: distal pulses intact   Musculoskeletal: no joint swelling/tenderness, no abnormal movements, able to do straight leg raise   Skin: No rashes    Neurological exam:  HF: awake, alert, appears oriented, difficult to have a conversation due to pt being severly hard of hearing even with hearing aids and cochlear  implant   CN: PEARRL, EOMI, VFF, facial sensation normal, no NLFD, tongue midline, Palate moves equally, SCM equal bilaterally  Motor: moves all extremities antigravity with good strength   Sens: Intact to light touch  Reflexes: severe hyperreflexia b/l lower ext, crossing to the other side, upper ext WNL, no Carvalho's   Coord: unable to assess   Gait/Balance: Not tested       Labs:                        13.4   9.26  )-----------( 254      ( 13 May 2020 07:15 )             39.2     05-13    138  |  104  |  15  ----------------------------<  95  3.9   |  25  |  0.74    Ca    9.2      13 May 2020 07:15  Phos  3.2     05-13  Mg     1.8     05-13    RADIOLOGY:  < from: CT Chest, Abdomen and Pelvis w/ IV Cont (05.08.20 @ 15:21) >  CHEST:   LUNGS AND LARGE AIRWAYS: Patent central airways. Patchy infiltrate right upper lobe posteriorly. This is a new finding. Subsegmental atelectasis/scarring along the posterior medial aspect of left lower lobe, stable. Trace reticular scarring right lung base.  PLEURA: No pleural effusion.  VESSELS: Coronary artery calcification. No evidence of aortic dissection. Unremarkable central pulmonary arteries.  HEART: Heart size is normal. No pericardial effusion. Aortic valve prosthesis.  MEDIASTINUM AND KISHOR: No lymphadenopathy. Mild hiatal hernia.  CHEST WALL AND LOWER NECK: Within normal limits.    ABDOMEN AND PELVIS:  LIVER: Evidence of a few low density lesions that are too small to be characterized.  BILE DUCTS: Normal caliber.  GALLBLADDER: Cholelithiasis.  SPLEEN: 2 small hypodense lesions measuring 1 cm and 1.7 cm that cannot be characterized.  PANCREAS: Within normal limits.  ADRENALS: Within normal limits.  KIDNEYS/URETERS: A few small low densities involving the renal cortex bilaterally, too small to be characterized.    BLADDER: Suspect prior TURP.  REPRODUCTIVE ORGANS: Prostate within normal limits.    BOWEL: No bowel obstruction. Appendix is not visualized. No evidence of inflammation in the pericecal region.  PERITONEUM: No ascites.  VESSELS: Atherosclerotic changes.  RETROPERITONEUM/LYMPH NODES: No lymphadenopathy.    ABDOMINAL WALL: Small fat-containing umbilical hernia  BONES: Degenerative changes.    IMPRESSION:   No evidence of a posttraumatic abnormality.  Patchy right upper lobe infiltrate.    < from: US Duplex Venous Lower Ext Complete, Bilateral (05.13.20 @ 10:12) >  FINDINGS:  There is normal compressibility of the bilateral common femoral, femoral and popliteal veins.   Doppler examination shows normal spontaneous and phasic flow.  Right calf vein thrombosis is detected (gastrocnemius vein).  IMPRESSION:   Venous thrombosis in right lower extremity gastrocnemius vein.

## 2020-05-14 LAB
HCT VFR BLD CALC: 41.8 % — SIGNIFICANT CHANGE UP (ref 39–50)
HGB BLD-MCNC: 13.9 G/DL — SIGNIFICANT CHANGE UP (ref 13–17)
MCHC RBC-ENTMCNC: 31 PG — SIGNIFICANT CHANGE UP (ref 27–34)
MCHC RBC-ENTMCNC: 33.3 GM/DL — SIGNIFICANT CHANGE UP (ref 32–36)
MCV RBC AUTO: 93.3 FL — SIGNIFICANT CHANGE UP (ref 80–100)
NT-PROBNP SERPL-SCNC: 1582 PG/ML — HIGH (ref 0–450)
PLATELET # BLD AUTO: 241 K/UL — SIGNIFICANT CHANGE UP (ref 150–400)
RBC # BLD: 4.48 M/UL — SIGNIFICANT CHANGE UP (ref 4.2–5.8)
RBC # FLD: 14.7 % — HIGH (ref 10.3–14.5)
WBC # BLD: 9.85 K/UL — SIGNIFICANT CHANGE UP (ref 3.8–10.5)
WBC # FLD AUTO: 9.85 K/UL — SIGNIFICANT CHANGE UP (ref 3.8–10.5)

## 2020-05-14 PROCEDURE — 99232 SBSQ HOSP IP/OBS MODERATE 35: CPT

## 2020-05-14 PROCEDURE — 99233 SBSQ HOSP IP/OBS HIGH 50: CPT

## 2020-05-14 RX ORDER — CEFUROXIME AXETIL 250 MG
250 TABLET ORAL EVERY 12 HOURS
Refills: 0 | Status: COMPLETED | OUTPATIENT
Start: 2020-05-15 | End: 2020-05-16

## 2020-05-14 RX ORDER — OXYCODONE HYDROCHLORIDE 5 MG/1
5 TABLET ORAL EVERY 6 HOURS
Refills: 0 | Status: DISCONTINUED | OUTPATIENT
Start: 2020-05-14 | End: 2020-05-21

## 2020-05-14 RX ORDER — CYCLOBENZAPRINE HYDROCHLORIDE 10 MG/1
10 TABLET, FILM COATED ORAL THREE TIMES A DAY
Refills: 0 | Status: DISCONTINUED | OUTPATIENT
Start: 2020-05-14 | End: 2020-05-21

## 2020-05-14 RX ADMIN — Medication 25 MILLIGRAM(S): at 06:43

## 2020-05-14 RX ADMIN — TRAMADOL HYDROCHLORIDE 50 MILLIGRAM(S): 50 TABLET ORAL at 06:42

## 2020-05-14 RX ADMIN — DONEPEZIL HYDROCHLORIDE 10 MILLIGRAM(S): 10 TABLET, FILM COATED ORAL at 23:04

## 2020-05-14 RX ADMIN — Medication 1 DROP(S): at 06:43

## 2020-05-14 RX ADMIN — BRIMONIDINE TARTRATE 1 DROP(S): 2 SOLUTION/ DROPS OPHTHALMIC at 17:54

## 2020-05-14 RX ADMIN — BRIMONIDINE TARTRATE 1 DROP(S): 2 SOLUTION/ DROPS OPHTHALMIC at 06:43

## 2020-05-14 RX ADMIN — CYCLOBENZAPRINE HYDROCHLORIDE 10 MILLIGRAM(S): 10 TABLET, FILM COATED ORAL at 14:13

## 2020-05-14 RX ADMIN — CEFTRIAXONE 1000 MILLIGRAM(S): 500 INJECTION, POWDER, FOR SOLUTION INTRAMUSCULAR; INTRAVENOUS at 11:11

## 2020-05-14 RX ADMIN — ESCITALOPRAM OXALATE 10 MILLIGRAM(S): 10 TABLET, FILM COATED ORAL at 11:12

## 2020-05-14 RX ADMIN — TRAMADOL HYDROCHLORIDE 50 MILLIGRAM(S): 50 TABLET ORAL at 12:43

## 2020-05-14 RX ADMIN — APIXABAN 10 MILLIGRAM(S): 2.5 TABLET, FILM COATED ORAL at 06:43

## 2020-05-14 RX ADMIN — OXYCODONE HYDROCHLORIDE 5 MILLIGRAM(S): 5 TABLET ORAL at 11:12

## 2020-05-14 RX ADMIN — LIDOCAINE 1 APPLICATION(S): 4 CREAM TOPICAL at 16:24

## 2020-05-14 RX ADMIN — Medication 1 DROP(S): at 17:54

## 2020-05-14 RX ADMIN — APIXABAN 10 MILLIGRAM(S): 2.5 TABLET, FILM COATED ORAL at 17:53

## 2020-05-14 RX ADMIN — TRAMADOL HYDROCHLORIDE 50 MILLIGRAM(S): 50 TABLET ORAL at 23:04

## 2020-05-14 RX ADMIN — MEMANTINE HYDROCHLORIDE 10 MILLIGRAM(S): 10 TABLET ORAL at 11:12

## 2020-05-14 RX ADMIN — TRAMADOL HYDROCHLORIDE 50 MILLIGRAM(S): 50 TABLET ORAL at 17:54

## 2020-05-14 RX ADMIN — Medication 25 MILLIGRAM(S): at 17:53

## 2020-05-14 RX ADMIN — ATORVASTATIN CALCIUM 20 MILLIGRAM(S): 80 TABLET, FILM COATED ORAL at 23:04

## 2020-05-14 RX ADMIN — PANTOPRAZOLE SODIUM 40 MILLIGRAM(S): 20 TABLET, DELAYED RELEASE ORAL at 06:43

## 2020-05-14 NOTE — CONSULT NOTE ADULT - REASON FOR ADMISSION
RUL Pna, Leukocytosis  Weakness, Fall   Chronic Back pain

## 2020-05-14 NOTE — CONSULT NOTE ADULT - SUBJECTIVE AND OBJECTIVE BOX
HPI:  Pt is an 83 y/o M with a PMHx of CAD, diastolic CHF, chronic anemia, chronic back pain, Afib, DDD, HTN, MRSA, BPH, GERD, diverticulosis, CAMILA, thoracic aortic aneurysm, [place] who  presents via  EMS after slipping and falling out of his recliner chair.  Pt has severe back pain and due to the COVID -19 pandemic he was  unable to get an epidural.      His pain is so severe, he spends most of the day in his recliner chair.  Pt's wife call 911, as he had fallen out of his recliner chair three times in the last 24 hours.  No preceding cpain, no SOB,  no  LOC or syncope.  No fever or other complaints.         In the ED, pt is found to have a RUL Pna on CT scan and leukocytosis.       NKDA. (08 May 2020 18:20)      PAST MEDICAL & SURGICAL HISTORY:  CAD (coronary artery disease): (-) cardiac stress and 2DECHO 2019  Diastolic CHF: LVEF 55-60% 6/2019  Chronic anemia  Afib: Not on A/C 2/2 hx of GIbleeding  DDD (degenerative disc disease), lumbar  [place] (hard of hearing)  Fall (on) (from) other stairs and steps, sequela: 03/28/2017- lumbar hemtoma  Essential hypertension  Nerve injury: complication from right TKR surgery, 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  Aortic root dilatation  MRSA (methicillin resistant Staphylococcus aureus): left forearm 2012  Lumbar degenerative disc disease  BPH associated with nocturia  Diverticulosis of intestine without bleeding, unspecified intestinal tract location  Gastroesophageal reflux disease, esophagitis presence not specified: Hx of GI bleed  Aortic valve insufficiency, unspecified etiology  CAMILA (obstructive sleep apnea): unable to tolerate nocturnal CPAP  Restless leg syndrome  Thoracic aortic aneurysm  HLD (hyperlipidemia)  S/P thoracic aortic aneurysm repair  S/P AVR (aortic valve replacement)  Status post cataract extraction, unspecified laterality: bilat  S/P debridement: left forearm -mrsa  History of fundoplication: 2000  Amputation finger: left index 1970  S/P knee replacement: left  S/P knee replacement: right  S/P shoulder surgery: right rotatotor cuff injury      MEDICATIONS  (STANDING):  apixaban 10 milliGRAM(s) Oral every 12 hours  atorvastatin 20 milliGRAM(s) Oral at bedtime  brimonidine 0.2% Ophthalmic Solution 1 Drop(s) Left EYE two times a day  donepezil 10 milliGRAM(s) Oral at bedtime  escitalopram 10 milliGRAM(s) Oral daily  memantine 10 milliGRAM(s) Oral daily  metoprolol tartrate 25 milliGRAM(s) Oral two times a day  pantoprazole    Tablet 40 milliGRAM(s) Oral before breakfast  timolol 0.5% Solution 1 Drop(s) Left EYE two times a day  traMADol 50 milliGRAM(s) Oral four times a day    MEDICATIONS  (PRN):  acetaminophen   Tablet .. 650 milliGRAM(s) Oral every 4 hours PRN Temp greater or equal to 38.5C (101.3F)  cyclobenzaprine 10 milliGRAM(s) Oral three times a day PRN Muscle Spasm  lidocaine 4% Cream 1 Application(s) Topical three times a day PRN for moderate pain  ondansetron   Disintegrating Tablet 4 milliGRAM(s) Oral three times a day PRN for nausea  oxyCODONE    IR 5 milliGRAM(s) Oral every 6 hours PRN Moderate Pain (4 - 6)  rOPINIRole 2 milliGRAM(s) Oral four times a day PRN Restless legs      Allergies    No Known Allergies    Intolerances        SOCIAL HISTORY:    FAMILY HISTORY:  Family history of lung cancer   Non-contributory    REVIEW OF SYSTEMS      General:	    Respiratory and Thorax:  	  Cardiovascular:	    Gastrointestinal:	    Musculoskeletal:	   Vital Signs Last 24 Hrs  T(C): 36.1 (14 May 2020 11:22), Max: 36.7 (14 May 2020 05:00)  T(F): 97 (14 May 2020 11:22), Max: 98 (14 May 2020 05:00)  HR: 61 (14 May 2020 11:22) (50 - 64)  BP: 117/88 (14 May 2020 11:22) (117/88 - 125/87)  BP(mean): --  RR: 18 (14 May 2020 11:22) (18 - 19)  SpO2: 97% (14 May 2020 11:22) (94% - 97%)    HEENT :No Pallor.No icterus. EOMI,PERLAA  Chest : Clear to Auscultation  CVS : S1S2 Normal.No murmurs.  Abdomen: Soft.Non tender .Normal bowel sounds.No Organomegaly.  CNS: Alert.Oriented to Time,Place and Person.No focal deficit.  EXT: Normal Range of motion.No pitting edema.    LABS:                        13.9   9.85  )-----------( 241      ( 14 May 2020 06:46 )             41.8     05-13    138  |  104  |  15  ----------------------------<  95  3.9   |  25  |  0.74    Ca    9.2      13 May 2020 07:15  Phos  3.2     05-13  Mg     1.8     05-13            RADIOLOGY & ADDITIONAL STUDIES:

## 2020-05-14 NOTE — PROGRESS NOTE ADULT - SUBJECTIVE AND OBJECTIVE BOX
SUBJECTIVE     Patient breathing comfortably at rest with out 02   no fever spikes   started on the anticoagulation with the eloquis after discussion with the family   completed 5 days azithromycin yesterday   hard of hearing and denies cough or wheeze or sob at rest     PAST MEDICAL & SURGICAL HISTORY:  CAD (coronary artery disease): (-) cardiac stress and 2DECHO 2019  Diastolic CHF: LVEF 55-60% 6/2019  Chronic anemia  Afib: Not on A/C 2/2 hx of GIbleeding  DDD (degenerative disc disease), lumbar  [place] (hard of hearing)  Fall (on) (from) other stairs and steps, sequela: 03/28/2017- lumbar hemtoma  Essential hypertension  Nerve injury: complication from right TKR surgery, 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  Aortic root dilatation  MRSA (methicillin resistant Staphylococcus aureus): left forearm 2012  Lumbar degenerative disc disease  BPH associated with nocturia  Diverticulosis of intestine without bleeding, unspecified intestinal tract location  Gastroesophageal reflux disease, esophagitis presence not specified: Hx of GI bleed  Aortic valve insufficiency, unspecified etiology  CAMILA (obstructive sleep apnea): unable to tolerate nocturnal CPAP  Restless leg syndrome  Thoracic aortic aneurysm  HLD (hyperlipidemia)  S/P thoracic aortic aneurysm repair  S/P AVR (aortic valve replacement)  Status post cataract extraction, unspecified laterality: bilat  S/P debridement: left forearm -mrsa  History of fundoplication: 2000  Amputation finger: left index 1970  S/P knee replacement: left  S/P knee replacement: right  S/P shoulder surgery: right rotatotor cuff injury    OBJECTIVE   Vital Signs Last 24 Hrs  T(C): 36.7 (14 May 2020 05:00), Max: 36.7 (14 May 2020 05:00)  T(F): 98 (14 May 2020 05:00), Max: 98 (14 May 2020 05:00)  HR: 64 (14 May 2020 05:00) (50 - 64)  BP: 125/87 (14 May 2020 05:00) (120/87 - 125/87)  BP(mean): --  RR: 19 (14 May 2020 05:00) (18 - 19)  SpO2: 94% (14 May 2020 05:00) (94% - 98%)    PHYSICAL EXAM:  Constitutional: , awake and alert, not in distress and not on the 02   HEENT: Normo cephalic atraumatic  Neck: Soft and supple, No J.V.D   Respiratory: vesicular breathing ,poor inspiratory effort   Cardiovascular: S1 and S2, regular rate .   Gastrointestinal:  soft, nontender,   Extremities: No  edema or calf tenderness .  Neurological: No new  focal deficits.    MEDICATIONS  (STANDING):  apixaban 10 milliGRAM(s) Oral every 12 hours  atorvastatin 20 milliGRAM(s) Oral at bedtime  brimonidine 0.2% Ophthalmic Solution 1 Drop(s) Left EYE two times a day  cefTRIAXone Injectable. 1000 milliGRAM(s) IV Push every 24 hours  donepezil 10 milliGRAM(s) Oral at bedtime  escitalopram 10 milliGRAM(s) Oral daily  memantine 10 milliGRAM(s) Oral daily  metoprolol tartrate 25 milliGRAM(s) Oral two times a day  pantoprazole    Tablet 40 milliGRAM(s) Oral before breakfast  timolol 0.5% Solution 1 Drop(s) Left EYE two times a day  traMADol 50 milliGRAM(s) Oral four times a day                            13.9   9.85  )-----------( 241      ( 14 May 2020 06:46 )             41.8     05-13    138  |  104  |  15  ----------------------------<  95  3.9   |  25  |  0.74    Ca    9.2      13 May 2020 07:15  Phos  3.2     05-13  Mg     1.8     05-13    < from: US Duplex Venous Lower Ext Complete, Bilateral (05.13.20 @ 10:12) >  EXAM:  US DPLX LWR EXT VEINS COMPL BI                            PROCEDURE DATE:  05/13/2020          INTERPRETATION:  CLINICAL INFORMATION: Medical clearance, elevated d-dimer    COMPARISON: None available.    TECHNIQUE: Duplex sonography of the BILATERAL LOWER extremity veins with color and spectral Doppler, with and without compression.      FINDINGS:    There is normal compressibility of the bilateral common femoral, femoral and popliteal veins.     Doppler examination shows normal spontaneous and phasic flow.    Right calf vein thrombosis is detected (gastrocnemius vein).    IMPRESSION:     Venous thrombosis in right lower extremity gastrocnemius vein.      < from: CT Chest w/ IV Cont (05.08.20 @ 15:21) >  INDINGS:    CHEST:     LUNGS AND LARGE AIRWAYS: Patent central airways. Patchy infiltrate right upper lobe posteriorly. Thisis a new finding. Subsegmental atelectasis/scarring along the posterior medial aspect of left lower lobe, stable. Trace reticular scarring right lung base.  PLEURA: No pleural effusion.  VESSELS: Coronary artery calcification. No evidence of aortic dissection. Unremarkable central pulmonary arteries.  HEART: Heart size is normal. No pericardial effusion. Aortic valve prosthesis.  MEDIASTINUM AND KISHOR: No lymphadenopathy. Mild hiatal hernia.  CHEST WALL AND LOWER NECK: Within normal limits.    ABDOMEN AND PELVIS:    LIVER: Evidence of a few low density lesions that are too small to be characterized.  BILE DUCTS: Normal caliber.  GALLBLADDER: Cholelithiasis.  SPLEEN: 2 small hypodense lesions measuring 1 cm and 1.7 cm that cannot be characterized.  PANCREAS: Within normal limits.  ADRENALS: Within normal limits.  KIDNEYS/URETERS: A few small low densities involving the renal cortex bilaterally, too small to be characterized.    BLADDER: Suspect prior TURP.  REPRODUCTIVE ORGANS: Prostate within normal limits.    BOWEL: No bowel obstruction. Appendix is not visualized. No evidence of inflammation in the pericecal region.  PERITONEUM: No ascites.  VESSELS: Atherosclerotic changes.  RETROPERITONEUM/LYMPH NODES: No lymphadenopathy.    ABDOMINAL WALL: Small fat-containing umbilical hernia  BONES: Degenerative changes.    IMPRESSION:     No evidence of a posttraumatic abnormality.    Patchy right upper lobe infiltrate.    < end of copied text >

## 2020-05-14 NOTE — PROGRESS NOTE ADULT - ASSESSMENT
-  right upper lobe infiltrate likely infectious in etiology on IV antibiotics for  community-acquired pneumonia  with Rocephin and azithromycin with improvement in the WBC count  -   Covid testing is negative . patient completed 5 days of azithromycin   -   calf  vein thrombus with high D-dimers cannot exclude associated pulmonary embolism  -   atrial fibrillation as per the chart not anticoagulated with history of GI bleeding in the past   -   moderate pulmonary hypertension likely secondary noted with the last echocardiogram  -   severe back pain with history of recurrent falls  -   mild atelectatic changes in the left base  -   reported history of obstructive sleep apnea as per charting patient is not using his CPAP.  -   other issues include hiatal hernia, history of aortic valve replacement and aneurysm repair    PLAN     -  continue with the rocephin for today and discontinue azithromycin as he completed 5 day course   -  repeat blood cultures were negative so far negative   - continue with the eloquis while monitoring for the G.I bleeding   - can not exclude preceding covid infection even though current testing is negative .  -  02 supplementation for the hypoxia if needed   -  management of back pain and other issues that include diastolic dysfunction as per the medicine . moderate pulmonary HTN is secondary to cardiac disease .

## 2020-05-14 NOTE — PROGRESS NOTE ADULT - SUBJECTIVE AND OBJECTIVE BOX
HOSPITALIST PROGRESS NOTE:  DOS: 5/10/2020  SUBJECTIVE:  no complaints; sating 95% on room air  Chief Complaint: Patient is a 84y old  Male who presents with a chief complaint of RUL Pna, Leukocytosis  Weakness, Fall, Chronic Back pain (08 May 2020 18:20)    HPI: Pt is an 85 y/o M with a PMHx of CAD, diastolic CHF, chronic anemia, chronic back pain, Afib, DDD, HTN, MRSA, BPH, GERD, diverticulosis, CAMILA, thoracic aortic aneurysm, [place] who  presents via  EMS after slipping and falling out of his recliner chair.  Pt has severe back pain and due to the COVID -19 pandemic he was  unable to get an epidural.      His pain is so severe, he spends most of the day in his recliner chair.  Pt's wife call 911, as he had fallen out of his recliner chair three times in the last 24 hours.  No preceding cpain, no SOB,  no  LOC or syncope.  No fever or other complaints.         In the ED, pt is found to have a RUL Pna on CT scan and leukocytosis.      5/12: Patient has no complaints for me and very sleepy; but as per the staff here patient complaining of back pain  5/13: patient found to have right DVT;  No other events. Dsicussed in length with patients wife   5/14:  Paitent still having pain despite getting tramadol and flexeril; wife is agreeable to starting oxycodone    Allergies:  No Known Allergies    REVIEW OF SYSTEMS:  See HPI. All other review of systems is negative unless indicated above.     OBJECTIVE  Physical Exam:  Vital Signs   Vital Signs Last 24 Hrs  T(C): 36.1 (14 May 2020 11:22), Max: 36.7 (14 May 2020 05:00)  T(F): 97 (14 May 2020 11:22), Max: 98 (14 May 2020 05:00)  HR: 61 (14 May 2020 11:22) (50 - 64)  BP: 117/88 (14 May 2020 11:22) (117/88 - 125/87)  BP(mean): --  RR: 18 (14 May 2020 11:22) (18 - 19)  SpO2: 97% (14 May 2020 11:22) (94% - 97%)    Constitutional: NAD, awake and alert, well-developed  Neurological: no focal deficits; [place], confused   HEENT: PERRLA, EOMI, MMM  Neck: Soft and supple, No LAD, No JVD  Respiratory: Breath sounds are clear bilaterally, No wheezing, rales or rhonchi  Cardiovascular: S1 and S2, regular rate and rhythm; no Murmurs, gallops or rubs  Gastrointestinal: Bowel Sounds present, soft, nontender, nondistended, no guarding, no rebound tenderness  Back: No CVA tenderness   Extremities: No peripheral edema  Vascular: 2+ peripheral pulses  Musculoskeletal: 5/5 strength b/l upper and lower extremities  Skin: No rashes  Breast: Deferred  Rectal: Deferred    MEDICATIONS  (STANDING):  atorvastatin 20 milliGRAM(s) Oral at bedtime  azithromycin  IVPB 500 milliGRAM(s) IV Intermittent every 24 hours  brimonidine 0.2% Ophthalmic Solution 1 Drop(s) Left EYE two times a day  cefTRIAXone Injectable. 1000 milliGRAM(s) IV Push every 24 hours  donepezil 10 milliGRAM(s) Oral at bedtime  enoxaparin Injectable 40 milliGRAM(s) SubCutaneous daily  escitalopram 10 milliGRAM(s) Oral daily  memantine 10 milliGRAM(s) Oral daily  metoprolol tartrate 25 milliGRAM(s) Oral two times a day  pantoprazole    Tablet 40 milliGRAM(s) Oral before breakfast  timolol 0.5% Solution 1 Drop(s) Left EYE two times a day    MEDICATIONS  (PRN):  acetaminophen   Tablet .. 650 milliGRAM(s) Oral every 4 hours PRN Temp greater or equal to 38.5C (101.3F)  lidocaine 4% Cream 1 Application(s) Topical three times a day PRN for moderate pain  ondansetron   Disintegrating Tablet 4 milliGRAM(s) Oral three times a day PRN for nausea  rOPINIRole 2 milliGRAM(s) Oral four times a day PRN Restless legs  traMADol 50 milliGRAM(s) Oral every 8 hours PRN pain      LABS:   Lab Results:  CBC  CBC Full  -  ( 14 May 2020 06:46 )  WBC Count : 9.85 K/uL  RBC Count : 4.48 M/uL  Hemoglobin : 13.9 g/dL  Hematocrit : 41.8 %  Platelet Count - Automated : 241 K/uL  Mean Cell Volume : 93.3 fl  Mean Cell Hemoglobin : 31.0 pg  Mean Cell Hemoglobin Concentration : 33.3 gm/dL  Auto Neutrophil # : x  Auto Lymphocyte # : x  Auto Monocyte # : x  Auto Eosinophil # : x  Auto Basophil # : x  Auto Neutrophil % : x  Auto Lymphocyte % : x  Auto Monocyte % : x  Auto Eosinophil % : x  Auto Basophil % : x    .		Differential:	[] Automated		[] Manual  Chemistry                        13.9   9.85  )-----------( 241      ( 14 May 2020 06:46 )             41.8     05-13    138  |  104  |  15  ----------------------------<  95  3.9   |  25  |  0.74    Ca    9.2      13 May 2020 07:15  Phos  3.2     05-13  Mg     1.8     05-13    MICROBIOLOGY/CULTURES:  Culture Results:   No growth to date. (05-10 @ 17:35)  Culture Results:   No growth to date. (05-10 @ 17:35)  Culture Results:   Growth in anaerobic bottle: Bacillus species not anthracis  "Susceptibilities not performed"  Growth in aerobic bottle: Staphylococcus hominis  Coag Negative Staphylococcus  Single set isolate, possible contaminant. Contact  Microbiology if susceptibility testing clinically  indicated.  "Due to technical problems, Proteus sp. will Not be reported as part of  the BCID panel until further notice"  ***Blood Panel PCR results on this specimen are available  approximately 3 hours after the Gram stain result.***  Gram stain, PCR, and/or culture results may not always  correspond due to difference in methodologies.  ************************************************************  This PCR assay was performed using [employer]  The following targets are tested for: Enterococcus,  vancomycin resistant enterococci, Listeria monocytogenes,  coagulase negative staphylococci, S. aureus,  methicillin resistant S. aureus, Streptococcus agalactiae  (Group B), S. pneumoniae, S. pyogenes (Group A),  Acinetobacter baumannii, Enterobacter cloacae, E. coli,  Klebsiella oxytoca, K. pneumoniae, Proteus sp.,  Serratia marcescens, Haemophilus influenzae,  Neisseria meningitidis, Pseudomonas aeruginosa, Candida  albicans, C. glabrata, C krusei, C parapsilosis,  C. tropicalis and the KPC resistance gene. (05-08 @ 16:14)  Culture Results:   <10,000 CFU/mL Normal Urogenital Mitzi (05-08 @ 14:58)      D-Dimer Assay, Quantitative (05.08.20 @ 20:15)    D-Dimer Assay, Quantitative: 1771 ng/mL DDU    Ferritin, Serum (05.08.20 @ 20:15)    Ferritin, Serum: 395 ng/mL    Lactate, Blood (05.08.20 @ 16:14)    Lactate, Blood: 1.5 mmol/L    Sedimentation Rate, Erythrocyte (05.08.20 @ 20:15)    Sedimentation Rate, Erythrocyte: 44 mm/hr    CARDIAC MARKERS ( 08 May 2020 20:15 )  x     / x     / 76 U/L / x     / x        COVID-19 PCR . (05.08.20 @ 16:14)    COVID-19 PCR: NotDetec:    RADIOLOGY/EKG:    < from: CT Abdomen and Pelvis w/ IV Cont (05.08.20 @ 15:21) >  < from: CT Chest w/ IV Cont (05.08.20 @ 15:21) >        IMPRESSION:   No evidence of a posttraumatic abnormality.  Patchy right upper lobe infiltrate.  < end of copied text >      < from: CT Cervical Spine No Cont (05.08.20 @ 15:09) >  IMPRESSION:  No acute findings    < from: CT Head No Cont (05.08.20 @ 15:07) >  Impression:  Unremarkable noncontrast CT scan of the brain.  < end of copied text >    < from: US Duplex Venous Lower Ext Complete, Bilateral (05.13.20 @ 10:12) >    IMPRESSION:     Venous thrombosis in right lower extremity gastrocnemius vein.      < end of copied text >

## 2020-05-14 NOTE — PROGRESS NOTE ADULT - ASSESSMENT
Pt is an 85 y/o M with a PMHx of CAD, diastolic CHF, chronic anemia, chronic back pain, Afib, DDD, HTN, MRSA, BPH, GERD, diverticulosis, CAMILA, thoracic aortic aneurysm, [place] admitted on 5/8 for evaluation after falling out of his recliner chair; is coughing; history per medical record as patient unable to provide history.   1. Patient admitted for fall, found to have pneumonia, which will treat as community acquired pneumonia  - COVID-19 not identified upon admission  - follow up cultures   - serial cbc and monitor temperature   - oxygen and nebs as needed   - reviewed prior medical records to evaluate for resistant or atypical pathogens   - iv hydration and supportive care   - agree with ceftriaxone and zithromax as ordered, day #5; will switch to ceftin for 2 more days rx  - tolerating antibiotics without rashes or side effects   - coagulase negative staph in blood is consistent with skin contaminant, no need for further vancomycin; Bacillus species also skin contaminant  2. other issues: per medicine

## 2020-05-14 NOTE — PROGRESS NOTE ADULT - SUBJECTIVE AND OBJECTIVE BOX
Patient is a 84y old  Male who presents with a chief complaint of RUL Pna, Leukocytosis, Weakness, Fall, Chronic Back pain (14 May 2020 14:26)      HPI:  Pt is an 83 y/o M with a PMHx of CAD, diastolic CHF, chronic anemia, chronic back pain, Afib, DDD, HTN, MRSA, BPH, GERD, diverticulosis, CAMILA, thoracic aortic aneurysm, [place] who  presented via  EMS after slipping and falling out of his recliner chair.  Pt has chronic severe back pain and due to the COVID -19 pandemic he was  unable to get an epidural.       His pain is so severe, he spends most of the day in his recliner chair.  Pt's wife call 911, as he had fallen out of his recliner chair three times in the 24 hours.    No preceding chest pain, no SOB,  no  LOC or syncope.  No fever or other complaints.      Pt was admitted to the Hospitalist service with Right upper lobe pneumonia and was found to have a Right lower ext DVT.  Pt has a h/o severe lower back pain, as per his wife he follows with Dr. Billy Motta a pain management physician. He had on steroid injection in the past but was unable to have another due to the COVID Crisis. Pt does spend most of his day in the recliner at home, he has only a few steps to the bathroom and was unable to make it the other day due to severe pain. Pt is extremely hard of hearing and cannot have a conversation or follow commands. He has been on tramadol at home for pain. Wife states he has not has issues with incontinence at home.     5/14 - Pt seen and examined earlier today, in NAD. No new complaints, no overnight events       acetaminophen   Tablet .. 650 milliGRAM(s) Oral every 4 hours PRN  apixaban 10 milliGRAM(s) Oral every 12 hours  atorvastatin 20 milliGRAM(s) Oral at bedtime  brimonidine 0.2% Ophthalmic Solution 1 Drop(s) Left EYE two times a day  cyclobenzaprine 10 milliGRAM(s) Oral three times a day PRN  donepezil 10 milliGRAM(s) Oral at bedtime  escitalopram 10 milliGRAM(s) Oral daily  lidocaine 4% Cream 1 Application(s) Topical three times a day PRN  memantine 10 milliGRAM(s) Oral daily  metoprolol tartrate 25 milliGRAM(s) Oral two times a day  ondansetron   Disintegrating Tablet 4 milliGRAM(s) Oral three times a day PRN  oxyCODONE    IR 5 milliGRAM(s) Oral every 6 hours PRN  pantoprazole    Tablet 40 milliGRAM(s) Oral before breakfast  rOPINIRole 2 milliGRAM(s) Oral four times a day PRN  timolol 0.5% Solution 1 Drop(s) Left EYE two times a day  traMADol 50 milliGRAM(s) Oral four times a day      Vital Signs Last 24 Hrs  T(C): 36.1 (14 May 2020 11:22), Max: 36.7 (14 May 2020 05:00)  T(F): 97 (14 May 2020 11:22), Max: 98 (14 May 2020 05:00)  HR: 61 (14 May 2020 11:22) (50 - 64)  BP: 117/88 (14 May 2020 11:22) (117/88 - 125/87)  RR: 18 (14 May 2020 11:22) (18 - 19)  SpO2: 97% (14 May 2020 11:22) (94% - 97%)                          13.9   9.85  )-----------( 241      ( 14 May 2020 06:46 )             41.8     138  |  104  |  15  ----------------------------<  95  3.9   |  25  |  0.74    Ca    9.2      13 May 2020 07:15  Phos  3.2     05-13  Mg     1.8     05-13

## 2020-05-14 NOTE — PROGRESS NOTE ADULT - ASSESSMENT
83 yo M Pt is admitted w/    #Weakness secondary to possible CAP, gram pos/ gram neg VS  COVID19 viral pna  #Weakness, Fall   - result COVID- 19 testing in the ED is neg   - CT chest RUL infiltrate   - Continue Rocephin IV 1gm qd #5 switch to ceftin X 2 more days   - S/P Zithromax  mg qd  - Sating well on room air   - crp coming down    #Elevated D-dimer 2ndry to Right DVT  -start Eliquis and monitor for bleeding  -patient has hx of GI bleeding and Liver hemangiomas; off Blood thinners  -Discussed with Dr Mendiola and advised to start eliquis and monitor for GI Bleed  -Discussed with wife and understands there is a chance he might bleed but also a risk of him getting PE  -If he bleeds in the future then he may be a candidate for IVC filter    #Acute on Chronic Back pain  - Hx of Epidural injections in the past  - Ortho spine consult noted  - PT evaluation  - probably not a candidate for MRI due to cochlear impants  - tramadol 50mg Q6H Standing along with flexeril;   - increase Flexeril 10mg Q8h PRN  - start Oxycodone 10mg PO Q6H PRN    #positive cultures  -coagulase negative staph in blood is consistent with skin contaminant, no need for further Vanco  -repeat blood cultures NGTD    #CAD/AFib  - As noted not on A/C due to recent GI bleeding but will try eliquis now due to DVT  - Cont. BB    #Hx Chronic diastolic HF   - euvolemic/compensated  - daily weights / I&Os   - lasix was d/c as outpatient by his PCP and Cardiologist due increase urinary frequency      #Hyperlipidemia  - Cont. statin     # DVT PPx:   -c/w lovenox    # Goals of Care:   - Full  Code

## 2020-05-14 NOTE — PROGRESS NOTE ADULT - ASSESSMENT
Pt is an 83 y/o M with a PMH of CAD, diastolic CHF, chronic anemia, chronic back pain, Afib, DDD, HTN, MRSA, BPH, GERD, diverticulosis, CAMILA, thoracic aortic aneurysm, [place] who  presented via  EMS after slipping and falling out of his recliner chair. Pt was admitted to the Hospitalist service with Right upper lobe pneumonia and was found to have a Right lower ext DVT. Pt has a h/o severe lower back pain, as per his wife he follows with Dr. Billy Motta a pain management physician. He had on steroid injection in the past but was unable to have another due to the COVID Crisis    PLAN:  - No MRI 2nd to cochlear implant  - Could benefit from CT myelogram  - Discussed with patients wife who agreed to procedure  - Consulted with IR, awaiting reply  - Would need to hold Eliquis and start heparin gtt  - Pain mgmt  - Will follow     Discussed with Dr. Smith

## 2020-05-14 NOTE — CONSULT NOTE ADULT - ASSESSMENT
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
Pt is an 83 y/o M with a PMHx of CAD, diastolic CHF, chronic anemia, chronic back pain, Afib, DDD, HTN, MRSA, BPH, GERD, diverticulosis, CAMILA, thoracic aortic aneurysm, [place] admitted on 5/8 for evaluation after falling out of his recliner chair; is coughing; history per medical record as patient unable to provide history.   1. Patient admitted for fall, found to have pneumonia, which will treat as community acquired pneumonia  - COVID-19 not identified upon admission  - follow up cultures   - serial cbc and monitor temperature   - oxygen and nebs as needed   - reviewed prior medical records to evaluate for resistant or atypical pathogens   - iv hydration and supportive care   - agree with ceftriaxone and zithromax as ordered  - coagulase negative staph in blood is consistent with skin contaminant, no need for further vancomycin  2. other issues: per medicine
Pt is an 85 y/o M with a PMHx of CAD, diastolic CHF, chronic anemia, chronic back pain, Afib, DDD, HTN, MRSA, BPH, GERD, diverticulosis, CAMILA, thoracic aortic aneurysm, [place] who  presented via  EMS after slipping and falling out of his recliner chair.    Pt was admitted to the Hospitalist service with Right upper lobe pneumonia and was found to have a Right lower ext DVT.  Pt has a h/o severe lower back pain, as per his wife he follows with Dr. Billy Motta a pain management physician. 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  Would benefit from a pain management consult and possible steroid injection while in hospital   Pt would also benefit from an MRI of his cervical, thorasic, and lumbar spine to assess if he is a surgical candidate   Unclear if pt can have an MRI b/c of cochlear implant, some implants are MRI compatible.  Would have to check with wife/family to see if they know which implant he has.   Continue pain management and physical therapy as tolerated.    Discussed with Dr. Smith
- right upper lobe infiltrate likely infectious in etiology on IV antibiotics for  community-acquired pneumonia  with Rocephin and azithromycin with improvement in the WBC count  -   Covid testing is negative   -  calf  vein thrombus with high D-dimers cannot exclude associated pulmonary embolism  -   atrial fibrillation as per the chart not anticoagulated with history of GI bleeding  -   moderate pulmonary hypertension likely secondary noted with the last echocardiogram  -   severe back pain with history of recurrent falls  -   mild atelectatic changes in the left base  -   reported history of obstructive sleep apnea as per charting patient is not using his CPAP.  -   other issues include hiatal hernia, history of aortic valve replacement and aneurysm repair    PLAN     -  continue with antibiotic for CAP now and monitor for the fever spikes patient completed 5 days of azithromycin and consider for another 2 days   -  repeat blood cultures were negative   -  consider full anticoagulation for now while closely monitoring for the bleeding as the risk of propagation is high after discussion with the family   - can not exclude preceding covid infection even though current testing is negative .  -  02 supplementation for the hypoxia if needed   -  management of back pain and other issues that include diastolic dysfunction as per the medicine . moderated pulmonary HTN is secondary.

## 2020-05-15 LAB
APTT BLD: 122.1 SEC — CRITICAL HIGH (ref 27.5–36.3)
APTT BLD: 40.8 SEC — HIGH (ref 27.5–36.3)
HCT VFR BLD CALC: 42 % — SIGNIFICANT CHANGE UP (ref 39–50)
HCT VFR BLD CALC: 43 % — SIGNIFICANT CHANGE UP (ref 39–50)
HGB BLD-MCNC: 14.1 G/DL — SIGNIFICANT CHANGE UP (ref 13–17)
HGB BLD-MCNC: 14.2 G/DL — SIGNIFICANT CHANGE UP (ref 13–17)
INR BLD: 1.99 RATIO — HIGH (ref 0.88–1.16)
MCHC RBC-ENTMCNC: 31.3 PG — SIGNIFICANT CHANGE UP (ref 27–34)
MCHC RBC-ENTMCNC: 31.4 PG — SIGNIFICANT CHANGE UP (ref 27–34)
MCHC RBC-ENTMCNC: 33 GM/DL — SIGNIFICANT CHANGE UP (ref 32–36)
MCHC RBC-ENTMCNC: 33.6 GM/DL — SIGNIFICANT CHANGE UP (ref 32–36)
MCV RBC AUTO: 93.5 FL — SIGNIFICANT CHANGE UP (ref 80–100)
MCV RBC AUTO: 94.7 FL — SIGNIFICANT CHANGE UP (ref 80–100)
PLATELET # BLD AUTO: 241 K/UL — SIGNIFICANT CHANGE UP (ref 150–400)
PLATELET # BLD AUTO: 253 K/UL — SIGNIFICANT CHANGE UP (ref 150–400)
PROTHROM AB SERPL-ACNC: 22.6 SEC — HIGH (ref 10–12.9)
RBC # BLD: 4.49 M/UL — SIGNIFICANT CHANGE UP (ref 4.2–5.8)
RBC # BLD: 4.54 M/UL — SIGNIFICANT CHANGE UP (ref 4.2–5.8)
RBC # FLD: 14.8 % — HIGH (ref 10.3–14.5)
RBC # FLD: 15 % — HIGH (ref 10.3–14.5)
WBC # BLD: 10.93 K/UL — HIGH (ref 3.8–10.5)
WBC # BLD: 11.03 K/UL — HIGH (ref 3.8–10.5)
WBC # FLD AUTO: 10.93 K/UL — HIGH (ref 3.8–10.5)
WBC # FLD AUTO: 11.03 K/UL — HIGH (ref 3.8–10.5)

## 2020-05-15 PROCEDURE — 99232 SBSQ HOSP IP/OBS MODERATE 35: CPT

## 2020-05-15 PROCEDURE — 99233 SBSQ HOSP IP/OBS HIGH 50: CPT

## 2020-05-15 PROCEDURE — 72131 CT LUMBAR SPINE W/O DYE: CPT | Mod: 26

## 2020-05-15 PROCEDURE — 72128 CT CHEST SPINE W/O DYE: CPT | Mod: 26

## 2020-05-15 RX ORDER — HEPARIN SODIUM 5000 [USP'U]/ML
INJECTION INTRAVENOUS; SUBCUTANEOUS
Qty: 25000 | Refills: 0 | Status: DISCONTINUED | OUTPATIENT
Start: 2020-05-15 | End: 2020-05-18

## 2020-05-15 RX ORDER — OXYCODONE HYDROCHLORIDE 5 MG/1
2.5 TABLET ORAL EVERY 6 HOURS
Refills: 0 | Status: DISCONTINUED | OUTPATIENT
Start: 2020-05-15 | End: 2020-05-19

## 2020-05-15 RX ORDER — HEPARIN SODIUM 5000 [USP'U]/ML
6500 INJECTION INTRAVENOUS; SUBCUTANEOUS EVERY 6 HOURS
Refills: 0 | Status: DISCONTINUED | OUTPATIENT
Start: 2020-05-15 | End: 2020-05-18

## 2020-05-15 RX ORDER — HEPARIN SODIUM 5000 [USP'U]/ML
3000 INJECTION INTRAVENOUS; SUBCUTANEOUS EVERY 6 HOURS
Refills: 0 | Status: DISCONTINUED | OUTPATIENT
Start: 2020-05-15 | End: 2020-05-18

## 2020-05-15 RX ORDER — ACETAMINOPHEN 500 MG
1000 TABLET ORAL ONCE
Refills: 0 | Status: COMPLETED | OUTPATIENT
Start: 2020-05-15 | End: 2020-05-15

## 2020-05-15 RX ADMIN — OXYCODONE HYDROCHLORIDE 2.5 MILLIGRAM(S): 5 TABLET ORAL at 11:45

## 2020-05-15 RX ADMIN — OXYCODONE HYDROCHLORIDE 2.5 MILLIGRAM(S): 5 TABLET ORAL at 16:36

## 2020-05-15 RX ADMIN — DONEPEZIL HYDROCHLORIDE 10 MILLIGRAM(S): 10 TABLET, FILM COATED ORAL at 21:10

## 2020-05-15 RX ADMIN — Medication 1 DROP(S): at 06:56

## 2020-05-15 RX ADMIN — BRIMONIDINE TARTRATE 1 DROP(S): 2 SOLUTION/ DROPS OPHTHALMIC at 16:37

## 2020-05-15 RX ADMIN — Medication 1 DROP(S): at 16:37

## 2020-05-15 RX ADMIN — HEPARIN SODIUM 1200 UNIT(S)/HR: 5000 INJECTION INTRAVENOUS; SUBCUTANEOUS at 19:52

## 2020-05-15 RX ADMIN — TRAMADOL HYDROCHLORIDE 50 MILLIGRAM(S): 50 TABLET ORAL at 06:56

## 2020-05-15 RX ADMIN — OXYCODONE HYDROCHLORIDE 2.5 MILLIGRAM(S): 5 TABLET ORAL at 23:33

## 2020-05-15 RX ADMIN — Medication 250 MILLIGRAM(S): at 17:44

## 2020-05-15 RX ADMIN — Medication 250 MILLIGRAM(S): at 06:57

## 2020-05-15 RX ADMIN — CYCLOBENZAPRINE HYDROCHLORIDE 10 MILLIGRAM(S): 10 TABLET, FILM COATED ORAL at 17:43

## 2020-05-15 RX ADMIN — Medication 400 MILLIGRAM(S): at 00:56

## 2020-05-15 RX ADMIN — BRIMONIDINE TARTRATE 1 DROP(S): 2 SOLUTION/ DROPS OPHTHALMIC at 06:56

## 2020-05-15 RX ADMIN — ESCITALOPRAM OXALATE 10 MILLIGRAM(S): 10 TABLET, FILM COATED ORAL at 11:46

## 2020-05-15 RX ADMIN — PANTOPRAZOLE SODIUM 40 MILLIGRAM(S): 20 TABLET, DELAYED RELEASE ORAL at 06:57

## 2020-05-15 RX ADMIN — CYCLOBENZAPRINE HYDROCHLORIDE 10 MILLIGRAM(S): 10 TABLET, FILM COATED ORAL at 06:59

## 2020-05-15 RX ADMIN — MEMANTINE HYDROCHLORIDE 10 MILLIGRAM(S): 10 TABLET ORAL at 11:46

## 2020-05-15 RX ADMIN — Medication 25 MILLIGRAM(S): at 06:58

## 2020-05-15 RX ADMIN — ATORVASTATIN CALCIUM 20 MILLIGRAM(S): 80 TABLET, FILM COATED ORAL at 21:10

## 2020-05-15 RX ADMIN — HEPARIN SODIUM 1400 UNIT(S)/HR: 5000 INJECTION INTRAVENOUS; SUBCUTANEOUS at 12:39

## 2020-05-15 RX ADMIN — OXYCODONE HYDROCHLORIDE 5 MILLIGRAM(S): 5 TABLET ORAL at 08:28

## 2020-05-15 NOTE — PROGRESS NOTE ADULT - ASSESSMENT
-   right upper lobe infiltrate likely infectious in etiology   -   patient completed azithromycin and on po ceftin for the completion   -   calf  vein thrombus with high D-dimers cannot exclude associated pulmonary embolism on elouis   -   atrial fibrillation as per the chart not anticoagulated with history of GI bleeding in the past   -   moderate pulmonary hypertension likely secondary noted with the last echocardiogram  -   severe back pain with history of recurrent falls  -   mild atelectatic changes in the left base  -   reported history of obstructive sleep apnea as per charting patient is not using his CPAP.  -   other issues include hiatal hernia, history of aortic valve replacement and aneurysm repair    PLAN     -  complete ceftin couse and WBC shows improvement    -  repeat blood cultures were negative so far negative   - continue with the eloquis for calf thrombus  while monitoring for the G.I bleeding   -  02 supplementation for the hypoxia if needed   -  management of back pain and other issues that include diastolic dysfunction as per the medicine . moderate pulmonary HTN is secondary to cardiac disease .

## 2020-05-15 NOTE — PROGRESS NOTE ADULT - ASSESSMENT
83 yo M Pt is admitted w/    #Weakness secondary to possible CAP, gram pos/ gram neg VS  COVID19 viral pna  #Weakness, Fall   - result COVID- 19 testing in the ED is neg   - CT chest RUL infiltrate   - Continue Rocephin IV 1gm qd #5 switch to ceftin X 2 more days   - S/P Zithromax  mg qd  - Sating well on room air   - crp coming down    #Elevated D-dimer 2ndry to Right DVT  -patient has hx of GI bleeding and Liver hemangiomas; off Blood thinners  -Discussed with Dr Mendiola and advised to start eliquis and monitor for GI Bleed  -Discussed with wife and understands there is a chance he might bleed but also a risk of him getting PE  -If he bleeds in the future then he may be a candidate for IVC filter  -Hold  Eliquis and Switch to heparin Drip for CT myelography    #Acute on Chronic Back pain  - Hx of Epidural injections in the past  - Ortho spine consult noted  - PT evaluation  - probably not a candidate for MRI due to cochlear impants  - S/C tramadol 50mg Q6H   - Add oxycodone 2.5 PO Q6H ATC and PRN Oxycodone 5mg   - continue Flexeril 10mg Q8h PRN  - CT myelography on monday with IR  - last dose of Eliquis 5/14 evening    #positive cultures  -coagulase negative staph in blood is consistent with skin contaminant, no need for further Vanco  -repeat blood cultures NGTD    #CAD/AFib  - As noted not on A/C due to recent GI bleeding but will try eliquis now due to DVT  - Cont. BB    #Hx Chronic diastolic HF   - euvolemic/compensated  - daily weights / I&Os   - lasix was d/c as outpatient by his PCP and Cardiologist due increase urinary frequency      #Hyperlipidemia  - Cont. statin     # DVT PPx:   -c/w lovenox    # Goals of Care:   - Full  Code

## 2020-05-15 NOTE — PROGRESS NOTE ADULT - SUBJECTIVE AND OBJECTIVE BOX
Interval History:    MEDICATIONS  (STANDING):  atorvastatin 20 milliGRAM(s) Oral at bedtime  brimonidine 0.2% Ophthalmic Solution 1 Drop(s) Left EYE two times a day  cefuroxime   Tablet 250 milliGRAM(s) Oral every 12 hours  donepezil 10 milliGRAM(s) Oral at bedtime  escitalopram 10 milliGRAM(s) Oral daily  heparin  Infusion.  Unit(s)/Hr (14 mL/Hr) IV Continuous <Continuous>  memantine 10 milliGRAM(s) Oral daily  metoprolol tartrate 25 milliGRAM(s) Oral two times a day  oxyCODONE    IR 2.5 milliGRAM(s) Oral every 6 hours  pantoprazole    Tablet 40 milliGRAM(s) Oral before breakfast  timolol 0.5% Solution 1 Drop(s) Left EYE two times a day    MEDICATIONS  (PRN):  acetaminophen   Tablet .. 650 milliGRAM(s) Oral every 4 hours PRN Temp greater or equal to 38.5C (101.3F)  cyclobenzaprine 10 milliGRAM(s) Oral three times a day PRN Muscle Spasm  heparin   Injectable 6500 Unit(s) IV Push every 6 hours PRN For aPTT less than 40  heparin   Injectable 3000 Unit(s) IV Push every 6 hours PRN For aPTT between 40 - 57  lidocaine 4% Cream 1 Application(s) Topical three times a day PRN for moderate pain  ondansetron   Disintegrating Tablet 4 milliGRAM(s) Oral three times a day PRN for nausea  oxyCODONE    IR 5 milliGRAM(s) Oral every 6 hours PRN Moderate Pain (4 - 6)  rOPINIRole 2 milliGRAM(s) Oral four times a day PRN Restless legs      Daily     Daily Weight in k (15 May 2020 06:00)  BMI: 25.8 (05-08 @ 14:13)  Change in Weight:  Vital Signs Last 24 Hrs  T(C): 36.4 (15 May 2020 10:51), Max: 36.9 (14 May 2020 21:36)  T(F): 97.5 (15 May 2020 10:51), Max: 98.5 (14 May 2020 21:36)  HR: 56 (15 May 2020 10:51) (56 - 64)  BP: 127/84 (15 May 2020 10:51) (124/73 - 130/90)  BP(mean): --  RR: 17 (15 May 2020 10:51) (17 - 19)  SpO2: 95% (15 May 2020 10:51) (95% - 97%)  I&O's Detail    14 May 2020 07:01  -  15 May 2020 07:00  --------------------------------------------------------  IN:  Total IN: 0 mL    OUT:    Voided: 250 mL  Total OUT: 250 mL    Total NET: -250 mL          PHYSICAL EXAM  General:  Well developed, well nourished, alert and active, no pallor, NAD.  HEENT:    Normal appearance of conjunctiva, ears, nose, lips, oropharynx, and oral mucosa, anicteric.  Neck:  No masses, no asymmetry.  Lymph Nodes:  No lymphadenopathy.   Cardiovascular:  RRR normal S1/S2, no murmur.  Respiratory:  CTA B/L, normal respiratory effort.   Abdominal:   soft, no masses or tenderness, normoactive BS, NT/ND, no HSM.  Extremities:   No clubbing or cyanosis, normal capillary refill, no edema.   Skin:   No rash, jaundice, lesions, eczema.   Musculoskeletal:  No joint swelling, erythema or tenderness.   Other:     Lab Results:                        14.1   10.93 )-----------( 241      ( 15 May 2020 11:43 )             42.0             PT/INR - ( 15 May 2020 11:43 )   PT: 22.6 sec;   INR: 1.99 ratio         PTT - ( 15 May 2020 11:43 )  PTT:40.8 sec      Stool Results:          RADIOLOGY RESULTS:    SURGICAL PATHOLOGY:

## 2020-05-15 NOTE — PROGRESS NOTE ADULT - ASSESSMENT
Pt is an 83 y/o M with a PMH of CAD, diastolic CHF, chronic anemia, chronic back pain, Afib, DDD, HTN, MRSA, BPH, GERD, diverticulosis, CAMILA, thoracic aortic aneurysm, Cheesh-Na who  presented via  EMS after slipping and falling out of his recliner chair. Pt was admitted to the Hospitalist service with Right upper lobe pneumonia and was found to have a Right lower ext DVT. Pt has a h/o severe lower back pain, as per his wife he follows with Dr. Billy Motta a pain management physician. He had on steroid injection in the past but was unable to have another due to the COVID Crisis    PLAN:  - CT T /L spine first  - Radiology will then determine if myelogram is needed  - Hold Eliquis and start heparin gtt in preparation  - Pain mgmt  - Will follow     Discussed with Dr. Smith / Dr Daly / and Dr Carroll

## 2020-05-15 NOTE — PROGRESS NOTE ADULT - SUBJECTIVE AND OBJECTIVE BOX
Patient is a 84y old  Male who presents with a chief complaint of RUL Pna, Leukocytosis  Weakness, Fall   Chronic Back pain (15 May 2020 10:58)      HPI:  Pt is an 83 y/o M with a PMHx of CAD, diastolic CHF, chronic anemia, chronic back pain, Afib, DDD, HTN, MRSA, BPH, GERD, diverticulosis, CAMILA, thoracic aortic aneurysm, Jamul who  presented via  EMS after slipping and falling out of his recliner chair.  Pt has chronic severe back pain and due to the COVID -19 pandemic he was  unable to get an epidural.       His pain is so severe, he spends most of the day in his recliner chair.  Pt's wife call 911, as he had fallen out of his recliner chair three times in the 24 hours.    No preceding chest pain, no SOB,  no  LOC or syncope.  No fever or other complaints.      Pt was admitted to the Hospitalist service with Right upper lobe pneumonia and was found to have a Right lower ext DVT.  Pt has a h/o severe lower back pain, as per his wife he follows with Dr. Billy Motta a pain management physician. He had on steroid injection in the past but was unable to have another due to the COVID Crisis. Pt does spend most of his day in the recliner at home, he has only a few steps to the bathroom and was unable to make it the other day due to severe pain. Pt is extremely hard of hearing and cannot have a conversation or follow commands. He has been on tramadol at home for pain. Wife states he has not has issues with incontinence at home.     5/14 - Pt seen and examined earlier today, in NAD. No new complaints, no overnight events     5/15 - Films / chart reviewed. Case discussed with IR. CT C spine shows no gross compression, would favor CT T/L spine prior to doing myelogram      acetaminophen   Tablet .. 650 milliGRAM(s) Oral every 4 hours PRN  atorvastatin 20 milliGRAM(s) Oral at bedtime  brimonidine 0.2% Ophthalmic Solution 1 Drop(s) Left EYE two times a day  cefuroxime   Tablet 250 milliGRAM(s) Oral every 12 hours  cyclobenzaprine 10 milliGRAM(s) Oral three times a day PRN  donepezil 10 milliGRAM(s) Oral at bedtime  escitalopram 10 milliGRAM(s) Oral daily  heparin   Injectable 6500 Unit(s) IV Push every 6 hours PRN  heparin   Injectable 3000 Unit(s) IV Push every 6 hours PRN  heparin  Infusion.  Unit(s)/Hr IV Continuous <Continuous>  lidocaine 4% Cream 1 Application(s) Topical three times a day PRN  memantine 10 milliGRAM(s) Oral daily  metoprolol tartrate 25 milliGRAM(s) Oral two times a day  ondansetron   Disintegrating Tablet 4 milliGRAM(s) Oral three times a day PRN  oxyCODONE    IR 2.5 milliGRAM(s) Oral every 6 hours  oxyCODONE    IR 5 milliGRAM(s) Oral every 6 hours PRN  pantoprazole    Tablet 40 milliGRAM(s) Oral before breakfast  rOPINIRole 2 milliGRAM(s) Oral four times a day PRN  timolol 0.5% Solution 1 Drop(s) Left EYE two times a day      Vital Signs Last 24 Hrs  T(C): 36.4 (15 May 2020 10:51), Max: 36.9 (14 May 2020 21:36)  T(F): 97.5 (15 May 2020 10:51), Max: 98.5 (14 May 2020 21:36)  HR: 56 (15 May 2020 10:51) (56 - 64)  BP: 127/84 (15 May 2020 10:51) (124/73 - 130/90)  RR: 17 (15 May 2020 10:51) (17 - 19)  SpO2: 95% (15 May 2020 10:51) (95% - 97%)                          14.1   10.93 )-----------( 241      ( 15 May 2020 11:43 )             42.0    PT/INR - ( 15 May 2020 11:43 )   PT: 22.6 sec;   INR: 1.99 ratio      PTT - ( 15 May 2020 11:43 )  PTT:40.8 sec

## 2020-05-15 NOTE — PROGRESS NOTE ADULT - SUBJECTIVE AND OBJECTIVE BOX
SUBJECTIVE     Patient resting in the bed with no new issues reported   on the room air now   hard of the hearing   iv antibiotics changed to po ceftin for the pneumonia     PAST MEDICAL & SURGICAL HISTORY:  CAD (coronary artery disease): (-) cardiac stress and 2DECHO 2019  Diastolic CHF: LVEF 55-60% 6/2019  Chronic anemia  Afib: Not on A/C 2/2 hx of GIbleeding  DDD (degenerative disc disease), lumbar  [place] (hard of hearing)  Fall (on) (from) other stairs and steps, sequela: 03/28/2017- lumbar hemtoma  Essential hypertension  Nerve injury: complication from right TKR surgery, 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  Aortic root dilatation  MRSA (methicillin resistant Staphylococcus aureus): left forearm 2012  Lumbar degenerative disc disease  BPH associated with nocturia  Diverticulosis of intestine without bleeding, unspecified intestinal tract location  Gastroesophageal reflux disease, esophagitis presence not specified: Hx of GI bleed  Aortic valve insufficiency, unspecified etiology  CAMILA (obstructive sleep apnea): unable to tolerate nocturnal CPAP  Restless leg syndrome  Thoracic aortic aneurysm  HLD (hyperlipidemia)  S/P thoracic aortic aneurysm repair  S/P AVR (aortic valve replacement)  Status post cataract extraction, unspecified laterality: bilat  S/P debridement: left forearm -mrsa  History of fundoplication: 2000  Amputation finger: left index 1970  S/P knee replacement: left  S/P knee replacement: right  S/P shoulder surgery: right rotatotor cuff injury    OBJECTIVE   Vital Signs Last 24 Hrs  T(C): 36.4 (15 May 2020 10:51), Max: 36.9 (14 May 2020 21:36)  T(F): 97.5 (15 May 2020 10:51), Max: 98.5 (14 May 2020 21:36)  HR: 56 (15 May 2020 10:51) (56 - 64)  BP: 127/84 (15 May 2020 10:51) (117/88 - 130/90)  BP(mean): --  RR: 17 (15 May 2020 10:51) (17 - 19)  SpO2: 95% (15 May 2020 10:51) (95% - 97%)    PHYSICAL EXAM:  Constitutional: resting in the bed and sleeping and wakes up to the commands   HEENT: Normo cephalic atraumatic  Neck: Soft and supple, No J.V.D   Respiratory: vesicular breathing poor inspiratory effort   Cardiovascular: S1 and S2, iregular rate .   Gastrointestinal:  soft, nontender,   Extremities: No  edema or calf tenderness .  Neurological: No new  focal deficits.    MEDICATIONS  (STANDING):  apixaban 10 milliGRAM(s) Oral every 12 hours  atorvastatin 20 milliGRAM(s) Oral at bedtime  brimonidine 0.2% Ophthalmic Solution 1 Drop(s) Left EYE two times a day  cefuroxime   Tablet 250 milliGRAM(s) Oral every 12 hours  donepezil 10 milliGRAM(s) Oral at bedtime  escitalopram 10 milliGRAM(s) Oral daily  memantine 10 milliGRAM(s) Oral daily  metoprolol tartrate 25 milliGRAM(s) Oral two times a day  oxyCODONE    IR 2.5 milliGRAM(s) Oral every 6 hours  pantoprazole    Tablet 40 milliGRAM(s) Oral before breakfast  timolol 0.5% Solution 1 Drop(s) Left EYE two times a day                            13.9   9.85  )-----------( 241      ( 14 May 2020 06:46 )             41.8

## 2020-05-15 NOTE — PROGRESS NOTE ADULT - SUBJECTIVE AND OBJECTIVE BOX
HOSPITALIST PROGRESS NOTE:  DOS: 5/10/2020  SUBJECTIVE:  no complaints; sating 95% on room air  Chief Complaint: Patient is a 84y old  Male who presents with a chief complaint of RUL Pna, Leukocytosis  Weakness, Fall, Chronic Back pain (08 May 2020 18:20)    HPI: Pt is an 85 y/o M with a PMHx of CAD, diastolic CHF, chronic anemia, chronic back pain, Afib, DDD, HTN, MRSA, BPH, GERD, diverticulosis, CAMILA, thoracic aortic aneurysm, Ramona who  presents via  EMS after slipping and falling out of his recliner chair.  Pt has severe back pain and due to the COVID -19 pandemic he was  unable to get an epidural.      His pain is so severe, he spends most of the day in his recliner chair.  Pt's wife call 911, as he had fallen out of his recliner chair three times in the last 24 hours.  No preceding cpain, no SOB,  no  LOC or syncope.  No fever or other complaints.         In the ED, pt is found to have a RUL Pna on CT scan and leukocytosis.      5/12: Patient has no complaints for me and very sleepy; but as per the staff here patient complaining of back pain  5/13: patient found to have right DVT;  No other events. Dsicussed in length with patients wife   5/14:  Paitent still having pain despite getting tramadol and flexeril; wife is agreeable to starting oxycodone  5/15:  Patient still having pain with tramadol; agreeable to changing to oxycodone ATC; ID going to take him on monday for CT myelography; Eliquis d/c and started on heparin Drip today    Allergies:  No Known Allergies    REVIEW OF SYSTEMS:  See HPI. All other review of systems is negative unless indicated above.     OBJECTIVE  Physical Exam:  Vital Signs   Vital Signs Last 24 Hrs  T(C): 36.4 (15 May 2020 10:51), Max: 36.9 (14 May 2020 21:36)  T(F): 97.5 (15 May 2020 10:51), Max: 98.5 (14 May 2020 21:36)  HR: 56 (15 May 2020 10:51) (56 - 64)  BP: 127/84 (15 May 2020 10:51) (124/73 - 130/90)  BP(mean): --  RR: 17 (15 May 2020 10:51) (17 - 19)  SpO2: 95% (15 May 2020 10:51) (95% - 97%)    Constitutional: NAD, awake and alert, well-developed  Neurological: no focal deficits; [place], confused   HEENT: PERRLA, EOMI, MMM  Neck: Soft and supple, No LAD, No JVD  Respiratory: Breath sounds are clear bilaterally, No wheezing, rales or rhonchi  Cardiovascular: S1 and S2, regular rate and rhythm; no Murmurs, gallops or rubs  Gastrointestinal: Bowel Sounds present, soft, nontender, nondistended, no guarding, no rebound tenderness  Back: No CVA tenderness   Extremities: No peripheral edema  Vascular: 2+ peripheral pulses  Musculoskeletal: 5/5 strength b/l upper and lower extremities  Skin: No rashes  Breast: Deferred  Rectal: Deferred    MEDICATIONS  (STANDING):  atorvastatin 20 milliGRAM(s) Oral at bedtime  azithromycin  IVPB 500 milliGRAM(s) IV Intermittent every 24 hours  brimonidine 0.2% Ophthalmic Solution 1 Drop(s) Left EYE two times a day  cefTRIAXone Injectable. 1000 milliGRAM(s) IV Push every 24 hours  donepezil 10 milliGRAM(s) Oral at bedtime  enoxaparin Injectable 40 milliGRAM(s) SubCutaneous daily  escitalopram 10 milliGRAM(s) Oral daily  memantine 10 milliGRAM(s) Oral daily  metoprolol tartrate 25 milliGRAM(s) Oral two times a day  pantoprazole    Tablet 40 milliGRAM(s) Oral before breakfast  timolol 0.5% Solution 1 Drop(s) Left EYE two times a day    MEDICATIONS  (PRN):  acetaminophen   Tablet .. 650 milliGRAM(s) Oral every 4 hours PRN Temp greater or equal to 38.5C (101.3F)  lidocaine 4% Cream 1 Application(s) Topical three times a day PRN for moderate pain  ondansetron   Disintegrating Tablet 4 milliGRAM(s) Oral three times a day PRN for nausea  rOPINIRole 2 milliGRAM(s) Oral four times a day PRN Restless legs  traMADol 50 milliGRAM(s) Oral every 8 hours PRN pain      LABS:     Lab Results:  CBC  CBC Full  -  ( 15 May 2020 11:43 )  WBC Count : 10.93 K/uL  RBC Count : 4.49 M/uL  Hemoglobin : 14.1 g/dL  Hematocrit : 42.0 %  Platelet Count - Automated : 241 K/uL  Mean Cell Volume : 93.5 fl  Mean Cell Hemoglobin : 31.4 pg  Mean Cell Hemoglobin Concentration : 33.6 gm/dL  Auto Neutrophil # : x  Auto Lymphocyte # : x  Auto Monocyte # : x  Auto Eosinophil # : x  Auto Basophil # : x  Auto Neutrophil % : x  Auto Lymphocyte % : x  Auto Monocyte % : x  Auto Eosinophil % : x  Auto Basophil % : x    .		Differential:	[] Automated		[] Manual  Chemistry                        14.1   10.93 )-----------( 241      ( 15 May 2020 11:43 )             42.0         PT/INR - ( 15 May 2020 11:43 )   PT: 22.6 sec;   INR: 1.99 ratio         PTT - ( 15 May 2020 11:43 )  PTT:40.8 sec    MICROBIOLOGY/CULTURES:  Culture Results:   No growth to date. (05-10 @ 17:35)  Culture Results:   No growth to date. (05-10 @ 17:35)  Culture Results:   Growth in anaerobic bottle: Bacillus species not anthracis  "Susceptibilities not performed"  Growth in aerobic bottle: Staphylococcus hominis  Coag Negative Staphylococcus  Single set isolate, possible contaminant. Contact  Microbiology if susceptibility testing clinically  indicated.  "Due to technical problems, Proteus sp. will Not be reported as part of  the BCID panel until further notice"  ***Blood Panel PCR results on this specimen are available  approximately 3 hours after the Gram stain result.***  Gram stain, PCR, and/or culture results may not always  correspond due to difference in methodologies.  ************************************************************  This PCR assay was performed using [employer].  The following targets are tested for: Enterococcus,  vancomycin resistant enterococci, Listeria monocytogenes,  coagulase negative staphylococci, S. aureus,  methicillin resistant S. aureus, Streptococcus agalactiae  (Group B), S. pneumoniae, S. pyogenes (Group A),  Acinetobacter baumannii, Enterobacter cloacae, E. coli,  Klebsiella oxytoca, K. pneumoniae, Proteus sp.,  Serratia marcescens, Haemophilus influenzae,  Neisseria meningitidis, Pseudomonas aeruginosa, Candida  albicans, C. glabrata, C krusei, C parapsilosis,  C. tropicalis and the KPC resistance gene. (05-08 @ 16:14)  Culture Results:   <10,000 CFU/mL Normal Urogenital Mitzi (05-08 @ 14:58)        D-Dimer Assay, Quantitative (05.08.20 @ 20:15)    D-Dimer Assay, Quantitative: 1771 ng/mL DDU    Ferritin, Serum (05.08.20 @ 20:15)    Ferritin, Serum: 395 ng/mL    Lactate, Blood (05.08.20 @ 16:14)    Lactate, Blood: 1.5 mmol/L    Sedimentation Rate, Erythrocyte (05.08.20 @ 20:15)    Sedimentation Rate, Erythrocyte: 44 mm/hr    CARDIAC MARKERS ( 08 May 2020 20:15 )  x     / x     / 76 U/L / x     / x        COVID-19 PCR . (05.08.20 @ 16:14)    COVID-19 PCR: NotDetec:    RADIOLOGY/EKG:    < from: CT Abdomen and Pelvis w/ IV Cont (05.08.20 @ 15:21) >  < from: CT Chest w/ IV Cont (05.08.20 @ 15:21) >        IMPRESSION:   No evidence of a posttraumatic abnormality.  Patchy right upper lobe infiltrate.  < end of copied text >      < from: CT Cervical Spine No Cont (05.08.20 @ 15:09) >  IMPRESSION:  No acute findings    < from: CT Head No Cont (05.08.20 @ 15:07) >  Impression:  Unremarkable noncontrast CT scan of the brain.  < end of copied text >    < from: US Duplex Venous Lower Ext Complete, Bilateral (05.13.20 @ 10:12) >    IMPRESSION:     Venous thrombosis in right lower extremity gastrocnemius vein.      < end of copied text >

## 2020-05-16 LAB
ANION GAP SERPL CALC-SCNC: 6 MMOL/L — SIGNIFICANT CHANGE UP (ref 5–17)
APTT BLD: 123.8 SEC — CRITICAL HIGH (ref 27.5–36.3)
APTT BLD: 84.9 SEC — HIGH (ref 27.5–36.3)
APTT BLD: 86 SEC — HIGH (ref 27.5–36.3)
BUN SERPL-MCNC: 16 MG/DL — SIGNIFICANT CHANGE UP (ref 7–23)
CALCIUM SERPL-MCNC: 9.7 MG/DL — SIGNIFICANT CHANGE UP (ref 8.5–10.1)
CHLORIDE SERPL-SCNC: 105 MMOL/L — SIGNIFICANT CHANGE UP (ref 96–108)
CO2 SERPL-SCNC: 28 MMOL/L — SIGNIFICANT CHANGE UP (ref 22–31)
CREAT SERPL-MCNC: 0.89 MG/DL — SIGNIFICANT CHANGE UP (ref 0.5–1.3)
CULTURE RESULTS: SIGNIFICANT CHANGE UP
CULTURE RESULTS: SIGNIFICANT CHANGE UP
GLUCOSE SERPL-MCNC: 109 MG/DL — HIGH (ref 70–99)
HCT VFR BLD CALC: 42.1 % — SIGNIFICANT CHANGE UP (ref 39–50)
HGB BLD-MCNC: 14.1 G/DL — SIGNIFICANT CHANGE UP (ref 13–17)
MAGNESIUM SERPL-MCNC: 1.9 MG/DL — SIGNIFICANT CHANGE UP (ref 1.6–2.6)
MCHC RBC-ENTMCNC: 31.5 PG — SIGNIFICANT CHANGE UP (ref 27–34)
MCHC RBC-ENTMCNC: 33.5 GM/DL — SIGNIFICANT CHANGE UP (ref 32–36)
MCV RBC AUTO: 94 FL — SIGNIFICANT CHANGE UP (ref 80–100)
PHOSPHATE SERPL-MCNC: 3.4 MG/DL — SIGNIFICANT CHANGE UP (ref 2.5–4.5)
PLATELET # BLD AUTO: 271 K/UL — SIGNIFICANT CHANGE UP (ref 150–400)
POTASSIUM SERPL-MCNC: 4 MMOL/L — SIGNIFICANT CHANGE UP (ref 3.5–5.3)
POTASSIUM SERPL-SCNC: 4 MMOL/L — SIGNIFICANT CHANGE UP (ref 3.5–5.3)
RBC # BLD: 4.48 M/UL — SIGNIFICANT CHANGE UP (ref 4.2–5.8)
RBC # FLD: 14.9 % — HIGH (ref 10.3–14.5)
SODIUM SERPL-SCNC: 139 MMOL/L — SIGNIFICANT CHANGE UP (ref 135–145)
SPECIMEN SOURCE: SIGNIFICANT CHANGE UP
SPECIMEN SOURCE: SIGNIFICANT CHANGE UP
WBC # BLD: 12.67 K/UL — HIGH (ref 3.8–10.5)
WBC # FLD AUTO: 12.67 K/UL — HIGH (ref 3.8–10.5)

## 2020-05-16 PROCEDURE — 99232 SBSQ HOSP IP/OBS MODERATE 35: CPT

## 2020-05-16 RX ADMIN — Medication 25 MILLIGRAM(S): at 05:37

## 2020-05-16 RX ADMIN — OXYCODONE HYDROCHLORIDE 2.5 MILLIGRAM(S): 5 TABLET ORAL at 12:11

## 2020-05-16 RX ADMIN — OXYCODONE HYDROCHLORIDE 2.5 MILLIGRAM(S): 5 TABLET ORAL at 05:37

## 2020-05-16 RX ADMIN — HEPARIN SODIUM 1000 UNIT(S)/HR: 5000 INJECTION INTRAVENOUS; SUBCUTANEOUS at 16:13

## 2020-05-16 RX ADMIN — DONEPEZIL HYDROCHLORIDE 10 MILLIGRAM(S): 10 TABLET, FILM COATED ORAL at 21:03

## 2020-05-16 RX ADMIN — Medication 25 MILLIGRAM(S): at 18:01

## 2020-05-16 RX ADMIN — CYCLOBENZAPRINE HYDROCHLORIDE 10 MILLIGRAM(S): 10 TABLET, FILM COATED ORAL at 21:03

## 2020-05-16 RX ADMIN — PANTOPRAZOLE SODIUM 40 MILLIGRAM(S): 20 TABLET, DELAYED RELEASE ORAL at 05:37

## 2020-05-16 RX ADMIN — HEPARIN SODIUM 1000 UNIT(S)/HR: 5000 INJECTION INTRAVENOUS; SUBCUTANEOUS at 09:33

## 2020-05-16 RX ADMIN — Medication 1 DROP(S): at 17:55

## 2020-05-16 RX ADMIN — OXYCODONE HYDROCHLORIDE 2.5 MILLIGRAM(S): 5 TABLET ORAL at 23:36

## 2020-05-16 RX ADMIN — ATORVASTATIN CALCIUM 20 MILLIGRAM(S): 80 TABLET, FILM COATED ORAL at 21:03

## 2020-05-16 RX ADMIN — BRIMONIDINE TARTRATE 1 DROP(S): 2 SOLUTION/ DROPS OPHTHALMIC at 17:52

## 2020-05-16 RX ADMIN — BRIMONIDINE TARTRATE 1 DROP(S): 2 SOLUTION/ DROPS OPHTHALMIC at 05:37

## 2020-05-16 RX ADMIN — HEPARIN SODIUM 1000 UNIT(S)/HR: 5000 INJECTION INTRAVENOUS; SUBCUTANEOUS at 02:36

## 2020-05-16 RX ADMIN — Medication 250 MILLIGRAM(S): at 18:01

## 2020-05-16 RX ADMIN — Medication 1 DROP(S): at 05:38

## 2020-05-16 RX ADMIN — ESCITALOPRAM OXALATE 10 MILLIGRAM(S): 10 TABLET, FILM COATED ORAL at 12:11

## 2020-05-16 RX ADMIN — MEMANTINE HYDROCHLORIDE 10 MILLIGRAM(S): 10 TABLET ORAL at 12:11

## 2020-05-16 RX ADMIN — OXYCODONE HYDROCHLORIDE 2.5 MILLIGRAM(S): 5 TABLET ORAL at 18:01

## 2020-05-16 RX ADMIN — Medication 250 MILLIGRAM(S): at 05:37

## 2020-05-16 NOTE — DIETITIAN INITIAL EVALUATION ADULT. - PERTINENT MEDS FT
MEDICATIONS  (STANDING):  atorvastatin 20 milliGRAM(s) Oral at bedtime  brimonidine 0.2% Ophthalmic Solution 1 Drop(s) Left EYE two times a day  cefuroxime   Tablet 250 milliGRAM(s) Oral every 12 hours  donepezil 10 milliGRAM(s) Oral at bedtime  escitalopram 10 milliGRAM(s) Oral daily  heparin  Infusion.  Unit(s)/Hr (14 mL/Hr) IV Continuous <Continuous>  memantine 10 milliGRAM(s) Oral daily  metoprolol tartrate 25 milliGRAM(s) Oral two times a day  oxyCODONE    IR 2.5 milliGRAM(s) Oral every 6 hours  pantoprazole    Tablet 40 milliGRAM(s) Oral before breakfast  timolol 0.5% Solution 1 Drop(s) Left EYE two times a day    MEDICATIONS  (PRN):  acetaminophen   Tablet .. 650 milliGRAM(s) Oral every 4 hours PRN Temp greater or equal to 38.5C (101.3F)  cyclobenzaprine 10 milliGRAM(s) Oral three times a day PRN Muscle Spasm  heparin   Injectable 6500 Unit(s) IV Push every 6 hours PRN For aPTT less than 40  heparin   Injectable 3000 Unit(s) IV Push every 6 hours PRN For aPTT between 40 - 57  lidocaine 4% Cream 1 Application(s) Topical three times a day PRN for moderate pain  ondansetron   Disintegrating Tablet 4 milliGRAM(s) Oral three times a day PRN for nausea  oxyCODONE    IR 5 milliGRAM(s) Oral every 6 hours PRN Moderate Pain (4 - 6)  rOPINIRole 2 milliGRAM(s) Oral four times a day PRN Restless legs

## 2020-05-16 NOTE — PROGRESS NOTE ADULT - SUBJECTIVE AND OBJECTIVE BOX
SUBJECTIVE     patient resting in the bed with no fever or sob   eloquis was on hold for the procedure   started on the heparin drip   planned for the myelogram   today last day of the oral antibiotics     PAST MEDICAL & SURGICAL HISTORY:  CAD (coronary artery disease): (-) cardiac stress and 2DECHO 2019  Diastolic CHF: LVEF 55-60% 6/2019  Chronic anemia  Afib: Not on A/C 2/2 hx of GIbleeding  DDD (degenerative disc disease), lumbar  [place] (hard of hearing)  Fall (on) (from) other stairs and steps, sequela: 03/28/2017- lumbar hemtoma  Essential hypertension  Nerve injury: complication from right TKR surgery, 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  Aortic root dilatation  MRSA (methicillin resistant Staphylococcus aureus): left forearm 2012  Lumbar degenerative disc disease  BPH associated with nocturia  Diverticulosis of intestine without bleeding, unspecified intestinal tract location  Gastroesophageal reflux disease, esophagitis presence not specified: Hx of GI bleed  Aortic valve insufficiency, unspecified etiology  CAMILA (obstructive sleep apnea): unable to tolerate nocturnal CPAP  Restless leg syndrome  Thoracic aortic aneurysm  HLD (hyperlipidemia)  S/P thoracic aortic aneurysm repair  S/P AVR (aortic valve replacement)  Status post cataract extraction, unspecified laterality: bilat  S/P debridement: left forearm -mrsa  History of fundoplication: 2000  Amputation finger: left index 1970  S/P knee replacement: left  S/P knee replacement: right  S/P shoulder surgery: right rotatotor cuff injury    OBJECTIVE   Vital Signs Last 24 Hrs  T(C): 36.9 (16 May 2020 05:26), Max: 36.9 (16 May 2020 05:26)  T(F): 98.4 (16 May 2020 05:26), Max: 98.4 (16 May 2020 05:26)  HR: 63 (16 May 2020 05:26) (56 - 63)  BP: 129/73 (16 May 2020 05:26) (108/75 - 129/73)  BP(mean): --  RR: 18 (16 May 2020 05:26) (17 - 18)  SpO2: 94% (16 May 2020 05:26) (94% - 96%)    PHYSICAL EXAM:  Constitutional: , awake and alert, not in distress not on the 02   HEENT: Normo cephalic atraumatic  Neck: Soft and supple, No J.V.D   Respiratory: vesicular breathing , No wheezing, rales or rhonchi.   Cardiovascular: S1 and S2, regular rate .   Gastrointestinal:  soft, nontender,   Extremities: No  edema or calf tenderness .  Neurological: No new  focal deficits.    MEDICATIONS  (STANDING):  atorvastatin 20 milliGRAM(s) Oral at bedtime  brimonidine 0.2% Ophthalmic Solution 1 Drop(s) Left EYE two times a day  cefuroxime   Tablet 250 milliGRAM(s) Oral every 12 hours  donepezil 10 milliGRAM(s) Oral at bedtime  escitalopram 10 milliGRAM(s) Oral daily  heparin  Infusion.  Unit(s)/Hr (14 mL/Hr) IV Continuous <Continuous>  memantine 10 milliGRAM(s) Oral daily  metoprolol tartrate 25 milliGRAM(s) Oral two times a day  oxyCODONE    IR 2.5 milliGRAM(s) Oral every 6 hours  pantoprazole    Tablet 40 milliGRAM(s) Oral before breakfast  timolol 0.5% Solution 1 Drop(s) Left EYE two times a day                            14.1   12.67 )-----------( 271      ( 16 May 2020 08:02 )             42.1

## 2020-05-16 NOTE — PROGRESS NOTE ADULT - ASSESSMENT
-   right upper lobe infiltrate likely infectious in etiology with the CAP   -   patient completed azithromycin and on po ceftin for the completion and today is last day of antibiotics    -   calf  vein thrombus with high D-dimers cannot exclude associated pulmonary embolism on heparin drip in anticipation for the procedure .  -   atrial fibrillation as per the chart not anticoagulated with history of GI bleeding in the past   -   moderate pulmonary hypertension likely secondary noted with the last echocardiogram  -   severe back pain with history of recurrent falls  -   mild atelectatic changes in the left base  -   reported history of obstructive sleep apnea as per charting patient is not using his CPAP.  -   other issues include hiatal hernia, history of aortic valve replacement and aneurysm repair    PLAN     -  complete ceftin couse and WBC shows improvement  and today is last day   -  repeat blood cultures were negative so far negative   - continue with the eloquis for calf thrombus  while monitoring for the G.I bleeding after the procedure   -  02 supplementation for the hypoxia if needed   -  management of back pain and other issues that include diastolic dysfunction as per the medicine . moderate pulmonary HTN is secondary to cardiac disease .  - other wise pulmonary status remained stable

## 2020-05-16 NOTE — PROGRESS NOTE ADULT - SUBJECTIVE AND OBJECTIVE BOX
Patient is a 84y old  Male who presents with a chief complaint of RUL Pna, Leukocytosis  Weakness, Fall   Chronic Back pain (16 May 2020 10:03)      HPI:  Pt is an 85 y/o M with a PMHx of CAD, diastolic CHF, chronic anemia, chronic back pain, Afib, DDD, HTN, MRSA, BPH, GERD, diverticulosis, CAMILA, thoracic aortic aneurysm, [place] who  presented via  EMS after slipping and falling out of his recliner chair.  Pt has chronic severe back pain and due to the COVID -19 pandemic he was  unable to get an epidural.       His pain is so severe, he spends most of the day in his recliner chair.  Pt's wife call 911, as he had fallen out of his recliner chair three times in the 24 hours.    No preceding chest pain, no SOB,  no  LOC or syncope.  No fever or other complaints.      Pt was admitted to the Hospitalist service with Right upper lobe pneumonia and was found to have a Right lower ext DVT.  Pt has a h/o severe lower back pain, as per his wife he follows with Dr. Billy Motta a pain management physician. He had on steroid injection in the past but was unable to have another due to the COVID Crisis. Pt does spend most of his day in the recliner at home, he has only a few steps to the bathroom and was unable to make it the other day due to severe pain. Pt is extremely hard of hearing and cannot have a conversation or follow commands. He has been on tramadol at home for pain. Wife states he has not has issues with incontinence at home.     5/14 - Pt seen and examined earlier today, in NAD. No new complaints, no overnight events     5/15 - Films / chart reviewed. Case discussed with IR. CT C spine shows no gross compression, would favor CT T/L spine prior to doing myelogram    acetaminophen   Tablet .. 650 milliGRAM(s) Oral every 4 hours PRN  atorvastatin 20 milliGRAM(s) Oral at bedtime  brimonidine 0.2% Ophthalmic Solution 1 Drop(s) Left EYE two times a day  cefuroxime   Tablet 250 milliGRAM(s) Oral every 12 hours  cyclobenzaprine 10 milliGRAM(s) Oral three times a day PRN  donepezil 10 milliGRAM(s) Oral at bedtime  escitalopram 10 milliGRAM(s) Oral daily  heparin   Injectable 6500 Unit(s) IV Push every 6 hours PRN  heparin   Injectable 3000 Unit(s) IV Push every 6 hours PRN  heparin  Infusion.  Unit(s)/Hr IV Continuous <Continuous>  lidocaine 4% Cream 1 Application(s) Topical three times a day PRN  memantine 10 milliGRAM(s) Oral daily  metoprolol tartrate 25 milliGRAM(s) Oral two times a day  ondansetron   Disintegrating Tablet 4 milliGRAM(s) Oral three times a day PRN  oxyCODONE    IR 2.5 milliGRAM(s) Oral every 6 hours  oxyCODONE    IR 5 milliGRAM(s) Oral every 6 hours PRN  pantoprazole    Tablet 40 milliGRAM(s) Oral before breakfast  rOPINIRole 2 milliGRAM(s) Oral four times a day PRN  timolol 0.5% Solution 1 Drop(s) Left EYE two times a day      Vital Signs Last 24 Hrs  T(C): 36.8 (16 May 2020 10:27), Max: 36.9 (16 May 2020 05:26)  T(F): 98.3 (16 May 2020 10:27), Max: 98.4 (16 May 2020 05:26)  HR: 62 (16 May 2020 10:27) (56 - 63)  BP: 100/65 (16 May 2020 10:27) (100/65 - 129/73)  BP(mean): --  RR: 17 (16 May 2020 10:27) (17 - 18)  SpO2: 96% (16 May 2020 10:27) (94% - 96%)                          14.1   12.67 )-----------( 271      ( 16 May 2020 08:02 )             42.1       05-16    139  |  105  |  16  ----------------------------<  109<H>  4.0   |  28  |  0.89    Ca    9.7      16 May 2020 08:02  Phos  3.4     05-16  Mg     1.9     05-16        PT/INR - ( 15 May 2020 11:43 )   PT: 22.6 sec;   INR: 1.99 ratio         PTT - ( 16 May 2020 08:02 )  PTT:86.0 sec      Radiology:  CT Thoracic / Lumbar Spine No Cont (05.15.20 @ 17:33)  ALIGNMENT: There is a normal thoracic kyphosis. There is straightening of the expected lumbar lordosis, with minimal retrolisthesis of L2 on L3 and grade 1 anterolisthesis of L4 on L5 and L5 on S1, favored to be degenerative in nature. There is a minimal dextroconvex curvature to the thoracic spine and moderate levoconvex curvature to the lower lumbar spine with apex at L4-L5.    VERTEBRAE: Thoracic and lumbar vertebral bodies are normal in height. There is no evidence of acute fracture or aggressive osseous lesion.  There are multilevel anterior bridging osteophytes along the thoracic spine, findings compatible with diffuse idiopathic skeletal hyperostosis (DISH).    DISCS: Multilevel degenerative loss of intervertebral disc height at the mid thoracic spine and also in lumbar spine with disc phenomenon at L2-L3 and L5-S1.    PARAVERTEBRAL SOFT TISSUES AND VISUALIZED RETROPERITONEUM: There is no evidence of acute traumatic injury to the paravertebral soft tissues. There is atrophy of the lower lumbar paravertebral and paraspinal musculature, likely related to disuse atrophy.    There is a patchy infiltrate in the posterior right upper lobe. Subsegmental atelectasis/scarring along the posterior medial left lower lobe, unchanged. There is also reticular scarring at the right lung base. There are atherosclerotic calcifications of the visualized aorta and bilateral iliac arteries.  Please see CT chest, abdomen and pelvis 5/8/2020 for detailed assessment of thoracic, abdominal and pelvic/retroperitoneal findings.    EVALUATION OF INDIVIDUAL LEVELS DEMONSTRATES:     There is disc bulge at T6-T7 resulting in mild-to-moderate canal stenosis.    L1-2: No spinal canal or neuroforaminal stenosis.    L2-3: Minimal retrolisthesis of L2 on L3. Disc bulge and degenerative changes of the facet and uncovertebraljoints resulting in mild canal stenosis and mild-to-moderate neuroforaminal narrowing bilaterally.    L3-4: Disc bulge and degenerative changes of the bilateral facet joints, thickening of ligamentum flavum resulting in moderate to severe canal stenosis and moderate right, mild to moderate left neuroforaminal narrowing.    L4-5: Minimal grade 1 anterolisthesis of L4 on L5. Disc bulge and degenerative changes of the bilateral facet joints, thickening ligamentum flavum resulting in moderate canal stenosis and moderate to severe right, mild to moderate left neuroforaminal narrowing.    L5-S1: Minimal grade 1 anterolisthesis of L5 on S1. Disc bulge and degenerative changes of the bilateral facet joints resulting in mild canal stenosis and severe left, moderate to severe right neural foraminal narrowing.    LIMITED EVALUATION OF UPPER SACRUM AND SACROILIAC JOINTS: Mild to moderate degenerative changes of the sacroiliac joints.

## 2020-05-16 NOTE — PROGRESS NOTE ADULT - ASSESSMENT
Pt is an 85 y/o M with a PMH of CAD, diastolic CHF, chronic anemia, chronic back pain, Afib, DDD, HTN, MRSA, BPH, GERD, diverticulosis, CAMILA, thoracic aortic aneurysm, [place] who  presented via  EMS after slipping and falling out of his recliner chair. Pt was admitted to the Hospitalist service with Right upper lobe pneumonia and was found to have a Right lower ext DVT. Pt has a h/o severe lower back pain, as per his wife he follows with Dr. Billy Motta a pain management physician. He had on steroid injection in the past but was unable to have another due to the COVID Crisis    PLAN:  - CT T /L spine complete, radiology to review to determine if myelogram is needed  - Hold Eliquis / Cont heparin gtt in preparation  - Pain mgmt  - Will follow     Discussed with Dr. Smith

## 2020-05-16 NOTE — PROGRESS NOTE ADULT - SUBJECTIVE AND OBJECTIVE BOX
HOSPITALIST PROGRESS NOTE:  DOS: 5/10/2020  SUBJECTIVE:  no complaints; sating 95% on room air  Chief Complaint: Patient is a 84y old  Male who presents with a chief complaint of RUL Pna, Leukocytosis  Weakness, Fall, Chronic Back pain (08 May 2020 18:20)    HPI: Pt is an 85 y/o M with a PMHx of CAD, diastolic CHF, chronic anemia, chronic back pain, Afib, DDD, HTN, MRSA, BPH, GERD, diverticulosis, CAMILA, thoracic aortic aneurysm, [place] who  presents via  EMS after slipping and falling out of his recliner chair.  Pt has severe back pain and due to the COVID -19 pandemic he was  unable to get an epidural.      His pain is so severe, he spends most of the day in his recliner chair.  Pt's wife call 911, as he had fallen out of his recliner chair three times in the last 24 hours.  No preceding cpain, no SOB,  no  LOC or syncope.  No fever or other complaints.         In the ED, pt is found to have a RUL Pna on CT scan and leukocytosis.      5/12: Patient has no complaints for me and very sleepy; but as per the staff here patient complaining of back pain  5/13: patient found to have right DVT;  No other events. Dsicussed in length with patients wife   5/14:  Paitent still having pain despite getting tramadol and flexeril; wife is agreeable to starting oxycodone  5/15:  Patient still having pain with tramadol; agreeable to changing to oxycodone ATC; ID going to take him on monday for CT myelography; Eliquis d/c and started on heparin Drip today  5/16:  On heparin Drip; rising WBC; oxycodone helping as patient is able to sit up more while eating     Allergies:  No Known Allergies    REVIEW OF SYSTEMS:  See HPI. All other review of systems is negative unless indicated above.     OBJECTIVE  Physical Exam:  Vital Signs   Vital Signs Last 24 Hrs  T(C): 36.9 (16 May 2020 05:26), Max: 36.9 (16 May 2020 05:26)  T(F): 98.4 (16 May 2020 05:26), Max: 98.4 (16 May 2020 05:26)  HR: 63 (16 May 2020 05:26) (56 - 63)  BP: 129/73 (16 May 2020 05:26) (108/75 - 129/73)  BP(mean): --  RR: 18 (16 May 2020 05:26) (17 - 18)  SpO2: 94% (16 May 2020 05:26) (94% - 96%)    Constitutional: NAD, awake and alert, well-developed  Neurological: no focal deficits; [place], confused   HEENT: PERRLA, EOMI, MMM  Neck: Soft and supple, No LAD, No JVD  Respiratory: Breath sounds are clear bilaterally, No wheezing, rales or rhonchi  Cardiovascular: S1 and S2, regular rate and rhythm; no Murmurs, gallops or rubs  Gastrointestinal: Bowel Sounds present, soft, nontender, nondistended, no guarding, no rebound tenderness  Back: No CVA tenderness   Extremities: No peripheral edema  Vascular: 2+ peripheral pulses  Musculoskeletal: 5/5 strength b/l upper and lower extremities  Skin: No rashes  Breast: Deferred  Rectal: Deferred    MEDICATIONS  (STANDING):  atorvastatin 20 milliGRAM(s) Oral at bedtime  azithromycin  IVPB 500 milliGRAM(s) IV Intermittent every 24 hours  brimonidine 0.2% Ophthalmic Solution 1 Drop(s) Left EYE two times a day  cefTRIAXone Injectable. 1000 milliGRAM(s) IV Push every 24 hours  donepezil 10 milliGRAM(s) Oral at bedtime  enoxaparin Injectable 40 milliGRAM(s) SubCutaneous daily  escitalopram 10 milliGRAM(s) Oral daily  memantine 10 milliGRAM(s) Oral daily  metoprolol tartrate 25 milliGRAM(s) Oral two times a day  pantoprazole    Tablet 40 milliGRAM(s) Oral before breakfast  timolol 0.5% Solution 1 Drop(s) Left EYE two times a day    MEDICATIONS  (PRN):  acetaminophen   Tablet .. 650 milliGRAM(s) Oral every 4 hours PRN Temp greater or equal to 38.5C (101.3F)  lidocaine 4% Cream 1 Application(s) Topical three times a day PRN for moderate pain  ondansetron   Disintegrating Tablet 4 milliGRAM(s) Oral three times a day PRN for nausea  rOPINIRole 2 milliGRAM(s) Oral four times a day PRN Restless legs  traMADol 50 milliGRAM(s) Oral every 8 hours PRN pain      LABS:   Lab Results:  CBC  CBC Full  -  ( 16 May 2020 08:02 )  WBC Count : 12.67 K/uL  RBC Count : 4.48 M/uL  Hemoglobin : 14.1 g/dL  Hematocrit : 42.1 %  Platelet Count - Automated : 271 K/uL  Mean Cell Volume : 94.0 fl  Mean Cell Hemoglobin : 31.5 pg  Mean Cell Hemoglobin Concentration : 33.5 gm/dL  Auto Neutrophil # : x  Auto Lymphocyte # : x  Auto Monocyte # : x  Auto Eosinophil # : x  Auto Basophil # : x  Auto Neutrophil % : x  Auto Lymphocyte % : x  Auto Monocyte % : x  Auto Eosinophil % : x  Auto Basophil % : x    .		Differential:	[] Automated		[] Manual  Chemistry                        14.1   12.67 )-----------( 271      ( 16 May 2020 08:02 )             42.1     05-16    139  |  105  |  16  ----------------------------<  109<H>  4.0   |  28  |  0.89    Ca    9.7      16 May 2020 08:02  Phos  3.4     05-16  Mg     1.9     05-16        PT/INR - ( 15 May 2020 11:43 )   PT: 22.6 sec;   INR: 1.99 ratio         PTT - ( 16 May 2020 08:02 )  PTT:86.0 sec          MICROBIOLOGY/CULTURES:  Culture Results:   No Growth Final (05-10 @ 17:35)  Culture Results:   No Growth Final (05-10 @ 17:35)      RADIOLOGY RESULTS:    MICROBIOLOGY/CULTURES:  Culture Results:   No growth to date. (05-10 @ 17:35)  Culture Results:   No growth to date. (05-10 @ 17:35)  Culture Results:   Growth in anaerobic bottle: Bacillus species not anthracis  "Susceptibilities not performed"  Growth in aerobic bottle: Staphylococcus hominis  Coag Negative Staphylococcus  Single set isolate, possible contaminant. Contact  Microbiology if susceptibility testing clinically  indicated.  "Due to technical problems, Proteus sp. will Not be reported as part of  the BCID panel until further notice"  ***Blood Panel PCR results on this specimen are available  approximately 3 hours after the Gram stain result.***  Gram stain, PCR, and/or culture results may not always  correspond due to difference in methodologies.  ************************************************************  This PCR assay was performed using [employer].  The following targets are tested for: Enterococcus,  vancomycin resistant enterococci, Listeria monocytogenes,  coagulase negative staphylococci, S. aureus,  methicillin resistant S. aureus, Streptococcus agalactiae  (Group B), S. pneumoniae, S. pyogenes (Group A),  Acinetobacter baumannii, Enterobacter cloacae, E. coli,  Klebsiella oxytoca, K. pneumoniae, Proteus sp.,  Serratia marcescens, Haemophilus influenzae,  Neisseria meningitidis, Pseudomonas aeruginosa, Candida  albicans, C. glabrata, C krusei, C parapsilosis,  C. tropicalis and the KPC resistance gene. (05-08 @ 16:14)  Culture Results:   <10,000 CFU/mL Normal Urogenital Mitzi (05-08 @ 14:58)        D-Dimer Assay, Quantitative (05.08.20 @ 20:15)    D-Dimer Assay, Quantitative: 1771 ng/mL DDU    Ferritin, Serum (05.08.20 @ 20:15)    Ferritin, Serum: 395 ng/mL    Lactate, Blood (05.08.20 @ 16:14)    Lactate, Blood: 1.5 mmol/L    Sedimentation Rate, Erythrocyte (05.08.20 @ 20:15)    Sedimentation Rate, Erythrocyte: 44 mm/hr    CARDIAC MARKERS ( 08 May 2020 20:15 )  x     / x     / 76 U/L / x     / x        COVID-19 PCR . (05.08.20 @ 16:14)    COVID-19 PCR: NotDetec:    RADIOLOGY/EKG:    < from: CT Abdomen and Pelvis w/ IV Cont (05.08.20 @ 15:21) >  < from: CT Chest w/ IV Cont (05.08.20 @ 15:21) >        IMPRESSION:   No evidence of a posttraumatic abnormality.  Patchy right upper lobe infiltrate.  < end of copied text >      < from: CT Cervical Spine No Cont (05.08.20 @ 15:09) >  IMPRESSION:  No acute findings    < from: CT Head No Cont (05.08.20 @ 15:07) >  Impression:  Unremarkable noncontrast CT scan of the brain.  < end of copied text >    < from: US Duplex Venous Lower Ext Complete, Bilateral (05.13.20 @ 10:12) >    IMPRESSION:     Venous thrombosis in right lower extremity gastrocnemius vein.      < end of copied text >

## 2020-05-16 NOTE — DIETITIAN INITIAL EVALUATION ADULT. - ADD RECOMMEND
1) monitor PO intake; encourage PO intake at each meal 2) add MVI with minerals daily to ensure 100% RDI met 3) monitor BM: add bowel regimen 4) weekly wt checks to track/trend changes

## 2020-05-16 NOTE — DIETITIAN INITIAL EVALUATION ADULT. - OTHER INFO
83yo male with PMH significant for CAD, diastolic CHF, chronic anemia, Afib, HTN, MRSA, GERD, diverticulosis, [place] presented s/p fall out of his recliner with severe back pain.  Pt admitted with weakness 2/2 possible CAP/aspiration vs COVID-19 (found to be negative).

## 2020-05-16 NOTE — DIETITIAN INITIAL EVALUATION ADULT. - NAME AND PHONE
Elvira Summers MA, RDN, CDN, [facility]  [phone] (office number)  [phone] (pager number)

## 2020-05-16 NOTE — DIETITIAN INITIAL EVALUATION ADULT. - PHYSICAL APPEARANCE
overweight/other (specify) NFPE revealed no significant muscle/fat wasting  no edema  no BM since 5/10, not on bowel regimen and on constipation causing pain meds; rec'd add bowel regimen.  chucky score of 15, no PU noted.

## 2020-05-16 NOTE — PROGRESS NOTE ADULT - ASSESSMENT
83 yo M Pt is admitted w/    #Weakness secondary to possible CAP/Aspiration VS  COVID19 viral pna  #Weakness, Fall   - result COVID- 19 testing in the ED is neg   - CT chest RUL infiltrate   - Continue Rocephin IV 1gm qd #5 switch to ceftin X 2 more days   - S/P Zithromax  mg qd  - Sating well on room air   - crp coming down  - WBC rising mos likely 2ndry to aspiration    #Elevated D-dimer 2ndry to Right DVT  -patient has hx of GI bleeding and Liver hemangiomas; off Blood thinners  -Discussed with Dr Mendiola and advised to start eliquis and monitor for GI Bleed  -Discussed with wife and understands there is a chance he might bleed but also a risk of him getting PE  -If he bleeds in the future then he may be a candidate for IVC filter  -Hold  Eliquis and Switch to heparin Drip for CT myelography    #Acute on Chronic Back pain  - Hx of Epidural injections in the past  - Ortho spine consult noted  - PT evaluation  - probably not a candidate for MRI due to cochlear impants  - S/C tramadol 50mg Q6H   - Add oxycodone 2.5 PO Q6H ATC and PRN Oxycodone 5mg   - continue Flexeril 10mg Q8h PRN  - CT myelography on monday with IR  - last dose of Eliquis 5/14 evening    #positive cultures  -coagulase negative staph in blood is consistent with skin contaminant, no need for further Vanco  -repeat blood cultures NGTD    #CAD/AFib  - As noted not on A/C due to recent GI bleeding but will try eliquis now due to DVT  - Cont. BB    #Hx Chronic diastolic HF   #Moderate Pulm HTN  - euvolemic/compensated  - daily weights / I&Os   - lasix was d/c as outpatient by his PCP and Cardiologist due increase urinary frequency      #Hyperlipidemia  - Cont. statin     # DVT PPx:   -c/w lovenox    # Goals of Care:   - Full  Code

## 2020-05-17 LAB
APTT BLD: 89.3 SEC — HIGH (ref 27.5–36.3)
HCT VFR BLD CALC: 41.3 % — SIGNIFICANT CHANGE UP (ref 39–50)
HGB BLD-MCNC: 14 G/DL — SIGNIFICANT CHANGE UP (ref 13–17)
MCHC RBC-ENTMCNC: 31.5 PG — SIGNIFICANT CHANGE UP (ref 27–34)
MCHC RBC-ENTMCNC: 33.9 GM/DL — SIGNIFICANT CHANGE UP (ref 32–36)
MCV RBC AUTO: 93 FL — SIGNIFICANT CHANGE UP (ref 80–100)
PLATELET # BLD AUTO: 222 K/UL — SIGNIFICANT CHANGE UP (ref 150–400)
RBC # BLD: 4.44 M/UL — SIGNIFICANT CHANGE UP (ref 4.2–5.8)
RBC # FLD: 14.9 % — HIGH (ref 10.3–14.5)
SARS-COV-2 RNA SPEC QL NAA+PROBE: SIGNIFICANT CHANGE UP
WBC # BLD: 10.64 K/UL — HIGH (ref 3.8–10.5)
WBC # FLD AUTO: 10.64 K/UL — HIGH (ref 3.8–10.5)

## 2020-05-17 PROCEDURE — 99232 SBSQ HOSP IP/OBS MODERATE 35: CPT

## 2020-05-17 RX ADMIN — OXYCODONE HYDROCHLORIDE 2.5 MILLIGRAM(S): 5 TABLET ORAL at 23:11

## 2020-05-17 RX ADMIN — ESCITALOPRAM OXALATE 10 MILLIGRAM(S): 10 TABLET, FILM COATED ORAL at 12:30

## 2020-05-17 RX ADMIN — CYCLOBENZAPRINE HYDROCHLORIDE 10 MILLIGRAM(S): 10 TABLET, FILM COATED ORAL at 21:51

## 2020-05-17 RX ADMIN — HEPARIN SODIUM 1000 UNIT(S)/HR: 5000 INJECTION INTRAVENOUS; SUBCUTANEOUS at 07:50

## 2020-05-17 RX ADMIN — BRIMONIDINE TARTRATE 1 DROP(S): 2 SOLUTION/ DROPS OPHTHALMIC at 06:27

## 2020-05-17 RX ADMIN — DONEPEZIL HYDROCHLORIDE 10 MILLIGRAM(S): 10 TABLET, FILM COATED ORAL at 21:51

## 2020-05-17 RX ADMIN — PANTOPRAZOLE SODIUM 40 MILLIGRAM(S): 20 TABLET, DELAYED RELEASE ORAL at 06:22

## 2020-05-17 RX ADMIN — Medication 1 DROP(S): at 06:27

## 2020-05-17 RX ADMIN — Medication 1 DROP(S): at 17:39

## 2020-05-17 RX ADMIN — CYCLOBENZAPRINE HYDROCHLORIDE 10 MILLIGRAM(S): 10 TABLET, FILM COATED ORAL at 14:06

## 2020-05-17 RX ADMIN — OXYCODONE HYDROCHLORIDE 2.5 MILLIGRAM(S): 5 TABLET ORAL at 12:30

## 2020-05-17 RX ADMIN — MEMANTINE HYDROCHLORIDE 10 MILLIGRAM(S): 10 TABLET ORAL at 12:30

## 2020-05-17 RX ADMIN — OXYCODONE HYDROCHLORIDE 2.5 MILLIGRAM(S): 5 TABLET ORAL at 06:21

## 2020-05-17 RX ADMIN — ATORVASTATIN CALCIUM 20 MILLIGRAM(S): 80 TABLET, FILM COATED ORAL at 21:51

## 2020-05-17 RX ADMIN — OXYCODONE HYDROCHLORIDE 2.5 MILLIGRAM(S): 5 TABLET ORAL at 17:40

## 2020-05-17 RX ADMIN — Medication 25 MILLIGRAM(S): at 06:22

## 2020-05-17 RX ADMIN — BRIMONIDINE TARTRATE 1 DROP(S): 2 SOLUTION/ DROPS OPHTHALMIC at 17:37

## 2020-05-17 NOTE — PROGRESS NOTE ADULT - ASSESSMENT
85 yo M Pt is admitted w/    #Weakness secondary to possible CAP/Aspiration VS  COVID19 viral pna  #Weakness, Fall   - result COVID- 19 testing in the ED is neg   - CT chest RUL infiltrate   - Continue Rocephin IV 1gm qd #5 switch to ceftin X 2 more days   - S/P Zithromax  mg qd  - Sating well on room air   - crp coming down    #Elevated D-dimer 2ndry to Right DVT  -patient has hx of GI bleeding and Liver hemangiomas; off Blood thinners  -Discussed with Dr Mendiola and advised to start eliquis and monitor for GI Bleed  -Discussed with wife and understands there is a chance he might bleed but also a risk of him getting PE  -If he bleeds in the future then he may be a candidate for IVC filter  -Hold  Eliquis and Switch to heparin Drip for CT myelography    #Acute on Chronic Back pain  - Hx of Epidural injections in the past  - Ortho spine consult noted  - PT evaluation  - probably not a candidate for MRI due to cochlear impants  - S/C tramadol 50mg Q6H   - Add oxycodone 2.5 PO Q6H ATC and PRN Oxycodone 5mg   - continue Flexeril 10mg Q8h PRN  - CT T/L - No evidence of acute traumatic injury to the thoracic and lumbar spine.  - Npo after MN, Hold heparin Drip at 3am and CT myelography on monday with IR  - last dose of Eliquis 5/14 evening    #positive cultures  -coagulase negative staph in blood is consistent with skin contaminant, no need for further Vanco  -repeat blood cultures NGTD    #CAD/AFib  - As noted not on A/C due to recent GI bleeding but will try eliquis now due to DVT  - Cont. BB    #Hx Chronic diastolic HF   #Moderate Pulm HTN  - euvolemic/compensated  - daily weights / I&Os   - lasix was d/c as outpatient by his PCP and Cardiologist due increase urinary frequency      #Hyperlipidemia  - Cont. statin     # DVT PPx:   -c/w lovenox    # Goals of Care:   - Full  Code

## 2020-05-17 NOTE — PROGRESS NOTE ADULT - ASSESSMENT
-   right upper lobe infiltrate likely infectious in etiology with the CAP   -   patient completed antibiotics for the pneumonia with the rocephin and azithromycin   -   calf  vein thrombus with high D-dimers cannot exclude associated pulmonary embolism on heparin drip in anticipation for the procedure .  -   atrial fibrillation as per the chart not anticoagulated with history of GI bleeding in the past   -   moderate pulmonary hypertension likely secondary noted with the last echocardiogram  -   severe back pain with history of recurrent falls  -   mild atelectatic changes in the left base  -   reported history of obstructive sleep apnea as per charting patient is not using his CPAP.  -   other issues include hiatal hernia, history of aortic valve replacement and aneurysm repair    PLAN     -  completed antibiotics with out any fever or leucocytosis   -  repeat blood cultures were negative so far negative   - continue with the eloquis for calf thrombus  while monitoring for the G.I bleeding after the procedure with the discontinuation of the heparin   -  02 supplementation for the hypoxia if needed so far did not needed the o2   -  management of back pain and other issues that include diastolic dysfunction as per the medicine . moderate pulmonary HTN is secondary to cardiac disease .  - other wise pulmonary status remained stable

## 2020-05-17 NOTE — PROGRESS NOTE ADULT - SUBJECTIVE AND OBJECTIVE BOX
SUBJECTIVE     Patient breathing comfortably with out the need for the 02   hard of hearing and offers no new complaints     PAST MEDICAL & SURGICAL HISTORY:  CAD (coronary artery disease): (-) cardiac stress and 2DECHO 2019  Diastolic CHF: LVEF 55-60% 6/2019  Chronic anemia  Afib: Not on A/C 2/2 hx of GIbleeding  DDD (degenerative disc disease), lumbar  [place] (hard of hearing)  Fall (on) (from) other stairs and steps, sequela: 03/28/2017- lumbar hemtoma  Essential hypertension  Nerve injury: complication from right TKR surgery, 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  Aortic root dilatation  MRSA (methicillin resistant Staphylococcus aureus): left forearm 2012  Lumbar degenerative disc disease  BPH associated with nocturia  Diverticulosis of intestine without bleeding, unspecified intestinal tract location  Gastroesophageal reflux disease, esophagitis presence not specified: Hx of GI bleed  Aortic valve insufficiency, unspecified etiology  CAMILA (obstructive sleep apnea): unable to tolerate nocturnal CPAP  Restless leg syndrome  Thoracic aortic aneurysm  HLD (hyperlipidemia)  S/P thoracic aortic aneurysm repair  S/P AVR (aortic valve replacement)  Status post cataract extraction, unspecified laterality: bilat  S/P debridement: left forearm -mrsa  History of fundoplication: 2000  Amputation finger: left index 1970  S/P knee replacement: left  S/P knee replacement: right  S/P shoulder surgery: right rotatotor cuff injury    OBJECTIVE   Vital Signs Last 24 Hrs  T(C): 36.3 (17 May 2020 05:37), Max: 36.6 (16 May 2020 16:55)  T(F): 97.3 (17 May 2020 05:37), Max: 97.9 (16 May 2020 16:55)  HR: 51 (17 May 2020 05:37) (51 - 66)  BP: 105/62 (17 May 2020 05:37) (103/76 - 105/62)  BP(mean): --  RR: 17 (17 May 2020 05:37) (17 - 17)  SpO2: 95% (17 May 2020 05:37) (95% - 96%)    PHYSICAL EXAM:  Constitutional: , awake and alert, not in distress.  HEENT: Normo cephalic atraumatic  Neck: Soft and supple, No J.V.D   Respiratory: vesicular breathing , No wheezing, rales or rhonchi.   Cardiovascular: S1 and S2, regular rate .   Gastrointestinal:  soft, nontender,   Extremities: No  edema or calf tenderness .  Neurological: No new  focal deficits.    MEDICATIONS  (STANDING):  atorvastatin 20 milliGRAM(s) Oral at bedtime  brimonidine 0.2% Ophthalmic Solution 1 Drop(s) Left EYE two times a day  donepezil 10 milliGRAM(s) Oral at bedtime  escitalopram 10 milliGRAM(s) Oral daily  heparin  Infusion.  Unit(s)/Hr (14 mL/Hr) IV Continuous <Continuous>  memantine 10 milliGRAM(s) Oral daily  metoprolol tartrate 25 milliGRAM(s) Oral two times a day  oxyCODONE    IR 2.5 milliGRAM(s) Oral every 6 hours  pantoprazole    Tablet 40 milliGRAM(s) Oral before breakfast  timolol 0.5% Solution 1 Drop(s) Left EYE two times a day                            14.0   10.64 )-----------( 222      ( 17 May 2020 07:20 )             41.3     05-16    139  |  105  |  16  ----------------------------<  109<H>  4.0   |  28  |  0.89    Ca    9.7      16 May 2020 08:02  Phos  3.4     05-16  Mg     1.9     05-16

## 2020-05-17 NOTE — PROGRESS NOTE ADULT - SUBJECTIVE AND OBJECTIVE BOX
HPI:  Pt is an 85 y/o M with a PMHx of CAD, diastolic CHF, chronic anemia, chronic back pain, Afib, DDD, HTN, MRSA, BPH, GERD, diverticulosis, CAMILA, thoracic aortic aneurysm, [place] who  presented via  EMS after slipping and falling out of his recliner chair.  Pt has chronic severe back pain and due to the COVID -19 pandemic he was  unable to get an epidural.       His pain is so severe, he spends most of the day in his recliner chair.  Pt's wife call 911, as he had fallen out of his recliner chair three times in the 24 hours.    No preceding chest pain, no SOB,  no  LOC or syncope.  No fever or other complaints.      Pt was admitted to the Hospitalist service with Right upper lobe pneumonia and was found to have a Right lower ext DVT.  Pt has a h/o severe lower back pain, as per his wife he follows with Dr. Billy Motta a pain management physician. He had on steroid injection in the past but was unable to have another due to the COVID Crisis. Pt does spend most of his day in the recliner at home, he has only a few steps to the bathroom and was unable to make it the other day due to severe pain. Pt is extremely hard of hearing and cannot have a conversation or follow commands. He has been on tramadol at home for pain. Wife states he has not has issues with incontinence at home.     5/14 - Pt seen and examined earlier today, in NAD. No new complaints, no overnight events     5/15 - Films / chart reviewed. Case discussed with IR. CT C spine shows no gross compression, would favor CT T/L spine prior to doing myelogram    5/16 - No events     5/17 - Pt seen and examined, states back pain slightly improved, full conversation difficult due ot pt being hard of hearing, Pt is on Heparin infusion with plans for CT myelogram in IR tomorrow     Vital Signs Last 24 Hrs  T(C): 36.3 (17 May 2020 05:37), Max: 36.8 (16 May 2020 10:27)  T(F): 97.3 (17 May 2020 05:37), Max: 98.3 (16 May 2020 10:27)  HR: 51 (17 May 2020 05:37) (51 - 66)  BP: 105/62 (17 May 2020 05:37) (100/65 - 105/62)  RR: 17 (17 May 2020 05:37) (17 - 17)  SpO2: 95% (17 May 2020 05:37) (95% - 96%)    MEDICATIONS  (STANDING):  atorvastatin 20 milliGRAM(s) Oral at bedtime  brimonidine 0.2% Ophthalmic Solution 1 Drop(s) Left EYE two times a day  donepezil 10 milliGRAM(s) Oral at bedtime  escitalopram 10 milliGRAM(s) Oral daily  heparin  Infusion.  Unit(s)/Hr (14 mL/Hr) IV Continuous <Continuous>  memantine 10 milliGRAM(s) Oral daily  metoprolol tartrate 25 milliGRAM(s) Oral two times a day  oxyCODONE    IR 2.5 milliGRAM(s) Oral every 6 hours  pantoprazole    Tablet 40 milliGRAM(s) Oral before breakfast  timolol 0.5% Solution 1 Drop(s) Left EYE two times a day    MEDICATIONS  (PRN):  acetaminophen   Tablet .. 650 milliGRAM(s) Oral every 4 hours PRN Temp greater or equal to 38.5C (101.3F)  cyclobenzaprine 10 milliGRAM(s) Oral three times a day PRN Muscle Spasm  heparin   Injectable 6500 Unit(s) IV Push every 6 hours PRN For aPTT less than 40  heparin   Injectable 3000 Unit(s) IV Push every 6 hours PRN For aPTT between 40 - 57  lidocaine 4% Cream 1 Application(s) Topical three times a day PRN for moderate pain  ondansetron   Disintegrating Tablet 4 milliGRAM(s) Oral three times a day PRN for nausea  oxyCODONE    IR 5 milliGRAM(s) Oral every 6 hours PRN Moderate Pain (4 - 6)  rOPINIRole 2 milliGRAM(s) Oral four times a day PRN Restless legs    PHYSICAL EXAM:  Constitutional: awake and alert, resting comfortably on his back, severely hard of hearing   HEENT: PERRLA, EOMI, [place]   Neck: Supple  Respiratory: Breath sounds are clear bilaterally  Cardiovascular: S1 and S2, regular rhythm  Gastrointestinal: soft, nontender  Extremities:  no edema  Vascular: distal pulses intact   Musculoskeletal: no joint swelling/tenderness, no abnormal movements, able to do straight leg raise   Skin: No rashes    Neurological exam:  HF: awake, alert, appears oriented, difficult to have a conversation due to pt being severly hard of hearing even with hearing aids and cochlear  implant   CN: PEARRL, EOMI, VFF, facial sensation normal, no NLFD, tongue midline, Palate moves equally, SCM equal bilaterally  Motor: moves all extremities antigravity with good strength   Sens: Intact to light touch  Reflexes: severe hyperreflexia b/l lower ext, crossing to the other side, upper ext WNL, no Carvalho's   Coord: unable to assess   Gait/Balance: Not tested     LABS:                         14.0   10.64 )-----------( 222      ( 17 May 2020 07:20 )             41.3     05-16    139  |  105  |  16  ----------------------------<  109<H>  4.0   |  28  |  0.89    Ca    9.7      16 May 2020 08:02  Phos  3.4     05-16  Mg     1.9     05-16    PT/INR - ( 15 May 2020 11:43 )   PT: 22.6 sec;   INR: 1.99 ratio      PTT - ( 17 May 2020 07:20 )  PTT:89.3 sec    RADIOLOGY:  < from: CT Lumbar Spine No Cont (05.15.20 @ 17:34) >  There is disc bulge at T6-T7 resulting in mild-to-moderate canal stenosis.  L1-2: No spinal canal or neuroforaminal stenosis.  L2-3: Minimal retrolisthesis of L2 on L3. Disc bulge and degenerative changes of the facet and uncovertebraljoints resulting in mild canal stenosis and mild-to-moderate neuroforaminal narrowing bilaterally.  L3-4: Disc bulge and degenerative changes of the bilateral facet joints, thickening of ligamentum flavum resulting in moderate to severe canal stenosis and moderate right, mild to moderate left neuroforaminal narrowing.  L4-5: Minimal grade 1 anterolisthesis of L4 on L5. Disc bulge and degenerative changes of the bilateral facet joints, thickening ligamentum flavum resulting in moderate canal stenosis and moderate to severe right, mild to moderate left neuroforaminal narrowing.  L5-S1: Minimal grade 1 anterolisthesis of L5 on S1. Disc bulge and degenerative changes of the bilateral facet joints resulting in mild canal stenosis and severe left, moderate to severe right neural foraminal narrowing.  LIMITED EVALUATION OF UPPER SACRUM AND SACROILIAC JOINTS: Mild to moderate degenerative changes of the sacroiliac joints.

## 2020-05-17 NOTE — PROGRESS NOTE ADULT - SUBJECTIVE AND OBJECTIVE BOX
HOSPITALIST PROGRESS NOTE:  DOS: 5/10/2020  SUBJECTIVE:  no complaints; sating 95% on room air  Chief Complaint: Patient is a 84y old  Male who presents with a chief complaint of RUL Pna, Leukocytosis  Weakness, Fall, Chronic Back pain (08 May 2020 18:20)    HPI: Pt is an 83 y/o M with a PMHx of CAD, diastolic CHF, chronic anemia, chronic back pain, Afib, DDD, HTN, MRSA, BPH, GERD, diverticulosis, CAMILA, thoracic aortic aneurysm, [place] who  presents via  EMS after slipping and falling out of his recliner chair.  Pt has severe back pain and due to the COVID -19 pandemic he was  unable to get an epidural.      His pain is so severe, he spends most of the day in his recliner chair.  Pt's wife call 911, as he had fallen out of his recliner chair three times in the last 24 hours.  No preceding cpain, no SOB,  no  LOC or syncope.  No fever or other complaints.         In the ED, pt is found to have a RUL Pna on CT scan and leukocytosis.      5/12: Patient has no complaints for me and very sleepy; but as per the staff here patient complaining of back pain  5/13: patient found to have right DVT;  No other events. Dsicussed in length with patients wife   5/14:  Paitent still having pain despite getting tramadol and flexeril; wife is agreeable to starting oxycodone  5/15:  Patient still having pain with tramadol; agreeable to changing to oxycodone ATC; ID going to take him on monday for CT myelography; Eliquis d/c and started on heparin Drip today  5/16:  On heparin Drip; rising WBC; oxycodone helping as patient is able to sit up more while eating   5/17: No events overnight, patient had a CT done yesterday    Allergies:  No Known Allergies    REVIEW OF SYSTEMS:  See HPI. All other review of systems is negative unless indicated above.     OBJECTIVE  Physical Exam:  Vital Signs   Vital Signs Last 24 Hrs  T(C): 36.6 (17 May 2020 10:45), Max: 36.6 (16 May 2020 16:55)  T(F): 97.9 (17 May 2020 10:45), Max: 97.9 (16 May 2020 16:55)  HR: 57 (17 May 2020 10:45) (51 - 66)  BP: 103/67 (17 May 2020 10:45) (103/67 - 105/62)  BP(mean): --  RR: 16 (17 May 2020 10:45) (16 - 17)  SpO2: 96% (17 May 2020 10:45) (95% - 96%)    Constitutional: NAD, awake and alert, well-developed  Neurological: no focal deficits; [place], confused   HEENT: PERRLA, EOMI, MMM  Neck: Soft and supple, No LAD, No JVD  Respiratory: Breath sounds are clear bilaterally, No wheezing, rales or rhonchi  Cardiovascular: S1 and S2, regular rate and rhythm; no Murmurs, gallops or rubs  Gastrointestinal: Bowel Sounds present, soft, nontender, nondistended, no guarding, no rebound tenderness  Back: No CVA tenderness   Extremities: No peripheral edema  Vascular: 2+ peripheral pulses  Musculoskeletal: 5/5 strength b/l upper and lower extremities  Skin: No rashes  Breast: Deferred  Rectal: Deferred    MEDICATIONS  (STANDING):  atorvastatin 20 milliGRAM(s) Oral at bedtime  azithromycin  IVPB 500 milliGRAM(s) IV Intermittent every 24 hours  brimonidine 0.2% Ophthalmic Solution 1 Drop(s) Left EYE two times a day  cefTRIAXone Injectable. 1000 milliGRAM(s) IV Push every 24 hours  donepezil 10 milliGRAM(s) Oral at bedtime  enoxaparin Injectable 40 milliGRAM(s) SubCutaneous daily  escitalopram 10 milliGRAM(s) Oral daily  memantine 10 milliGRAM(s) Oral daily  metoprolol tartrate 25 milliGRAM(s) Oral two times a day  pantoprazole    Tablet 40 milliGRAM(s) Oral before breakfast  timolol 0.5% Solution 1 Drop(s) Left EYE two times a day    MEDICATIONS  (PRN):  acetaminophen   Tablet .. 650 milliGRAM(s) Oral every 4 hours PRN Temp greater or equal to 38.5C (101.3F)  lidocaine 4% Cream 1 Application(s) Topical three times a day PRN for moderate pain  ondansetron   Disintegrating Tablet 4 milliGRAM(s) Oral three times a day PRN for nausea  rOPINIRole 2 milliGRAM(s) Oral four times a day PRN Restless legs  traMADol 50 milliGRAM(s) Oral every 8 hours PRN pain      LABS:   Lab Results:  CBC  CBC Full  -  ( 17 May 2020 07:20 )  WBC Count : 10.64 K/uL  RBC Count : 4.44 M/uL  Hemoglobin : 14.0 g/dL  Hematocrit : 41.3 %  Platelet Count - Automated : 222 K/uL  Mean Cell Volume : 93.0 fl  Mean Cell Hemoglobin : 31.5 pg  Mean Cell Hemoglobin Concentration : 33.9 gm/dL  Auto Neutrophil # : x  Auto Lymphocyte # : x  Auto Monocyte # : x  Auto Eosinophil # : x  Auto Basophil # : x  Auto Neutrophil % : x  Auto Lymphocyte % : x  Auto Monocyte % : x  Auto Eosinophil % : x  Auto Basophil % : x    .		Differential:	[] Automated		[] Manual  Chemistry                        14.0   10.64 )-----------( 222      ( 17 May 2020 07:20 )             41.3     05-16    139  |  105  |  16  ----------------------------<  109<H>  4.0   |  28  |  0.89    Ca    9.7      16 May 2020 08:02  Phos  3.4     05-16  Mg     1.9     05-16        PT/INR - ( 15 May 2020 11:43 )   PT: 22.6 sec;   INR: 1.99 ratio         PTT - ( 17 May 2020 07:20 )  PTT:89.3 sec      MICROBIOLOGY/CULTURES:  Culture Results:   No Growth Final (05-10 @ 17:35)  Culture Results:   No Growth Final (05-10 @ 17:35)      RADIOLOGY RESULTS:    MICROBIOLOGY/CULTURES:  Culture Results:   No growth to date. (05-10 @ 17:35)  Culture Results:   No growth to date. (05-10 @ 17:35)  Culture Results:   Growth in anaerobic bottle: Bacillus species not anthracis  "Susceptibilities not performed"  Growth in aerobic bottle: Staphylococcus hominis  Coag Negative Staphylococcus  Single set isolate, possible contaminant. Contact  Microbiology if susceptibility testing clinically  indicated.  "Due to technical problems, Proteus sp. will Not be reported as part of  the BCID panel until further notice"  ***Blood Panel PCR results on this specimen are available  approximately 3 hours after the Gram stain result.***  Gram stain, PCR, and/or culture results may not always  correspond due to difference in methodologies.  ************************************************************  This PCR assay was performed using [employer].  The following targets are tested for: Enterococcus,  vancomycin resistant enterococci, Listeria monocytogenes,  coagulase negative staphylococci, S. aureus,  methicillin resistant S. aureus, Streptococcus agalactiae  (Group B), S. pneumoniae, S. pyogenes (Group A),  Acinetobacter baumannii, Enterobacter cloacae, E. coli,  Klebsiella oxytoca, K. pneumoniae, Proteus sp.,  Serratia marcescens, Haemophilus influenzae,  Neisseria meningitidis, Pseudomonas aeruginosa, Candida  albicans, C. glabrata, C krusei, C parapsilosis,  C. tropicalis and the KPC resistance gene. (05-08 @ 16:14)  Culture Results:   <10,000 CFU/mL Normal Urogenital Mitzi (05-08 @ 14:58)        D-Dimer Assay, Quantitative (05.08.20 @ 20:15)    D-Dimer Assay, Quantitative: 1771 ng/mL DDU    Ferritin, Serum (05.08.20 @ 20:15)    Ferritin, Serum: 395 ng/mL    Lactate, Blood (05.08.20 @ 16:14)    Lactate, Blood: 1.5 mmol/L    Sedimentation Rate, Erythrocyte (05.08.20 @ 20:15)    Sedimentation Rate, Erythrocyte: 44 mm/hr    CARDIAC MARKERS ( 08 May 2020 20:15 )  x     / x     / 76 U/L / x     / x        COVID-19 PCR . (05.08.20 @ 16:14)    COVID-19 PCR: NotDetec:    RADIOLOGY/EKG:    < from: CT Abdomen and Pelvis w/ IV Cont (05.08.20 @ 15:21) >  < from: CT Chest w/ IV Cont (05.08.20 @ 15:21) >        IMPRESSION:   No evidence of a posttraumatic abnormality.  Patchy right upper lobe infiltrate.  < end of copied text >      < from: CT Cervical Spine No Cont (05.08.20 @ 15:09) >  IMPRESSION:  No acute findings    < from: CT Head No Cont (05.08.20 @ 15:07) >  Impression:  Unremarkable noncontrast CT scan of the brain.  < end of copied text >    < from: US Duplex Venous Lower Ext Complete, Bilateral (05.13.20 @ 10:12) >    IMPRESSION:     Venous thrombosis in right lower extremity gastrocnemius vein.      < end of copied text >    < from: CT Lumbar Spine No Cont (05.15.20 @ 17:34) >      IMPRESSION:     No evidence of acute traumatic injury to the thoracic and lumbar spine.    MRI may be obtained, if further information regarding ligamentous injury, hematoma or spinal cord pathology is required.     < end of copied text >

## 2020-05-17 NOTE — PROGRESS NOTE ADULT - ASSESSMENT
Pt is an 83 y/o M with a PMH of CAD, diastolic CHF, chronic anemia, chronic back pain, Afib, DDD, HTN, MRSA, BPH, GERD, diverticulosis, CAMILA, thoracic aortic aneurysm, [place] who  presented via  EMS after slipping and falling out of his recliner chair. Pt was admitted to the Hospitalist service with Right upper lobe pneumonia and was found to have a Right lower ext DVT. Pt has a h/o severe lower back pain, as per his wife he follows with Dr. Billy Motta a pain management physician. He had on steroid injection in the past but was unable to have another due to the COVID Crisis    PLAN:  - Possible plans for CT Myelogram tomorrow   - Hold Heparin at 3am, NPO after midnight    - Continue pain management PRN    Discussed with Dr. Smith

## 2020-05-18 LAB
APTT BLD: 76.8 SEC — HIGH (ref 27.5–36.3)
HCT VFR BLD CALC: 39.4 % — SIGNIFICANT CHANGE UP (ref 39–50)
HGB BLD-MCNC: 13.3 G/DL — SIGNIFICANT CHANGE UP (ref 13–17)
MCHC RBC-ENTMCNC: 31.7 PG — SIGNIFICANT CHANGE UP (ref 27–34)
MCHC RBC-ENTMCNC: 33.8 GM/DL — SIGNIFICANT CHANGE UP (ref 32–36)
MCV RBC AUTO: 93.8 FL — SIGNIFICANT CHANGE UP (ref 80–100)
PLATELET # BLD AUTO: 242 K/UL — SIGNIFICANT CHANGE UP (ref 150–400)
RBC # BLD: 4.2 M/UL — SIGNIFICANT CHANGE UP (ref 4.2–5.8)
RBC # FLD: 14.9 % — HIGH (ref 10.3–14.5)
WBC # BLD: 11.15 K/UL — HIGH (ref 3.8–10.5)
WBC # FLD AUTO: 11.15 K/UL — HIGH (ref 3.8–10.5)

## 2020-05-18 PROCEDURE — 99232 SBSQ HOSP IP/OBS MODERATE 35: CPT

## 2020-05-18 RX ORDER — APIXABAN 2.5 MG/1
10 TABLET, FILM COATED ORAL EVERY 12 HOURS
Refills: 0 | Status: DISCONTINUED | OUTPATIENT
Start: 2020-05-18 | End: 2020-05-21

## 2020-05-18 RX ORDER — APIXABAN 2.5 MG/1
5 TABLET, FILM COATED ORAL EVERY 12 HOURS
Refills: 0 | Status: CANCELLED | OUTPATIENT
Start: 2020-05-23 | End: 2020-05-21

## 2020-05-18 RX ADMIN — BRIMONIDINE TARTRATE 1 DROP(S): 2 SOLUTION/ DROPS OPHTHALMIC at 05:58

## 2020-05-18 RX ADMIN — ATORVASTATIN CALCIUM 20 MILLIGRAM(S): 80 TABLET, FILM COATED ORAL at 23:07

## 2020-05-18 RX ADMIN — Medication 1 DROP(S): at 06:01

## 2020-05-18 RX ADMIN — APIXABAN 10 MILLIGRAM(S): 2.5 TABLET, FILM COATED ORAL at 23:07

## 2020-05-18 RX ADMIN — MEMANTINE HYDROCHLORIDE 10 MILLIGRAM(S): 10 TABLET ORAL at 11:03

## 2020-05-18 RX ADMIN — OXYCODONE HYDROCHLORIDE 2.5 MILLIGRAM(S): 5 TABLET ORAL at 17:21

## 2020-05-18 RX ADMIN — OXYCODONE HYDROCHLORIDE 2.5 MILLIGRAM(S): 5 TABLET ORAL at 23:06

## 2020-05-18 RX ADMIN — BRIMONIDINE TARTRATE 1 DROP(S): 2 SOLUTION/ DROPS OPHTHALMIC at 17:21

## 2020-05-18 RX ADMIN — APIXABAN 10 MILLIGRAM(S): 2.5 TABLET, FILM COATED ORAL at 12:27

## 2020-05-18 RX ADMIN — ESCITALOPRAM OXALATE 10 MILLIGRAM(S): 10 TABLET, FILM COATED ORAL at 11:03

## 2020-05-18 RX ADMIN — HEPARIN SODIUM 1000 UNIT(S)/HR: 5000 INJECTION INTRAVENOUS; SUBCUTANEOUS at 08:02

## 2020-05-18 RX ADMIN — DONEPEZIL HYDROCHLORIDE 10 MILLIGRAM(S): 10 TABLET, FILM COATED ORAL at 23:07

## 2020-05-18 RX ADMIN — OXYCODONE HYDROCHLORIDE 2.5 MILLIGRAM(S): 5 TABLET ORAL at 12:27

## 2020-05-18 RX ADMIN — PANTOPRAZOLE SODIUM 40 MILLIGRAM(S): 20 TABLET, DELAYED RELEASE ORAL at 05:58

## 2020-05-18 RX ADMIN — Medication 1 DROP(S): at 17:21

## 2020-05-18 RX ADMIN — Medication 25 MILLIGRAM(S): at 05:58

## 2020-05-18 RX ADMIN — OXYCODONE HYDROCHLORIDE 2.5 MILLIGRAM(S): 5 TABLET ORAL at 06:01

## 2020-05-18 NOTE — PROGRESS NOTE ADULT - ASSESSMENT
-   right upper lobe infiltrate likely infectious in etiology with the CAP   -   patient completed antibiotics for the pneumonia with the rocephin and azithromycin   -   calf  vein thrombus with high D-dimers cannot exclude associated pulmonary embolism on heparin drip in anticipation for the procedure .  -   atrial fibrillation as per the chart not anticoagulated with history of GI bleeding in the past   -   moderate pulmonary hypertension likely secondary noted with the last echocardiogram  -   severe back pain with history of recurrent falls  -   mild atelectatic changes in the left base  -   reported history of obstructive sleep apnea as per charting patient is not using his CPAP.  -   other issues include hiatal hernia, history of aortic valve replacement and aneurysm repair    PLAN     -  completed antibiotics with out any fever or leucocytosis   -  repeat blood cultures were negative so far negative   - continue with the eloquis for calf thrombus  while monitoring for the G.I bleeding after the procedure with the discontinuation of the heparin   -  02 supplementation for the hypoxia if needed so far did not needed the o2   -  management of back pain and other issues that include diastolic dysfunction as per the medicine . moderate pulmonary HTN is secondary to cardiac disease .  - other wise pulmonary status remained stable   - follow up of ct myelogram results

## 2020-05-18 NOTE — PROGRESS NOTE ADULT - ASSESSMENT
Pt is an 83 y/o M with a PMH of CAD, diastolic CHF, chronic anemia, chronic back pain, Afib, DDD, HTN, MRSA, BPH, GERD, diverticulosis, CAMILA, thoracic aortic aneurysm, [place] who  presented via  EMS after slipping and falling out of his recliner chair. Pt was admitted to the Hospitalist service with Right upper lobe pneumonia and was found to have a Right lower ext DVT. Pt has a h/o severe lower back pain, as per his wife he follows with Dr. Billy Motta a pain management physician. He had on steroid injection in the past but was unable to have another due to the COVID Crisis    PLAN:  - No acute neurosurgical intervention   - pt has extensive degenerative changes in his lumbar spine, chronic   - Continue pain management and physical therapy  - Pt can follow up outpatient to discuss surgical options  - Pt can follow up with his pain management physician to try and schedule an outpt steroid injection     Will sign off for now, please re-consult as needed     Discussed with Dr. Smith

## 2020-05-18 NOTE — PROGRESS NOTE ADULT - SUBJECTIVE AND OBJECTIVE BOX
HPI: Pt is an 83 y/o M with a PMHx of CAD, diastolic CHF, chronic anemia, chronic back pain, Afib, DDD, HTN, MRSA, BPH, GERD, diverticulosis, CAMILA, thoracic aortic aneurysm, [place] who  presented via  EMS after slipping and falling out of his recliner chair.  Pt has chronic severe back pain and due to the COVID -19 pandemic he was  unable to get an epidural.       His pain is so severe, he spends most of the day in his recliner chair.  Pt's wife call 911, as he had fallen out of his recliner chair three times in the 24 hours.    No preceding chest pain, no SOB,  no  LOC or syncope.  No fever or other complaints.      Pt was admitted to the Hospitalist service with Right upper lobe pneumonia and was found to have a Right lower ext DVT.  Pt has a h/o severe lower back pain, as per his wife he follows with Dr. Billy Motta a pain management physician. He had on steroid injection in the past but was unable to have another due to the COVID Crisis. Pt does spend most of his day in the recliner at home, he has only a few steps to the bathroom and was unable to make it the other day due to severe pain. Pt is extremely hard of hearing and cannot have a conversation or follow commands. He has been on tramadol at home for pain. Wife states he has not has issues with incontinence at home.     5/14 - Pt seen and examined earlier today, in NAD. No new complaints, no overnight events     5/15 - Films / chart reviewed. Case discussed with IR. CT C spine shows no gross compression, would favor CT T/L spine prior to doing myelogram    5/16 - No events     5/17 - Pt seen and examined, states back pain slightly improved, full conversation difficult due ot pt being hard of hearing, Pt is on Heparin infusion with plans for CT myelogram in IR tomorrow     5/18- patient seen and examined earlier today, no events overnight, pt did NOT go fo CT myelogram, after review of CT with radiology, test not warranted     Vital Signs Last 24 Hrs  T(C): 36.4 (18 May 2020 11:33), Max: 36.4 (18 May 2020 05:16)  T(F): 97.6 (18 May 2020 11:33), Max: 97.6 (18 May 2020 11:33)  HR: 71 (18 May 2020 11:33) (59 - 71)  BP: 117/75 (18 May 2020 11:33) (104/83 - 136/77)  RR: 16 (18 May 2020 11:33) (16 - 17)  SpO2: 96% (18 May 2020 11:33) (95% - 97%)    MEDICATIONS  (STANDING):  apixaban 10 milliGRAM(s) Oral every 12 hours  atorvastatin 20 milliGRAM(s) Oral at bedtime  brimonidine 0.2% Ophthalmic Solution 1 Drop(s) Left EYE two times a day  donepezil 10 milliGRAM(s) Oral at bedtime  escitalopram 10 milliGRAM(s) Oral daily  memantine 10 milliGRAM(s) Oral daily  metoprolol tartrate 25 milliGRAM(s) Oral two times a day  oxyCODONE    IR 2.5 milliGRAM(s) Oral every 6 hours  pantoprazole    Tablet 40 milliGRAM(s) Oral before breakfast  timolol 0.5% Solution 1 Drop(s) Left EYE two times a day    MEDICATIONS  (PRN):  acetaminophen   Tablet .. 650 milliGRAM(s) Oral every 4 hours PRN Temp greater or equal to 38.5C (101.3F)  cyclobenzaprine 10 milliGRAM(s) Oral three times a day PRN Muscle Spasm  lidocaine 4% Cream 1 Application(s) Topical three times a day PRN for moderate pain  ondansetron   Disintegrating Tablet 4 milliGRAM(s) Oral three times a day PRN for nausea  oxyCODONE    IR 5 milliGRAM(s) Oral every 6 hours PRN Moderate Pain (4 - 6)  rOPINIRole 2 milliGRAM(s) Oral four times a day PRN Restless legs    ROS: pertinent positives in HPI, all other ROS were reviewed and are negative     PHYSICAL EXAM:  Constitutional: awake and alert, resting comfortably on his back, severely hard of hearing   HEENT: PERRLA, EOMI, [place]   Neck: Supple  Respiratory: Breath sounds are clear bilaterally  Cardiovascular: S1 and S2, regular rhythm  Gastrointestinal: soft, nontender  Extremities:  no edema  Vascular: distal pulses intact   Musculoskeletal: no joint swelling/tenderness, no abnormal movements, able to do straight leg raise   Skin: No rashes    Neurological exam:  HF: awake, alert, appears oriented, difficult to have a conversation due to pt being severly hard of hearing even with hearing aids and cochlear  implant   CN: PEARRL, EOMI, VFF, facial sensation normal, no NLFD, tongue midline, Palate moves equally, SCM equal bilaterally  Motor: moves all extremities antigravity with good strength   Sens: Intact to light touch  Reflexes: severe hyperreflexia b/l lower ext, crossing to the other side, upper ext WNL, no Carvalho's   Coord: unable to assess   Gait/Balance: Not tested     LABS:                         13.3   11.15 )-----------( 242      ( 18 May 2020 06:50 )             39.4     RADIOLOGY:  < from: CT Lumbar Spine No Cont (05.15.20 @ 17:34) >  There is disc bulge at T6-T7 resulting in mild-to-moderate canal stenosis.  L1-2: No spinal canal or neuroforaminal stenosis.  L2-3: Minimal retrolisthesis of L2 on L3. Disc bulge and degenerative changes of the facet and uncovertebraljoints resulting in mild canal stenosis and mild-to-moderate neuroforaminal narrowing bilaterally.  L3-4: Disc bulge and degenerative changes of the bilateral facet joints, thickening of ligamentum flavum resulting in moderate to severe canal stenosis and moderate right, mild to moderate left neuroforaminal narrowing.  L4-5: Minimal grade 1 anterolisthesis of L4 on L5. Disc bulge and degenerative changes of the bilateral facet joints, thickening ligamentum flavum resulting in moderate canal stenosis and moderate to severe right, mild to moderate left neuroforaminal narrowing.  L5-S1: Minimal grade 1 anterolisthesis of L5 on S1. Disc bulge and degenerative changes of the bilateral facet joints resulting in mild canal stenosis and severe left, moderate to severe right neural foraminal narrowing.  LIMITED EVALUATION OF UPPER SACRUM AND SACROILIAC JOINTS: Mild to moderate degenerative changes of the sacroiliac joints.

## 2020-05-18 NOTE — PROGRESS NOTE ADULT - SUBJECTIVE AND OBJECTIVE BOX
SUBJECTIVE     patient seen in the A.M   comfortable with out need for the 02   no sob or cough or wheezing   woke up in response to the commands   planning for the ct myelogram today     PAST MEDICAL & SURGICAL HISTORY:  CAD (coronary artery disease): (-) cardiac stress and 2DECHO 2019  Diastolic CHF: LVEF 55-60% 6/2019  Chronic anemia  Afib: Not on A/C 2/2 hx of GIbleeding  DDD (degenerative disc disease), lumbar  [place] (hard of hearing)  Fall (on) (from) other stairs and steps, sequela: 03/28/2017- lumbar hemtoma  Essential hypertension  Nerve injury: complication from right TKR surgery, 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  Aortic root dilatation  MRSA (methicillin resistant Staphylococcus aureus): left forearm 2012  Lumbar degenerative disc disease  BPH associated with nocturia  Diverticulosis of intestine without bleeding, unspecified intestinal tract location  Gastroesophageal reflux disease, esophagitis presence not specified: Hx of GI bleed  Aortic valve insufficiency, unspecified etiology  CAMILA (obstructive sleep apnea): unable to tolerate nocturnal CPAP  Restless leg syndrome  Thoracic aortic aneurysm  HLD (hyperlipidemia)  S/P thoracic aortic aneurysm repair  S/P AVR (aortic valve replacement)  Status post cataract extraction, unspecified laterality: bilat  S/P debridement: left forearm -mrsa  History of fundoplication: 2000  Amputation finger: left index 1970  S/P knee replacement: left  S/P knee replacement: right  S/P shoulder surgery: right rotatotor cuff injury    OBJECTIVE   Vital Signs Last 24 Hrs  T(C): 36.4 (18 May 2020 05:16), Max: 36.6 (17 May 2020 10:45)  T(F): 97.5 (18 May 2020 05:16), Max: 97.9 (17 May 2020 10:45)  HR: 63 (18 May 2020 05:16) (57 - 63)  BP: 136/77 (18 May 2020 05:16) (103/67 - 136/77)  BP(mean): --  RR: 17 (18 May 2020 05:16) (16 - 17)  SpO2: 97% (18 May 2020 05:16) (95% - 97%)    PHYSICAL EXAM:  Constitutional: , awake and alert, not in distress not on the 02   HEENT: Normo cephalic atraumatic  Neck: Soft and supple, No J.V.D   Respiratory: vesicular breathing , No wheezing, rales or rhonchi.   Cardiovascular: S1 and S2, regular rate .   Gastrointestinal:  soft, nontender,   Extremities: No  edema or calf tenderness .  Neurological: No new  focal deficits.    MEDICATIONS  (STANDING):  atorvastatin 20 milliGRAM(s) Oral at bedtime  brimonidine 0.2% Ophthalmic Solution 1 Drop(s) Left EYE two times a day  donepezil 10 milliGRAM(s) Oral at bedtime  escitalopram 10 milliGRAM(s) Oral daily  heparin  Infusion.  Unit(s)/Hr (14 mL/Hr) IV Continuous <Continuous>  memantine 10 milliGRAM(s) Oral daily  metoprolol tartrate 25 milliGRAM(s) Oral two times a day  oxyCODONE    IR 2.5 milliGRAM(s) Oral every 6 hours  pantoprazole    Tablet 40 milliGRAM(s) Oral before breakfast  timolol 0.5% Solution 1 Drop(s) Left EYE two times a day                            13.3   11.15 )-----------( 242      ( 18 May 2020 06:50 )             39.4

## 2020-05-18 NOTE — PROGRESS NOTE ADULT - SUBJECTIVE AND OBJECTIVE BOX
HOSPITALIST PROGRESS NOTE:  DOS: 5/10/2020  SUBJECTIVE:  no complaints; sating 95% on room air  Chief Complaint: Patient is a 84y old  Male who presents with a chief complaint of RUL Pna, Leukocytosis  Weakness, Fall, Chronic Back pain (08 May 2020 18:20)    HPI: Pt is an 85 y/o M with a PMHx of CAD, diastolic CHF, chronic anemia, chronic back pain, Afib, DDD, HTN, MRSA, BPH, GERD, diverticulosis, CAMILA, thoracic aortic aneurysm, [place] who  presents via  EMS after slipping and falling out of his recliner chair.  Pt has severe back pain and due to the COVID -19 pandemic he was  unable to get an epidural.      His pain is so severe, he spends most of the day in his recliner chair.  Pt's wife call 911, as he had fallen out of his recliner chair three times in the last 24 hours.  No preceding cpain, no SOB,  no  LOC or syncope.  No fever or other complaints.         In the ED, pt is found to have a RUL Pna on CT scan and leukocytosis.      5/12: Patient has no complaints for me and very sleepy; but as per the staff here patient complaining of back pain  5/13: patient found to have right DVT;  No other events. Dsicussed in length with patients wife   5/14:  Paitent still having pain despite getting tramadol and flexeril; wife is agreeable to starting oxycodone  5/15:  Patient still having pain with tramadol; agreeable to changing to oxycodone ATC; ID going to take him on monday for CT myelography; Eliquis d/c and started on heparin Drip today  5/16:  On heparin Drip; rising WBC; oxycodone helping as patient is able to sit up more while eating   5/17: No events overnight, patient had a CT done yesterday  5/18:  Patient has no complaints. Less back pain; seen by neuro sz and myelography d/c as he does not need it based on the recent CT     Allergies:  No Known Allergies    REVIEW OF SYSTEMS:  See HPI. All other review of systems is negative unless indicated above.     OBJECTIVE  Physical Exam:  Vital Signs   Vital Signs Last 24 Hrs  T(C): 36.4 (18 May 2020 11:33), Max: 36.4 (18 May 2020 05:16)  T(F): 97.6 (18 May 2020 11:33), Max: 97.6 (18 May 2020 11:33)  HR: 71 (18 May 2020 11:33) (59 - 71)  BP: 117/75 (18 May 2020 11:33) (104/83 - 136/77)  BP(mean): --  RR: 16 (18 May 2020 11:33) (16 - 17)  SpO2: 96% (18 May 2020 11:33) (95% - 97%)    Constitutional: NAD, awake and alert, well-developed  Neurological: no focal deficits; [place], confused   HEENT: PERRLA, EOMI, MMM  Neck: Soft and supple, No LAD, No JVD  Respiratory: Breath sounds are clear bilaterally, No wheezing, rales or rhonchi  Cardiovascular: S1 and S2, regular rate and rhythm; no Murmurs, gallops or rubs  Gastrointestinal: Bowel Sounds present, soft, nontender, nondistended, no guarding, no rebound tenderness  Back: No CVA tenderness   Extremities: No peripheral edema  Vascular: 2+ peripheral pulses  Musculoskeletal: 5/5 strength b/l upper and lower extremities  Skin: No rashes  Breast: Deferred  Rectal: Deferred    MEDICATIONS  (STANDING):  atorvastatin 20 milliGRAM(s) Oral at bedtime  azithromycin  IVPB 500 milliGRAM(s) IV Intermittent every 24 hours  brimonidine 0.2% Ophthalmic Solution 1 Drop(s) Left EYE two times a day  cefTRIAXone Injectable. 1000 milliGRAM(s) IV Push every 24 hours  donepezil 10 milliGRAM(s) Oral at bedtime  enoxaparin Injectable 40 milliGRAM(s) SubCutaneous daily  escitalopram 10 milliGRAM(s) Oral daily  memantine 10 milliGRAM(s) Oral daily  metoprolol tartrate 25 milliGRAM(s) Oral two times a day  pantoprazole    Tablet 40 milliGRAM(s) Oral before breakfast  timolol 0.5% Solution 1 Drop(s) Left EYE two times a day    MEDICATIONS  (PRN):  acetaminophen   Tablet .. 650 milliGRAM(s) Oral every 4 hours PRN Temp greater or equal to 38.5C (101.3F)  lidocaine 4% Cream 1 Application(s) Topical three times a day PRN for moderate pain  ondansetron   Disintegrating Tablet 4 milliGRAM(s) Oral three times a day PRN for nausea  rOPINIRole 2 milliGRAM(s) Oral four times a day PRN Restless legs  traMADol 50 milliGRAM(s) Oral every 8 hours PRN pain      LABS:   Lab Results:  CBC  CBC Full  -  ( 17 May 2020 07:20 )  WBC Count : 10.64 K/uL  RBC Count : 4.44 M/uL  Hemoglobin : 14.0 g/dL  Hematocrit : 41.3 %  Platelet Count - Automated : 222 K/uL  Mean Cell Volume : 93.0 fl  Mean Cell Hemoglobin : 31.5 pg  Mean Cell Hemoglobin Concentration : 33.9 gm/dL  Auto Neutrophil # : x  Auto Lymphocyte # : x  Auto Monocyte # : x  Auto Eosinophil # : x  Auto Basophil # : x  Auto Neutrophil % : x  Auto Lymphocyte % : x  Auto Monocyte % : x  Auto Eosinophil % : x  Auto Basophil % : x    .		Differential:	[] Automated		[] Manual  Chemistry                        14.0   10.64 )-----------( 222      ( 17 May 2020 07:20 )             41.3     05-16    139  |  105  |  16  ----------------------------<  109<H>  4.0   |  28  |  0.89    Ca    9.7      16 May 2020 08:02  Phos  3.4     05-16  Mg     1.9     05-16        PT/INR - ( 15 May 2020 11:43 )   PT: 22.6 sec;   INR: 1.99 ratio         PTT - ( 17 May 2020 07:20 )  PTT:89.3 sec      MICROBIOLOGY/CULTURES:  Culture Results:   No Growth Final (05-10 @ 17:35)  Culture Results:   No Growth Final (05-10 @ 17:35)      RADIOLOGY RESULTS:    MICROBIOLOGY/CULTURES:  Culture Results:   No growth to date. (05-10 @ 17:35)  Culture Results:   No growth to date. (05-10 @ 17:35)  Culture Results:   Growth in anaerobic bottle: Bacillus species not anthracis  "Susceptibilities not performed"  Growth in aerobic bottle: Staphylococcus hominis  Coag Negative Staphylococcus  Single set isolate, possible contaminant. Contact  Microbiology if susceptibility testing clinically  indicated.  "Due to technical problems, Proteus sp. will Not be reported as part of  the BCID panel until further notice"  ***Blood Panel PCR results on this specimen are available  approximately 3 hours after the Gram stain result.***  Gram stain, PCR, and/or culture results may not always  correspond due to difference in methodologies.  ************************************************************  This PCR assay was performed using [employer].  The following targets are tested for: Enterococcus,  vancomycin resistant enterococci, Listeria monocytogenes,  coagulase negative staphylococci, S. aureus,  methicillin resistant S. aureus, Streptococcus agalactiae  (Group B), S. pneumoniae, S. pyogenes (Group A),  Acinetobacter baumannii, Enterobacter cloacae, E. coli,  Klebsiella oxytoca, K. pneumoniae, Proteus sp.,  Serratia marcescens, Haemophilus influenzae,  Neisseria meningitidis, Pseudomonas aeruginosa, Candida  albicans, C. glabrata, C krusei, C parapsilosis,  C. tropicalis and the KPC resistance gene. (05-08 @ 16:14)  Culture Results:   <10,000 CFU/mL Normal Urogenital Mitzi (05-08 @ 14:58)        D-Dimer Assay, Quantitative (05.08.20 @ 20:15)    D-Dimer Assay, Quantitative: 1771 ng/mL DDU    Ferritin, Serum (05.08.20 @ 20:15)    Ferritin, Serum: 395 ng/mL    Lactate, Blood (05.08.20 @ 16:14)    Lactate, Blood: 1.5 mmol/L    Sedimentation Rate, Erythrocyte (05.08.20 @ 20:15)    Sedimentation Rate, Erythrocyte: 44 mm/hr    CARDIAC MARKERS ( 08 May 2020 20:15 )  x     / x     / 76 U/L / x     / x        COVID-19 PCR . (05.08.20 @ 16:14)    COVID-19 PCR: NotDetec:    RADIOLOGY/EKG:    < from: CT Abdomen and Pelvis w/ IV Cont (05.08.20 @ 15:21) >  < from: CT Chest w/ IV Cont (05.08.20 @ 15:21) >        IMPRESSION:   No evidence of a posttraumatic abnormality.  Patchy right upper lobe infiltrate.  < end of copied text >      < from: CT Cervical Spine No Cont (05.08.20 @ 15:09) >  IMPRESSION:  No acute findings    < from: CT Head No Cont (05.08.20 @ 15:07) >  Impression:  Unremarkable noncontrast CT scan of the brain.  < end of copied text >    < from: US Duplex Venous Lower Ext Complete, Bilateral (05.13.20 @ 10:12) >    IMPRESSION:     Venous thrombosis in right lower extremity gastrocnemius vein.      < end of copied text >    < from: CT Lumbar Spine No Cont (05.15.20 @ 17:34) >      IMPRESSION:     No evidence of acute traumatic injury to the thoracic and lumbar spine.    MRI may be obtained, if further information regarding ligamentous injury, hematoma or spinal cord pathology is required.     < end of copied text >

## 2020-05-18 NOTE — PROGRESS NOTE ADULT - ASSESSMENT
83 yo M Pt is admitted w/    #Weakness secondary to possible CAP/Aspiration VS  COVID19 viral pna  #Weakness, Fall   - result COVID- 19 testing in the ED is neg   - CT chest RUL infiltrate   - S/P Rocephin IV 1gm qd #5 switch to ceftin - completed   - S/P Zithromax  mg qd  - Sating well on room air   - crp coming down    #Elevated D-dimer 2ndry to Right DVT  -patient has hx of GI bleeding and Liver hemangiomas; off Blood thinners  -Discussed with Dr Mendiola and advised to start eliquis and monitor for GI Bleed  -Discussed with wife and understands there is a chance he might bleed but also a risk of him getting PE  -If he bleeds in the future then he may be a candidate for IVC filter  -S/P Heparin Drip; restart Eliquis     #Acute on Chronic Back pain  - Hx of Epidural injections in the past  - Ortho spine consult noted  - PT evaluation  - probably not a candidate for MRI due to cochlear impants  - S/C tramadol 50mg Q6H   - Add oxycodone 2.5 PO Q6H ATC and PRN Oxycodone 5mg   - continue Flexeril 10mg Q8h PRN  - CT T/L - No evidence of acute traumatic injury to the thoracic and lumbar spine.  - CT Myelogram was D/C pablo garnica sx ass patient does not need it based on the recent CT T/L; S/P heparin Drip; Switch to Eliquis    #positive cultures  -coagulase negative staph in blood is consistent with skin contaminant, no need for further Vanco  -repeat blood cultures NGTD    #CAD/AFib  - As noted not on A/C due to recent GI bleeding but will try eliquis now due to DVT  - Cont. BB    #Hx Chronic diastolic HF   #Moderate Pulm HTN  - euvolemic/compensated  - daily weights / I&Os   - lasix was d/c as outpatient by his PCP and Cardiologist due increase urinary frequency      #Hyperlipidemia  - Cont. statin     # DVT PPx:   -c/w lovenox    # Goals of Care:   - Full  Code    Dispo - POssible D/C pamela to rehab; Patient came in for Pneumonia completed ABX tx; found to have new clot of LE started on eliquis; continue loading and then switch to maintenance; regarding the back pain CT t/l does not show anything acute, mostly chronic; patients back pain is controlled on the current pain regimen which i would continue on D/C

## 2020-05-19 ENCOUNTER — TRANSCRIPTION ENCOUNTER (OUTPATIENT)
Age: 85
End: 2020-05-19

## 2020-05-19 LAB
ADD ON TEST-SPECIMEN IN LAB: SIGNIFICANT CHANGE UP
ANION GAP SERPL CALC-SCNC: 7 MMOL/L — SIGNIFICANT CHANGE UP (ref 5–17)
BUN SERPL-MCNC: 14 MG/DL — SIGNIFICANT CHANGE UP (ref 7–23)
CALCIUM SERPL-MCNC: 9.6 MG/DL — SIGNIFICANT CHANGE UP (ref 8.5–10.1)
CHLORIDE SERPL-SCNC: 109 MMOL/L — HIGH (ref 96–108)
CO2 SERPL-SCNC: 26 MMOL/L — SIGNIFICANT CHANGE UP (ref 22–31)
CREAT SERPL-MCNC: 0.8 MG/DL — SIGNIFICANT CHANGE UP (ref 0.5–1.3)
GLUCOSE SERPL-MCNC: 103 MG/DL — HIGH (ref 70–99)
HCT VFR BLD CALC: 41.7 % — SIGNIFICANT CHANGE UP (ref 39–50)
HGB BLD-MCNC: 13.8 G/DL — SIGNIFICANT CHANGE UP (ref 13–17)
MAGNESIUM SERPL-MCNC: 1.7 MG/DL — SIGNIFICANT CHANGE UP (ref 1.6–2.6)
MCHC RBC-ENTMCNC: 31.2 PG — SIGNIFICANT CHANGE UP (ref 27–34)
MCHC RBC-ENTMCNC: 33.1 GM/DL — SIGNIFICANT CHANGE UP (ref 32–36)
MCV RBC AUTO: 94.3 FL — SIGNIFICANT CHANGE UP (ref 80–100)
PHOSPHATE SERPL-MCNC: 3.3 MG/DL — SIGNIFICANT CHANGE UP (ref 2.5–4.5)
PLATELET # BLD AUTO: 224 K/UL — SIGNIFICANT CHANGE UP (ref 150–400)
POTASSIUM SERPL-MCNC: 4.1 MMOL/L — SIGNIFICANT CHANGE UP (ref 3.5–5.3)
POTASSIUM SERPL-SCNC: 4.1 MMOL/L — SIGNIFICANT CHANGE UP (ref 3.5–5.3)
RBC # BLD: 4.42 M/UL — SIGNIFICANT CHANGE UP (ref 4.2–5.8)
RBC # FLD: 14.8 % — HIGH (ref 10.3–14.5)
SODIUM SERPL-SCNC: 142 MMOL/L — SIGNIFICANT CHANGE UP (ref 135–145)
WBC # BLD: 12.03 K/UL — HIGH (ref 3.8–10.5)
WBC # FLD AUTO: 12.03 K/UL — HIGH (ref 3.8–10.5)

## 2020-05-19 PROCEDURE — 99232 SBSQ HOSP IP/OBS MODERATE 35: CPT

## 2020-05-19 RX ORDER — APIXABAN 2.5 MG/1
2 TABLET, FILM COATED ORAL
Qty: 0 | Refills: 0 | DISCHARGE
Start: 2020-05-19 | End: 2020-05-23

## 2020-05-19 RX ORDER — OXYCODONE HYDROCHLORIDE 5 MG/1
5 TABLET ORAL THREE TIMES A DAY
Refills: 0 | Status: DISCONTINUED | OUTPATIENT
Start: 2020-05-19 | End: 2020-05-21

## 2020-05-19 RX ORDER — IBUPROFEN 200 MG
400 TABLET ORAL EVERY 6 HOURS
Refills: 0 | Status: DISCONTINUED | OUTPATIENT
Start: 2020-05-19 | End: 2020-05-21

## 2020-05-19 RX ORDER — ACETAMINOPHEN 500 MG
2 TABLET ORAL
Qty: 0 | Refills: 0 | DISCHARGE
Start: 2020-05-19

## 2020-05-19 RX ORDER — OXYCODONE HYDROCHLORIDE 5 MG/1
1 TABLET ORAL
Qty: 0 | Refills: 0 | DISCHARGE
Start: 2020-05-19

## 2020-05-19 RX ORDER — APIXABAN 2.5 MG/1
2 TABLET, FILM COATED ORAL
Qty: 0 | Refills: 0 | DISCHARGE
Start: 2020-05-19

## 2020-05-19 RX ORDER — COLCHICINE 0.6 MG
0.6 TABLET ORAL ONCE
Refills: 0 | Status: COMPLETED | OUTPATIENT
Start: 2020-05-19 | End: 2020-05-19

## 2020-05-19 RX ORDER — TRAMADOL HYDROCHLORIDE 50 MG/1
1 TABLET ORAL
Qty: 0 | Refills: 0 | DISCHARGE

## 2020-05-19 RX ORDER — IBUPROFEN 200 MG
1 TABLET ORAL
Qty: 0 | Refills: 0 | DISCHARGE
Start: 2020-05-19

## 2020-05-19 RX ORDER — IBUPROFEN 200 MG
400 TABLET ORAL ONCE
Refills: 0 | Status: COMPLETED | OUTPATIENT
Start: 2020-05-19 | End: 2020-05-19

## 2020-05-19 RX ORDER — APIXABAN 2.5 MG/1
1 TABLET, FILM COATED ORAL
Qty: 0 | Refills: 0 | DISCHARGE
Start: 2020-05-19

## 2020-05-19 RX ADMIN — OXYCODONE HYDROCHLORIDE 5 MILLIGRAM(S): 5 TABLET ORAL at 11:24

## 2020-05-19 RX ADMIN — APIXABAN 10 MILLIGRAM(S): 2.5 TABLET, FILM COATED ORAL at 06:30

## 2020-05-19 RX ADMIN — PANTOPRAZOLE SODIUM 40 MILLIGRAM(S): 20 TABLET, DELAYED RELEASE ORAL at 06:31

## 2020-05-19 RX ADMIN — OXYCODONE HYDROCHLORIDE 5 MILLIGRAM(S): 5 TABLET ORAL at 23:27

## 2020-05-19 RX ADMIN — OXYCODONE HYDROCHLORIDE 2.5 MILLIGRAM(S): 5 TABLET ORAL at 06:30

## 2020-05-19 RX ADMIN — OXYCODONE HYDROCHLORIDE 5 MILLIGRAM(S): 5 TABLET ORAL at 16:57

## 2020-05-19 RX ADMIN — Medication 0.6 MILLIGRAM(S): at 13:02

## 2020-05-19 RX ADMIN — Medication 400 MILLIGRAM(S): at 17:46

## 2020-05-19 RX ADMIN — Medication 1 DROP(S): at 16:54

## 2020-05-19 RX ADMIN — MEMANTINE HYDROCHLORIDE 10 MILLIGRAM(S): 10 TABLET ORAL at 11:24

## 2020-05-19 RX ADMIN — BRIMONIDINE TARTRATE 1 DROP(S): 2 SOLUTION/ DROPS OPHTHALMIC at 06:31

## 2020-05-19 RX ADMIN — ATORVASTATIN CALCIUM 20 MILLIGRAM(S): 80 TABLET, FILM COATED ORAL at 23:27

## 2020-05-19 RX ADMIN — Medication 1 DROP(S): at 06:31

## 2020-05-19 RX ADMIN — APIXABAN 10 MILLIGRAM(S): 2.5 TABLET, FILM COATED ORAL at 16:57

## 2020-05-19 RX ADMIN — Medication 400 MILLIGRAM(S): at 13:02

## 2020-05-19 RX ADMIN — DONEPEZIL HYDROCHLORIDE 10 MILLIGRAM(S): 10 TABLET, FILM COATED ORAL at 23:27

## 2020-05-19 RX ADMIN — BRIMONIDINE TARTRATE 1 DROP(S): 2 SOLUTION/ DROPS OPHTHALMIC at 16:54

## 2020-05-19 RX ADMIN — ESCITALOPRAM OXALATE 10 MILLIGRAM(S): 10 TABLET, FILM COATED ORAL at 11:24

## 2020-05-19 NOTE — DISCHARGE NOTE PROVIDER - NSDCMRMEDTOKEN_GEN_ALL_CORE_FT
acetaminophen 325 mg oral tablet: 2 tab(s) orally every 4 hours, As needed, Temp greater or equal to 38.5C (101.3F)  apixaban 5 mg oral tablet: 1 tab(s) orally every 12 hours  apixaban 5 mg oral tablet: 2 tab(s) orally every 12 hours  atorvastatin 20 mg oral tablet: 1 tab(s) orally once a day  brimonidine 0.2% ophthalmic solution: 1 drop(s) in the left eye 2 times a day  donepezil 10 mg oral tablet: 1 tab(s) orally once a day (at bedtime)  escitalopram 10 mg oral tablet: 1 tab(s) orally once a day  lidocaine 4% topical cream: Apply topically to affected area 3 times a day, As Needed - for moderate pain  ***Back***  memantine 10 mg oral tablet: 1 tab(s) orally once a day  metoprolol tartrate 25 mg oral tablet: 1 tab(s) orally 2 times a day  ondansetron 4 mg oral tablet, disintegratin tab(s) orally 3 times a day, As Needed - for nausea  oxyCODONE 5 mg oral tablet: 1 tab(s) orally 3 times a day  oxyCODONE 5 mg oral tablet: 1 tab(s) orally every 6 hours, As needed, Moderate Pain (4 - 6)  pantoprazole 40 mg oral delayed release tablet: 1 tab(s) orally once a day  rOPINIRole 2 mg oral tablet: 1 tab(s) orally 6 times a day, As Needed - restless legs  timolol maleate 0.5% ophthalmic solution: 1 drop(s) in the left eye 2 times a day acetaminophen 325 mg oral tablet: 2 tab(s) orally every 4 hours, As needed, Temp greater or equal to 38.5C (101.3F)  apixaban 5 mg oral tablet: 1 tab(s) orally every 12 hours  apixaban 5 mg oral tablet: 2 tab(s) orally every 12 hours  atorvastatin 20 mg oral tablet: 1 tab(s) orally once a day  bacitracin-polymyxin B 500 units-10,000 units/g topical ointment: 1 application topically 3 times a day; apply to left cheng for 7 days  brimonidine 0.2% ophthalmic solution: 1 drop(s) in the left eye 2 times a day  donepezil 10 mg oral tablet: 1 tab(s) orally once a day (at bedtime)  escitalopram 10 mg oral tablet: 1 tab(s) orally once a day  ibuprofen 400 mg oral tablet: 1 tab(s) orally every 6 hours, As needed, Moderate Pain (4 - 6)  lidocaine 4% topical cream: Apply topically to affected area 3 times a day, As Needed - for moderate pain  ***Back***  memantine 10 mg oral tablet: 1 tab(s) orally once a day  metoprolol tartrate 25 mg oral tablet: 1 tab(s) orally 2 times a day  ondansetron 4 mg oral tablet, disintegratin tab(s) orally 3 times a day, As Needed - for nausea  oxyCODONE 5 mg oral tablet: 1 tab(s) orally 3 times a day  oxyCODONE 5 mg oral tablet: 1 tab(s) orally every 6 hours, As needed, Moderate Pain (4 - 6)  pantoprazole 40 mg oral delayed release tablet: 1 tab(s) orally once a day  rOPINIRole 2 mg oral tablet: 1 tab(s) orally 6 times a day, As Needed - restless legs  timolol maleate 0.5% ophthalmic solution: 1 drop(s) in the left eye 2 times a day acetaminophen 325 mg oral tablet: 2 tab(s) orally every 4 hours, As needed, Temp greater or equal to 38.5C (101.3F)  apixaban 5 mg oral tablet: 1 tab(s) orally every 12 hours  apixaban 5 mg oral tablet: 2 tab(s) orally every 12 hours  atorvastatin 20 mg oral tablet: 1 tab(s) orally once a day  bacitracin-polymyxin B 500 units-10,000 units/g topical ointment: 1 application topically 3 times a day; apply to left cheng for 7 days  brimonidine 0.2% ophthalmic solution: 1 drop(s) in the left eye 2 times a day  donepezil 10 mg oral tablet: 1 tab(s) orally once a day (at bedtime)  escitalopram 10 mg oral tablet: 1 tab(s) orally once a day  ibuprofen 400 mg oral tablet: 1 tab(s) orally every 6 hours, As needed, Moderate Pain (4 - 6)  lidocaine 4% topical cream: Apply topically to affected area 3 times a day, As Needed - for moderate pain  ***Back***  memantine 10 mg oral tablet: 1 tab(s) orally once a day  metoprolol tartrate 25 mg oral tablet: 0.5 tab(s) orally 2 times a day  ondansetron 4 mg oral tablet, disintegratin tab(s) orally 3 times a day, As Needed - for nausea  oxyCODONE 5 mg oral tablet: 1 tab(s) orally 3 times a day  oxyCODONE 5 mg oral tablet: 1 tab(s) orally every 6 hours, As needed, Moderate Pain (4 - 6)  pantoprazole 40 mg oral delayed release tablet: 1 tab(s) orally once a day  rOPINIRole 2 mg oral tablet: 1 tab(s) orally 6 times a day, As Needed - restless legs  timolol maleate 0.5% ophthalmic solution: 1 drop(s) in the left eye 2 times a day

## 2020-05-19 NOTE — DISCHARGE NOTE PROVIDER - CARE PROVIDER_API CALL
Dennis Soriano  INTERNAL MEDICINE  [address] [place] [address]  Phone: [phone]  Fax: [phone]  Follow Up Time:

## 2020-05-19 NOTE — DISCHARGE NOTE PROVIDER - HOSPITAL COURSE
Vital Signs Last 24 Hrs    T(C): 36.6 (19 May 2020 11:11), Max: 36.9 (18 May 2020 23:13)    T(F): 97.9 (19 May 2020 11:11), Max: 98.4 (18 May 2020 23:13)    HR: 72 (19 May 2020 11:11) (58 - 72)    BP: 100/62 (19 May 2020 11:11) (100/62 - 135/86)    BP(mean): --    RR: 18 (19 May 2020 11:11) (16 - 18)    SpO2: 99% (19 May 2020 11:11) (94% - 99%)        HEENT:   pupils equal and reactive, EOMI, no oropharyngeal lesions, erythema, exudates, oral thrush        NECK:   supple, no carotid bruits, no palpable lymph nodes, no thyromegaly        CV:  +S1, +S2, regular, no murmurs or rubs        RESP:   lungs clear to auscultation bilaterally, no wheezing, rales, rhonchi, good air entry bilaterally        BREAST:  not examined        GI:  abdomen soft, non-tender, non-distended, normal BS, no bruits, no abdominal masses, no palpable masses        RECTAL:  not examined        :  not examined        MSK:   normal muscle tone, no atrophy, no rigidity, no contractions        EXT:   no clubbing, no cyanosis, no edema, no calf pain, swelling or erythema        VASCULAR:  pulses equal and symmetric in the upper and lower extremities        NEURO:  AAOX3, no focal neurological deficits, follows all commands, able to move extremities spontaneously        SKIN:  no ulcers, lesions or rashes        19 May 2020 07:56        142    |  109    |  14       ----------------------------<  103      4.1     |  26     |  0.80         Ca    9.6        19 May 2020 07:56    Phos  3.3       19 May 2020 07:56    Mg     1.7       19 May 2020 07:56        PTT - ( 18 May 2020 06:50 )  PTT:76.8 secCBC Full  -  ( 19 May 2020 07:56 )    WBC Count : 12.03 K/uL    Hemoglobin : 13.8 g/dL    Hematocrit : 41.7 %    Platelet Count - Automated : 224 K/uL    Mean Cell Volume : 94.3 fl    Mean Cell Hemoglobin : 31.2 pg    Mean Cell Hemoglobin Concentration : 33.1 gm/dL            Hospital Course:        Pt is an 83 y/o M with a PMHx of CAD, diastolic CHF, chronic anemia, chronic back pain, Afib, DDD, HTN, MRSA, BPH, GERD, diverticulosis, CAMILA, thoracic aortic aneurysm, [place] who  presents via  EMS after slipping and falling out of his recliner chair.  Pt has severe back pain and due to the COVID -19 pandemic he was  unable to get an epidural.      His pain is so severe, he spends most of the day in his recliner chair.  Pt's wife call 911, as he had fallen out of his recliner chair three times in the last 24 hours.               #Weakness secondary to possible CAP/Aspiration     -resultant Weakness, Fall     - result COVID- 19 testing in the ED is neg     - CT chest RUL infiltrate     - S/P Rocephin IV 1gm qd #5 switch to ceftin - completed     - S/P Zithromax  mg qd        # Right DVT    -patient has hx of GI bleeding and Liver hemangiomas; off Blood thinners    -Discussed with Dr Mendiola and advised to start eliquis and monitor for GI Bleed    -Discussed with wife and understands there is a chance he might bleed but also a risk of him getting PE    -If he bleeds in the future then he may be a candidate for IVC filter    -now on Eliquis        #Acute on Chronic Back pain    - Hx of Epidural injections in the past    - Ortho spine consult noted >  recco CT myelogram; - CT T/L - No evidence of acute traumatic injury to the thoracic and lumbar spine.    - probably not a candidate for MRI due to cochlear impants    - CT Myelogram was D/C pablo garnica sx ass patient does not need it based on the recent CT T/L; S/P heparin Drip; Switch to Eliquis

## 2020-05-19 NOTE — PROGRESS NOTE ADULT - SUBJECTIVE AND OBJECTIVE BOX
SUBJECTIVE     Patient has no symptoms of sob or cough or wheezing   on the room air and ct myelogram was d/c and felt no indication   remained of the antibiotics with out fever .  on heparin need to be changed to eloquis .    PAST MEDICAL & SURGICAL HISTORY:  CAD (coronary artery disease): (-) cardiac stress and 2DECHO 2019  Diastolic CHF: LVEF 55-60% 6/2019  Chronic anemia  Afib: Not on A/C 2/2 hx of GIbleeding  DDD (degenerative disc disease), lumbar  [place] (hard of hearing)  Fall (on) (from) other stairs and steps, sequela: 03/28/2017- lumbar hemtoma  Essential hypertension  Nerve injury: complication from right TKR surgery, 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  Aortic root dilatation  MRSA (methicillin resistant Staphylococcus aureus): left forearm 2012  Lumbar degenerative disc disease  BPH associated with nocturia  Diverticulosis of intestine without bleeding, unspecified intestinal tract location  Gastroesophageal reflux disease, esophagitis presence not specified: Hx of GI bleed  Aortic valve insufficiency, unspecified etiology  CAMILA (obstructive sleep apnea): unable to tolerate nocturnal CPAP  Restless leg syndrome  Thoracic aortic aneurysm  HLD (hyperlipidemia)  S/P thoracic aortic aneurysm repair  S/P AVR (aortic valve replacement)  Status post cataract extraction, unspecified laterality: bilat  S/P debridement: left forearm -mrsa  History of fundoplication: 2000  Amputation finger: left index 1970  S/P knee replacement: left  S/P knee replacement: right  S/P shoulder surgery: right rotatotor cuff injury    OBJECTIVE   Vital Signs Last 24 Hrs  T(C): 36.8 (19 May 2020 04:59), Max: 36.9 (18 May 2020 23:13)  T(F): 98.3 (19 May 2020 04:59), Max: 98.4 (18 May 2020 23:13)  HR: 58 (19 May 2020 04:59) (58 - 71)  BP: 135/86 (19 May 2020 04:59) (117/75 - 135/86)  BP(mean): --  RR: 17 (19 May 2020 04:59) (16 - 17)  SpO2: 94% (19 May 2020 04:59) (94% - 98%)    PHYSICAL EXAM:  Constitutional: , awake and alert, not in distress on the room air and hard of the hearing   HEENT: Normo cephalic atraumatic  Neck: Soft and supple, No J.V.D   Respiratory: vesicular breathing , No wheezing, rales or rhonchi.   Cardiovascular: S1 and S2, regular rate .   Gastrointestinal:  soft, nontender,   Extremities: No  edema or calf tenderness .  Neurological: No new  focal deficits.    MEDICATIONS  (STANDING):  apixaban 10 milliGRAM(s) Oral every 12 hours  atorvastatin 20 milliGRAM(s) Oral at bedtime  brimonidine 0.2% Ophthalmic Solution 1 Drop(s) Left EYE two times a day  donepezil 10 milliGRAM(s) Oral at bedtime  escitalopram 10 milliGRAM(s) Oral daily  memantine 10 milliGRAM(s) Oral daily  metoprolol tartrate 25 milliGRAM(s) Oral two times a day  oxyCODONE    IR 2.5 milliGRAM(s) Oral every 6 hours  pantoprazole    Tablet 40 milliGRAM(s) Oral before breakfast  timolol 0.5% Solution 1 Drop(s) Left EYE two times a day                            13.8   12.03 )-----------( 224      ( 19 May 2020 07:56 )             41.7     05-19    142  |  109<H>  |  14  ----------------------------<  103<H>  4.1   |  26  |  0.80    Ca    9.6      19 May 2020 07:56  Phos  3.3     05-19  Mg     1.7     05-19

## 2020-05-19 NOTE — DISCHARGE NOTE PROVIDER - NSDCFUSCHEDAPPT_GEN_ALL_CORE_FT
[facility] ; 06/18/2020 ; NPP Otolaryng [address] [facility] ; 06/02/2020 ; NPP CardioElectro [address] [facility] [place] ; 06/18/2020 ; NPP Otolaryng [address] [facility] ; 06/02/2020 ; NPP CardioElectro [address] [facility] [place] ; 06/18/2020 ; NPP Otolaryng [address] [facility]

## 2020-05-19 NOTE — PROGRESS NOTE ADULT - SUBJECTIVE AND OBJECTIVE BOX
Pt is an 83 y/o M with a PMHx of CAD, diastolic CHF, chronic anemia, chronic back pain, Afib, DDD, HTN, MRSA, BPH, GERD, diverticulosis, CAMILA, thoracic aortic aneurysm, [place] who  presents via  EMS after slipping and falling out of his recliner chair.  Pt has severe back pain and due to the COVID -19 pandemic he was  unable to get an epidural.      His pain is so severe, he spends most of the day in his recliner chair.  Pt's wife call 911, as he had fallen out of his recliner chair three times in the last 24 hours.  No preceding cpain, no SOB,  no  LOC or syncope.  No fever or other complaints.         In the ED, pt is found to have a RUL Pna on CT scan and leukocytosis.

## 2020-05-19 NOTE — PROGRESS NOTE ADULT - ASSESSMENT
-   right upper lobe infiltrate likely infectious in etiology with the CAP   -   patient completed antibiotics for the pneumonia with the rocephin and azithromycin   -   calf  vein thrombus with high D-dimers cannot exclude associated pulmonary embolism on heparin drip and ct myelogram was cancelled   -   atrial fibrillation as per the chart not anticoagulated with history of GI bleeding in the past   -   moderate pulmonary hypertension likely secondary noted with the last echocardiogram  -   severe back pain with history of recurrent falls  -   mild atelectatic changes in the left base  -   reported history of obstructive sleep apnea as per charting patient is not using his CPAP.  -   other issues include hiatal hernia, history of aortic valve replacement and aneurysm repair    PLAN     -  completed antibiotics with out any fever or leucocytosis   -  repeat blood cultures were negative so far negative   - continue with the eloquis for calf thrombus with the discontinuation of the heparin   -  02 supplementation for the hypoxia if needed so far did not needed the o2   -  management of back pain and other issues that include diastolic dysfunction as per the medicine . moderate pulmonary HTN is secondary to cardiac disease .  - other wise pulmonary status remained stable   - pain management as per the medicine

## 2020-05-19 NOTE — DISCHARGE NOTE PROVIDER - NSDCCPCAREPLAN_GEN_ALL_CORE_FT
PRINCIPAL DISCHARGE DIAGNOSIS  Diagnosis: Pneumonia  Assessment and Plan of Treatment: completed treatment      SECONDARY DISCHARGE DIAGNOSES  Diagnosis: DVT, lower extremity  Assessment and Plan of Treatment: Right lower ext DVT; continue with 10mg twice a day Eliquis for 6 days, then switch to 5mg twice a day for 3-6 months.    Diagnosis: Difficulty walking  Assessment and Plan of Treatment: Due to chronic osteoarthritis, degenerative changes, and decompensation. Patient will benefit from physical therapy. PRINCIPAL DISCHARGE DIAGNOSIS  Diagnosis: Pneumonia  Assessment and Plan of Treatment: completed treatment      SECONDARY DISCHARGE DIAGNOSES  Diagnosis: DVT, lower extremity  Assessment and Plan of Treatment: Right lower ext DVT; continue with 10mg twice a day Eliquis till 5/23, then switch to 5mg(on 5/23) twice a day for 3-6 months.    Diagnosis: Difficulty walking  Assessment and Plan of Treatment: Due to chronic osteoarthritis, degenerative changes, and decompensation. Patient will benefit from physical therapy. PRINCIPAL DISCHARGE DIAGNOSIS  Diagnosis: Pneumonia  Assessment and Plan of Treatment: completed treatment      SECONDARY DISCHARGE DIAGNOSES  Diagnosis: Hypotension  Assessment and Plan of Treatment: patient Metoprol 25 BID, was reduced to 12.5mg bid.    Diagnosis: DVT, lower extremity  Assessment and Plan of Treatment: Right lower ext DVT; continue with 10mg twice a day Eliquis till 5/23, then switch to 5mg(on 5/23) twice a day for 3-6 months.    Diagnosis: Difficulty walking  Assessment and Plan of Treatment: Due to chronic osteoarthritis, degenerative changes, and decompensation. Patient will benefit from physical therapy.

## 2020-05-20 ENCOUNTER — TRANSCRIPTION ENCOUNTER (OUTPATIENT)
Age: 85
End: 2020-05-20

## 2020-05-20 LAB
HCT VFR BLD CALC: 42.1 % — SIGNIFICANT CHANGE UP (ref 39–50)
HGB BLD-MCNC: 14.2 G/DL — SIGNIFICANT CHANGE UP (ref 13–17)
MCHC RBC-ENTMCNC: 32.1 PG — SIGNIFICANT CHANGE UP (ref 27–34)
MCHC RBC-ENTMCNC: 33.7 GM/DL — SIGNIFICANT CHANGE UP (ref 32–36)
MCV RBC AUTO: 95 FL — SIGNIFICANT CHANGE UP (ref 80–100)
PLATELET # BLD AUTO: 226 K/UL — SIGNIFICANT CHANGE UP (ref 150–400)
RBC # BLD: 4.43 M/UL — SIGNIFICANT CHANGE UP (ref 4.2–5.8)
RBC # FLD: 15 % — HIGH (ref 10.3–14.5)
WBC # BLD: 11.46 K/UL — HIGH (ref 3.8–10.5)
WBC # FLD AUTO: 11.46 K/UL — HIGH (ref 3.8–10.5)

## 2020-05-20 PROCEDURE — 99232 SBSQ HOSP IP/OBS MODERATE 35: CPT

## 2020-05-20 RX ORDER — BACITRACIN AND POLYMYXIN B SULFATE 500; 10000 [USP'U]/G; [USP'U]/G
1 OINTMENT TOPICAL
Qty: 0 | Refills: 0 | DISCHARGE
Start: 2020-05-20

## 2020-05-20 RX ORDER — METOPROLOL TARTRATE 50 MG
12.5 TABLET ORAL
Refills: 0 | Status: DISCONTINUED | OUTPATIENT
Start: 2020-05-20 | End: 2020-05-21

## 2020-05-20 RX ORDER — BACITRACIN AND POLYMYXIN B SULFATE 500; 10000 [USP'U]/G; [USP'U]/G
1 OINTMENT TOPICAL THREE TIMES A DAY
Refills: 0 | Status: DISCONTINUED | OUTPATIENT
Start: 2020-05-20 | End: 2020-05-21

## 2020-05-20 RX ORDER — BACITRACIN AND POLYMYXIN B SULFATE 500; 10000 [USP'U]/G; [USP'U]/G
1 OINTMENT TOPICAL THREE TIMES A DAY
Refills: 0 | Status: DISCONTINUED | OUTPATIENT
Start: 2020-05-20 | End: 2020-05-20

## 2020-05-20 RX ORDER — SODIUM CHLORIDE 9 MG/ML
500 INJECTION INTRAMUSCULAR; INTRAVENOUS; SUBCUTANEOUS ONCE
Refills: 0 | Status: COMPLETED | OUTPATIENT
Start: 2020-05-20 | End: 2020-05-20

## 2020-05-20 RX ADMIN — Medication 1 DROP(S): at 18:41

## 2020-05-20 RX ADMIN — Medication 400 MILLIGRAM(S): at 10:02

## 2020-05-20 RX ADMIN — SODIUM CHLORIDE 500 MILLILITER(S): 9 INJECTION INTRAMUSCULAR; INTRAVENOUS; SUBCUTANEOUS at 14:44

## 2020-05-20 RX ADMIN — Medication 1 DROP(S): at 05:52

## 2020-05-20 RX ADMIN — SODIUM CHLORIDE 1000 MILLILITER(S): 9 INJECTION INTRAMUSCULAR; INTRAVENOUS; SUBCUTANEOUS at 14:00

## 2020-05-20 RX ADMIN — Medication 12.5 MILLIGRAM(S): at 18:40

## 2020-05-20 RX ADMIN — BRIMONIDINE TARTRATE 1 DROP(S): 2 SOLUTION/ DROPS OPHTHALMIC at 18:41

## 2020-05-20 RX ADMIN — ESCITALOPRAM OXALATE 10 MILLIGRAM(S): 10 TABLET, FILM COATED ORAL at 11:39

## 2020-05-20 RX ADMIN — BACITRACIN AND POLYMYXIN B SULFATE 1 APPLICATION(S): 500; 10000 OINTMENT TOPICAL at 21:05

## 2020-05-20 RX ADMIN — BRIMONIDINE TARTRATE 1 DROP(S): 2 SOLUTION/ DROPS OPHTHALMIC at 05:52

## 2020-05-20 RX ADMIN — OXYCODONE HYDROCHLORIDE 5 MILLIGRAM(S): 5 TABLET ORAL at 05:51

## 2020-05-20 RX ADMIN — OXYCODONE HYDROCHLORIDE 5 MILLIGRAM(S): 5 TABLET ORAL at 21:05

## 2020-05-20 RX ADMIN — APIXABAN 10 MILLIGRAM(S): 2.5 TABLET, FILM COATED ORAL at 18:40

## 2020-05-20 RX ADMIN — ATORVASTATIN CALCIUM 20 MILLIGRAM(S): 80 TABLET, FILM COATED ORAL at 21:05

## 2020-05-20 RX ADMIN — PANTOPRAZOLE SODIUM 40 MILLIGRAM(S): 20 TABLET, DELAYED RELEASE ORAL at 05:54

## 2020-05-20 RX ADMIN — Medication 25 MILLIGRAM(S): at 05:53

## 2020-05-20 RX ADMIN — DONEPEZIL HYDROCHLORIDE 10 MILLIGRAM(S): 10 TABLET, FILM COATED ORAL at 21:05

## 2020-05-20 RX ADMIN — MEMANTINE HYDROCHLORIDE 10 MILLIGRAM(S): 10 TABLET ORAL at 11:39

## 2020-05-20 RX ADMIN — OXYCODONE HYDROCHLORIDE 5 MILLIGRAM(S): 5 TABLET ORAL at 13:17

## 2020-05-20 RX ADMIN — APIXABAN 10 MILLIGRAM(S): 2.5 TABLET, FILM COATED ORAL at 05:53

## 2020-05-20 NOTE — PROGRESS NOTE ADULT - SUBJECTIVE AND OBJECTIVE BOX
CHIEF COMPLAINT/Diagnosis: CAP/ Rt leg DVT/ chronic back pains    SUBJECTIVE: no complaints    REVIEW OF SYSTEMS:    CONSTITUTIONAL: No weakness, fevers or chills  EYES/ENT: No visual changes;  No vertigo or throat pain   NECK: No pain or stiffness  RESPIRATORY: No cough, wheezing, hemoptysis; No shortness of breath  CARDIOVASCULAR: No chest pain or palpitations  GASTROINTESTINAL: No abdominal or epigastric pain. No nausea, vomiting, or hematemesis; No diarrhea or constipation. No melena or hematochezia.  GENITOURINARY: No dysuria, frequency or hematuria  NEUROLOGICAL: No numbness or weakness  SKIN: No itching, burning, rashes, or lesions   All other review of systems is negative unless indicated above    Vital Signs Last 24 Hrs  T(C): 36.1 (20 May 2020 05:16), Max: 36.9 (19 May 2020 16:57)  T(F): 97 (20 May 2020 05:16), Max: 98.4 (19 May 2020 16:57)  HR: 66 (20 May 2020 05:16) (50 - 72)  BP: 123/79 (20 May 2020 05:16) (97/74 - 123/79)  BP(mean): --  RR: 17 (20 May 2020 05:16) (17 - 18)  SpO2: 97% (20 May 2020 05:16) (95% - 99%)    I&O's Summary      CAPILLARY BLOOD GLUCOSE          PHYSICAL EXAM:    Constitutional: NAD, awake and alert, well-developed  HEENT: PERR, EOMI, Normal Hearing, MMM  Neck: Soft and supple, No LAD, No JVD  Respiratory: Breath sounds are clear bilaterally, No wheezing, rales or rhonchi  Cardiovascular: S1 and S2, regular rate and rhythm, no Murmurs, gallops or rubs  Gastrointestinal: Bowel Sounds present, soft, nontender, nondistended, no guarding, no rebound  Extremities: No peripheral edema  Vascular: 2+ peripheral pulses  Neurological: A/O x 3, no focal deficits  Musculoskeletal: 5/5 strength b/l upper and lower extremities  Skin: No rashes    MEDICATIONS:  MEDICATIONS  (STANDING):  apixaban 10 milliGRAM(s) Oral every 12 hours  atorvastatin 20 milliGRAM(s) Oral at bedtime  brimonidine 0.2% Ophthalmic Solution 1 Drop(s) Left EYE two times a day  donepezil 10 milliGRAM(s) Oral at bedtime  escitalopram 10 milliGRAM(s) Oral daily  memantine 10 milliGRAM(s) Oral daily  metoprolol tartrate 25 milliGRAM(s) Oral two times a day  oxyCODONE    IR 5 milliGRAM(s) Oral three times a day  pantoprazole    Tablet 40 milliGRAM(s) Oral before breakfast  timolol 0.5% Solution 1 Drop(s) Left EYE two times a day      LABS: All Labs Reviewed:                        14.2   11.46 )-----------( 226      ( 20 May 2020 08:18 )             42.1     05-19    142  |  109<H>  |  14  ----------------------------<  103<H>  4.1   |  26  |  0.80    Ca    9.6      19 May 2020 07:56  Phos  3.3     05-19  Mg     1.7     05-19            Hospital Course:    Pt is an 83 y/o M with a PMHx of CAD, diastolic CHF, chronic anemia, chronic back pain, Afib, DDD, HTN, MRSA, BPH, GERD, diverticulosis, CAMILA, thoracic aortic aneurysm, [place] who  presents via  EMS after slipping and falling out of his recliner chair.  Pt has severe back pain and due to the COVID -19 pandemic he was  unable to get an epidural.      His pain is so severe, he spends most of the day in his recliner chair.  Pt's wife call 911, as he had fallen out of his recliner chair three times in the last 24 hours.         #Weakness secondary to possible CAP/Aspiration   -resultant Weakness, Fall   - result COVID- 19 testing in the ED is neg   - CT chest RUL infiltrate   - S/P Rocephin IV 1gm qd #5 switch to ceftin - completed   - S/P Zithromax  mg qd    # Right DVT  -patient has hx of GI bleeding and Liver hemangiomas; off Blood thinners  -Discussed with Dr Mendiola and advised to start eliquis and monitor for GI Bleed  -Discussed with wife and understands there is a chance he might bleed but also a risk of him getting PE  -If he bleeds in the future then he may be a candidate for IVC filter  -now on Eliquis    #Acute on Chronic Back pain  - Hx of Epidural injections in the past  - Ortho spine consult noted >  recco CT myelogram; - CT T/L - No evidence of acute traumatic injury to the thoracic and lumbar spine.  - probably not a candidate for MRI due to cochlear impants  - CT Myelogram was D/C pablo janex ass patient does not need it based on the recent CT T/L; S/P heparin Drip; Switch to Eliquis        Patient for DC today

## 2020-05-20 NOTE — PROGRESS NOTE ADULT - SUBJECTIVE AND OBJECTIVE BOX
SUBJECTIVE     Patient breathing comfortably with out any events reported   no sob and hard of hearing   restarted on the eloquis for the dvt   no signs of active bleeding   on ibuprofen for the pain   on the room air with the acceptable sat     PAST MEDICAL & SURGICAL HISTORY:  CAD (coronary artery disease): (-) cardiac stress and 2DECHO 2019  Diastolic CHF: LVEF 55-60% 6/2019  Chronic anemia  Afib: Not on A/C 2/2 hx of GIbleeding  DDD (degenerative disc disease), lumbar  [place] (hard of hearing)  Fall (on) (from) other stairs and steps, sequela: 03/28/2017- lumbar hemtoma  Essential hypertension  Nerve injury: complication from right TKR surgery, 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  Aortic root dilatation  MRSA (methicillin resistant Staphylococcus aureus): left forearm 2012  Lumbar degenerative disc disease  BPH associated with nocturia  Diverticulosis of intestine without bleeding, unspecified intestinal tract location  Gastroesophageal reflux disease, esophagitis presence not specified: Hx of GI bleed  Aortic valve insufficiency, unspecified etiology  CAMILA (obstructive sleep apnea): unable to tolerate nocturnal CPAP  Restless leg syndrome  Thoracic aortic aneurysm  HLD (hyperlipidemia)  S/P thoracic aortic aneurysm repair  S/P AVR (aortic valve replacement)  Status post cataract extraction, unspecified laterality: bilat  S/P debridement: left forearm -mrsa  History of fundoplication: 2000  Amputation finger: left index 1970  S/P knee replacement: left  S/P knee replacement: right  S/P shoulder surgery: right rotatotor cuff injury    OBJECTIVE   Vital Signs Last 24 Hrs  T(C): 36.1 (20 May 2020 05:16), Max: 36.9 (19 May 2020 16:57)  T(F): 97 (20 May 2020 05:16), Max: 98.4 (19 May 2020 16:57)  HR: 66 (20 May 2020 05:16) (50 - 72)  BP: 123/79 (20 May 2020 05:16) (97/74 - 123/79)  BP(mean): --  RR: 17 (20 May 2020 05:16) (17 - 18)  SpO2: 97% (20 May 2020 05:16) (95% - 99%)    PHYSICAL EXAM:  Constitutional: , awake and alert, not in distress.  HEENT: Normo cephalic atraumatic  Neck: Soft and supple, No J.V.D   Respiratory: vesicular breathing with no wheeze   Cardiovascular: S1 and S2, regular rate .   Gastrointestinal:  soft, nontender,   Extremities: No  edema or calf tenderness .  Neurological: No new  focal deficits.    MEDICATIONS  (STANDING):  apixaban 10 milliGRAM(s) Oral every 12 hours  atorvastatin 20 milliGRAM(s) Oral at bedtime  brimonidine 0.2% Ophthalmic Solution 1 Drop(s) Left EYE two times a day  donepezil 10 milliGRAM(s) Oral at bedtime  escitalopram 10 milliGRAM(s) Oral daily  memantine 10 milliGRAM(s) Oral daily  metoprolol tartrate 25 milliGRAM(s) Oral two times a day  oxyCODONE    IR 5 milliGRAM(s) Oral three times a day  pantoprazole    Tablet 40 milliGRAM(s) Oral before breakfast  timolol 0.5% Solution 1 Drop(s) Left EYE two times a day                            14.2   11.46 )-----------( 226      ( 20 May 2020 08:18 )             42.1     05-19    142  |  109<H>  |  14  ----------------------------<  103<H>  4.1   |  26  |  0.80    Ca    9.6      19 May 2020 07:56  Phos  3.3     05-19  Mg     1.7     05-19

## 2020-05-20 NOTE — DISCHARGE NOTE NURSING/CASE MANAGEMENT/SOCIAL WORK - PATIENT PORTAL LINK FT
You can access the FollowMyHealth Patient Portal offered by [facility] by registering at the following website: [email or web address]. By joining [facility]’s FollowMyHealth portal, you will also be able to view your health information using other applications (apps) compatible with our system.

## 2020-05-20 NOTE — PROGRESS NOTE ADULT - ASSESSMENT
-   right upper lobe infiltrate likely infectious in etiology with the CAP   -   patient completed antibiotics for the pneumonia with the rocephin and azithromycin   -   calf  vein thrombus with high D-dimers cannot exclude associated pulmonary embolism on heparin drip and ct myelogram was cancelled   -   atrial fibrillation as per the chart not anticoagulated with history of GI bleeding in the past   -   moderate pulmonary hypertension likely secondary noted with the last echocardiogram  -   severe back pain with history of recurrent falls  -   mild atelectatic changes in the left base  -   reported history of obstructive sleep apnea as per charting patient is not using his CPAP.  -   other issues include hiatal hernia, history of aortic valve replacement and aneurysm repair    PLAN     -  completed antibiotics with out any fever or leucocytosis and continue to remain afebrile   -  repeat blood cultures were negative so far negative   - continue with the eloquis for calf thrombus for atleast 3 months   -  02 supplementation for the hypoxia if needed so far did not needed the o2   -  management of back pain and other issues that include diastolic dysfunction as per the medicine . moderate pulmonary HTN is secondary to cardiac disease .  -  would avoid NASIADS with the risk of the bleeding   - pain management as per the medicine for the back pain

## 2020-05-21 VITALS
TEMPERATURE: 98 F | RESPIRATION RATE: 16 BRPM | SYSTOLIC BLOOD PRESSURE: 99 MMHG | OXYGEN SATURATION: 97 % | HEART RATE: 70 BPM | DIASTOLIC BLOOD PRESSURE: 72 MMHG

## 2020-05-21 LAB
HCT VFR BLD CALC: 41.2 % — SIGNIFICANT CHANGE UP (ref 39–50)
HGB BLD-MCNC: 13.5 G/DL — SIGNIFICANT CHANGE UP (ref 13–17)
MCHC RBC-ENTMCNC: 31.5 PG — SIGNIFICANT CHANGE UP (ref 27–34)
MCHC RBC-ENTMCNC: 32.8 GM/DL — SIGNIFICANT CHANGE UP (ref 32–36)
MCV RBC AUTO: 96.3 FL — SIGNIFICANT CHANGE UP (ref 80–100)
PLATELET # BLD AUTO: 231 K/UL — SIGNIFICANT CHANGE UP (ref 150–400)
RBC # BLD: 4.28 M/UL — SIGNIFICANT CHANGE UP (ref 4.2–5.8)
RBC # FLD: 15.1 % — HIGH (ref 10.3–14.5)
WBC # BLD: 11.94 K/UL — HIGH (ref 3.8–10.5)
WBC # FLD AUTO: 11.94 K/UL — HIGH (ref 3.8–10.5)

## 2020-05-21 PROCEDURE — 99239 HOSP IP/OBS DSCHRG MGMT >30: CPT

## 2020-05-21 RX ADMIN — ESCITALOPRAM OXALATE 10 MILLIGRAM(S): 10 TABLET, FILM COATED ORAL at 11:08

## 2020-05-21 RX ADMIN — OXYCODONE HYDROCHLORIDE 5 MILLIGRAM(S): 5 TABLET ORAL at 05:21

## 2020-05-21 RX ADMIN — PANTOPRAZOLE SODIUM 40 MILLIGRAM(S): 20 TABLET, DELAYED RELEASE ORAL at 05:22

## 2020-05-21 RX ADMIN — MEMANTINE HYDROCHLORIDE 10 MILLIGRAM(S): 10 TABLET ORAL at 11:08

## 2020-05-21 RX ADMIN — BRIMONIDINE TARTRATE 1 DROP(S): 2 SOLUTION/ DROPS OPHTHALMIC at 05:18

## 2020-05-21 RX ADMIN — ONDANSETRON 4 MILLIGRAM(S): 8 TABLET, FILM COATED ORAL at 05:17

## 2020-05-21 RX ADMIN — APIXABAN 10 MILLIGRAM(S): 2.5 TABLET, FILM COATED ORAL at 05:18

## 2020-05-21 RX ADMIN — Medication 1 DROP(S): at 05:30

## 2020-05-21 RX ADMIN — BACITRACIN AND POLYMYXIN B SULFATE 1 APPLICATION(S): 500; 10000 OINTMENT TOPICAL at 05:18

## 2020-05-21 RX ADMIN — Medication 12.5 MILLIGRAM(S): at 05:18

## 2020-05-21 NOTE — PROGRESS NOTE ADULT - SUBJECTIVE AND OBJECTIVE BOX
CHIEF COMPLAINT/diagnosis: chronic [place] pains/ right  lower ext dvt/ CAP    SUBJECTIVE: no complaints    REVIEW OF SYSTEMS:    CONSTITUTIONAL: No weakness, fevers or chills  EYES/ENT: No visual changes;  No vertigo or throat pain   NECK: No pain or stiffness  RESPIRATORY: No cough, wheezing, hemoptysis; No shortness of breath  CARDIOVASCULAR: No chest pain or palpitations  GASTROINTESTINAL: No abdominal or epigastric pain. No nausea, vomiting, or hematemesis; No diarrhea or constipation. No melena or hematochezia.  GENITOURINARY: No dysuria, frequency or hematuria  NEUROLOGICAL: No numbness or weakness  SKIN: No itching, burning, rashes, or lesions   All other review of systems is negative unless indicated above    Vital Signs Last 24 Hrs  T(C): 36.4 (21 May 2020 11:13), Max: 36.9 (21 May 2020 04:59)  T(F): 97.5 (21 May 2020 11:13), Max: 98.4 (21 May 2020 04:59)  HR: 70 (21 May 2020 11:13) (63 - 70)  BP: 99/72 (21 May 2020 11:13) (99/72 - 140/93)  BP(mean): --  RR: 16 (21 May 2020 11:13) (16 - 18)  SpO2: 97% (21 May 2020 11:13) (95% - 97%)    I&O's Summary    20 May 2020 07:01  -  21 May 2020 07:00  --------------------------------------------------------  IN: 1000 mL / OUT: 0 mL / NET: 1000 mL        CAPILLARY BLOOD GLUCOSE          PHYSICAL EXAM:    Constitutional: NAD, awake and alert, well-developed  HEENT: PERR, EOMI, Normal Hearing, MMM  Neck: Soft and supple, No LAD, No JVD  Respiratory: Breath sounds are clear bilaterally, No wheezing, rales or rhonchi  Cardiovascular: S1 and S2, regular rate and rhythm, no Murmurs, gallops or rubs  Gastrointestinal: Bowel Sounds present, soft, nontender, nondistended, no guarding, no rebound  Extremities: No peripheral edema  Vascular: 2+ peripheral pulses  Neurological: A/O x 3, no focal deficits  Musculoskeletal: 5/5 strength b/l upper and lower extremities  Skin: No rashes    MEDICATIONS:  MEDICATIONS  (STANDING):  apixaban 10 milliGRAM(s) Oral every 12 hours  atorvastatin 20 milliGRAM(s) Oral at bedtime  bacitracin/polymyxin B Ointment 1 Application(s) Topical three times a day  brimonidine 0.2% Ophthalmic Solution 1 Drop(s) Left EYE two times a day  donepezil 10 milliGRAM(s) Oral at bedtime  escitalopram 10 milliGRAM(s) Oral daily  memantine 10 milliGRAM(s) Oral daily  metoprolol tartrate 12.5 milliGRAM(s) Oral two times a day  oxyCODONE    IR 5 milliGRAM(s) Oral three times a day  pantoprazole    Tablet 40 milliGRAM(s) Oral before breakfast  timolol 0.5% Solution 1 Drop(s) Left EYE two times a day      LABS: All Labs Reviewed:                        13.5   11.94 )-----------( 231      ( 21 May 2020 08:03 )             41.2         Hospital Course:    Pt is an 83 y/o M with a PMHx of CAD, diastolic CHF, chronic anemia, chronic back pain, Afib, DDD, HTN, MRSA, BPH, GERD, diverticulosis, CAMILA, thoracic aortic aneurysm, [place] who  presents via  EMS after slipping and falling out of his recliner chair.  Pt has severe back pain and due to the COVID -19 pandemic he was  unable to get an epidural.      His pain is so severe, he spends most of the day in his recliner chair.  Pt's wife call 911, as he had fallen out of his recliner chair three times in the last 24 hours.         #Weakness secondary to possible CAP/Aspiration   -resultant Weakness, Fall   - result COVID- 19 testing in the ED is neg   - CT chest RUL infiltrate   - S/P Rocephin IV 1gm qd #5 switch to ceftin - completed   - S/P Zithromax  mg qd    # Right DVT  -patient has hx of GI bleeding and Liver hemangiomas; off Blood thinners  -Discussed with Dr Mendiola and advised to start eliquis and monitor for GI Bleed  -Discussed with wife and understands there is a chance he might bleed but also a risk of him getting PE  -If he bleeds in the future then he may be a candidate for IVC filter  -now on Eliquis    #Acute on Chronic Back pain  - Hx of Epidural injections in the past  - Ortho spine consult noted >  recco CT myelogram; - CT T/L - No evidence of acute traumatic injury to the thoracic and lumbar spine.  - probably not a candidate for MRI due to cochlear impants  - CT Myelogram was D/C pablo garnica sx ass patient does not need it based on the recent CT T/L; S/P heparin Drip; Switch to Eliquis        Patient for DC today ; could not be discharged yesturday due to dehydration secondary to poor oral intake; patient was hydrated vial 1L ivf; BP today is siginicantly improved.   BP meds adjusted for dicharge as well. lopressor loweredd.

## 2020-05-21 NOTE — PROGRESS NOTE ADULT - REASON FOR ADMISSION
RUL Pna, Leukocytosis  Weakness, Fall   Chronic Back pain

## 2020-05-21 NOTE — PROGRESS NOTE ADULT - SUBJECTIVE AND OBJECTIVE BOX
SUBJECTIVE     Patient resting in the bed and hard of hearing   yesterday discharge was held due to hypotension   received fluid bolus   no fever or sob reported     PAST MEDICAL & SURGICAL HISTORY:  CAD (coronary artery disease): (-) cardiac stress and 2DECHO 2019  Diastolic CHF: LVEF 55-60% 6/2019  Chronic anemia  Afib: Not on A/C 2/2 hx of GIbleeding  DDD (degenerative disc disease), lumbar  [place] (hard of hearing)  Fall (on) (from) other stairs and steps, sequela: 03/28/2017- lumbar hemtoma  Essential hypertension  Nerve injury: complication from right TKR surgery, 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  Aortic root dilatation  MRSA (methicillin resistant Staphylococcus aureus): left forearm 2012  Lumbar degenerative disc disease  BPH associated with nocturia  Diverticulosis of intestine without bleeding, unspecified intestinal tract location  Gastroesophageal reflux disease, esophagitis presence not specified: Hx of GI bleed  Aortic valve insufficiency, unspecified etiology  CAMILA (obstructive sleep apnea): unable to tolerate nocturnal CPAP  Restless leg syndrome  Thoracic aortic aneurysm  HLD (hyperlipidemia)  S/P thoracic aortic aneurysm repair  S/P AVR (aortic valve replacement)  Status post cataract extraction, unspecified laterality: bilat  S/P debridement: left forearm -mrsa  History of fundoplication: 2000  Amputation finger: left index 1970  S/P knee replacement: left  S/P knee replacement: right  S/P shoulder surgery: right rotatotor cuff injury    OBJECTIVE   Vital Signs Last 24 Hrs  T(C): 36.9 (21 May 2020 04:59), Max: 36.9 (21 May 2020 04:59)  T(F): 98.4 (21 May 2020 04:59), Max: 98.4 (21 May 2020 04:59)  HR: 63 (21 May 2020 04:59) (60 - 65)  BP: 140/93 (21 May 2020 04:59) (72/40 - 140/93)  BP(mean): --  RR: 16 (21 May 2020 04:59) (16 - 18)  SpO2: 96% (21 May 2020 04:59) (95% - 100%)    PHYSICAL EXAM:  Constitutional: resting in the bed with out the need for the 02   HEENT: Normo cephalic atraumatic  Neck: Soft and supple, No J.V.D   Respiratory: vesicular breathing ,transmitted sounds with the poor inspiratory effort   Cardiovascular: S1 and S2, regular rate .   Gastrointestinal:  soft, nontender,   Extremities: No  edema or calf tenderness .  Neurological: No new  focal deficits.    MEDICATIONS  (STANDING):  apixaban 10 milliGRAM(s) Oral every 12 hours  atorvastatin 20 milliGRAM(s) Oral at bedtime  bacitracin/polymyxin B Ointment 1 Application(s) Topical three times a day  brimonidine 0.2% Ophthalmic Solution 1 Drop(s) Left EYE two times a day  donepezil 10 milliGRAM(s) Oral at bedtime  escitalopram 10 milliGRAM(s) Oral daily  memantine 10 milliGRAM(s) Oral daily  metoprolol tartrate 12.5 milliGRAM(s) Oral two times a day  oxyCODONE    IR 5 milliGRAM(s) Oral three times a day  pantoprazole    Tablet 40 milliGRAM(s) Oral before breakfast  timolol 0.5% Solution 1 Drop(s) Left EYE two times a day                            13.5   11.94 )-----------( 231      ( 21 May 2020 08:03 )             41.2

## 2020-05-21 NOTE — PROGRESS NOTE ADULT - ASSESSMENT
-   right upper lobe infiltrate likely infectious in etiology with the CAP   -   patient completed antibiotics for the pneumonia with the rocephin and azithromycin   -   calf  vein thrombus with high D-dimers cannot exclude associated pulmonary embolism on heparin drip and ct myelogram was cancelled   -   atrial fibrillation as per the chart not anticoagulated with history of GI bleeding in the past   -   moderate pulmonary hypertension likely secondary noted with the last echocardiogram  -   severe back pain with history of recurrent falls  -   mild atelectatic changes in the left base  -   reported history of obstructive sleep apnea as per charting patient is not using his CPAP.  -   other issues include hiatal hernia, history of aortic valve replacement and aneurysm repair  -   transient episode of hypotension resolved with out clear signs of infection     PLAN     -  completed antibiotics with out any fever only minimally elevated WBC no signs of new respiratory infection   - continue with the eloquis for calf thrombus for at least 3 months   -  02 supplementation for the hypoxia if needed so far did not needed the o2   -  management of back pain and other issues that include diastolic dysfunction as per the medicine .   -  moderate pulmonary HTN is secondary to cardiac disease .  -  would avoid NASIADS with the risk of the bleeding   - pain management as per the medicine for the back pain   - pulmonary status remained stable and discharge planning as per the medicine

## 2020-05-21 NOTE — PROGRESS NOTE ADULT - NSHPATTENDINGPLANDISCUSS_GEN_ALL_CORE
patient, nursing, staff
patient, nursing, staff
patient, nursing,staff
Patient,  nurse
patient, nursing, staff
patient, nursing, staff

## 2020-05-27 DIAGNOSIS — I95.9 HYPOTENSION, UNSPECIFIED: ICD-10-CM

## 2020-05-27 DIAGNOSIS — K21.9 GASTRO-ESOPHAGEAL REFLUX DISEASE WITHOUT ESOPHAGITIS: ICD-10-CM

## 2020-05-27 DIAGNOSIS — J69.0 PNEUMONITIS DUE TO INHALATION OF FOOD AND VOMIT: ICD-10-CM

## 2020-05-27 DIAGNOSIS — Z96.653 PRESENCE OF ARTIFICIAL KNEE JOINT, BILATERAL: ICD-10-CM

## 2020-05-27 DIAGNOSIS — E78.5 HYPERLIPIDEMIA, UNSPECIFIED: ICD-10-CM

## 2020-05-27 DIAGNOSIS — D64.9 ANEMIA, UNSPECIFIED: ICD-10-CM

## 2020-05-27 DIAGNOSIS — I11.0 HYPERTENSIVE HEART DISEASE WITH HEART FAILURE: ICD-10-CM

## 2020-05-27 DIAGNOSIS — H90.5 UNSPECIFIED SENSORINEURAL HEARING LOSS: ICD-10-CM

## 2020-05-27 DIAGNOSIS — I50.30 UNSPECIFIED DIASTOLIC (CONGESTIVE) HEART FAILURE: ICD-10-CM

## 2020-05-27 DIAGNOSIS — Z89.022 ACQUIRED ABSENCE OF LEFT FINGER(S): ICD-10-CM

## 2020-05-27 DIAGNOSIS — I25.10 ATHEROSCLEROTIC HEART DISEASE OF NATIVE CORONARY ARTERY WITHOUT ANGINA PECTORIS: ICD-10-CM

## 2020-05-27 DIAGNOSIS — G25.81 RESTLESS LEGS SYNDROME: ICD-10-CM

## 2020-05-27 DIAGNOSIS — G89.29 OTHER CHRONIC PAIN: ICD-10-CM

## 2020-05-27 DIAGNOSIS — M51.36 OTHER INTERVERTEBRAL DISC DEGENERATION, LUMBAR REGION: ICD-10-CM

## 2020-05-27 DIAGNOSIS — I48.91 UNSPECIFIED ATRIAL FIBRILLATION: ICD-10-CM

## 2020-05-27 DIAGNOSIS — I82.401 ACUTE EMBOLISM AND THROMBOSIS OF UNSPECIFIED DEEP VEINS OF RIGHT LOWER EXTREMITY: ICD-10-CM

## 2020-05-27 DIAGNOSIS — M54.9 DORSALGIA, UNSPECIFIED: ICD-10-CM

## 2020-06-18 ENCOUNTER — APPOINTMENT (OUTPATIENT)
Dept: OTOLARYNGOLOGY | Facility: CLINIC | Age: 85
End: 2020-06-18

## 2020-06-19 ENCOUNTER — APPOINTMENT (OUTPATIENT)
Dept: ELECTROPHYSIOLOGY | Facility: CLINIC | Age: 85
End: 2020-06-19
Payer: MEDICARE

## 2020-06-19 PROCEDURE — G2066: CPT

## 2020-06-19 PROCEDURE — 93298 REM INTERROG DEV EVAL SCRMS: CPT

## 2020-07-24 ENCOUNTER — APPOINTMENT (OUTPATIENT)
Dept: ELECTROPHYSIOLOGY | Facility: CLINIC | Age: 85
End: 2020-07-24
Payer: MEDICARE

## 2020-07-24 PROCEDURE — 93298 REM INTERROG DEV EVAL SCRMS: CPT

## 2020-07-24 PROCEDURE — G2066: CPT

## 2020-08-27 ENCOUNTER — APPOINTMENT (OUTPATIENT)
Dept: ELECTROPHYSIOLOGY | Facility: CLINIC | Age: 85
End: 2020-08-27
Payer: MEDICARE

## 2020-08-28 PROCEDURE — G2066: CPT

## 2020-08-28 PROCEDURE — 93298 REM INTERROG DEV EVAL SCRMS: CPT

## 2020-09-04 ENCOUNTER — APPOINTMENT (OUTPATIENT)
Dept: ELECTROPHYSIOLOGY | Facility: CLINIC | Age: 85
End: 2020-09-04

## 2020-10-01 ENCOUNTER — APPOINTMENT (OUTPATIENT)
Dept: ELECTROPHYSIOLOGY | Facility: CLINIC | Age: 85
End: 2020-10-01
Payer: MEDICARE

## 2020-10-02 PROCEDURE — 93298 REM INTERROG DEV EVAL SCRMS: CPT

## 2020-10-02 PROCEDURE — G2066: CPT

## 2020-11-03 ENCOUNTER — APPOINTMENT (OUTPATIENT)
Dept: ELECTROPHYSIOLOGY | Facility: CLINIC | Age: 85
End: 2020-11-03
Payer: MEDICARE

## 2020-11-04 PROCEDURE — G2066: CPT

## 2020-11-04 PROCEDURE — 93298 REM INTERROG DEV EVAL SCRMS: CPT

## 2020-12-09 ENCOUNTER — APPOINTMENT (OUTPATIENT)
Dept: ELECTROPHYSIOLOGY | Facility: CLINIC | Age: 85
End: 2020-12-09
Payer: MEDICARE

## 2020-12-09 PROCEDURE — 93298 REM INTERROG DEV EVAL SCRMS: CPT

## 2020-12-09 PROCEDURE — G2066: CPT

## 2021-01-11 ENCOUNTER — APPOINTMENT (OUTPATIENT)
Dept: ELECTROPHYSIOLOGY | Facility: CLINIC | Age: 86
End: 2021-01-11
Payer: MEDICARE

## 2021-01-11 PROCEDURE — 93298 REM INTERROG DEV EVAL SCRMS: CPT

## 2021-01-11 PROCEDURE — G2066: CPT

## 2021-02-11 ENCOUNTER — APPOINTMENT (OUTPATIENT)
Dept: ELECTROPHYSIOLOGY | Facility: CLINIC | Age: 86
End: 2021-02-11
Payer: MEDICARE

## 2021-02-11 PROCEDURE — 93298 REM INTERROG DEV EVAL SCRMS: CPT

## 2021-02-11 PROCEDURE — G2066: CPT

## 2021-02-12 ENCOUNTER — NON-APPOINTMENT (OUTPATIENT)
Age: 86
End: 2021-02-12

## 2021-03-15 ENCOUNTER — APPOINTMENT (OUTPATIENT)
Dept: ELECTROPHYSIOLOGY | Facility: CLINIC | Age: 86
End: 2021-03-15
Payer: MEDICARE

## 2021-03-15 ENCOUNTER — NON-APPOINTMENT (OUTPATIENT)
Age: 86
End: 2021-03-15

## 2021-03-15 PROCEDURE — G2066: CPT

## 2021-03-15 PROCEDURE — 93298 REM INTERROG DEV EVAL SCRMS: CPT

## 2021-04-15 ENCOUNTER — NON-APPOINTMENT (OUTPATIENT)
Age: 86
End: 2021-04-15

## 2021-04-16 ENCOUNTER — APPOINTMENT (OUTPATIENT)
Dept: ELECTROPHYSIOLOGY | Facility: CLINIC | Age: 86
End: 2021-04-16
Payer: MEDICARE

## 2021-04-16 PROCEDURE — 93298 REM INTERROG DEV EVAL SCRMS: CPT

## 2021-04-16 PROCEDURE — G2066: CPT

## 2021-04-26 NOTE — PHYSICAL THERAPY INITIAL EVALUATION ADULT - NAME OF DISCHARGE PLANNER
[facility] Pt # [id number] Physical Therapy Progress Note's signed on 4/23/21 by provider,faxed on 4/26/21 to # [phone] Pt # [id number] confirmation received and placed in Centralized Scanning. DREW Kenyon

## 2021-05-10 ENCOUNTER — NON-APPOINTMENT (OUTPATIENT)
Age: 86
End: 2021-05-10

## 2021-05-11 ENCOUNTER — NON-APPOINTMENT (OUTPATIENT)
Age: 86
End: 2021-05-11

## 2021-05-19 ENCOUNTER — APPOINTMENT (OUTPATIENT)
Dept: ELECTROPHYSIOLOGY | Facility: CLINIC | Age: 86
End: 2021-05-19
Payer: MEDICARE

## 2021-05-19 VITALS
RESPIRATION RATE: 18 BRPM | OXYGEN SATURATION: 100 % | DIASTOLIC BLOOD PRESSURE: 57 MMHG | WEIGHT: 150 LBS | SYSTOLIC BLOOD PRESSURE: 89 MMHG | HEART RATE: 57 BPM | HEIGHT: 66 IN | BODY MASS INDEX: 24.11 KG/M2

## 2021-05-19 PROCEDURE — 93285 PRGRMG DEV EVAL SCRMS IP: CPT

## 2021-05-19 RX ORDER — FUROSEMIDE 40 MG/1
40 TABLET ORAL TWICE DAILY
Qty: 90 | Refills: 4 | Status: DISCONTINUED | COMMUNITY
End: 2021-05-19

## 2021-05-19 RX ORDER — MAGNESIUM HYDROXIDE 1200 MG/15ML
400 SUSPENSION ORAL
Refills: 0 | Status: ACTIVE | COMMUNITY

## 2021-05-19 RX ORDER — GABAPENTIN 100 MG
100 TABLET ORAL 3 TIMES DAILY
Refills: 0 | Status: DISCONTINUED | COMMUNITY
End: 2021-05-19

## 2021-05-19 RX ORDER — WARFARIN SODIUM 2 MG/1
2 TABLET ORAL DAILY
Refills: 0 | Status: ACTIVE | COMMUNITY

## 2021-05-19 RX ORDER — POTASSIUM 75 MG
TABLET ORAL
Refills: 0 | Status: DISCONTINUED | COMMUNITY
End: 2021-05-19

## 2021-05-19 RX ORDER — BRIMONIDINE TARTRATE 2 MG/MG
0.2 SOLUTION/ DROPS OPHTHALMIC
Refills: 0 | Status: ACTIVE | COMMUNITY

## 2021-05-19 RX ORDER — METOPROLOL TARTRATE 25 MG/1
25 TABLET, FILM COATED ORAL TWICE DAILY
Qty: 60 | Refills: 0 | Status: DISCONTINUED | COMMUNITY
Start: 2017-08-03 | End: 2021-05-19

## 2021-05-19 RX ORDER — OFLOXACIN OTIC 3 MG/ML
0.3 SOLUTION AURICULAR (OTIC) TWICE DAILY
Qty: 1 | Refills: 3 | Status: DISCONTINUED | COMMUNITY
Start: 2020-02-21 | End: 2021-05-19

## 2021-05-20 ENCOUNTER — APPOINTMENT (OUTPATIENT)
Dept: ELECTROPHYSIOLOGY | Facility: CLINIC | Age: 86
End: 2021-05-20
Payer: MEDICARE

## 2021-05-25 ENCOUNTER — NON-APPOINTMENT (OUTPATIENT)
Age: 86
End: 2021-05-25

## 2021-06-16 ENCOUNTER — APPOINTMENT (OUTPATIENT)
Dept: ELECTROPHYSIOLOGY | Facility: CLINIC | Age: 86
End: 2021-06-16
Payer: MEDICARE

## 2021-06-16 VITALS
DIASTOLIC BLOOD PRESSURE: 68 MMHG | RESPIRATION RATE: 16 BRPM | SYSTOLIC BLOOD PRESSURE: 105 MMHG | OXYGEN SATURATION: 99 % | HEART RATE: 57 BPM

## 2021-06-16 DIAGNOSIS — Z95.818 PRESENCE OF OTHER CARDIAC IMPLANTS AND GRAFTS: ICD-10-CM

## 2021-06-16 PROCEDURE — 99212 OFFICE O/P EST SF 10 MIN: CPT

## 2021-06-16 PROCEDURE — 93285 PRGRMG DEV EVAL SCRMS IP: CPT

## 2021-06-16 RX ORDER — TIMOLOL MALEATE 5 MG/ML
0.5 SOLUTION OPHTHALMIC
Refills: 0 | Status: DISCONTINUED | COMMUNITY
End: 2021-06-16

## 2021-06-16 RX ORDER — ROPINIROLE 2 MG/1
2 TABLET, FILM COATED ORAL
Refills: 0 | Status: DISCONTINUED | COMMUNITY
End: 2021-06-16

## 2021-06-16 RX ORDER — METOPROLOL TARTRATE 25 MG/1
25 TABLET, FILM COATED ORAL
Qty: 90 | Refills: 2 | Status: DISCONTINUED | COMMUNITY
End: 2021-06-16

## 2021-06-16 NOTE — DISCUSSION/SUMMARY
[Sick Sinus Syndrome] : sick sinus syndrome [Pacemaker Placement] : placement of a transvenous pacemaker [Patient] : the patient [Family] : the patient's family [FreeTextEntry1] : Pt will benefit from PPM implant in order to support use of beta blockers.  \par Will discuss with Dr. Stewart (Micra versus transvenous PPM).\par Wife and pt in agreement with plan.\par Continue to hold metoprolol.

## 2021-06-16 NOTE — REASON FOR VISIT
[Spouse] : spouse [FreeTextEntry1] : Pt is an 85 yr old male with PMHx of  cryptogenic stroke s/p ILR implant, Afib on warfarin who has been having multiple episodes of bradycardia and pauses.  He had 2 pauses that were 5 seconds duration during the day on 5/25/21 and his Metoprolol was discontinued.\par On June 8th he had another 5 second pause at 4 PM.  He is a poor historian and is [place] but when asked he said he gets dizzy sometimes.

## 2021-06-21 NOTE — PROGRESS NOTE ADULT - CARDIOVASCULAR DETAILS
Letter by Mira Cornejo RDCS at      Author: Mira Cornejo RDCS Service: -- Author Type: --    Filed:  Encounter Date: 2/2/2021 Status: (Other)         Anthony Tiwari  [address] [place] [address]      February 2, 2021      Dear Mr. Tiwari,    RE: Remote Results    We are writing to you regarding your recent Remote ICD check from home. Your transmission was received successfully. Battery status is satisfactory at this time.     Your results are showing no significant changes.    Your next device appointment will be a remote check on May 4, 2021; this will occur automatically.    To schedule or reschedule, please call [phone] and press 1.    NOTE: If you would like to do an extra transmission, please call [phone] and press 3 to speak to a nurse BEFORE transmitting. This ensures that the Device Clinic staff is aware of the reason you are sending a transmission, and can follow-up with you after it has been reviewed.    We will be checking your implanted device from home (remotely) every three months unless otherwise instructed. We will need to see you in the clinic at least once a year. You may need to be seen in the clinic sooner depending on the results of your check.    Please be aware:    The follow-up schedule is like a Physician prescription.    Your remote monitor is paired to your specific implanted device.      Sincerely,    [facility]        irregular rate and rhythm

## 2021-06-25 ENCOUNTER — NON-APPOINTMENT (OUTPATIENT)
Age: 86
End: 2021-06-25

## 2021-06-25 ENCOUNTER — APPOINTMENT (OUTPATIENT)
Dept: ELECTROPHYSIOLOGY | Facility: CLINIC | Age: 86
End: 2021-06-25
Payer: MEDICARE

## 2021-06-25 PROCEDURE — 93298 REM INTERROG DEV EVAL SCRMS: CPT

## 2021-06-25 PROCEDURE — G2066: CPT

## 2021-06-27 ENCOUNTER — APPOINTMENT (OUTPATIENT)
Dept: DISASTER EMERGENCY | Facility: CLINIC | Age: 86
End: 2021-06-27

## 2021-06-27 DIAGNOSIS — Z01.818 ENCOUNTER FOR OTHER PREPROCEDURAL EXAMINATION: ICD-10-CM

## 2021-06-28 LAB — SARS-COV-2 N GENE NPH QL NAA+PROBE: NOT DETECTED

## 2021-06-28 RX ORDER — LIDOCAINE 4 G/100G
1 CREAM TOPICAL
Qty: 0 | Refills: 0 | DISCHARGE

## 2021-06-28 RX ORDER — TIMOLOL 0.5 %
1 DROPS OPHTHALMIC (EYE)
Qty: 0 | Refills: 0 | DISCHARGE

## 2021-06-28 RX ORDER — ESCITALOPRAM OXALATE 10 MG/1
1 TABLET, FILM COATED ORAL
Qty: 0 | Refills: 0 | DISCHARGE

## 2021-06-28 RX ORDER — MEMANTINE HYDROCHLORIDE 10 MG/1
1 TABLET ORAL
Qty: 0 | Refills: 0 | DISCHARGE

## 2021-06-28 RX ORDER — ONDANSETRON 8 MG/1
1 TABLET, FILM COATED ORAL
Qty: 0 | Refills: 0 | DISCHARGE

## 2021-06-28 RX ORDER — ROPINIROLE 8 MG/1
1 TABLET, FILM COATED, EXTENDED RELEASE ORAL
Qty: 0 | Refills: 0 | DISCHARGE

## 2021-06-28 NOTE — ASU PATIENT PROFILE, ADULT - NS PRO INFO GIVEN TO
Spoke to patient's wife, Louise, and Rachel RN at [facility] regarding ILR explant and Micra PPM implant/family

## 2021-06-28 NOTE — ASU PATIENT PROFILE, ADULT - MEDICATIONS TO HOLD
As per NP orders, patient to continue coumadin if INR 2.0 or hold coumadin 2 days prior if INR >2.5.

## 2021-06-28 NOTE — ASU PATIENT PROFILE, ADULT - PMH
Afib  Not on A/C 2/2 hx of GIbleeding  Aortic root dilatation    Aortic valve insufficiency, unspecified etiology    BPH associated with nocturia    CAD (coronary artery disease)  (-) cardiac stress and 2DECHO 2019  Chronic anemia    DDD (degenerative disc disease), lumbar    Diastolic CHF  LVEF 55-60% 6/2019  Diverticulosis of intestine without bleeding, unspecified intestinal tract location    Essential hypertension    Fall (on) (from) other stairs and steps, sequela  03/28/2017- lumbar hemtoma  Gastroesophageal reflux disease, esophagitis presence not specified  Hx of GI bleed  HLD (hyperlipidemia)    [place] (hard of hearing)    Lumbar degenerative disc disease    MRSA (methicillin resistant Staphylococcus aureus)  left forearm 2012  Nerve injury  complication from right TKR surgery, 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  CAMILA (obstructive sleep apnea)  unable to tolerate nocturnal CPAP  Restless leg syndrome    Thoracic aortic aneurysm

## 2021-06-30 ENCOUNTER — OUTPATIENT (OUTPATIENT)
Dept: OUTPATIENT SERVICES | Facility: HOSPITAL | Age: 86
LOS: 1 days | Discharge: HOME | End: 2021-06-30
Payer: MEDICARE

## 2021-06-30 ENCOUNTER — RESULT REVIEW (OUTPATIENT)
Age: 86
End: 2021-06-30

## 2021-06-30 VITALS
RESPIRATION RATE: 16 BRPM | DIASTOLIC BLOOD PRESSURE: 70 MMHG | SYSTOLIC BLOOD PRESSURE: 108 MMHG | TEMPERATURE: 98 F | WEIGHT: 149.91 LBS | OXYGEN SATURATION: 97 % | HEART RATE: 69 BPM | HEIGHT: 66 IN

## 2021-06-30 DIAGNOSIS — Z98.49 CATARACT EXTRACTION STATUS, UNSPECIFIED EYE: Chronic | ICD-10-CM

## 2021-06-30 DIAGNOSIS — Z98.890 OTHER SPECIFIED POSTPROCEDURAL STATES: Chronic | ICD-10-CM

## 2021-06-30 DIAGNOSIS — Z96.659 PRESENCE OF UNSPECIFIED ARTIFICIAL KNEE JOINT: Chronic | ICD-10-CM

## 2021-06-30 DIAGNOSIS — Z00.6 ENCOUNTER FOR EXAMINATION FOR NORMAL COMPARISON AND CONTROL IN CLINICAL RESEARCH PROGRAM: ICD-10-CM

## 2021-06-30 DIAGNOSIS — S68.119A COMPLETE TRAUMATIC METACARPOPHALANGEAL AMPUTATION OF UNSPECIFIED FINGER, INITIAL ENCOUNTER: Chronic | ICD-10-CM

## 2021-06-30 DIAGNOSIS — I48.91 UNSPECIFIED ATRIAL FIBRILLATION: ICD-10-CM

## 2021-06-30 DIAGNOSIS — Z95.2 PRESENCE OF PROSTHETIC HEART VALVE: Chronic | ICD-10-CM

## 2021-06-30 LAB
INR BLD: 2.01 RATIO — HIGH (ref 0.88–1.16)
PROTHROM AB SERPL-ACNC: 22.6 SEC — HIGH (ref 10.6–13.6)

## 2021-06-30 PROCEDURE — 33274 TCAT INSJ/RPL PERM LDLS PM: CPT | Mod: Q0

## 2021-06-30 PROCEDURE — C1769: CPT

## 2021-06-30 PROCEDURE — C1894: CPT

## 2021-06-30 PROCEDURE — 86900 BLOOD TYPING SEROLOGIC ABO: CPT

## 2021-06-30 PROCEDURE — 85610 PROTHROMBIN TIME: CPT

## 2021-06-30 PROCEDURE — 71045 X-RAY EXAM CHEST 1 VIEW: CPT | Mod: 26

## 2021-06-30 PROCEDURE — 85027 COMPLETE CBC AUTOMATED: CPT

## 2021-06-30 PROCEDURE — 86901 BLOOD TYPING SEROLOGIC RH(D): CPT

## 2021-06-30 PROCEDURE — 86850 RBC ANTIBODY SCREEN: CPT

## 2021-06-30 PROCEDURE — 80048 BASIC METABOLIC PNL TOTAL CA: CPT

## 2021-06-30 PROCEDURE — 93005 ELECTROCARDIOGRAM TRACING: CPT

## 2021-06-30 PROCEDURE — 36415 COLL VENOUS BLD VENIPUNCTURE: CPT | Mod: 91

## 2021-06-30 PROCEDURE — 33286 RMVL SUBQ CAR RHYTHM MNTR: CPT

## 2021-06-30 PROCEDURE — 71045 X-RAY EXAM CHEST 1 VIEW: CPT

## 2021-06-30 PROCEDURE — C1786: CPT

## 2021-06-30 RX ORDER — BRIMONIDINE TARTRATE 2 MG/MG
1 SOLUTION/ DROPS OPHTHALMIC
Refills: 0 | Status: DISCONTINUED | OUTPATIENT
Start: 2021-06-30 | End: 2021-07-01

## 2021-06-30 RX ORDER — METOPROLOL TARTRATE 50 MG
25 TABLET ORAL DAILY
Refills: 0 | Status: DISCONTINUED | OUTPATIENT
Start: 2021-06-30 | End: 2021-07-01

## 2021-06-30 RX ORDER — ATORVASTATIN CALCIUM 80 MG/1
20 TABLET, FILM COATED ORAL ONCE
Refills: 0 | Status: COMPLETED | OUTPATIENT
Start: 2021-06-30 | End: 2021-06-30

## 2021-06-30 RX ORDER — PANTOPRAZOLE SODIUM 20 MG/1
1 TABLET, DELAYED RELEASE ORAL
Qty: 0 | Refills: 0 | DISCHARGE

## 2021-06-30 RX ORDER — CEFAZOLIN SODIUM 1 G
2000 VIAL (EA) INJECTION ONCE
Refills: 0 | Status: COMPLETED | OUTPATIENT
Start: 2021-06-30 | End: 2021-06-30

## 2021-06-30 RX ORDER — VANCOMYCIN HCL 1 G
1000 VIAL (EA) INTRAVENOUS ONCE
Refills: 0 | Status: COMPLETED | OUTPATIENT
Start: 2021-06-30 | End: 2021-06-30

## 2021-06-30 RX ORDER — WARFARIN SODIUM 2.5 MG/1
2 TABLET ORAL ONCE
Refills: 0 | Status: COMPLETED | OUTPATIENT
Start: 2021-06-30 | End: 2021-06-30

## 2021-06-30 RX ORDER — CEFAZOLIN SODIUM 1 G
1000 VIAL (EA) INJECTION EVERY 8 HOURS
Refills: 0 | Status: DISCONTINUED | OUTPATIENT
Start: 2021-06-30 | End: 2021-06-30

## 2021-06-30 RX ORDER — DONEPEZIL HYDROCHLORIDE 10 MG/1
10 TABLET, FILM COATED ORAL ONCE
Refills: 0 | Status: COMPLETED | OUTPATIENT
Start: 2021-06-30 | End: 2021-06-30

## 2021-06-30 RX ADMIN — ATORVASTATIN CALCIUM 20 MILLIGRAM(S): 80 TABLET, FILM COATED ORAL at 21:04

## 2021-06-30 RX ADMIN — BRIMONIDINE TARTRATE 1 DROP(S): 2 SOLUTION/ DROPS OPHTHALMIC at 21:03

## 2021-06-30 RX ADMIN — DONEPEZIL HYDROCHLORIDE 10 MILLIGRAM(S): 10 TABLET, FILM COATED ORAL at 21:04

## 2021-06-30 RX ADMIN — WARFARIN SODIUM 2 MILLIGRAM(S): 2.5 TABLET ORAL at 21:04

## 2021-06-30 RX ADMIN — Medication 250 MILLIGRAM(S): at 14:10

## 2021-06-30 NOTE — PACU DISCHARGE NOTE - COMMENTS
Report given to Yumiko WEI 3E; pt placed on telemetry monitor with rhythm verified by [employer] monitor tech. Report given to Yumiko WEI 3E; pt placed on telemetry monitor with rhythm verified by [employer] monitor tech. Pt transported via stretcher by transporter.

## 2021-06-30 NOTE — PROCEDURE NOTE - NSCOMPLICATION_GEN_A_CORE
----- Message from Phillip Izquierdo MD sent at 9/18/2019  9:40 AM CDT -----  Take vitamin D 2000-day vitamin D levels are low also on a day where there is some try to get some sunlight of 30 minutes    Cholesterol is quite high watch diet stay away from fried food eat oatmeal more fruits vegetables and fiber increase exercise  
Patient informed of results and dr directives. Verbalized understanding    
no complications

## 2021-06-30 NOTE — PROCEDURE NOTE - NSICDXPROCEDURE_GEN_ALL_CORE_FT
PROCEDURES:  Implantation of leadless pacemaker 30-Jun-2021 18:06:31  Abel Stewart  Removal, loop recorder 30-Jun-2021 18:06:37  Abel Stewart

## 2021-06-30 NOTE — ASU PREOP CHECKLIST - AS BP NONINV METHOD
Daily Note     Today's date: 2018  Patient name: Dorys Keyes  : 1951  MRN: [id number]  Referring provider: Lc Ibrahim PA-C  Dx:   Encounter Diagnosis   Name Primary?  Flexor tendon rupture of hand, left, initial encounter Yes                  Subjective:  No comments      Objective: See treatment diary below  Precautions: FT protocol - 4 wks    Daily Treatment Diary        Manual            Protected PROM to ring finger 10min 10min 10min          Scar massage   5min                                                     Exercise Diary            Place hold modified hook and fist positions 30x 30x 30x          tenodesis  30x 30x          pegs Place in board            Pennies- static    performed                                                                                                                                                                                                                              Modalities             MH 15min 15min                                           Assessment: Tolerated treatment well  Patient would benefit from continued OT  Swelling of the digit        Plan: Progress treament per protocol     Wound care,  TGE's
electronic

## 2021-07-01 ENCOUNTER — TRANSCRIPTION ENCOUNTER (OUTPATIENT)
Age: 86
End: 2021-07-01

## 2021-07-01 VITALS — DIASTOLIC BLOOD PRESSURE: 74 MMHG | SYSTOLIC BLOOD PRESSURE: 130 MMHG

## 2021-07-01 LAB
ANION GAP SERPL CALC-SCNC: 4 MMOL/L — LOW (ref 5–17)
BUN SERPL-MCNC: 21 MG/DL — SIGNIFICANT CHANGE UP (ref 7–23)
CALCIUM SERPL-MCNC: 8.8 MG/DL — SIGNIFICANT CHANGE UP (ref 8.5–10.1)
CHLORIDE SERPL-SCNC: 111 MMOL/L — HIGH (ref 96–108)
CO2 SERPL-SCNC: 26 MMOL/L — SIGNIFICANT CHANGE UP (ref 22–31)
CREAT SERPL-MCNC: 0.8 MG/DL — SIGNIFICANT CHANGE UP (ref 0.5–1.3)
GLUCOSE SERPL-MCNC: 85 MG/DL — SIGNIFICANT CHANGE UP (ref 70–99)
HCT VFR BLD CALC: 41.5 % — SIGNIFICANT CHANGE UP (ref 39–50)
HGB BLD-MCNC: 14 G/DL — SIGNIFICANT CHANGE UP (ref 13–17)
INR BLD: 2.2 RATIO — HIGH (ref 0.88–1.16)
MCHC RBC-ENTMCNC: 33.1 PG — SIGNIFICANT CHANGE UP (ref 27–34)
MCHC RBC-ENTMCNC: 33.7 GM/DL — SIGNIFICANT CHANGE UP (ref 32–36)
MCV RBC AUTO: 98.1 FL — SIGNIFICANT CHANGE UP (ref 80–100)
PLATELET # BLD AUTO: 160 K/UL — SIGNIFICANT CHANGE UP (ref 150–400)
POTASSIUM SERPL-MCNC: 4.4 MMOL/L — SIGNIFICANT CHANGE UP (ref 3.5–5.3)
POTASSIUM SERPL-SCNC: 4.4 MMOL/L — SIGNIFICANT CHANGE UP (ref 3.5–5.3)
PROTHROM AB SERPL-ACNC: 24.8 SEC — HIGH (ref 10.6–13.6)
RBC # BLD: 4.23 M/UL — SIGNIFICANT CHANGE UP (ref 4.2–5.8)
RBC # FLD: 13.6 % — SIGNIFICANT CHANGE UP (ref 10.3–14.5)
SODIUM SERPL-SCNC: 141 MMOL/L — SIGNIFICANT CHANGE UP (ref 135–145)
WBC # BLD: 9.8 K/UL — SIGNIFICANT CHANGE UP (ref 3.8–10.5)
WBC # FLD AUTO: 9.8 K/UL — SIGNIFICANT CHANGE UP (ref 3.8–10.5)

## 2021-07-01 PROCEDURE — 93010 ELECTROCARDIOGRAM REPORT: CPT

## 2021-07-01 RX ORDER — METOPROLOL TARTRATE 50 MG
0.5 TABLET ORAL
Qty: 0 | Refills: 0 | DISCHARGE
Start: 2021-07-01

## 2021-07-01 RX ORDER — MAGNESIUM HYDROXIDE 400 MG/1
30 TABLET, CHEWABLE ORAL DAILY
Refills: 0 | Status: DISCONTINUED | OUTPATIENT
Start: 2021-07-01 | End: 2021-07-01

## 2021-07-01 RX ADMIN — Medication 25 MILLIGRAM(S): at 10:59

## 2021-07-01 NOTE — DISCHARGE NOTE PROVIDER - NSDCFUSCHEDAPPT_GEN_ALL_CORE_FT
SON HO ; 07/13/2021 ; [facility] [address] SON HO ; 07/14/2021 ; [facility] [address]

## 2021-07-01 NOTE — DISCHARGE NOTE PROVIDER - NSDCMRMEDTOKEN_GEN_ALL_CORE_FT
atorvastatin 20 mg oral tablet: 1 tab(s) orally once a day  brimonidine 0.2% ophthalmic solution: 1 drop(s) in the left eye 2 times a day  Coumadin: orally once a day  donepezil 10 mg oral tablet: 1 tab(s) orally once a day (at bedtime)  iron:   memantine:   metoprolol tartrate 25 mg oral tablet: 0.5 tab(s) orally 2 times a day  Milk of Magnesia:    atorvastatin 20 mg oral tablet: 1 tab(s) orally once a day  brimonidine 0.2% ophthalmic solution: 1 drop(s) in the left eye 2 times a day  Coumadin: orally once a day  donepezil 10 mg oral tablet: 1 tab(s) orally once a day (at bedtime)  memantine:   metoprolol tartrate 25 mg oral tablet: 0.5 tab(s) orally 2 times a day  Milk of Magnesia:

## 2021-07-01 NOTE — DISCHARGE NOTE NURSING/CASE MANAGEMENT/SOCIAL WORK - PATIENT PORTAL LINK FT
You can access the FollowMyHealth Patient Portal offered by [facility] by registering at the following website: [email or web address]. By joining [employer]’s FollowMyHealth portal, you will also be able to view your health information using other applications (apps) compatible with our system.

## 2021-07-01 NOTE — DISCHARGE NOTE PROVIDER - HOSPITAL COURSE
This is an 85 yr old male with PMHx of  CAD, CHF, chronic anemia, chronic Afib on warfarin, HTN, BPH, GERD, diverticulosis, CAMILA, glaucoma, depression, dementia who had ILR revealing multiple pauses over 5 seconds during awake hours despite discontinuing beta blockers, associated with dizziness.  He does have CAMILA and some pauses were seen during sleep.  He presented for ILR explant and MICRA implant.    7/1/21:  s/p MICRA implant, ILR explant, POD #1.  Pt seen lying in bed, [place] in no distress.  Denies any c/o.  TELE: atrial fibrillation with demand v-pacing, rate 60's, 4 beat NSVT.  EKG: AF -v-pacing 60 bpm    MEDICATIONS  (STANDING):  brimonidine 0.2% Ophthalmic Solution 1 Drop(s) Left EYE two times a day  metoprolol tartrate 25 milliGRAM(s) Oral daily    MEDICATIONS  (PRN):  Allergies    No Known Allergies    Intolerances    REVIEW OF SYSTEMS:    CONSTITUTIONAL: No weakness, fevers or chills  EYES/ENT: No visual changes;  No vertigo or throat pain   NECK: No pain or stiffness  RESPIRATORY: No cough, wheezing, hemoptysis; No shortness of breath  CARDIOVASCULAR: No chest pain or palpitations  GASTROINTESTINAL: No abdominal or epigastric pain. No nausea, vomiting, or hematemesis; No diarrhea or constipation. No melena or hematochezia.  GENITOURINARY: No dysuria, frequency or hematuria  NEUROLOGICAL: No numbness or weakness  SKIN: No itching, burning, rashes, or lesions   All other review of systems is negative unless indicated above    Vital Signs Last 24 Hrs  T(C): 36.8 (01 Jul 2021 06:11), Max: 36.8 (01 Jul 2021 06:11)  T(F): 98.3 (01 Jul 2021 06:11), Max: 98.3 (01 Jul 2021 06:11)  HR: 60 (30 Jun 2021 18:26) (60 - 69)  BP: 127/74 (30 Jun 2021 18:26) (99/71 - 145/85)  BP(mean): --  RR: 20 (30 Jun 2021 18:26) (15 - 20)  SpO2: 97% (30 Jun 2021 18:26) (96% - 97%)                          14.0   9.80  )-----------( 160      ( 01 Jul 2021 06:52 )             41.5   07-01    141  |  111<H>  |  21  ----------------------------<  85  4.4   |  26  |  0.80    Ca    8.8      01 Jul 2021 06:52    PT/INR - ( 01 Jul 2021 06:52 )   PT: 24.8 sec;   INR: 2.20 ratio         PHYSICAL EXAM:    General: elderly male, WN/WD NAD  Neurology: A&Ox2, nonfocal, MCINTOSH x 4  HEENT: NC/AT, neck supple, no JVD, EOMI, PERRL  Respiratory: CTA B/L  CV: RRR, S1S2, no murmurs, rubs or gallops  Abdominal: Soft, NT, ND +BS  Extremities: No edema, + peripheral pulses. Right groin site is soft, no bleeding, drainage or hematoma.  Stitch removed.  Skin: No rashes.  Left chest wall ILR removal site intact with dermabond, no bleeding or drainage.    83 yr old male with above history presented for elective MICRA PPM implant and ILR removal for SSS, pauses during awake hours with dizziness.    A/P: SSS, chronic AF, s/p MICRA, POD #1, ILR explant.  Metoprolol is resumed.  Continue warfarin.  Device interrogation this AM reveals normal function.  Return as outpatient in 2 weeks for f/u in EP clinic.  Stable from EP standpoint for transfer back to [facility].   This is an 85 yr old male with PMHx of  CAD, CHF, chronic anemia, chronic Afib on warfarin, HTN, BPH, GERD, diverticulosis, CAMILA, glaucoma, depression, dementia who had ILR revealing multiple pauses over 5 seconds during awake hours despite discontinuing beta blockers, associated with dizziness.  He does have CAMILA and some pauses were seen during sleep.  He presented for ILR explant and MICRA implant.    7/1/21:  s/p MICRA implant, ILR explant, POD #1.  Pt seen lying in bed, [place] in no distress.  Denies any c/o.  TELE: atrial fibrillation with demand v-pacing, rate 60's, 4 beat NSVT.  EKG: AF -v-pacing 60 bpm    MEDICATIONS  (STANDING):  brimonidine 0.2% Ophthalmic Solution 1 Drop(s) Left EYE two times a day  metoprolol tartrate 25 milliGRAM(s) Oral daily    MEDICATIONS  (PRN):  Allergies    No Known Allergies    Home Medications:  atorvastatin 20 mg oral tablet: 1 tab(s) orally once a day (30 Jun 2021 13:39)  brimonidine 0.2% ophthalmic solution: 1 drop(s) in the left eye 2 times a day (30 Jun 2021 13:39)  Coumadin: orally once a day (30 Jun 2021 13:39)  donepezil 10 mg oral tablet: 1 tab(s) orally once a day (at bedtime) (30 Jun 2021 13:39)  memantine:  (30 Jun 2021 13:39)  metoprolol tartrate 25 mg oral tablet: 0.5 tab(s) orally 2 times a day (01 Jul 2021 08:59)  Milk of Magnesia:  (30 Jun 2021 13:39)      REVIEW OF SYSTEMS:    CONSTITUTIONAL: No weakness, fevers or chills  EYES/ENT: No visual changes;  No vertigo or throat pain   NECK: No pain or stiffness  RESPIRATORY: No cough, wheezing, hemoptysis; No shortness of breath  CARDIOVASCULAR: No chest pain or palpitations  GASTROINTESTINAL: No abdominal or epigastric pain. No nausea, vomiting, or hematemesis; No diarrhea or constipation. No melena or hematochezia.  GENITOURINARY: No dysuria, frequency or hematuria  NEUROLOGICAL: No numbness or weakness  SKIN: No itching, burning, rashes, or lesions   All other review of systems is negative unless indicated above    Vital Signs Last 24 Hrs  T(C): 36.8 (01 Jul 2021 06:11), Max: 36.8 (01 Jul 2021 06:11)  T(F): 98.3 (01 Jul 2021 06:11), Max: 98.3 (01 Jul 2021 06:11)  HR: 60 (30 Jun 2021 18:26) (60 - 69)  BP: 127/74 (30 Jun 2021 18:26) (99/71 - 145/85)  BP(mean): --  RR: 20 (30 Jun 2021 18:26) (15 - 20)  SpO2: 97% (30 Jun 2021 18:26) (96% - 97%)                          14.0   9.80  )-----------( 160      ( 01 Jul 2021 06:52 )             41.5   07-01    141  |  111<H>  |  21  ----------------------------<  85  4.4   |  26  |  0.80    Ca    8.8      01 Jul 2021 06:52    PT/INR - ( 01 Jul 2021 06:52 )   PT: 24.8 sec;   INR: 2.20 ratio         PHYSICAL EXAM:    General: elderly male, WN/WD NAD  Neurology: A&Ox2, nonfocal, MCINTOSH x 4  HEENT: NC/AT, neck supple, no JVD, EOMI, PERRL  Respiratory: CTA B/L  CV: RRR, S1S2, no murmurs, rubs or gallops  Abdominal: Soft, NT, ND +BS  Extremities: No edema, + peripheral pulses. Right groin site is soft, no bleeding, drainage or hematoma.  Stitch removed.  Skin: No rashes.  Left chest wall ILR removal site intact with dermabond, no bleeding or drainage.    < from: Xray Chest 1 View- PORTABLE-Urgent (Xray Chest 1 View- PORTABLE-Urgent .) (06.30.21 @ 17:26) >    FINDINGS:    The lungs are clear of airspace consolidations or effusions. No pneumothorax.    The  heart is enlarged in transverse diameter. No hilar mass.  Micra Transcatheter PacingSystem cardiac  device within RIGHT ventricle.  Status post median sternotomy.     Visualized osseous structures are intact.    IMPRESSION:   No evidence of active chest disease.  Cardiomegaly.    Micra Transcatheter Pacing System cardiac  device within RIGHT ventricle.    83 yr old male with above history presented for elective MICRA PPM implant and ILR removal for SSS, pauses during awake hours with dizziness.    A/P: SSS, chronic AF, s/p MICRA, POD #1, ILR explant.  Metoprolol is resumed.  Continue warfarin.  Device interrogation this AM reveals normal function.  Return as outpatient in 2 weeks for f/u in EP clinic.  Stable from EP standpoint for transfer back to [facility].

## 2021-07-01 NOTE — DISCHARGE NOTE PROVIDER - NSDCCPCAREPLAN_GEN_ALL_CORE_FT
PRINCIPAL DISCHARGE DIAGNOSIS  Diagnosis: SSS (sick sinus syndrome)  Assessment and Plan of Treatment: s/p MICRA implant

## 2021-07-01 NOTE — DISCHARGE NOTE PROVIDER - CARE PROVIDER_API CALL
Abel Stewart (MD)  Cardiac Electrophysiology; Cardiovascular Disease  [address] [place] [address]  Phone: [phone]  Fax: [phone]  Follow Up Time:

## 2021-07-03 NOTE — ASU PREOP CHECKLIST - SELECT TESTS ORDERED
CBC/PT/PTT/INR/Type and Cross/Type and Screen/EKG/CXR/COVID-19 If you are a smoker, it is important for your health to stop smoking. Please be aware that second hand smoke is also harmful.

## 2021-07-04 DIAGNOSIS — Z45.09 ENCOUNTER FOR ADJUSTMENT AND MANAGEMENT OF OTHER CARDIAC DEVICE: ICD-10-CM

## 2021-07-04 DIAGNOSIS — I10 ESSENTIAL (PRIMARY) HYPERTENSION: ICD-10-CM

## 2021-07-04 DIAGNOSIS — I49.5 SICK SINUS SYNDROME: ICD-10-CM

## 2021-07-04 DIAGNOSIS — I48.91 UNSPECIFIED ATRIAL FIBRILLATION: ICD-10-CM

## 2021-07-04 DIAGNOSIS — G25.81 RESTLESS LEGS SYNDROME: ICD-10-CM

## 2021-07-04 DIAGNOSIS — Z95.2 PRESENCE OF PROSTHETIC HEART VALVE: ICD-10-CM

## 2021-07-04 DIAGNOSIS — G47.30 SLEEP APNEA, UNSPECIFIED: ICD-10-CM

## 2021-07-04 DIAGNOSIS — Z79.01 LONG TERM (CURRENT) USE OF ANTICOAGULANTS: ICD-10-CM

## 2021-07-04 DIAGNOSIS — I34.0 NONRHEUMATIC MITRAL (VALVE) INSUFFICIENCY: ICD-10-CM

## 2021-07-04 DIAGNOSIS — R00.1 BRADYCARDIA, UNSPECIFIED: ICD-10-CM

## 2021-07-04 DIAGNOSIS — I25.10 ATHEROSCLEROTIC HEART DISEASE OF NATIVE CORONARY ARTERY WITHOUT ANGINA PECTORIS: ICD-10-CM

## 2021-07-04 DIAGNOSIS — K21.9 GASTRO-ESOPHAGEAL REFLUX DISEASE WITHOUT ESOPHAGITIS: ICD-10-CM

## 2021-07-04 DIAGNOSIS — I71.2 THORACIC AORTIC ANEURYSM, WITHOUT RUPTURE: ICD-10-CM

## 2021-07-04 DIAGNOSIS — Z95.818 PRESENCE OF OTHER CARDIAC IMPLANTS AND GRAFTS: ICD-10-CM

## 2021-07-04 DIAGNOSIS — Z86.73 PERSONAL HISTORY OF TRANSIENT ISCHEMIC ATTACK (TIA), AND CEREBRAL INFARCTION WITHOUT RESIDUAL DEFICITS: ICD-10-CM

## 2021-07-04 DIAGNOSIS — E78.00 PURE HYPERCHOLESTEROLEMIA, UNSPECIFIED: ICD-10-CM

## 2021-07-14 ENCOUNTER — APPOINTMENT (OUTPATIENT)
Dept: ELECTROPHYSIOLOGY | Facility: CLINIC | Age: 86
End: 2021-07-14
Payer: MEDICARE

## 2021-07-14 VITALS
HEIGHT: 65 IN | HEART RATE: 62 BPM | WEIGHT: 150 LBS | BODY MASS INDEX: 24.99 KG/M2 | DIASTOLIC BLOOD PRESSURE: 72 MMHG | SYSTOLIC BLOOD PRESSURE: 103 MMHG | OXYGEN SATURATION: 100 % | RESPIRATION RATE: 16 BRPM

## 2021-07-14 DIAGNOSIS — I48.91 UNSPECIFIED ATRIAL FIBRILLATION: ICD-10-CM

## 2021-07-14 DIAGNOSIS — Z95.0 PRESENCE OF CARDIAC PACEMAKER: ICD-10-CM

## 2021-07-14 PROCEDURE — 93279 PRGRMG DEV EVAL PM/LDLS PM: CPT

## 2021-07-15 RX ORDER — MEMANTINE HYDROCHLORIDE 10 MG/1
10 TABLET, FILM COATED ORAL
Refills: 0 | Status: ACTIVE | COMMUNITY

## 2021-07-15 RX ORDER — METOPROLOL TARTRATE 25 MG/1
25 TABLET, FILM COATED ORAL
Qty: 90 | Refills: 2 | Status: ACTIVE | COMMUNITY

## 2021-08-26 NOTE — BEHAVIORAL HEALTH ASSESSMENT NOTE - FAMILY HISTORY OF PSYCHIATRIC ILLNESS / SUICIDALITY
Unable to assess Trilobed Flap Text: The defect edges were debeveled with a #15 scalpel blade.  Given the location of the defect and the proximity to free margins a trilobed flap was deemed most appropriate.  Using a sterile surgical marker, an appropriate trilobed flap drawn around the defect.    The area thus outlined was incised deep to adipose tissue with a #15 scalpel blade.  The skin margins were undermined to an appropriate distance in all directions utilizing iris scissors.

## 2021-09-17 NOTE — PRE-OP CHECKLIST - HEART RATE (BEATS/MIN)
EVENT:   9/17/21, 1am, called by RN re: patient c/o abdominal gas likely common effect of medication an food, and he requested medication (Cr - 9.21).   HPI:    63 y/o male with PMH ESRD on HD T TH S at [facility] [place], Renal Cell CA w/ lung mets on sunitinib, HTN, DM p/w generalized body malaise x 3 days. Pt reported that he had not feel well for a long time and was diagnosed with lung cancer and his previous lung ca doctor told him that there was nothing to do. He saw Dr. Smyth on the day of admission who told him to go to the hospital and that his renal ca medications will be changed. Pt had decreased appetite.    OBJECTIVE:  Vital Signs Last 24 Hrs  T(C): 36.3 (16 Sep 2021 22:00), Max: 36.8 (16 Sep 2021 14:40)  T(F): 97.4 (16 Sep 2021 22:00), Max: 98.3 (16 Sep 2021 14:40)  HR: 63 (16 Sep 2021 22:00) (51 - 63)  BP: 167/64 (16 Sep 2021 22:00) (135/65 - 173/76)  BP(mean): --  RR: 18 (16 Sep 2021 22:00) (16 - 19)  SpO2: 96% (16 Sep 2021 22:00) (96% - 100%)    FOCUSED PHYSICAL EXAM:    LABS:                        12.5   6.41  )-----------( 79       ( 16 Sep 2021 17:44 )             39.3     09-16    132<L>  |  93<L>  |  59<H>  ----------------------------<  133<H>  5.1   |  27  |  9.21<H>    Ca    6.0<LL>      16 Sep 2021 17:44  Phos  5.3     09-15    TPro  7.8  /  Alb  2.7<L>  /  TBili  0.4  /  DBili  x   /  AST  45<H>  /  ALT  7<L>  /  AlkPhos  249<H>  09-16    PLAN:   - Simethicone 80 mg , chew, po x 1 dose now for c/o gas.     FOLLOW UP / RESULT:    - Reassess patient comfort level,    - Safety measures maintain. 93

## 2021-11-09 ENCOUNTER — APPOINTMENT (OUTPATIENT)
Dept: ELECTROPHYSIOLOGY | Facility: CLINIC | Age: 86
End: 2021-11-09
Payer: MEDICARE

## 2021-11-10 ENCOUNTER — NON-APPOINTMENT (OUTPATIENT)
Age: 86
End: 2021-11-10

## 2021-11-10 PROCEDURE — 93296 REM INTERROG EVL PM/IDS: CPT

## 2021-11-10 PROCEDURE — 93294 REM INTERROG EVL PM/LDLS PM: CPT

## 2022-01-01 ENCOUNTER — APPOINTMENT (OUTPATIENT)
Dept: ELECTROPHYSIOLOGY | Facility: CLINIC | Age: 87
End: 2022-01-01

## 2022-01-01 ENCOUNTER — FORM ENCOUNTER (OUTPATIENT)
Age: 87
End: 2022-01-01

## 2022-01-01 ENCOUNTER — NON-APPOINTMENT (OUTPATIENT)
Age: 87
End: 2022-01-01

## 2022-01-01 ENCOUNTER — APPOINTMENT (OUTPATIENT)
Dept: ELECTROPHYSIOLOGY | Facility: CLINIC | Age: 87
End: 2022-01-01
Payer: MEDICARE

## 2022-01-01 PROCEDURE — 93296 REM INTERROG EVL PM/IDS: CPT

## 2022-01-01 PROCEDURE — 93294 REM INTERROG EVL PM/LDLS PM: CPT

## 2022-01-25 ENCOUNTER — FORM ENCOUNTER (OUTPATIENT)
Age: 87
End: 2022-01-25

## 2022-02-01 ENCOUNTER — NON-APPOINTMENT (OUTPATIENT)
Age: 87
End: 2022-02-01

## 2022-02-08 ENCOUNTER — APPOINTMENT (OUTPATIENT)
Dept: ELECTROPHYSIOLOGY | Facility: CLINIC | Age: 87
End: 2022-02-08
Payer: MEDICARE

## 2022-02-09 ENCOUNTER — NON-APPOINTMENT (OUTPATIENT)
Age: 87
End: 2022-02-09

## 2022-02-09 PROCEDURE — 93296 REM INTERROG EVL PM/IDS: CPT

## 2022-02-09 PROCEDURE — 93294 REM INTERROG EVL PM/LDLS PM: CPT

## 2022-05-11 ENCOUNTER — APPOINTMENT (OUTPATIENT)
Dept: ELECTROPHYSIOLOGY | Facility: CLINIC | Age: 87
End: 2022-05-11

## 2022-08-22 NOTE — CONSULT NOTE ADULT - SUBJECTIVE AND OBJECTIVE BOX
Surgery was consulted to evaluate 84M presenting with abdominal pain, nausea and vomiting. CT scan findings suggestive of possible GOO. Patient has hearing difficulty and hence detailed history couldnt be obtained.       85 y/o M PMHx significant for non-obstructive CAD, CHF (HFpEF LVEF  55-60% 6/2019), aortic root dilation, aortic valve insufficiency, s/p bioprosthetic aortic valve replacement and ascending aortic root graft, A-fib (not on AC due to recent GIBleed), [place] (s/p cochlear implant), chronic imbalance, Hypertension, Restless Leg Syndrome, CAMILA unable to tolerate CPAP, was referred by his Cardiologist to  for further evaluation and management of progressive shortness of breath associated with increased wheezing, subjective fevers, and cough over the past 2 days. The patient admits to recent sick contacts with his wife with URI symptoms at home. The patient denies any associated chest pain, lower extremity edema. Labs => BUN/Cr 26/1.13, proBNP 1305, RVP was (+) RSV. In the ED the patient was given Methylprednisolone 125mg IVP x 1, and Combivent nebs x 3.                            12.9   7.28  )-----------( 166      ( 06 Feb 2020 12:08 )             40.3       02-06    140  |  108  |  26<H>  ----------------------------<  105<H>  4.2   |  26  |  1.13    Ca    9.1      06 Feb 2020 12:08    TPro  6.9  /  Alb  3.5  /  TBili  0.8  /  DBili  x   /  AST  20  /  ALT  16  /  AlkPhos  87  02-06      physical exam :     gen: aoo x3  pulm : equal air entry bilaterally  cv: [id number]  abdomen: distended, nontender Carac Counseling:  I discussed with the patient the risks of Carac including but not limited to erythema, scaling, itching, weeping, crusting, and pain.

## 2022-10-26 NOTE — INPATIENT CERTIFICATION FOR MEDICARE PATIENTS - PHYSICIAN CONCUR
I concur with the Admission Order and I certify that services are provided in accordance with Section 42 CFR § 412.3
no

## 2023-01-01 ENCOUNTER — TRANSCRIPTION ENCOUNTER (OUTPATIENT)
Age: 88
End: 2023-01-01

## 2023-01-01 ENCOUNTER — FORM ENCOUNTER (OUTPATIENT)
Age: 88
End: 2023-01-01

## 2023-01-01 ENCOUNTER — APPOINTMENT (OUTPATIENT)
Dept: ELECTROPHYSIOLOGY | Facility: CLINIC | Age: 88
End: 2023-01-01

## 2023-01-01 ENCOUNTER — INPATIENT (INPATIENT)
Facility: HOSPITAL | Age: 88
LOS: 5 days | Discharge: NURSING, REHAB OR LONG-TERM CARE | DRG: 871 | End: 2023-03-16
Attending: INTERNAL MEDICINE | Admitting: INTERNAL MEDICINE
Payer: MEDICARE

## 2023-01-01 VITALS
OXYGEN SATURATION: 98 % | WEIGHT: 149.91 LBS | SYSTOLIC BLOOD PRESSURE: 96 MMHG | RESPIRATION RATE: 20 BRPM | HEART RATE: 120 BPM | DIASTOLIC BLOOD PRESSURE: 68 MMHG | HEIGHT: 67 IN

## 2023-01-01 VITALS
RESPIRATION RATE: 17 BRPM | HEART RATE: 63 BPM | TEMPERATURE: 98 F | DIASTOLIC BLOOD PRESSURE: 81 MMHG | OXYGEN SATURATION: 95 % | SYSTOLIC BLOOD PRESSURE: 119 MMHG

## 2023-01-01 DIAGNOSIS — U07.1 COVID-19: ICD-10-CM

## 2023-01-01 DIAGNOSIS — J18.9 PNEUMONIA, UNSPECIFIED ORGANISM: ICD-10-CM

## 2023-01-01 DIAGNOSIS — A41.9 SEPSIS, UNSPECIFIED ORGANISM: ICD-10-CM

## 2023-01-01 DIAGNOSIS — I25.10 ATHEROSCLEROTIC HEART DISEASE OF NATIVE CORONARY ARTERY WITHOUT ANGINA PECTORIS: ICD-10-CM

## 2023-01-01 DIAGNOSIS — I71.43 INFRARENAL ABDOMINAL AORTIC ANEURYSM, WITHOUT RUPTURE: ICD-10-CM

## 2023-01-01 DIAGNOSIS — Z98.49 CATARACT EXTRACTION STATUS, UNSPECIFIED EYE: Chronic | ICD-10-CM

## 2023-01-01 DIAGNOSIS — F03.90 UNSPECIFIED DEMENTIA, UNSPECIFIED SEVERITY, WITHOUT BEHAVIORAL DISTURBANCE, PSYCHOTIC DISTURBANCE, MOOD DISTURBANCE, AND ANXIETY: ICD-10-CM

## 2023-01-01 DIAGNOSIS — Z98.890 OTHER SPECIFIED POSTPROCEDURAL STATES: Chronic | ICD-10-CM

## 2023-01-01 DIAGNOSIS — Z96.659 PRESENCE OF UNSPECIFIED ARTIFICIAL KNEE JOINT: Chronic | ICD-10-CM

## 2023-01-01 DIAGNOSIS — Z79.01 LONG TERM (CURRENT) USE OF ANTICOAGULANTS: ICD-10-CM

## 2023-01-01 DIAGNOSIS — I11.0 HYPERTENSIVE HEART DISEASE WITH HEART FAILURE: ICD-10-CM

## 2023-01-01 DIAGNOSIS — I48.20 CHRONIC ATRIAL FIBRILLATION, UNSPECIFIED: ICD-10-CM

## 2023-01-01 DIAGNOSIS — R65.20 SEVERE SEPSIS WITHOUT SEPTIC SHOCK: ICD-10-CM

## 2023-01-01 DIAGNOSIS — I50.32 CHRONIC DIASTOLIC (CONGESTIVE) HEART FAILURE: ICD-10-CM

## 2023-01-01 DIAGNOSIS — R06.02 SHORTNESS OF BREATH: ICD-10-CM

## 2023-01-01 DIAGNOSIS — K21.9 GASTRO-ESOPHAGEAL REFLUX DISEASE WITHOUT ESOPHAGITIS: ICD-10-CM

## 2023-01-01 DIAGNOSIS — D64.9 ANEMIA, UNSPECIFIED: ICD-10-CM

## 2023-01-01 DIAGNOSIS — Z20.822 CONTACT WITH AND (SUSPECTED) EXPOSURE TO COVID-19: ICD-10-CM

## 2023-01-01 DIAGNOSIS — N40.0 BENIGN PROSTATIC HYPERPLASIA WITHOUT LOWER URINARY TRACT SYMPTOMS: ICD-10-CM

## 2023-01-01 DIAGNOSIS — Z95.2 PRESENCE OF PROSTHETIC HEART VALVE: ICD-10-CM

## 2023-01-01 DIAGNOSIS — E44.0 MODERATE PROTEIN-CALORIE MALNUTRITION: ICD-10-CM

## 2023-01-01 DIAGNOSIS — Z66 DO NOT RESUSCITATE: ICD-10-CM

## 2023-01-01 DIAGNOSIS — E87.0 HYPEROSMOLALITY AND HYPERNATREMIA: ICD-10-CM

## 2023-01-01 DIAGNOSIS — S68.119A COMPLETE TRAUMATIC METACARPOPHALANGEAL AMPUTATION OF UNSPECIFIED FINGER, INITIAL ENCOUNTER: Chronic | ICD-10-CM

## 2023-01-01 DIAGNOSIS — Z95.2 PRESENCE OF PROSTHETIC HEART VALVE: Chronic | ICD-10-CM

## 2023-01-01 DIAGNOSIS — Z95.0 PRESENCE OF CARDIAC PACEMAKER: ICD-10-CM

## 2023-01-01 DIAGNOSIS — E78.5 HYPERLIPIDEMIA, UNSPECIFIED: ICD-10-CM

## 2023-01-01 DIAGNOSIS — G25.81 RESTLESS LEGS SYNDROME: ICD-10-CM

## 2023-01-01 DIAGNOSIS — J96.01 ACUTE RESPIRATORY FAILURE WITH HYPOXIA: ICD-10-CM

## 2023-01-01 DIAGNOSIS — G47.33 OBSTRUCTIVE SLEEP APNEA (ADULT) (PEDIATRIC): ICD-10-CM

## 2023-01-01 DIAGNOSIS — H40.9 UNSPECIFIED GLAUCOMA: ICD-10-CM

## 2023-01-01 DIAGNOSIS — F03.911 UNSPECIFIED DEMENTIA, UNSPECIFIED SEVERITY, WITH AGITATION: ICD-10-CM

## 2023-01-01 LAB
ADD ON TEST-SPECIMEN IN LAB: SIGNIFICANT CHANGE UP
ALBUMIN SERPL ELPH-MCNC: 2.9 G/DL — LOW (ref 3.3–5)
ALP SERPL-CCNC: 83 U/L — SIGNIFICANT CHANGE UP (ref 40–120)
ALT FLD-CCNC: 18 U/L — SIGNIFICANT CHANGE UP (ref 12–78)
ANION GAP SERPL CALC-SCNC: 5 MMOL/L — SIGNIFICANT CHANGE UP (ref 5–17)
ANION GAP SERPL CALC-SCNC: 5 MMOL/L — SIGNIFICANT CHANGE UP (ref 5–17)
ANION GAP SERPL CALC-SCNC: 8 MMOL/L — SIGNIFICANT CHANGE UP (ref 5–17)
APPEARANCE UR: CLEAR — SIGNIFICANT CHANGE UP
APTT BLD: 39.3 SEC — HIGH (ref 27.5–35.5)
AST SERPL-CCNC: 17 U/L — SIGNIFICANT CHANGE UP (ref 15–37)
BACTERIA # UR AUTO: ABNORMAL
BASOPHILS # BLD AUTO: 0 K/UL — SIGNIFICANT CHANGE UP (ref 0–0.2)
BASOPHILS NFR BLD AUTO: 0 % — SIGNIFICANT CHANGE UP (ref 0–2)
BILIRUB SERPL-MCNC: 0.8 MG/DL — SIGNIFICANT CHANGE UP (ref 0.2–1.2)
BILIRUB UR-MCNC: NEGATIVE — SIGNIFICANT CHANGE UP
BUN SERPL-MCNC: 22 MG/DL — SIGNIFICANT CHANGE UP (ref 7–23)
BUN SERPL-MCNC: 22 MG/DL — SIGNIFICANT CHANGE UP (ref 7–23)
BUN SERPL-MCNC: 29 MG/DL — HIGH (ref 7–23)
CALCIUM SERPL-MCNC: 8.9 MG/DL — SIGNIFICANT CHANGE UP (ref 8.5–10.1)
CALCIUM SERPL-MCNC: 9.1 MG/DL — SIGNIFICANT CHANGE UP (ref 8.5–10.1)
CALCIUM SERPL-MCNC: 9.5 MG/DL — SIGNIFICANT CHANGE UP (ref 8.5–10.1)
CHLORIDE SERPL-SCNC: 111 MMOL/L — HIGH (ref 96–108)
CHLORIDE SERPL-SCNC: 114 MMOL/L — HIGH (ref 96–108)
CHLORIDE SERPL-SCNC: 119 MMOL/L — HIGH (ref 96–108)
CO2 SERPL-SCNC: 23 MMOL/L — SIGNIFICANT CHANGE UP (ref 22–31)
CO2 SERPL-SCNC: 24 MMOL/L — SIGNIFICANT CHANGE UP (ref 22–31)
CO2 SERPL-SCNC: 25 MMOL/L — SIGNIFICANT CHANGE UP (ref 22–31)
COLOR SPEC: YELLOW — SIGNIFICANT CHANGE UP
COMMENT - URINE: SIGNIFICANT CHANGE UP
CREAT SERPL-MCNC: 0.9 MG/DL — SIGNIFICANT CHANGE UP (ref 0.5–1.3)
CREAT SERPL-MCNC: 0.91 MG/DL — SIGNIFICANT CHANGE UP (ref 0.5–1.3)
CREAT SERPL-MCNC: 1.14 MG/DL — SIGNIFICANT CHANGE UP (ref 0.5–1.3)
CULTURE RESULTS: SIGNIFICANT CHANGE UP
DIFF PNL FLD: ABNORMAL
EGFR: 63 ML/MIN/1.73M2 — SIGNIFICANT CHANGE UP
EGFR: 83 ML/MIN/1.73M2 — SIGNIFICANT CHANGE UP
EGFR: 84 ML/MIN/1.73M2 — SIGNIFICANT CHANGE UP
ELLIPTOCYTES BLD QL SMEAR: SLIGHT — SIGNIFICANT CHANGE UP
EOSINOPHIL # BLD AUTO: 0 K/UL — SIGNIFICANT CHANGE UP (ref 0–0.5)
EOSINOPHIL NFR BLD AUTO: 0 % — SIGNIFICANT CHANGE UP (ref 0–6)
EPI CELLS # UR: SIGNIFICANT CHANGE UP
GLUCOSE SERPL-MCNC: 102 MG/DL — HIGH (ref 70–99)
GLUCOSE SERPL-MCNC: 125 MG/DL — HIGH (ref 70–99)
GLUCOSE SERPL-MCNC: 96 MG/DL — SIGNIFICANT CHANGE UP (ref 70–99)
GLUCOSE UR QL: NEGATIVE — SIGNIFICANT CHANGE UP
GRAN CASTS # UR COMP ASSIST: ABNORMAL /LPF
HCT VFR BLD CALC: 35.7 % — LOW (ref 39–50)
HCT VFR BLD CALC: 38.5 % — LOW (ref 39–50)
HCT VFR BLD CALC: 47 % — SIGNIFICANT CHANGE UP (ref 39–50)
HGB BLD-MCNC: 11.8 G/DL — LOW (ref 13–17)
HGB BLD-MCNC: 12.9 G/DL — LOW (ref 13–17)
HGB BLD-MCNC: 15.6 G/DL — SIGNIFICANT CHANGE UP (ref 13–17)
HYALINE CASTS # UR AUTO: ABNORMAL /LPF
INR BLD: 1.23 RATIO — HIGH (ref 0.88–1.16)
INR BLD: 1.42 RATIO — HIGH (ref 0.88–1.16)
INR BLD: 1.61 RATIO — HIGH (ref 0.88–1.16)
INR BLD: 1.97 RATIO — HIGH (ref 0.88–1.16)
INR BLD: 3.05 RATIO — HIGH (ref 0.88–1.16)
INR BLD: 3.46 RATIO — HIGH (ref 0.88–1.16)
KETONES UR-MCNC: NEGATIVE — SIGNIFICANT CHANGE UP
LACTATE SERPL-SCNC: 3.9 MMOL/L — HIGH (ref 0.7–2)
LACTATE SERPL-SCNC: 4.5 MMOL/L — CRITICAL HIGH (ref 0.7–2)
LACTATE SERPL-SCNC: 5.5 MMOL/L — CRITICAL HIGH (ref 0.7–2)
LEUKOCYTE ESTERASE UR-ACNC: NEGATIVE — SIGNIFICANT CHANGE UP
LYMPHOCYTES # BLD AUTO: 0.33 K/UL — LOW (ref 1–3.3)
LYMPHOCYTES # BLD AUTO: 3 % — LOW (ref 13–44)
MACROCYTES BLD QL: SLIGHT — SIGNIFICANT CHANGE UP
MANUAL SMEAR VERIFICATION: SIGNIFICANT CHANGE UP
MCHC RBC-ENTMCNC: 33.1 GM/DL — SIGNIFICANT CHANGE UP (ref 32–36)
MCHC RBC-ENTMCNC: 33.2 GM/DL — SIGNIFICANT CHANGE UP (ref 32–36)
MCHC RBC-ENTMCNC: 33.5 GM/DL — SIGNIFICANT CHANGE UP (ref 32–36)
MCHC RBC-ENTMCNC: 33.5 PG — SIGNIFICANT CHANGE UP (ref 27–34)
MCHC RBC-ENTMCNC: 33.7 PG — SIGNIFICANT CHANGE UP (ref 27–34)
MCHC RBC-ENTMCNC: 34.1 PG — HIGH (ref 27–34)
MCV RBC AUTO: 100.5 FL — HIGH (ref 80–100)
MCV RBC AUTO: 101.1 FL — HIGH (ref 80–100)
MCV RBC AUTO: 103.2 FL — HIGH (ref 80–100)
MONOCYTES # BLD AUTO: 1.19 K/UL — HIGH (ref 0–0.9)
MONOCYTES NFR BLD AUTO: 11 % — SIGNIFICANT CHANGE UP (ref 2–14)
NEUTROPHILS # BLD AUTO: 9.01 K/UL — HIGH (ref 1.8–7.4)
NEUTROPHILS NFR BLD AUTO: 67 % — SIGNIFICANT CHANGE UP (ref 43–77)
NEUTS BAND # BLD: 16 % — HIGH (ref 0–8)
NITRITE UR-MCNC: NEGATIVE — SIGNIFICANT CHANGE UP
NRBC # BLD: 0 /100 — SIGNIFICANT CHANGE UP (ref 0–0)
NRBC # BLD: SIGNIFICANT CHANGE UP /100 WBCS (ref 0–0)
NT-PROBNP SERPL-SCNC: 4996 PG/ML — HIGH (ref 0–450)
OVALOCYTES BLD QL SMEAR: SLIGHT — SIGNIFICANT CHANGE UP
PH UR: 5 — SIGNIFICANT CHANGE UP (ref 5–8)
PLAT MORPH BLD: NORMAL — SIGNIFICANT CHANGE UP
PLATELET # BLD AUTO: 153 K/UL — SIGNIFICANT CHANGE UP (ref 150–400)
PLATELET # BLD AUTO: 180 K/UL — SIGNIFICANT CHANGE UP (ref 150–400)
PLATELET # BLD AUTO: 192 K/UL — SIGNIFICANT CHANGE UP (ref 150–400)
POIKILOCYTOSIS BLD QL AUTO: SLIGHT — SIGNIFICANT CHANGE UP
POTASSIUM SERPL-MCNC: 3.9 MMOL/L — SIGNIFICANT CHANGE UP (ref 3.5–5.3)
POTASSIUM SERPL-MCNC: 3.9 MMOL/L — SIGNIFICANT CHANGE UP (ref 3.5–5.3)
POTASSIUM SERPL-MCNC: 4 MMOL/L — SIGNIFICANT CHANGE UP (ref 3.5–5.3)
POTASSIUM SERPL-SCNC: 3.9 MMOL/L — SIGNIFICANT CHANGE UP (ref 3.5–5.3)
POTASSIUM SERPL-SCNC: 3.9 MMOL/L — SIGNIFICANT CHANGE UP (ref 3.5–5.3)
POTASSIUM SERPL-SCNC: 4 MMOL/L — SIGNIFICANT CHANGE UP (ref 3.5–5.3)
PROT SERPL-MCNC: 6.7 GM/DL — SIGNIFICANT CHANGE UP (ref 6–8.3)
PROT UR-MCNC: NEGATIVE — SIGNIFICANT CHANGE UP
PROTHROM AB SERPL-ACNC: 14.3 SEC — HIGH (ref 10.5–13.4)
PROTHROM AB SERPL-ACNC: 16.5 SEC — HIGH (ref 10.5–13.4)
PROTHROM AB SERPL-ACNC: 18.8 SEC — HIGH (ref 10.5–13.4)
PROTHROM AB SERPL-ACNC: 23 SEC — HIGH (ref 10.5–13.4)
PROTHROM AB SERPL-ACNC: 35.8 SEC — HIGH (ref 10.5–13.4)
PROTHROM AB SERPL-ACNC: 40.6 SEC — HIGH (ref 10.5–13.4)
RAPID RVP RESULT: SIGNIFICANT CHANGE UP
RBC # BLD: 3.46 M/UL — LOW (ref 4.2–5.8)
RBC # BLD: 3.83 M/UL — LOW (ref 4.2–5.8)
RBC # BLD: 4.65 M/UL — SIGNIFICANT CHANGE UP (ref 4.2–5.8)
RBC # FLD: 13.6 % — SIGNIFICANT CHANGE UP (ref 10.3–14.5)
RBC # FLD: 13.9 % — SIGNIFICANT CHANGE UP (ref 10.3–14.5)
RBC # FLD: 14.2 % — SIGNIFICANT CHANGE UP (ref 10.3–14.5)
RBC BLD AUTO: ABNORMAL
RBC CASTS # UR COMP ASSIST: ABNORMAL /HPF (ref 0–4)
SARS-COV-2 RNA SPEC QL NAA+PROBE: DETECTED
SARS-COV-2 RNA SPEC QL NAA+PROBE: SIGNIFICANT CHANGE UP
SCHISTOCYTES BLD QL AUTO: SLIGHT — SIGNIFICANT CHANGE UP
SODIUM SERPL-SCNC: 140 MMOL/L — SIGNIFICANT CHANGE UP (ref 135–145)
SODIUM SERPL-SCNC: 147 MMOL/L — HIGH (ref 135–145)
SODIUM SERPL-SCNC: 147 MMOL/L — HIGH (ref 135–145)
SP GR SPEC: 1.01 — SIGNIFICANT CHANGE UP (ref 1.01–1.02)
SPECIMEN SOURCE: SIGNIFICANT CHANGE UP
TROPONIN I, HIGH SENSITIVITY RESULT: 44.59 NG/L — SIGNIFICANT CHANGE UP
UROBILINOGEN FLD QL: NEGATIVE — SIGNIFICANT CHANGE UP
VARIANT LYMPHS # BLD: 3 % — SIGNIFICANT CHANGE UP (ref 0–6)
WBC # BLD: 10.86 K/UL — HIGH (ref 3.8–10.5)
WBC # BLD: 14.98 K/UL — HIGH (ref 3.8–10.5)
WBC # BLD: 8.3 K/UL — SIGNIFICANT CHANGE UP (ref 3.8–10.5)
WBC # FLD AUTO: 10.86 K/UL — HIGH (ref 3.8–10.5)
WBC # FLD AUTO: 14.98 K/UL — HIGH (ref 3.8–10.5)
WBC # FLD AUTO: 8.3 K/UL — SIGNIFICANT CHANGE UP (ref 3.8–10.5)
WBC UR QL: SIGNIFICANT CHANGE UP /HPF (ref 0–5)

## 2023-01-01 PROCEDURE — 36415 COLL VENOUS BLD VENIPUNCTURE: CPT

## 2023-01-01 PROCEDURE — 83605 ASSAY OF LACTIC ACID: CPT

## 2023-01-01 PROCEDURE — 99232 SBSQ HOSP IP/OBS MODERATE 35: CPT

## 2023-01-01 PROCEDURE — 83880 ASSAY OF NATRIURETIC PEPTIDE: CPT

## 2023-01-01 PROCEDURE — 71250 CT THORAX DX C-: CPT | Mod: 26

## 2023-01-01 PROCEDURE — 85027 COMPLETE CBC AUTOMATED: CPT

## 2023-01-01 PROCEDURE — 71045 X-RAY EXAM CHEST 1 VIEW: CPT | Mod: 26

## 2023-01-01 PROCEDURE — 93010 ELECTROCARDIOGRAM REPORT: CPT

## 2023-01-01 PROCEDURE — 99233 SBSQ HOSP IP/OBS HIGH 50: CPT

## 2023-01-01 PROCEDURE — 99223 1ST HOSP IP/OBS HIGH 75: CPT

## 2023-01-01 PROCEDURE — 74174 CTA ABD&PLVS W/CONTRAST: CPT | Mod: 26

## 2023-01-01 PROCEDURE — 99285 EMERGENCY DEPT VISIT HI MDM: CPT | Mod: CS

## 2023-01-01 PROCEDURE — 99231 SBSQ HOSP IP/OBS SF/LOW 25: CPT

## 2023-01-01 PROCEDURE — 99239 HOSP IP/OBS DSCHRG MGMT >30: CPT

## 2023-01-01 PROCEDURE — 99497 ADVNCD CARE PLAN 30 MIN: CPT | Mod: 25

## 2023-01-01 PROCEDURE — 93306 TTE W/DOPPLER COMPLETE: CPT | Mod: 26

## 2023-01-01 PROCEDURE — 74174 CTA ABD&PLVS W/CONTRAST: CPT

## 2023-01-01 PROCEDURE — 93306 TTE W/DOPPLER COMPLETE: CPT

## 2023-01-01 PROCEDURE — 71275 CT ANGIOGRAPHY CHEST: CPT | Mod: 26

## 2023-01-01 PROCEDURE — 71250 CT THORAX DX C-: CPT

## 2023-01-01 PROCEDURE — 99222 1ST HOSP IP/OBS MODERATE 55: CPT

## 2023-01-01 PROCEDURE — 87635 SARS-COV-2 COVID-19 AMP PRB: CPT

## 2023-01-01 PROCEDURE — 71275 CT ANGIOGRAPHY CHEST: CPT

## 2023-01-01 PROCEDURE — 85610 PROTHROMBIN TIME: CPT

## 2023-01-01 PROCEDURE — 92610 EVALUATE SWALLOWING FUNCTION: CPT | Mod: GN

## 2023-01-01 PROCEDURE — 80048 BASIC METABOLIC PNL TOTAL CA: CPT

## 2023-01-01 RX ORDER — CARVEDILOL PHOSPHATE 80 MG/1
1 CAPSULE, EXTENDED RELEASE ORAL
Qty: 0 | Refills: 0 | DISCHARGE

## 2023-01-01 RX ORDER — SODIUM CHLORIDE 9 MG/ML
1000 INJECTION INTRAMUSCULAR; INTRAVENOUS; SUBCUTANEOUS ONCE
Refills: 0 | Status: COMPLETED | OUTPATIENT
Start: 2023-01-01 | End: 2023-01-01

## 2023-01-01 RX ORDER — HALOPERIDOL DECANOATE 100 MG/ML
2 INJECTION INTRAMUSCULAR EVERY 6 HOURS
Refills: 0 | Status: DISCONTINUED | OUTPATIENT
Start: 2023-01-01 | End: 2023-01-01

## 2023-01-01 RX ORDER — ACETAMINOPHEN 500 MG
1000 TABLET ORAL ONCE
Refills: 0 | Status: COMPLETED | OUTPATIENT
Start: 2023-01-01 | End: 2023-01-01

## 2023-01-01 RX ORDER — MEMANTINE HYDROCHLORIDE 10 MG/1
10 TABLET ORAL DAILY
Refills: 0 | Status: DISCONTINUED | OUTPATIENT
Start: 2023-01-01 | End: 2023-01-01

## 2023-01-01 RX ORDER — ATORVASTATIN CALCIUM 80 MG/1
1 TABLET, FILM COATED ORAL
Qty: 0 | Refills: 0 | DISCHARGE

## 2023-01-01 RX ORDER — PIPERACILLIN AND TAZOBACTAM 4; .5 G/20ML; G/20ML
3.38 INJECTION, POWDER, LYOPHILIZED, FOR SOLUTION INTRAVENOUS ONCE
Refills: 0 | Status: COMPLETED | OUTPATIENT
Start: 2023-01-01 | End: 2023-01-01

## 2023-01-01 RX ORDER — QUETIAPINE FUMARATE 200 MG/1
1 TABLET, FILM COATED ORAL
Qty: 0 | Refills: 0 | DISCHARGE
Start: 2023-01-01

## 2023-01-01 RX ORDER — SODIUM CHLORIDE 9 MG/ML
2100 INJECTION INTRAMUSCULAR; INTRAVENOUS; SUBCUTANEOUS ONCE
Refills: 0 | Status: DISCONTINUED | OUTPATIENT
Start: 2023-01-01 | End: 2023-01-01

## 2023-01-01 RX ORDER — ACETAMINOPHEN 500 MG
650 TABLET ORAL EVERY 6 HOURS
Refills: 0 | Status: DISCONTINUED | OUTPATIENT
Start: 2023-01-01 | End: 2023-01-01

## 2023-01-01 RX ORDER — WARFARIN SODIUM 2.5 MG/1
5 TABLET ORAL DAILY
Refills: 0 | Status: DISCONTINUED | OUTPATIENT
Start: 2023-01-01 | End: 2023-01-01

## 2023-01-01 RX ORDER — BRIMONIDINE TARTRATE 2 MG/MG
1 SOLUTION/ DROPS OPHTHALMIC
Refills: 0 | Status: DISCONTINUED | OUTPATIENT
Start: 2023-01-01 | End: 2023-01-01

## 2023-01-01 RX ORDER — ATORVASTATIN CALCIUM 80 MG/1
20 TABLET, FILM COATED ORAL AT BEDTIME
Refills: 0 | Status: DISCONTINUED | OUTPATIENT
Start: 2023-01-01 | End: 2023-01-01

## 2023-01-01 RX ORDER — MEMANTINE HYDROCHLORIDE 10 MG/1
1 TABLET ORAL
Qty: 0 | Refills: 0 | DISCHARGE

## 2023-01-01 RX ORDER — HEPARIN SODIUM 5000 [USP'U]/ML
5000 INJECTION INTRAVENOUS; SUBCUTANEOUS EVERY 12 HOURS
Refills: 0 | Status: DISCONTINUED | OUTPATIENT
Start: 2023-01-01 | End: 2023-01-01

## 2023-01-01 RX ORDER — CARVEDILOL PHOSPHATE 80 MG/1
3.12 CAPSULE, EXTENDED RELEASE ORAL EVERY 12 HOURS
Refills: 0 | Status: DISCONTINUED | OUTPATIENT
Start: 2023-01-01 | End: 2023-01-01

## 2023-01-01 RX ORDER — LANOLIN ALCOHOL/MO/W.PET/CERES
3 CREAM (GRAM) TOPICAL AT BEDTIME
Refills: 0 | Status: DISCONTINUED | OUTPATIENT
Start: 2023-01-01 | End: 2023-01-01

## 2023-01-01 RX ORDER — WARFARIN SODIUM 2.5 MG/1
0 TABLET ORAL
Qty: 0 | Refills: 0 | DISCHARGE

## 2023-01-01 RX ORDER — WARFARIN SODIUM 2.5 MG/1
1 TABLET ORAL
Qty: 0 | Refills: 0 | DISCHARGE
Start: 2023-01-01 | End: 2023-01-01

## 2023-01-01 RX ORDER — HALOPERIDOL DECANOATE 100 MG/ML
1 INJECTION INTRAMUSCULAR EVERY 6 HOURS
Refills: 0 | Status: DISCONTINUED | OUTPATIENT
Start: 2023-01-01 | End: 2023-01-01

## 2023-01-01 RX ORDER — SODIUM CHLORIDE 9 MG/ML
1000 INJECTION INTRAMUSCULAR; INTRAVENOUS; SUBCUTANEOUS ONCE
Refills: 0 | Status: DISCONTINUED | OUTPATIENT
Start: 2023-01-01 | End: 2023-01-01

## 2023-01-01 RX ORDER — SODIUM CHLORIDE 9 MG/ML
500 INJECTION INTRAMUSCULAR; INTRAVENOUS; SUBCUTANEOUS ONCE
Refills: 0 | Status: COMPLETED | OUTPATIENT
Start: 2023-01-01 | End: 2023-01-01

## 2023-01-01 RX ORDER — CEFUROXIME AXETIL 250 MG
1 TABLET ORAL
Qty: 10 | Refills: 0
Start: 2023-01-01

## 2023-01-01 RX ORDER — VANCOMYCIN HCL 1 G
1000 VIAL (EA) INTRAVENOUS ONCE
Refills: 0 | Status: COMPLETED | OUTPATIENT
Start: 2023-01-01 | End: 2023-01-01

## 2023-01-01 RX ORDER — MIDODRINE HYDROCHLORIDE 2.5 MG/1
10 TABLET ORAL THREE TIMES A DAY
Refills: 0 | Status: DISCONTINUED | OUTPATIENT
Start: 2023-01-01 | End: 2023-01-01

## 2023-01-01 RX ORDER — BRIMONIDINE TARTRATE 2 MG/MG
1 SOLUTION/ DROPS OPHTHALMIC
Qty: 0 | Refills: 0 | DISCHARGE

## 2023-01-01 RX ORDER — ASCORBIC ACID 60 MG
1 TABLET,CHEWABLE ORAL
Qty: 0 | Refills: 0 | DISCHARGE

## 2023-01-01 RX ORDER — MAGNESIUM HYDROXIDE 400 MG/1
0 TABLET, CHEWABLE ORAL
Qty: 0 | Refills: 0 | DISCHARGE

## 2023-01-01 RX ORDER — ESCITALOPRAM OXALATE 10 MG/1
1 TABLET, FILM COATED ORAL
Qty: 0 | Refills: 0 | DISCHARGE

## 2023-01-01 RX ORDER — DONEPEZIL HYDROCHLORIDE 10 MG/1
10 TABLET, FILM COATED ORAL AT BEDTIME
Refills: 0 | Status: DISCONTINUED | OUTPATIENT
Start: 2023-01-01 | End: 2023-01-01

## 2023-01-01 RX ORDER — ONDANSETRON 8 MG/1
4 TABLET, FILM COATED ORAL EVERY 8 HOURS
Refills: 0 | Status: DISCONTINUED | OUTPATIENT
Start: 2023-01-01 | End: 2023-01-01

## 2023-01-01 RX ORDER — ESCITALOPRAM OXALATE 10 MG/1
10 TABLET, FILM COATED ORAL DAILY
Refills: 0 | Status: DISCONTINUED | OUTPATIENT
Start: 2023-01-01 | End: 2023-01-01

## 2023-01-01 RX ORDER — MEMANTINE HYDROCHLORIDE 10 MG/1
0 TABLET ORAL
Qty: 0 | Refills: 0 | DISCHARGE

## 2023-01-01 RX ORDER — DONEPEZIL HYDROCHLORIDE 10 MG/1
1 TABLET, FILM COATED ORAL
Qty: 0 | Refills: 0 | DISCHARGE

## 2023-01-01 RX ORDER — PIPERACILLIN AND TAZOBACTAM 4; .5 G/20ML; G/20ML
3.38 INJECTION, POWDER, LYOPHILIZED, FOR SOLUTION INTRAVENOUS EVERY 8 HOURS
Refills: 0 | Status: DISCONTINUED | OUTPATIENT
Start: 2023-01-01 | End: 2023-01-01

## 2023-01-01 RX ORDER — CEFEPIME 1 G/1
1000 INJECTION, POWDER, FOR SOLUTION INTRAMUSCULAR; INTRAVENOUS EVERY 12 HOURS
Refills: 0 | Status: DISCONTINUED | OUTPATIENT
Start: 2023-01-01 | End: 2023-01-01

## 2023-01-01 RX ORDER — QUETIAPINE FUMARATE 200 MG/1
25 TABLET, FILM COATED ORAL AT BEDTIME
Refills: 0 | Status: DISCONTINUED | OUTPATIENT
Start: 2023-01-01 | End: 2023-01-01

## 2023-01-01 RX ORDER — MAGNESIUM HYDROXIDE 400 MG/1
30 TABLET, CHEWABLE ORAL
Qty: 0 | Refills: 0 | DISCHARGE

## 2023-01-01 RX ADMIN — PIPERACILLIN AND TAZOBACTAM 25 GRAM(S): 4; .5 INJECTION, POWDER, LYOPHILIZED, FOR SOLUTION INTRAVENOUS at 06:09

## 2023-01-01 RX ADMIN — Medication 0.5 MILLIGRAM(S): at 10:16

## 2023-01-01 RX ADMIN — Medication 0.5 MILLIGRAM(S): at 10:33

## 2023-01-01 RX ADMIN — PIPERACILLIN AND TAZOBACTAM 25 GRAM(S): 4; .5 INJECTION, POWDER, LYOPHILIZED, FOR SOLUTION INTRAVENOUS at 22:17

## 2023-01-01 RX ADMIN — PIPERACILLIN AND TAZOBACTAM 25 GRAM(S): 4; .5 INJECTION, POWDER, LYOPHILIZED, FOR SOLUTION INTRAVENOUS at 21:07

## 2023-01-01 RX ADMIN — QUETIAPINE FUMARATE 25 MILLIGRAM(S): 200 TABLET, FILM COATED ORAL at 22:03

## 2023-01-01 RX ADMIN — QUETIAPINE FUMARATE 25 MILLIGRAM(S): 200 TABLET, FILM COATED ORAL at 21:31

## 2023-01-01 RX ADMIN — DONEPEZIL HYDROCHLORIDE 10 MILLIGRAM(S): 10 TABLET, FILM COATED ORAL at 21:30

## 2023-01-01 RX ADMIN — BRIMONIDINE TARTRATE 1 DROP(S): 2 SOLUTION/ DROPS OPHTHALMIC at 21:33

## 2023-01-01 RX ADMIN — SODIUM CHLORIDE 1000 MILLILITER(S): 9 INJECTION INTRAMUSCULAR; INTRAVENOUS; SUBCUTANEOUS at 09:58

## 2023-01-01 RX ADMIN — BRIMONIDINE TARTRATE 1 DROP(S): 2 SOLUTION/ DROPS OPHTHALMIC at 10:17

## 2023-01-01 RX ADMIN — Medication 0.5 MILLIGRAM(S): at 21:30

## 2023-01-01 RX ADMIN — PIPERACILLIN AND TAZOBACTAM 200 GRAM(S): 4; .5 INJECTION, POWDER, LYOPHILIZED, FOR SOLUTION INTRAVENOUS at 04:14

## 2023-01-01 RX ADMIN — Medication 20 MILLIGRAM(S): at 21:30

## 2023-01-01 RX ADMIN — DONEPEZIL HYDROCHLORIDE 10 MILLIGRAM(S): 10 TABLET, FILM COATED ORAL at 22:11

## 2023-01-01 RX ADMIN — Medication 0.5 MILLIGRAM(S): at 22:53

## 2023-01-01 RX ADMIN — MIDODRINE HYDROCHLORIDE 10 MILLIGRAM(S): 2.5 TABLET ORAL at 12:24

## 2023-01-01 RX ADMIN — CARVEDILOL PHOSPHATE 3.12 MILLIGRAM(S): 80 CAPSULE, EXTENDED RELEASE ORAL at 22:04

## 2023-01-01 RX ADMIN — BRIMONIDINE TARTRATE 1 DROP(S): 2 SOLUTION/ DROPS OPHTHALMIC at 10:33

## 2023-01-01 RX ADMIN — PIPERACILLIN AND TAZOBACTAM 25 GRAM(S): 4; .5 INJECTION, POWDER, LYOPHILIZED, FOR SOLUTION INTRAVENOUS at 22:02

## 2023-01-01 RX ADMIN — HALOPERIDOL DECANOATE 2 MILLIGRAM(S): 100 INJECTION INTRAMUSCULAR at 11:48

## 2023-01-01 RX ADMIN — Medication 0.5 MILLIGRAM(S): at 11:21

## 2023-01-01 RX ADMIN — ESCITALOPRAM OXALATE 10 MILLIGRAM(S): 10 TABLET, FILM COATED ORAL at 11:21

## 2023-01-01 RX ADMIN — MEMANTINE HYDROCHLORIDE 10 MILLIGRAM(S): 10 TABLET ORAL at 10:33

## 2023-01-01 RX ADMIN — MIDODRINE HYDROCHLORIDE 10 MILLIGRAM(S): 2.5 TABLET ORAL at 17:41

## 2023-01-01 RX ADMIN — Medication 2 MILLIGRAM(S): at 18:51

## 2023-01-01 RX ADMIN — Medication 20 MILLIGRAM(S): at 06:07

## 2023-01-01 RX ADMIN — MIDODRINE HYDROCHLORIDE 10 MILLIGRAM(S): 2.5 TABLET ORAL at 18:06

## 2023-01-01 RX ADMIN — PIPERACILLIN AND TAZOBACTAM 25 GRAM(S): 4; .5 INJECTION, POWDER, LYOPHILIZED, FOR SOLUTION INTRAVENOUS at 14:06

## 2023-01-01 RX ADMIN — QUETIAPINE FUMARATE 25 MILLIGRAM(S): 200 TABLET, FILM COATED ORAL at 22:11

## 2023-01-01 RX ADMIN — Medication 20 MILLIGRAM(S): at 15:47

## 2023-01-01 RX ADMIN — Medication 0.5 MILLIGRAM(S): at 21:31

## 2023-01-01 RX ADMIN — BRIMONIDINE TARTRATE 1 DROP(S): 2 SOLUTION/ DROPS OPHTHALMIC at 10:32

## 2023-01-01 RX ADMIN — MEMANTINE HYDROCHLORIDE 10 MILLIGRAM(S): 10 TABLET ORAL at 17:06

## 2023-01-01 RX ADMIN — MIDODRINE HYDROCHLORIDE 10 MILLIGRAM(S): 2.5 TABLET ORAL at 17:05

## 2023-01-01 RX ADMIN — Medication 3 MILLIGRAM(S): at 22:03

## 2023-01-01 RX ADMIN — Medication 20 MILLIGRAM(S): at 06:17

## 2023-01-01 RX ADMIN — CARVEDILOL PHOSPHATE 3.12 MILLIGRAM(S): 80 CAPSULE, EXTENDED RELEASE ORAL at 00:39

## 2023-01-01 RX ADMIN — Medication 20 MILLIGRAM(S): at 22:02

## 2023-01-01 RX ADMIN — Medication 20 MILLIGRAM(S): at 15:45

## 2023-01-01 RX ADMIN — Medication 20 MILLIGRAM(S): at 05:37

## 2023-01-01 RX ADMIN — MIDODRINE HYDROCHLORIDE 10 MILLIGRAM(S): 2.5 TABLET ORAL at 10:19

## 2023-01-01 RX ADMIN — BRIMONIDINE TARTRATE 1 DROP(S): 2 SOLUTION/ DROPS OPHTHALMIC at 11:20

## 2023-01-01 RX ADMIN — SODIUM CHLORIDE 1000 MILLILITER(S): 9 INJECTION INTRAMUSCULAR; INTRAVENOUS; SUBCUTANEOUS at 04:59

## 2023-01-01 RX ADMIN — ESCITALOPRAM OXALATE 10 MILLIGRAM(S): 10 TABLET, FILM COATED ORAL at 10:16

## 2023-01-01 RX ADMIN — Medication 250 MILLIGRAM(S): at 06:44

## 2023-01-01 RX ADMIN — PIPERACILLIN AND TAZOBACTAM 25 GRAM(S): 4; .5 INJECTION, POWDER, LYOPHILIZED, FOR SOLUTION INTRAVENOUS at 06:15

## 2023-01-01 RX ADMIN — MEMANTINE HYDROCHLORIDE 10 MILLIGRAM(S): 10 TABLET ORAL at 10:39

## 2023-01-01 RX ADMIN — CARVEDILOL PHOSPHATE 3.12 MILLIGRAM(S): 80 CAPSULE, EXTENDED RELEASE ORAL at 11:20

## 2023-01-01 RX ADMIN — ATORVASTATIN CALCIUM 20 MILLIGRAM(S): 80 TABLET, FILM COATED ORAL at 00:40

## 2023-01-01 RX ADMIN — ESCITALOPRAM OXALATE 10 MILLIGRAM(S): 10 TABLET, FILM COATED ORAL at 10:38

## 2023-01-01 RX ADMIN — CARVEDILOL PHOSPHATE 3.12 MILLIGRAM(S): 80 CAPSULE, EXTENDED RELEASE ORAL at 10:32

## 2023-01-01 RX ADMIN — Medication 20 MILLIGRAM(S): at 10:19

## 2023-01-01 RX ADMIN — BRIMONIDINE TARTRATE 1 DROP(S): 2 SOLUTION/ DROPS OPHTHALMIC at 22:01

## 2023-01-01 RX ADMIN — MEMANTINE HYDROCHLORIDE 10 MILLIGRAM(S): 10 TABLET ORAL at 11:20

## 2023-01-01 RX ADMIN — Medication 400 MILLIGRAM(S): at 04:14

## 2023-01-01 RX ADMIN — Medication 20 MILLIGRAM(S): at 06:05

## 2023-01-01 RX ADMIN — ATORVASTATIN CALCIUM 20 MILLIGRAM(S): 80 TABLET, FILM COATED ORAL at 21:31

## 2023-01-01 RX ADMIN — Medication 1000 MILLIGRAM(S): at 06:35

## 2023-01-01 RX ADMIN — ESCITALOPRAM OXALATE 10 MILLIGRAM(S): 10 TABLET, FILM COATED ORAL at 10:33

## 2023-01-01 RX ADMIN — Medication 3 MILLIGRAM(S): at 21:45

## 2023-01-01 RX ADMIN — ATORVASTATIN CALCIUM 20 MILLIGRAM(S): 80 TABLET, FILM COATED ORAL at 21:55

## 2023-01-01 RX ADMIN — PIPERACILLIN AND TAZOBACTAM 25 GRAM(S): 4; .5 INJECTION, POWDER, LYOPHILIZED, FOR SOLUTION INTRAVENOUS at 21:33

## 2023-01-01 RX ADMIN — PIPERACILLIN AND TAZOBACTAM 25 GRAM(S): 4; .5 INJECTION, POWDER, LYOPHILIZED, FOR SOLUTION INTRAVENOUS at 15:05

## 2023-01-01 RX ADMIN — PIPERACILLIN AND TAZOBACTAM 25 GRAM(S): 4; .5 INJECTION, POWDER, LYOPHILIZED, FOR SOLUTION INTRAVENOUS at 15:47

## 2023-01-01 RX ADMIN — SODIUM CHLORIDE 500 MILLILITER(S): 9 INJECTION INTRAMUSCULAR; INTRAVENOUS; SUBCUTANEOUS at 06:12

## 2023-01-01 RX ADMIN — Medication 1 MILLIGRAM(S): at 04:59

## 2023-01-01 RX ADMIN — SODIUM CHLORIDE 1000 MILLILITER(S): 9 INJECTION INTRAMUSCULAR; INTRAVENOUS; SUBCUTANEOUS at 03:59

## 2023-01-01 RX ADMIN — MEMANTINE HYDROCHLORIDE 10 MILLIGRAM(S): 10 TABLET ORAL at 10:16

## 2023-01-01 RX ADMIN — Medication 20 MILLIGRAM(S): at 15:06

## 2023-01-01 RX ADMIN — ATORVASTATIN CALCIUM 20 MILLIGRAM(S): 80 TABLET, FILM COATED ORAL at 22:03

## 2023-01-01 RX ADMIN — Medication 20 MILLIGRAM(S): at 13:09

## 2023-01-01 RX ADMIN — WARFARIN SODIUM 5 MILLIGRAM(S): 2.5 TABLET ORAL at 21:34

## 2023-01-01 RX ADMIN — PIPERACILLIN AND TAZOBACTAM 25 GRAM(S): 4; .5 INJECTION, POWDER, LYOPHILIZED, FOR SOLUTION INTRAVENOUS at 05:19

## 2023-01-01 RX ADMIN — BRIMONIDINE TARTRATE 1 DROP(S): 2 SOLUTION/ DROPS OPHTHALMIC at 10:38

## 2023-01-01 RX ADMIN — DONEPEZIL HYDROCHLORIDE 10 MILLIGRAM(S): 10 TABLET, FILM COATED ORAL at 22:02

## 2023-01-01 RX ADMIN — PIPERACILLIN AND TAZOBACTAM 25 GRAM(S): 4; .5 INJECTION, POWDER, LYOPHILIZED, FOR SOLUTION INTRAVENOUS at 05:38

## 2023-01-01 RX ADMIN — Medication 3 MILLIGRAM(S): at 22:11

## 2023-01-01 RX ADMIN — Medication 20 MILLIGRAM(S): at 00:40

## 2023-01-01 RX ADMIN — ESCITALOPRAM OXALATE 10 MILLIGRAM(S): 10 TABLET, FILM COATED ORAL at 17:06

## 2023-01-01 RX ADMIN — Medication 0.5 MILLIGRAM(S): at 10:39

## 2023-01-01 RX ADMIN — CARVEDILOL PHOSPHATE 3.12 MILLIGRAM(S): 80 CAPSULE, EXTENDED RELEASE ORAL at 10:33

## 2023-01-23 NOTE — ED ADULT NURSE NOTE - CHPI ED NUR SYMPTOMS POS
Subjective:    Patient ID:  Srikanth Hendrickson is a 54 y.o. male who presents for   Abnormal ECG, Hypertension, Dizziness (When gets up to fast), and Fatigue    HPI 1)  orthostasiss-  lite headed getting up       Sob + tired    Snores at nite -  never got results      2) no   chest pain   no  mi  -dm   quit smoke    drinks alittle         3 step kids        Construction   EKG shows old inferior wall scar-patient denies myocardial infarction    Review of patient's allergies indicates:  No Known Allergies    Past Medical History:   Diagnosis Date    Basal cell carcinoma     Cancer     skin    Hyperlipidemia     Hypertension      Past Surgical History:   Procedure Laterality Date    COLONOSCOPY N/A 2021    Procedure: COLONOSCOPY;  Surgeon: Archie Satnoro MD;  Location: [facility] ENDO;  Service: Endoscopy;  Laterality: N/A;    COLONOSCOPY N/A 3/23/2022    Procedure: COLONOSCOPY;  Surgeon: Archie Santoro MD;  Location: [facility] ENDO;  Service: Endoscopy;  Laterality: N/A;    COLONOSCOPY N/A 2022    Procedure: COLONOSCOPY;  Surgeon: Archie Santoro MD;  Location: [facility] ENDO;  Service: Endoscopy;  Laterality: N/A;    CYST REMOVAL      right shoulder    left hand surgery      right arm surgery       Social History     Tobacco Use    Smoking status: Former     Types: Cigarettes     Quit date: 2020     Years since quittin.6    Smokeless tobacco: Never   Substance Use Topics    Alcohol use: Yes     Alcohol/week: 7.0 standard drinks     Types: 1 Glasses of wine, 6 Cans of beer per week     Comment: weekly    Drug use: Never        Review of Systems     Review of Systems   Constitutional: Positive for weight loss. Negative for decreased appetite, malaise/fatigue and weight gain.        - covid vaccine       On  ozempic for wt loss        HENT:  Positive for hearing loss. Negative for congestion and tinnitus.    Eyes:  Positive for blurred vision. Negative for vision loss in left eye, vision loss in right eye, visual  disturbance and visual halos.   Cardiovascular: Negative.  Negative for chest pain, claudication, dyspnea on exertion, irregular heartbeat, leg swelling, near-syncope, orthopnea, palpitations, paroxysmal nocturnal dyspnea and syncope.   Respiratory:  Positive for shortness of breath.    Endocrine: Negative for polydipsia, polyphagia and polyuria.   Hematologic/Lymphatic: Negative for adenopathy. Does not bruise/bleed easily.   Skin:         Skin cnancer   Musculoskeletal:  Positive for arthritis. Negative for back pain, falls, gout, joint pain, joint swelling, muscle cramps, muscle weakness, neck pain and stiffness.   Gastrointestinal: Negative.  Negative for abdominal pain, change in bowel habit, constipation, diarrhea, heartburn, hematemesis, hemorrhoids, melena, nausea and vomiting.   Genitourinary:  Positive for frequency. Negative for bladder incontinence, dysuria, flank pain, hematuria, nocturia and non-menstrual bleeding.   Neurological:  Negative for brief paralysis, difficulty with concentration, disturbances in coordination, dizziness, focal weakness, headaches, loss of balance, numbness, paresthesias and tremors.   Psychiatric/Behavioral:  Negative for altered mental status, depression, hallucinations, memory loss, substance abuse, suicidal ideas and thoughts of violence. The patient does not have insomnia and is not nervous/anxious.    Allergic/Immunologic: Negative for environmental allergies and hives.   All other systems reviewed and are negative.        Objective:        Vitals:    01/23/23 1508   BP: (!) 150/90   Pulse: 96       Lab Results   Component Value Date    WBC 5.99 12/31/2022    HGB 14.4 12/31/2022    HCT 44.0 03/16/2019     12/31/2022    CHOL 179 12/31/2022    TRIG 165 (H) 12/31/2022    HDL 60 12/31/2022    ALT 30 12/31/2022    AST 24 12/31/2022     12/31/2022    K 4.8 12/31/2022     12/31/2022    CREATININE 0.8 12/31/2022    BUN 7 12/31/2022    CO2 26 12/31/2022    TSH  0.950 12/31/2022    HGBA1C 5.3 12/31/2022        ECHOCARDIOGRAM RESULTS  No results found for this or any previous visit.      CURRENT/PREVIOUS VISIT EKG  Results for orders placed or performed in visit on 12/16/22   IN OFFICE EKG 12-LEAD (to [place])    Collection Time: 12/16/22  8:20 AM    Narrative    Test Reason : R42,    Vent. Rate : 079 BPM     Atrial Rate : 079 BPM     P-R Int : 176 ms          QRS Dur : 102 ms      QT Int : 376 ms       P-R-T Axes : 039 -13 036 degrees     QTc Int : 431 ms    Normal sinus rhythm  Inferior infarct ,age undetermined  Abnormal ECG  No previous ECGs available  Confirmed by Gabriel Aleman MD ([id number]) on 12/16/2022 11:41:03 AM    Referred By: Dong Lipscomb           Confirmed By:Gabriel Aleman MD     No results found for this or any previous visit.    No results found for this or any previous visit.      PHYSICAL EXAM    Physical Exam blood pressure is 140/90 bilaterally  Head neck no bruit  Lungs are clear  Cardiac regular  Abdomen no mass no hepatomegaly  Extremities good pulse  Medication List with Changes/Refills   Current Medications    ATORVASTATIN (LIPITOR) 20 MG TABLET    Take 1 tablet (20 mg total) by mouth once daily.    FLUTICASONE PROPIONATE (FLONASE) 50 MCG/ACTUATION NASAL SPRAY    1 spray (50 mcg total) by Each Nostril route once daily.    LISINOPRIL (PRINIVIL,ZESTRIL) 5 MG TABLET    Take 1 tablet (5 mg total) by mouth once daily.    PANTOPRAZOLE (PROTONIX) 40 MG TABLET    TAKE 1 TABLET(40 MG) BY MOUTH EVERY DAY    PODOFILOX (CONDYLOX) 0.5 % EXTERNAL SOLUTION    Apply topically 2 (two) times daily.    SCOPOLAMINE (TRANSDERM-SCOP) 1.3-1.5 MG (1 MG OVER 3 DAYS)    Place 1 patch onto the skin every 72 hours.    SEMAGLUTIDE (OZEMPIC) 1 MG/DOSE (4 MG/3 ML)    Inject 1 mg into the skin every 7 days.           Assessment:     Hypertension,  1. Abnormal EKG    Question of old inferior wall scar  Weight loss secondary to his     Plan:   Will get coronary calcium score stress test and  echo  Return to clinic in several months    Problem List Items Addressed This Visit    None  Visit Diagnoses       Abnormal EKG                 No follow-ups on file.      DIFFICULTY BREATHING/SHORTNESS OF BREATH/WHEEZING

## 2023-02-02 NOTE — CONSULT NOTE ADULT - I WAS PHYSICALLY PRESENT FOR THE KEY PORTIONS OF THE EVALUATION AND MANAGEMENT (E/M) SERVICE PROVIDED.  I AGREE WITH THE ABOVE HISTORY, PHYSICAL, AND PLAN WHICH I HAVE REVIEWED AND EDITED WHERE APPROPRIATE
Pre-charting complete.     BMI- 33.68- Glyco ordered     FOB Labs ordered- Transfer of care FOB labs cancelled.     O +     Give 1 HR GTT instructions    28 week labs ordered - 24 weeks and 4 days on 02/03/2023   Statement Selected

## 2023-02-08 NOTE — PATIENT PROFILE ADULT - FALL HARM RISK TYPE OF ASSESSMENT
Patient returned call and I informed him that [facility] Patient stated his insurance approved his Colonoscopy and had Dr. Michelle Juarez as an in network provider. Informed patient again that our surgeries are elective and may not be covered due to the [address] not being accepted by [facility]. Advised patient to call his insurance and verify his benefits, then call back with an update. Patient in agreement and thanked me for update. admission

## 2023-03-10 NOTE — H&P ADULT - NSICDXPASTMEDICALHX_GEN_ALL_CORE_FT
PAST MEDICAL HISTORY:  Afib Not on A/C 2/2 hx of GIbleeding    Aortic root dilatation     Aortic valve insufficiency, unspecified etiology     BPH associated with nocturia     CAD (coronary artery disease) (-) cardiac stress and 2DECHO 2019    Chronic anemia     DDD (degenerative disc disease), lumbar     Diastolic CHF LVEF 55-60% 6/2019    Diverticulosis of intestine without bleeding, unspecified intestinal tract location     Essential hypertension     Fall (on) (from) other stairs and steps, sequela 03/28/2017- lumbar hemtoma    Gastroesophageal reflux disease, esophagitis presence not specified Hx of GI bleed    HLD (hyperlipidemia)     [place] (hard of hearing)     Lumbar degenerative disc disease     MRSA (methicillin resistant Staphylococcus aureus) left forearm 2012    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

## 2023-03-10 NOTE — H&P ADULT - ASSESSMENT
* SOB elevated lactate  doubt PNA more likely CHF  repeat lactate  try Midodrine  obtain TTE in order to facilitate further GOC discussion  not a candidate for ICU per consult  unable to use diuretic due to BP    * Dementia resident at NHx3 years    Hope for comfort care; left message for wife EMILY was verified by ED doc; case briefly d/w son also     * SOB elevated lactate  doubt PNA more likely CHF  repeat lactate  try Midodrine  obtain TTE in order to facilitate further GOC discussion  not a candidate for ICU per consult  unable to use diuretic due to BP    * Dementia resident at NHx3 years    * AF- INR>3 Coumadin was not reordered; daily INR    Hope for comfort care; left message for wife EMILY was verified by ED doc; case briefly d/w son also

## 2023-03-10 NOTE — H&P ADULT - HISTORY OF PRESENT ILLNESS
· Subjective and Objective:   Patient is a 87y old  Male who presents with a chief complaint of     BRIEF HOSPITAL COURSE-  88 y/o Male with PMH of CAD, Chronic Anemia, HTN, BPH, AFib (On Coumadin), GERD, CAMILA, Glaucoma, Depression, Dementia BIBA for shortness of breath. Per EMS, patient with desaturation event with associated shortness of breath in SNF. Transitioned to NRB with minimal improvement. Per staff, also increasingly altered, baseline AO x self. Unable to provide further history due to mental status.     On presentation to the ED, labs significant for leukocytosis (WBC 10.86), hypernatremia (Na 147), lactatemia (Lactate 4.5). Patient altered on admission, with associated hypoxemia. Transitioned to NRB with improvement in oxygenation. Now on RA maintaining O2 >93%. CXR with B/L infiltrates. ICU consulted for hypotension in setting of severe sepsis. Rejected by ICU. Case d/w wife, remains hypotensive, Rechallange  with IVF and try Midodrine; pt is combative refused exam  CT- CHF more than PNA  Order TTE  Pall consult as this is more like HF than PNA

## 2023-03-10 NOTE — ED ADULT NURSE NOTE - OBJECTIVE STATEMENT
Pt SADIE from [facility] for SOB. Pt w/ hx of dementia, according to EMS pt had decreased mental status and hypoxia, satting 80s on room air. At baseline pt is a&ox1. Pt unable to provide any information. Pt rectal temp 101.6 in ED, o2 saturation 96% on room air.

## 2023-03-10 NOTE — ED ADULT NURSE REASSESSMENT NOTE - NS ED NURSE REASSESS COMMENT FT1
Report received from ERIBERTO Pinto. Patient resting in stretcher with wife at bedside. Patient hypotensive in the 80's, fluid bolus hung. Hospitalist aware will come down to evaluate the patient. Safety maintained.

## 2023-03-10 NOTE — ED ADULT NURSE NOTE - CHIEF COMPLAINT QUOTE
Patient BIBEMS from [facility] to the ED with c/o SOB. Patient hax hx of afib, chf, pacemaker 98dementia and is combative. EMS reports nurses checked on patient and saw he was purple and placed him on 10LNC, gave an albuterol treatment and Lasix. As per EMS pt was noted to be hypotensive and unable to get access due to pt being combative. Pt came in by EMS on 15L Nonrebreather. Pt has MOLST form in chart.

## 2023-03-10 NOTE — ED PROVIDER NOTE - NSICDXPASTMEDICALHX_GEN_ALL_CORE_FT
PAST MEDICAL HISTORY:  Afib Not on A/C 2/2 hx of GIbleeding    Aortic root dilatation     Aortic valve insufficiency, unspecified etiology     BPH associated with nocturia     CAD (coronary artery disease) (-) cardiac stress and 2DECHO 2019    Chronic anemia     DDD (degenerative disc disease), lumbar     Diastolic CHF LVEF 55-60% 6/2019    Diverticulosis of intestine without bleeding, unspecified intestinal tract location     Essential hypertension     Fall (on) (from) other stairs and steps, sequela 03/28/2017- lumbar hemtoma    Gastroesophageal reflux disease, esophagitis presence not specified Hx of GI bleed    HLD (hyperlipidemia)     [place] (hard of hearing)     Lumbar degenerative disc disease     MRSA (methicillin resistant Staphylococcus aureus) left forearm 2012    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

## 2023-03-10 NOTE — CONSULT NOTE ADULT - NS ATTEND AMEND GEN_ALL_CORE FT
[facility] phone call with wife done by myself.  pt has IM Haldol for agitation.  while I generally like to avoid antipsychotics in the elderly, in Mr. Davison's condition, it does appear that the benefits outweigh the burdens.   If pt stabilizes medically but frequently requires IM medications for agitation, we could then consider hospice unit transfer if the family agrees. if pt medically and behaviorally stabilizes, then the goal would be to return to SNF.

## 2023-03-10 NOTE — ED ADULT NURSE REASSESSMENT NOTE - NS ED NURSE REASSESS COMMENT FT1
Patient at baseline mental status. Patient's brief and linen changed, cleaned, new brief and sheets placed. Patient given warm blankets. Patient resting comfortably, no visible signs of pain or discomfort noted at this time. Awaiting transport to [address] [place] [address]. Safety maintained.

## 2023-03-10 NOTE — ED PROVIDER NOTE - OBJECTIVE STATEMENT
87 yr old male with PMHx of  CAD, CHF, chronic anemia, chronic Afib on warfarin, HTN, BPH, GERD, diverticulosis, CAMILA, glaucoma, depression, dementia BIBEMS for evaluation hypoxia and decreased mental status.  According to EMS, the patient had oxygen saturation in the 80s on room air and was placed on nonrebreather mask.  At baseline patient is alert and oriented to self only.  Patient is unable to provide any detailed HPI or review of systems due to his baseline mental status.

## 2023-03-10 NOTE — CONSULT NOTE ADULT - SUBJECTIVE AND OBJECTIVE BOX
Patient is a 87y old  Male who presents with a chief complaint of     BRIEF HOSPITAL COURSE-  86 y/o Male with PMH of CAD, Chronic Anemia, HTN, BPH, AFib (On Coumadin), GERD, CAMILA, Glaucoma, Depression, Dementia BIBA for shortness of breath. Per EMS, patient with desaturation event with associated shortness of breath in SNF. Transitioned to NRB with minimal improvement. Per staff, also increasingly altered, baseline AO x self. Unable to provide further history due to mental status.     On presentation to the ED, labs significant for leukocytosis (WBC 10.86), hypernatremia (Na 147), lactatemia (Lactate 4.5). Patient altered on admission, with associated hypoxemia. Transitioned to NRB with improvement in oxygenation. Now on RA maintaining O2 >93%. CXR with B/L infiltrates. ICU consulted for hypotension in setting of severe sepsis.       Review of Systems:      [X] Unable to obtain secondary to current mental status/ medical condition.         PAST MEDICAL & SURGICAL HISTORY-  HLD (hyperlipidemia)      Thoracic aortic aneurysm      Restless leg syndrome      CAMILA (obstructive sleep apnea)  unable to tolerate nocturnal CPAP      Aortic valve insufficiency, unspecified etiology      Gastroesophageal reflux disease, esophagitis presence not specified  Hx of GI bleed      Diverticulosis of intestine without bleeding, unspecified intestinal tract location      BPH associated with nocturia      Lumbar degenerative disc disease      MRSA (methicillin resistant Staphylococcus aureus)  left forearm       Aortic root dilatation      Nerve injury  complication from right TKR surgery, 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      Essential hypertension      Fall (on) (from) other stairs and steps, sequela  2017- lumbar hemtoma      [place] (hard of hearing)      DDD (degenerative disc disease), lumbar      Afib  Not on A/C 2/2 hx of GIbleeding      Chronic anemia      Diastolic CHF  LVEF 55-60% 2019      CAD (coronary artery disease)  (-) cardiac stress and 2DECHO       S/P shoulder surgery  right rotatotor cuff injury      S/P knee replacement  right      S/P knee replacement  left      Amputation finger  left index 1970      History of fundoplication        S/P debridement  left forearm -mrsa      Status post cataract extraction, unspecified laterality  bilat      S/P AVR (aortic valve replacement)      S/P thoracic aortic aneurysm repair          Medications:                ICU Vital Signs Last 24 Hrs  T(C): 38.1 (10 Mar 2023 06:00), Max: 38.7 (10 Mar 2023 03:15)  T(F): 100.6 (10 Mar 2023 06:00), Max: 101.6 (10 Mar 2023 03:15)  HR: 61 (10 Mar 2023 06:55) (61 - 120)  BP: 107/74 (10 Mar 2023 06:55) (88/63 - 107/74)  BP(mean): --  ABP: --  ABP(mean): --  RR: 21 (10 Mar 2023 06:55) (16 - 29)  SpO2: 97% (10 Mar 2023 06:55) (95% - 98%)    O2 Parameters below as of 10 Mar 2023 06:55  Patient On (Oxygen Delivery Method): room air            I&O's Detail              LABS-                        15.6   10.86 )-----------( 180      ( 10 Mar 2023 03:34 )             47.0     03-10    147<H>  |  114<H>  |  22  ----------------------------<  125<H>  4.0   |  25  |  1.14    Ca    9.5      10 Mar 2023 03:34    TPro  6.7  /  Alb  2.9<L>  /  TBili  0.8  /  DBili  x   /  AST  17  /  ALT  18  /  AlkPhos  83  03-10          CAPILLARY BLOOD GLUCOSE        PT/INR - ( 10 Mar 2023 03:34 )   PT: 35.8 sec;   INR: 3.05 ratio    PTT - ( 10 Mar 2023 03:34 )  PTT:39.3 sec        Urinalysis Basic - ( 10 Mar 2023 03:34 )  Color: Yellow / Appearance: Clear / S.010 / pH: x  Gluc: x / Ketone: Negative  / Bili: Negative / Urobili: Negative   Blood: x / Protein: Negative / Nitrite: Negative   Leuk Esterase: Negative / RBC: 25-50 /HPF / WBC 0-2 /HPF   Sq Epi: x / Non Sq Epi: Occasional / Bacteria: Moderate        CULTURES:  Rapid RVP Result: NotDetec (03-10 @ 03:34)      Physical Examination:  General: Elderly male, chronically ill appearing. In no acute distress.    HEENT: Pupils equal, reactive to light.  Symmetric.  PULM: Diminished to auscultation bilaterally. No significant sputum production.  NECK: Supple, no lymphadenopathy, trachea midline.  CVS: Regular rate and rhythm. No murmurs, rubs, or gallops.  ABD: Soft, nondistended, nontender, normoactive bowel sounds. No masses palpable   EXT: No edema, nontender.  SKIN: Warm and well perfused, no rashes noted.  NEURO: Alert, disoriented at baseline, agitated, unable to follow commands.   DEVICES:       RADIOLOGY-

## 2023-03-10 NOTE — ED ADULT TRIAGE NOTE - CHIEF COMPLAINT QUOTE
Patient BIBEMS from [facility] to the ED with c/o SOB. Patient hax hx of afib, chf, pacemaker 98dementia and is combative. EMS reports nurses checked on patient and saw he was purple and placed him on 10LNC, gave an albuterol treatment and Lasix. As per EMS pt was noted to be hypotensive and unable to get access due to pt being combative. Pt came in by EMS on 15L Nonrebreather. Pt has MOLST form in chart. Patient BIBEMS from [facility] to the ED with c/o SOB. Patient has hx of afib, chf, pacemaker, dementia and is combative. EMS reports nurses checked on patient and saw he was purple and placed him on 10LNC, gave an albuterol treatment and Lasix. As per EMS pt was noted to be hypotensive and unable to get access due to pt being combative. Pt came in by EMS on 15L Nonrebreather. Pt has MOLST form in chart.

## 2023-03-10 NOTE — ED PROVIDER NOTE - PROGRESS NOTE DETAILS
Attending Sarah, received sign out from Dr. Mcclure.  ICU paged for consult. Attending Smith, ICU eval and pt does not need ICU admission at this time.  Hospitalist called and message left to return call for admission. Attending yari Smith/w Dr. Rendon for admission

## 2023-03-10 NOTE — H&P ADULT - NSHPLABSRESULTS_GEN_ALL_CORE
15.6   10.86 )-----------( 180      ( 10 Mar 2023 03:34 )             47.0   03-10    147<H>  |  114<H>  |  22  ----------------------------<  125<H>  4.0   |  25  |  1.14    Ca    9.5      10 Mar 2023 03:34    TPro  6.7  /  Alb  2.9<L>  /  TBili  0.8  /  DBili  x   /  AST  17  /  ALT  18  /  AlkPhos  83  03-10

## 2023-03-10 NOTE — CONSULT NOTE ADULT - SUBJECTIVE AND OBJECTIVE BOX
HPI: Pt is a 87y old Male with hx of afib, chf, pacemaker, dementia and is combative. EMS reports nurses checked on patient and saw he was purple and placed him on 10LNC, gave an albuterol treatment and Lasix. As per EMS pt was noted to be hypotensive and unable to get access due to pt being combative. Pt came in by EMS on 15L Nonrebreather. Pt has MOLST form in chart. Palliative medicine consulted to help establish GOC and advance care planning     3/10/2023 pt seen and examined with no family at bedside, patient appears comfortable at this time, as per RN staff patient has been having episodes of agitation. Will reach out to family to have GOC meeting     PAIN: ( )Yes   (x )No - pt appears comfortable   DYSPNEA: ( ) Yes  (x ) No  Level:    PAST MEDICAL & SURGICAL HISTORY:  HLD (hyperlipidemia)  Thoracic aortic aneurysm  Restless leg syndrome  CAMILA (obstructive sleep apnea) unable to tolerate nocturnal CPAP  Aortic valve insufficiency, unspecified etiology  Gastroesophageal reflux disease, esophagitis presence not specified Hx of GI bleed  Diverticulosis of intestine without bleeding, unspecified intestinal tract location  BPH associated with nocturia  Lumbar degenerative disc disease  MRSA (methicillin resistant Staphylococcus aureus) left forearm 2012  Aortic root dilatation  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, 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  Essential hypertension  Fall (on) (from) other stairs and steps, sequela 03/28/2017- lumbar hematoma  [place] (hard of hearing)  DDD (degenerative disc disease), lumbar  Afib Not on A/C 2/2 hx of GI bleeding  Chronic anemia  Diastolic CHF LVEF 55-60% 6/2019  CAD (coronary artery disease) (-) cardiac stress and 2DECHO 2019  S/P shoulder surgery right rotator cuff injury  S/P knee replacement right  S/P knee replacement left  Amputation finger left index 1970  History of fundoplication 2000  S/P debridement left forearm -mrsa  Status post cataract extraction, unspecified laterality bilat  S/P AVR (aortic valve replacement)  S/P thoracic aortic aneurysm repair    SOCIAL HX:     Hx opiate tolerance ( )YES  (x )NO    Baseline ADLs  (Prior to Admission)  ( ) Independent   (x )Dependent    FAMILY HISTORY:  Family history of lung cancer    Review of Systems:    Unable to obtain/Limited due to: dementia     PHYSICAL EXAM:    Vital Signs Last 24 Hrs  T(C): 37.6 (10 Mar 2023 08:00), Max: 38.7 (10 Mar 2023 03:15)  T(F): 99.6 (10 Mar 2023 08:00), Max: 101.6 (10 Mar 2023 03:15)  HR: 60 (10 Mar 2023 11:00) (60 - 120)  BP: 77/45 (10 Mar 2023 11:00) (70/50 - 117/56)  BP(mean): 55 (10 Mar 2023 11:00) (55 - 77)  RR: 20 (10 Mar 2023 11:00) (16 - 29)  SpO2: 95% (10 Mar 2023 11:00) (94% - 100%)    Parameters below as of 10 Mar 2023 11:00  Patient On (Oxygen Delivery Method): room air    Daily Height in cm: 170.18 (10 Mar 2023 03:03)      PPSV2: 20 %  FAST: 7d    General: elderly male in bed, NAD   Mental Status: alert but disoriented   HEENT: dry oral mucosa   Lungs: diminished breath sounds b/l   Cardiac: s1s2 +   GI: nontender, nondistended, +BS   Ext: no edema, MCINTOSH   Neuro: dementia     LABS:                        15.6   10.86 )-----------( 180      ( 10 Mar 2023 03:34 )             47.0     03-10    147<H>  |  114<H>  |  22  ----------------------------<  125<H>  4.0   |  25  |  1.14    Ca    9.5      10 Mar 2023 03:34    TPro  6.7  /  Alb  2.9<L>  /  TBili  0.8  /  DBili  x   /  AST  17  /  ALT  18  /  AlkPhos  83  03-10    PT/INR - ( 10 Mar 2023 03:34 )   PT: 35.8 sec;   INR: 3.05 ratio         PTT - ( 10 Mar 2023 03:34 )  PTT:39.3 sec  Albumin: Albumin, Serum: 2.9 g/dL (03-10 @ 03:34)    Allergies- No Known Allergies    MEDICATIONS  (STANDING):  atorvastatin 20 milliGRAM(s) Oral at bedtime  brimonidine 0.2% Ophthalmic Solution 1 Drop(s) Left EYE two times a day  busPIRone 20 milliGRAM(s) Oral three times a day  carvedilol 3.125 milliGRAM(s) Oral every 12 hours  donepezil 10 milliGRAM(s) Oral at bedtime  escitalopram 10 milliGRAM(s) Oral daily  LORazepam     Tablet 0.5 milliGRAM(s) Oral two times a day  memantine 10 milliGRAM(s) Oral daily  midodrine. 10 milliGRAM(s) Oral three times a day    MEDICATIONS  (PRN):  acetaminophen     Tablet .. 650 milliGRAM(s) Oral every 6 hours PRN Temp greater or equal to 38C (100.4F), Mild Pain (1 - 3)  aluminum hydroxide/magnesium hydroxide/simethicone Suspension 30 milliLiter(s) Oral every 4 hours PRN Dyspepsia  haloperidol    Injectable 1 milliGRAM(s) IntraMuscular every 6 hours PRN Agitation  melatonin 3 milliGRAM(s) Oral at bedtime PRN Insomnia  ondansetron Injectable 4 milliGRAM(s) IV Push every 8 hours PRN Nausea and/or Vomiting      RADIOLOGY/ADDITIONAL STUDIES:

## 2023-03-10 NOTE — H&P ADULT - REASON FOR ADMISSION
Patient BIBEMS from [facility] to the ED with c/o SOB. Patient has hx of afib, chf, pacemaker, dementia and is combative. EMS reports nurses checked on patient and saw he was purple and placed him on 10LNC, gave an albuterol treatment and Lasix. As per EMS pt was noted to be hypotensive and unable to get access due to pt being combative. Pt came in by EMS on 15L Nonrebreather. Pt has MOLST form in chart.
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
## 2023-03-10 NOTE — PATIENT PROFILE ADULT - FALL HARM RISK - HARM RISK INTERVENTIONS

## 2023-03-10 NOTE — ED ADULT NURSE NOTE - NSICDXPASTMEDICALHX_GEN_ALL_CORE_FT
PAST MEDICAL HISTORY:  Afib Not on A/C 2/2 hx of GIbleeding    Aortic root dilatation     Aortic valve insufficiency, unspecified etiology     BPH associated with nocturia     CAD (coronary artery disease) (-) cardiac stress and 2DECHO 2019    Chronic anemia     DDD (degenerative disc disease), lumbar     Diastolic CHF LVEF 55-60% 6/2019    Diverticulosis of intestine without bleeding, unspecified intestinal tract location     Essential hypertension     Fall (on) (from) other stairs and steps, sequela 03/28/2017- lumbar hemtoma    Gastroesophageal reflux disease, esophagitis presence not specified Hx of GI bleed    HLD (hyperlipidemia)     [place] (hard of hearing)     Lumbar degenerative disc disease     MRSA (methicillin resistant Staphylococcus aureus) left forearm 2012    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

## 2023-03-10 NOTE — H&P ADULT - NSHPPHYSICALEXAM_GEN_ALL_CORE
Vital Signs Last 24 Hrs  T(C): 37.6 (10 Mar 2023 08:00), Max: 38.7 (10 Mar 2023 03:15)  T(F): 99.6 (10 Mar 2023 08:00), Max: 101.6 (10 Mar 2023 03:15)  HR: 61 (10 Mar 2023 10:06) (60 - 120)  BP: 81/63 (10 Mar 2023 10:06) (70/50 - 117/56)  BP(mean): 69 (10 Mar 2023 10:06) (58 - 77)  RR: 22 (10 Mar 2023 10:06) (16 - 29)  SpO2: 96% (10 Mar 2023 10:06) (95% - 100%)    Parameters below as of 10 Mar 2023 10:06  Patient On (Oxygen Delivery Method): room air    PHYSICAL EXAM:  Combative refuse exam

## 2023-03-10 NOTE — PHARMACOTHERAPY INTERVENTION NOTE - COMMENTS
Medication reconciliation completed.  Reviewed Medication list and confirmed med allergies with list from Care Facility "[facility]"; confirmed with Dr. First Medsathya.

## 2023-03-10 NOTE — ED ADULT NURSE NOTE - NSICDXPASTSURGICALHX_GEN_ALL_CORE_FT
PAST SURGICAL HISTORY:  Amputation finger left index 1970    History of fundoplication 2000    S/P AVR (aortic valve replacement)     S/P debridement left forearm -mrsa    S/P knee replacement right    S/P knee replacement left    S/P shoulder surgery right rotatotor cuff injury    S/P thoracic aortic aneurysm repair     Status post cataract extraction, unspecified laterality bilat    
Chart(s)/Spouse/Significant Other

## 2023-03-12 NOTE — SWALLOW BEDSIDE ASSESSMENT ADULT - SLP GENERAL OBSERVATIONS
pt semi reclining n bed, being fed breakfast by NSg Asst.  pt alert, but non-communicative. pt is [place]. Reportedly, easily aroused to opposition and combativeness. At this itme, was quietly cooperating with being fed.

## 2023-03-12 NOTE — SWALLOW BEDSIDE ASSESSMENT ADULT - SWALLOW EVAL: RECOMMENDED DIET
Maintain regular texture: Pt upright for meals and for 20-30 minutes post meal. meds crushed in pudding/puree. tel pt what is presented.. pt has aspiration risk 2/2 to fluctuating cooperation and attentiveness.

## 2023-03-12 NOTE — SWALLOW BEDSIDE ASSESSMENT ADULT - SWALLOW EVAL: DIAGNOSIS
presbyphagia but with sufficient function for REGULAR texture: in a setting of AMS, multiple medical co-morbidities, fluctuating behavioral disturbance.

## 2023-03-12 NOTE — SWALLOW BEDSIDE ASSESSMENT ADULT - MODE OF PRESENTATION
actually pt was drinking out of the milk carton, reportedly he refused the cup when offered by the Asst./cup/spoon/fed by clinician

## 2023-03-12 NOTE — CONSULT NOTE ADULT - SUBJECTIVE AND OBJECTIVE BOX
HPI:        PAST MEDICAL & SURGICAL HISTORY:  HLD (hyperlipidemia)      Thoracic aortic aneurysm      Restless leg syndrome      CAMILA (obstructive sleep apnea)  unable to tolerate nocturnal CPAP      Aortic valve insufficiency, unspecified etiology      Gastroesophageal reflux disease, esophagitis presence not specified  Hx of GI bleed      Diverticulosis of intestine without bleeding, unspecified intestinal tract location      BPH associated with nocturia      Lumbar degenerative disc disease      MRSA (methicillin resistant Staphylococcus aureus)  left forearm 2012      Aortic root dilatation      Nerve injury  complication from right TKR surgery, 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      Essential hypertension      Fall (on) (from) other stairs and steps, sequela  03/28/2017- lumbar hemtoma      [place] (hard of hearing)      DDD (degenerative disc disease), lumbar      Afib  Not on A/C 2/2 hx of GIbleeding      Chronic anemia      Diastolic CHF  LVEF 55-60% 6/2019      CAD (coronary artery disease)  (-) cardiac stress and 2DECHO 2019      S/P shoulder surgery  right rotatotor cuff injury      S/P knee replacement  right      S/P knee replacement  left      Amputation finger  left index 1970      History of fundoplication  2000      S/P debridement  left forearm -mrsa      Status post cataract extraction, unspecified laterality  bilat      S/P AVR (aortic valve replacement)      S/P thoracic aortic aneurysm repair          MEDICATIONS  (STANDING):  atorvastatin 20 milliGRAM(s) Oral at bedtime  brimonidine 0.2% Ophthalmic Solution 1 Drop(s) Left EYE two times a day  busPIRone 20 milliGRAM(s) Oral three times a day  carvedilol 3.125 milliGRAM(s) Oral every 12 hours  donepezil 10 milliGRAM(s) Oral at bedtime  escitalopram 10 milliGRAM(s) Oral daily  LORazepam     Tablet 0.5 milliGRAM(s) Oral two times a day  memantine 10 milliGRAM(s) Oral daily  midodrine. 10 milliGRAM(s) Oral three times a day  piperacillin/tazobactam IVPB.. 3.375 Gram(s) IV Intermittent every 8 hours    MEDICATIONS  (PRN):  acetaminophen     Tablet .. 650 milliGRAM(s) Oral every 6 hours PRN Temp greater or equal to 38C (100.4F), Mild Pain (1 - 3)  aluminum hydroxide/magnesium hydroxide/simethicone Suspension 30 milliLiter(s) Oral every 4 hours PRN Dyspepsia  haloperidol    Injectable 2 milliGRAM(s) IntraMuscular every 6 hours PRN Agitation  melatonin 3 milliGRAM(s) Oral at bedtime PRN Insomnia  ondansetron Injectable 4 milliGRAM(s) IV Push every 8 hours PRN Nausea and/or Vomiting      Allergies    No Known Allergies    Intolerances        SOCIAL HISTORY:    FAMILY HISTORY:  Family history of lung cancer            Physical Exam:  General: NAD, resting comfortably  HEENT: NC/AT, EOMI, neck supple  Pulmonary: normal resp effort  Cardiovascular: NSR  Abdominal: soft, ND/NT, no organomegaly  Extremities: WWP, normal strength, no clubbing/cyanosis/edema  Neuro: non-cooperative with exam  Pulses: palpable distal pulses      Vital Signs Last 24 Hrs  T(C): 36.8 (12 Mar 2023 16:24), Max: 37.2 (11 Mar 2023 21:36)  T(F): 98.2 (12 Mar 2023 16:24), Max: 99 (11 Mar 2023 21:36)  HR: 61 (12 Mar 2023 16:24) (61 - 80)  BP: 97/65 (12 Mar 2023 16:24) (97/65 - 107/72)  BP(mean): --  RR: 18 (12 Mar 2023 16:24) (18 - 18)  SpO2: 95% (12 Mar 2023 16:24) (95% - 97%)    Parameters below as of 12 Mar 2023 16:24  Patient On (Oxygen Delivery Method): room air        I&O's Summary          LABS:                        11.8   14.98 )-----------( 153      ( 11 Mar 2023 06:25 )             35.7     03-11    147<H>  |  119<H>  |  29<H>  ----------------------------<  102<H>  3.9   |  23  |  0.90    Ca    8.9      11 Mar 2023 06:25      PT/INR - ( 11 Mar 2023 06:25 )   PT: 40.6 sec;   INR: 3.46 ratio               Cultures:      RADIOLOGY & ADDITIONAL STUDIES:    < from: CT Chest No Cont (03.11.23 @ 13:25) >    ACC: [id number] EXAM:  CT CHEST   ORDERED BY: RG TOBAR     PROCEDURE DATE:  03/11/2023          INTERPRETATION:  CLINICAL INFORMATION: r/o Chf, r/o Pna JCT    COMPARISON: 5.8.20.    CONTRAST/COMPLICATIONS:  IV Contrast: NONE  Oral Contrast: NONE  Complications: None reported at time of study completion    PROCEDURE:  CT of the Chest was performed.  Sagittal and coronal reformats were performed.    FINDINGS:    LUNGS AND LARGE AIRWAYS: Patent central airways. Bilateral ill-defined   perihilar opacities.  PLEURA: Small pleural effusions.  VESSELS: There is a enlarging saccular aneurysm versus penetrating ulcer   arising from the upper abdominal aorta on the left side measuring 4.4 cm   craniocaudally previously 2 cm.  HEART: Cardiomegaly. No pericardial effusion. Aortic valve repair.   Coronary calcification. Status post sternotomy. Leadless pacemaker in the   right ventricle.  MEDIASTINUM AND KISHOR: No lymphadenopathy.  CHEST WALL AND LOWER NECK: Within normal limits.  UPPER ABDOMEN: Cholelithiasis. Moderate hiatal hernia. Dilated esophagus.  BONES: Within normal limits.    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    Perihilar opacities suggesting pulmonary edema.    --- End of Report ---            DIANA COFFEY MD; Attending Radiologist  This document has been electronically signed. Mar 11 2023  1:44PM    < end of copied text >   HPI: "88 y/o Male with PMH of CAD, Chronic Anemia, HTN, BPH, AFib (On Coumadin), GERD, CAMILA, Glaucoma, Depression, Dementia BIBA for shortness of breath. Per EMS, patient with desaturation event with associated shortness of breath in SNF. Transitioned to NRB with minimal improvement. Per staff, also increasingly altered, baseline AO x self. Unable to provide further history due to mental status.     On presentation to the ED, labs significant for leukocytosis (WBC 10.86), hypernatremia (Na 147), lactatemia (Lactate 4.5). Patient altered on admission, with associated hypoxemia. Transitioned to NRB with improvement in oxygenation. Now on RA maintaining O2 >93%. CXR with B/L infiltrates. ICU consulted for hypotension in setting of severe sepsis. Rejected by ICU. Case d/w wife, remains hypotensive, Rechallange  with IVF and try Midodrine; pt is combative refused exam"        PAST MEDICAL & SURGICAL HISTORY:  HLD (hyperlipidemia)      Thoracic aortic aneurysm      Restless leg syndrome      CAMILA (obstructive sleep apnea)  unable to tolerate nocturnal CPAP      Aortic valve insufficiency, unspecified etiology      Gastroesophageal reflux disease, esophagitis presence not specified  Hx of GI bleed      Diverticulosis of intestine without bleeding, unspecified intestinal tract location      BPH associated with nocturia      Lumbar degenerative disc disease      MRSA (methicillin resistant Staphylococcus aureus)  left forearm 2012      Aortic root dilatation      Nerve injury  complication from right TKR surgery, 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      Essential hypertension      Fall (on) (from) other stairs and steps, sequela  03/28/2017- lumbar hemtoma      [place] (hard of hearing)      DDD (degenerative disc disease), lumbar      Afib  Not on A/C 2/2 hx of GIbleeding      Chronic anemia      Diastolic CHF  LVEF 55-60% 6/2019      CAD (coronary artery disease)  (-) cardiac stress and 2DECHO 2019      S/P shoulder surgery  right rotatotor cuff injury      S/P knee replacement  right      S/P knee replacement  left      Amputation finger  left index 1970      History of fundoplication  2000      S/P debridement  left forearm -mrsa      Status post cataract extraction, unspecified laterality  bilat      S/P AVR (aortic valve replacement)      S/P thoracic aortic aneurysm repair          MEDICATIONS  (STANDING):  atorvastatin 20 milliGRAM(s) Oral at bedtime  brimonidine 0.2% Ophthalmic Solution 1 Drop(s) Left EYE two times a day  busPIRone 20 milliGRAM(s) Oral three times a day  carvedilol 3.125 milliGRAM(s) Oral every 12 hours  donepezil 10 milliGRAM(s) Oral at bedtime  escitalopram 10 milliGRAM(s) Oral daily  LORazepam     Tablet 0.5 milliGRAM(s) Oral two times a day  memantine 10 milliGRAM(s) Oral daily  midodrine. 10 milliGRAM(s) Oral three times a day  piperacillin/tazobactam IVPB.. 3.375 Gram(s) IV Intermittent every 8 hours    MEDICATIONS  (PRN):  acetaminophen     Tablet .. 650 milliGRAM(s) Oral every 6 hours PRN Temp greater or equal to 38C (100.4F), Mild Pain (1 - 3)  aluminum hydroxide/magnesium hydroxide/simethicone Suspension 30 milliLiter(s) Oral every 4 hours PRN Dyspepsia  haloperidol    Injectable 2 milliGRAM(s) IntraMuscular every 6 hours PRN Agitation  melatonin 3 milliGRAM(s) Oral at bedtime PRN Insomnia  ondansetron Injectable 4 milliGRAM(s) IV Push every 8 hours PRN Nausea and/or Vomiting      Allergies    No Known Allergies    Intolerances        SOCIAL HISTORY:    FAMILY HISTORY:  Family history of lung cancer            Physical Exam:  General: NAD, resting comfortably  HEENT: NC/AT, EOMI, neck supple  Pulmonary: normal resp effort  Cardiovascular: NSR  Abdominal: soft, ND/NT, no organomegaly  Extremities: WWP, normal strength, no clubbing/cyanosis/edema  Neuro: non-cooperative with exam  Pulses: palpable distal pulses      Vital Signs Last 24 Hrs  T(C): 36.8 (12 Mar 2023 16:24), Max: 37.2 (11 Mar 2023 21:36)  T(F): 98.2 (12 Mar 2023 16:24), Max: 99 (11 Mar 2023 21:36)  HR: 61 (12 Mar 2023 16:24) (61 - 80)  BP: 97/65 (12 Mar 2023 16:24) (97/65 - 107/72)  BP(mean): --  RR: 18 (12 Mar 2023 16:24) (18 - 18)  SpO2: 95% (12 Mar 2023 16:24) (95% - 97%)    Parameters below as of 12 Mar 2023 16:24  Patient On (Oxygen Delivery Method): room air        I&O's Summary          LABS:                        11.8   14.98 )-----------( 153      ( 11 Mar 2023 06:25 )             35.7     03-11    147<H>  |  119<H>  |  29<H>  ----------------------------<  102<H>  3.9   |  23  |  0.90    Ca    8.9      11 Mar 2023 06:25      PT/INR - ( 11 Mar 2023 06:25 )   PT: 40.6 sec;   INR: 3.46 ratio               Cultures:      RADIOLOGY & ADDITIONAL STUDIES:    < from: CT Chest No Cont (03.11.23 @ 13:25) >    ACC: [id number] EXAM:  CT CHEST   ORDERED BY: RG TOBAR     PROCEDURE DATE:  03/11/2023          INTERPRETATION:  CLINICAL INFORMATION: r/o Chf, r/o Pna JCT    COMPARISON: 5.8.20.    CONTRAST/COMPLICATIONS:  IV Contrast: NONE  Oral Contrast: NONE  Complications: None reported at time of study completion    PROCEDURE:  CT of the Chest was performed.  Sagittal and coronal reformats were performed.    FINDINGS:    LUNGS AND LARGE AIRWAYS: Patent central airways. Bilateral ill-defined   perihilar opacities.  PLEURA: Small pleural effusions.  VESSELS: There is a enlarging saccular aneurysm versus penetrating ulcer   arising from the upper abdominal aorta on the left side measuring 4.4 cm   craniocaudally previously 2 cm.  HEART: Cardiomegaly. No pericardial effusion. Aortic valve repair.   Coronary calcification. Status post sternotomy. Leadless pacemaker in the   right ventricle.  MEDIASTINUM AND KISHOR: No lymphadenopathy.  CHEST WALL AND LOWER NECK: Within normal limits.  UPPER ABDOMEN: Cholelithiasis. Moderate hiatal hernia. Dilated esophagus.  BONES: Within normal limits.    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    Perihilar opacities suggesting pulmonary edema.    --- End of Report ---            DIANA COFFEY MD; Attending Radiologist  This document has been electronically signed. Mar 11 2023  1:44PM    < end of copied text >

## 2023-03-12 NOTE — SWALLOW BEDSIDE ASSESSMENT ADULT - PHARYNGEAL PHASE
onset of pharyngeal swallow mildly latent, c/w age  but no overt s/s aspiration . risk for aspiration in that pt can be inattentive to task, or even oppositional.

## 2023-03-12 NOTE — SWALLOW BEDSIDE ASSESSMENT ADULT - NS SPL SWALLOW CLINIC TRIAL FT
Maintain regular texture and regular  liquids: Pt upright for meal and for 20-30 minutes post eating.  tell pt what is presented. Encourage cup holding etc. as feasible.   pt is judged to be at baseline and Service will not follow at this time. Please re-consult prn.

## 2023-03-12 NOTE — CONSULT NOTE ADULT - REASON FOR ADMISSION
Pneumonia
Patient BIBEMS from [facility] to the ED with c/o SOB. Patient has hx of afib, chf, pacemaker, dementia and is combative. EMS reports nurses checked on patient and saw he was purple and placed him on 10LNC, gave an albuterol treatment and Lasix. As per EMS pt was noted to be hypotensive and unable to get access due to pt being combative. Pt came in by EMS on 15L Nonrebreather. Pt has MOLST form in chart.
Patient BIBEMS from [facility] to the ED with c/o SOB. Patient has hx of afib, chf, pacemaker, dementia and is combative. EMS reports nurses checked on patient and saw he was purple and placed him on 10LNC, gave an albuterol treatment and Lasix. As per EMS pt was noted to be hypotensive and unable to get access due to pt being combative. Pt came in by EMS on 15L Nonrebreather. Pt has MOLST form in chart.

## 2023-03-12 NOTE — CONSULT NOTE ADULT - ASSESSMENT
Pt is a 87y old Male with hx of afib, chf, pacemaker, dementia and is combative. EMS reports nurses checked on patient and saw he was purple and placed him on 10LNC, gave an albuterol treatment and Lasix. As per EMS pt was noted to be hypotensive and unable to get access due to pt being combative. Pt came in by EMS on 15L Nonrebreather. Pt has MOLST form in chart. Palliative medicine consulted to help establish GOC and advance care planning     1) Acute Hypoxic respiratory failure   - PNA vs CHF exacerbation   - Supplemental O2 PRN   - Monitor O2   - hypotensive - started on midodrine     2) Dementia with agitation   - supportive care   - PPSV2: 20 %  - FAST: 7d  - long time resident     Process of Care  --Reviewed dx/treatment problems and alignment with Goals of Care    Physical Aspects of Care  --Pain  pt appears comfortable at this time     --Dyspnea  No SOB at this time - resolving   pt appears comfortable and in NAD    --Nausea Vomiting  denies    --Weakness  PT as tolerated     Psychological and Psychiatric Aspects of Care:   --Greif/Bereavment: emotional support provided  -Pastoral Care Available PRN     Social Aspects of Care  -SW involved     Cultural Aspects  -Primary Language: English    Goals of Care:     We discussed Palliative Care team being a supportive team when a patient has ongoing illnesses.  We also discussed that it is not an end of life care service, but can help navigate symptoms and emotional support through out their hospital stay here.    Ethical and Legal Aspects:   NA    Capacity- pt does not have capacity     HCP/Surrogate: HCP naming Louise (wife) on one content     Code Status- DNR/I   MOLST-  Dispo Plan-    Discussed With: Dr. Resendiz     Case coordinated with attending and SW and RN       
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    Plan:  - please obtain CTA chest/abdomen/pelvis to delineate aneurism anatomy  - continue aspirin and statin  - vascular surgery will follow  - rest of management per primary team    Discussed with Dr. Chiang
88 y/o Male with PMH of CAD, Chronic Anemia, HTN, BPH, AFib (On Coumadin), GERD, CAMILA, Glaucoma, Depression, Dementia BIBA for shortness of breath with:       1. Hypotension       -Patient altered, with elevated lactate in setting of CXR with B/L infiltrates. s/p Vancomycin and Zosyn in ED. Initially normotensive, however; with brief episode of hypotension. Administered 1L NS x2 in ED. On examination, SBP>100 maintaining MAP >65. No indication for vasopressors at this time.   -Lactate elevated admission, 4.5. Now s/p 2L NS. Recommend repeating. Additional fluid resuscitation as needed. Will need gentle hydration will NPO.       Patient stable for admission to Medicine for treatment of Sepsis secondary to Pneumonia. Does not require ICU level care at this time. Please reconsult if patient status worsens.

## 2023-03-12 NOTE — SWALLOW BEDSIDE ASSESSMENT ADULT - COMMENTS
Patient BIBEMS from [facility] to the ED with c/o SOB. Patient has hx of afib, chf, pacemaker, dementia and is combative. EMS reports nurses checked on patient and saw he was purple and placed him on 10LNC, gave an albuterol treatment and Lasix. As per EMS pt was noted to be hypotensive and unable to get access due to pt being combative. Pt came in by EMS on 15L Nonrebreather. Pt has MOLST form in chart.  8 y/o Male with PMH of CAD, Chronic Anemia, HTN, BPH, AFib (On Coumadin), GERD, CAMILA, Glaucoma, Depression, Dementia BIBA for shortness of breath. Per EMS, patient with desaturation event with associated shortness of breath in SNF. Transitioned to NRB with minimal improvement. Per staff, also increasingly altered, baseline AO x self. Unable to provide further history due to mental status.   Service is consulted for po intake, and to determine the texture of diet.  Note pt has GERD, also fluctuating mood and can be combative easily.

## 2023-03-12 NOTE — SWALLOW BEDSIDE ASSESSMENT ADULT - SWALLOW EVAL: RECOMMENDED FEEDING/EATING TECHNIQUES
allow for swallow between intakes/alternate food with liquid/crush medication (when feasible)/maintain upright posture during/after eating for 30 mins/position upright (90 degrees)

## 2023-03-12 NOTE — SWALLOW BEDSIDE ASSESSMENT ADULT - ORAL PREPARATORY PHASE
focus to eating routines; reduced self monitoring and inmpulsiuve but functional for serving himself the milk form the carton./Within functional limits

## 2023-03-12 NOTE — CONSULT NOTE ADULT - ATTENDING COMMENTS
CTA reviewed. Infrarenal asymmetric saccular aneurysm.  Unrelated to current clinical condition but will likely need surgical repair. Would like to see him as outpatient once discharged and recovered from respiratory failure. CTA reviewed. Infrarenal asymmetric saccular aneurysm.  Unrelated to current clinical condition but will likely need surgical repair. Would like to see him as outpatient once discharged and recovered from respiratory failure-sepsis.

## 2023-03-13 NOTE — DIETITIAN INITIAL EVALUATION ADULT - PERTINENT MEDS FT
MEDICATIONS  (STANDING):  atorvastatin 20 milliGRAM(s) Oral at bedtime  brimonidine 0.2% Ophthalmic Solution 1 Drop(s) Left EYE two times a day  busPIRone 20 milliGRAM(s) Oral three times a day  carvedilol 3.125 milliGRAM(s) Oral every 12 hours  donepezil 10 milliGRAM(s) Oral at bedtime  escitalopram 10 milliGRAM(s) Oral daily  LORazepam     Tablet 0.5 milliGRAM(s) Oral two times a day  memantine 10 milliGRAM(s) Oral daily  midodrine. 10 milliGRAM(s) Oral three times a day  piperacillin/tazobactam IVPB.. 3.375 Gram(s) IV Intermittent every 8 hours  QUEtiapine 25 milliGRAM(s) Oral at bedtime    MEDICATIONS  (PRN):  acetaminophen     Tablet .. 650 milliGRAM(s) Oral every 6 hours PRN Temp greater or equal to 38C (100.4F), Mild Pain (1 - 3)  aluminum hydroxide/magnesium hydroxide/simethicone Suspension 30 milliLiter(s) Oral every 4 hours PRN Dyspepsia  haloperidol    Injectable 2 milliGRAM(s) IntraMuscular every 6 hours PRN Agitation  melatonin 3 milliGRAM(s) Oral at bedtime PRN Insomnia  ondansetron Injectable 4 milliGRAM(s) IV Push every 8 hours PRN Nausea and/or Vomiting

## 2023-03-13 NOTE — DIETITIAN INITIAL EVALUATION ADULT - ORAL INTAKE PTA/DIET HISTORY
Unable to obtain intake/ diet hx pta 2/2 dementia. On a PRASHANT, Regular Texture, Thin Liquids, No Nuts, No Seeds, No Skin, No Kernels, Lactaid only diet with Ensure Plus High Protein QID at [facility].

## 2023-03-13 NOTE — GOALS OF CARE CONVERSATION - ADVANCED CARE PLANNING - CONVERSATION DETAILS
HPI:    · Subjective and Objective:   Patient is a 87y old  Male who presents with a chief complaint of     BRIEF HOSPITAL COURSE-  88 y/o Male with PMH of CAD, Chronic Anemia, HTN, BPH, AFib (On Coumadin), GERD, CAMILA, Glaucoma, Depression, Dementia BIBA for shortness of breath.    (10 Mar 2023 10:32)      PERTINENT PMH REVIEWED:  [ X ] YES [ ] NO           Mental Status: [  ] Alert  [  ] Oriented [  ] Confused [  X ] Lethargic [  ]  Concerns of Depression [  ]- history of   Anxiety [   ]- anxious at times   Baseline ADLs (prior to admission):  Independent [ ] moderately [ ] fully   Dependent   [ ] moderately [ X ] fully    Anticipated Grief: Patient [  ] Family [  X ]    Caregiver [place] Assessed: Yes [ X ] No [  ]    Mosque: Samaritan    Spiritual Concerns: Not identified     Goals of Care: Continue with current medical management and one d/c back to SNF Comfort focus there    Previous Services: [facility]    ADVANCE DIRECTIVES: MOLST: DNR/DNI, Comfort Measures, Do not send to hospital, No feeding tube, Trial of IV fluids, Determine use or limitation of antibiotics.    Anticipated D/C Plan: Back to SNF with comfort MOLST                     Summary:    This SW spoke with pts wife via phone to discuss goals of care, assist with planning and provide supportive counseling. Pt. has been a LTC resident at [facility]. She reports that pt. has been sleepy at times and other times agitated. She expressed concern regarding the psych medications he is on and if they are causing him to be lethargic. She reports that he has a Psychiatrist at the facility who she plans to speak to about this.     Goals of care discussed. Pts wife shared that she did not want more heroic measures taken. She discussed if pt. was "terminal" then she would be ready to focus on comfort at SNF. We discussed pts dementia and how this is a progressive illness and with that pt. will continue to decline. We discussed how pt. would want to spend the time he has and if he would want to continue to come back and forth to the hospital. Pts wife expressed understanding. She shared how at this point she wants pt. comfortable in his own environment. She would like pt. o continue with current medical management here and have Echocardiogram he is schedule for. She would like to focus on comfort once pt. is at SNF therefore, not starting comfort care here.     MOLST completed to reflect comfort. MOLST states: DNR/DNI, Comfort Measures, Do not send to hospital, No feeding tube, Trial of IV fluids, Determine use or limitation of antibiotics. HPI:    · Subjective and Objective:   Patient is a 87y old  Male who presents with a chief complaint of     BRIEF HOSPITAL COURSE-  88 y/o Male with PMH of CAD, Chronic Anemia, HTN, BPH, AFib (On Coumadin), GERD, CAMILA, Glaucoma, Depression, Dementia BIBA for shortness of breath.    (10 Mar 2023 10:32)      PERTINENT PMH REVIEWED:  [ X ] YES [ ] NO           Mental Status: [  ] Alert  [  ] Oriented [  ] Confused [  X ] Lethargic [  ]  Concerns of Depression [  ]- history of   Anxiety [   ]- anxious at times   Baseline ADLs (prior to admission):  Independent [ ] moderately [ ] fully   Dependent   [ ] moderately [ X ] fully    Anticipated Grief: Patient [  ] Family [  X ]    Caregiver [place] Assessed: Yes [ X ] No [  ]    Mormon: Muslim    Spiritual Concerns: Not identified     Goals of Care: Continue with current medical management and one d/c back to SNF Comfort focus there    Previous Services: [facility]    ADVANCE DIRECTIVES: MOLST: DNR/DNI, Comfort Measures, Do not send to hospital, No feeding tube, Trial of IV fluids, Determine use or limitation of antibiotics.    Anticipated D/C Plan: Back to SNF with comfort MOLST                     Summary:    This SW spoke with pts wife via phone to discuss goals of care, assist with planning and provide supportive counseling. Pt. has been a LTC resident at [facility]. She reports that pt. has been sleepy at times and other times agitated. She expressed concern regarding the psych medications he is on and if they are causing him to be lethargic. She reports that he has a Psychiatrist at the facility who she plans to speak to about this.     Goals of care discussed. Pts wife shared that she did not want more heroic measures taken. She discussed if pt. was "terminal" then she would be ready to focus on comfort at SNF. Feelings explored and support provided, We discussed pts dementia and how this is a progressive illness and with that pt. will continue to decline. We discussed how pt. would want to spend the time he has and if he would want to continue to come back and forth to the hospital. Pts wife expressed understanding. She shared how at this point she wants pt. comfortable in his own environment. She would like pt. to continue with current medical management here and have Echocardiogram he is schedule for. She would like to focus on comfort once pt. is at SNF therefore, not starting comfort care here.     We also discussed the option of hospice at [facility] as pt. has LTC insurance that pays for his stay therefore, Medicare should be able to be billed for hospice as well. We discussed hospice philosophy and services in detail. Pts wife would like [facility] hospice referral sent and to see if [facility] will accept back to with hospice. Floor SW Malinda to contact  and place [facility] referral if [facility] approves.     MOLST completed to reflect comfort. MOLST states: DNR/DNI, Comfort Measures, Do not send to hospital, No feeding tube, Trial of IV fluids, Determine use or limitation of antibiotics.    Plan at this time is to continue with current medical management here and have Echocardiogram however, once d/c pts wife would like to focus on comfort with comfort MOLST at [facility] and [facility] home hospice referral, if [facility] allows. Emotional support provided. Our team will continue to follow.

## 2023-03-13 NOTE — CDI QUERY NOTE - NSCDIOTHERTXTBX3_GEN_ALL_CORE_FT
Progress Note Adult-Hospitalist Attending 3/12:   Patient BIBEMS from [facility] to the ED with c/o SOB.  2. Rt Pna:  r/o aspiration  c/w Zosyn IV    Per  Consult Note Adult-GOC-Palliative Care Nurse Practitioner:  "1) Acute Hypoxic respiratory failure   - PNA vs CHF exacerbation "     CT- CHF more than PNA    H&P Adult 3/10  * SOB elevated lactate  doubt PNA more likely CHF    Please clarify the status of the Pneumonia and if Pna present please clarify the type  - Aspiration pneumonia, present on admission  - Gram negative Pneumonia, present on admission  - Other type of pneumonia, please clarify  - Karolina clinical evidence of pneumonia, Pneumonia ruled-out

## 2023-03-13 NOTE — DIETITIAN INITIAL EVALUATION ADULT - OTHER INFO
86 y/o M with a PMHx of CAD, Chronic Anemia, HTN, BPH, AFib (On Coumadin), GERD, CAMILA, Glaucoma, Depression, Dementia presented to ED from [facility] for SOB. Per EMS, patient with desaturation event with associated shortness of breath in SNF. Transitioned to NRB with minimal improvement. Per staff, also increasingly altered, baseline AO x self. Unable to provide further history due to mental status. Patient altered on admission, with associated hypoxemia. Transitioned to NRB with improvement in oxygenation. CXR with B/L infiltrates. ICU consulted for hypotension in setting of severe sepsis. Rejected by ICU. Case d/w wife, remains hypotensive, Rechallange  with IVF and try Midodrine; pt is combative refused exam. Admitted for SOB, elevated lactate, and CHF. SLP Eval (3/12): Regular, Thin Liquids. DNR/DNI.    Unable to obtain meaningful information at time of visit 2/2 dementia. RD obtained bedscale wt on 3/13 - 125#. Weight hx reviewed: 169.1# (taken by RD on 5/16/20); wt loss of 44.1# (26.1%) x 34 mons - not clinsig. NFPE reveals mild muscle/ fat wasting, pt meets criteria for PCM at this time. Will trial Ensure Plus High Protein in effort to optimize PO intake. Consider adding appetite stimulant such as Remeron or Marinol 2/2 chronically poor appetite/ PO intake. Pt is DNR/DNI, however TF IS NOT ADDRESSED IN MOLST. Nutrition support is not recommended due to overall declining medical status which evidenced based studies indicate EN is not effective in prolonging survival and improving quality of life. It can also increase risk of aspiration pneumonia as well as other related issues. Please see additional recommendations below.

## 2023-03-13 NOTE — DIETITIAN INITIAL EVALUATION ADULT - NSICDXPASTMEDICALHX_GEN_ALL_CORE_FT
PAST MEDICAL HISTORY:  Afib Not on A/C 2/2 hx of GIbleeding    Aortic root dilatation     Aortic valve insufficiency, unspecified etiology     BPH associated with nocturia     CAD (coronary artery disease) (-) cardiac stress and 2DECHO 2019    Chronic anemia     DDD (degenerative disc disease), lumbar     Diastolic CHF LVEF 55-60% 6/2019    Diverticulosis of intestine without bleeding, unspecified intestinal tract location     Essential hypertension     Fall (on) (from) other stairs and steps, sequela 03/28/2017- lumbar hemtoma    Gastroesophageal reflux disease, esophagitis presence not specified Hx of GI bleed    HLD (hyperlipidemia)     [place] (hard of hearing)     Lumbar degenerative disc disease     MRSA (methicillin resistant Staphylococcus aureus) left forearm 2012    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

## 2023-03-13 NOTE — DIETITIAN INITIAL EVALUATION ADULT - FLUID ACCUMULATION
Provisional Diagnosis: Alcohol Use Disorder, Stimulant Withdrawal, Substance-Induced Depressive Disorder    Risk, Psychosocial and Contextual Factors: Patient has not had contact with his kids in 6+ months due to his methamphetamine usage. Also reports that his daughter apparently tried to kill herself at school last week by cutting self with a knife. Patient also recently had his electricity turned off. Current  Treatment: Patient denies current or past [facility] treatment. Present Suicidal Behavior:      Verbal: Patient denies. Attempt: Patient denies. Access to Weapons: Patient denies. Current Suicide Risk: Low, Moderate or High: Low. Past Suicidal Behavior:       Verbal: Patient reports past history of suicidal ideation, last time 1+ years ago when children started staying with their mother. Attempt: Patient denies. Self-Injurious/Self-Mutilation: Patient denies. Traumatic Event Within Past 2 Weeks: Patient denies. Current Abuse: Patient denies. Legal: Patient denies. Violence: Patient denies. Protective Factors: Able to identify individuals in support system ([facility]), also future-oriented in regards to wanting to reunite with his children. Housing: Patient rents a home and lives alone. CPAP/Oxygen/Ambulation Difficulties: No ambulation difficulties indicated or observed. Basic Vital Signs Normal?: Patient's vital signs were normative. Critical Labs?: Still waiting on lab results. Clinical Summary:      Patient is a 37year old female who presents to the ED voluntarily for detox from methamphetamines. Patient denies suicidal thoughts. Denies any history of suicide attempts. Reports last suicidal thoughts were 1 year ago in relation to his kids moving back to their biological mother's in [place] (biological mother has custody, moved kids from him due to patient's drug use). Homicidal thoughts and/or plans denied.  Patient denies connection to outpatient (3/13) No fluid accumulation doc'd.  [facility] treatment and denies any past history of outpatient services. He has been hospitalized for psychiatric reasons before, with last admission 2 years ago to [facility]. No delusions noted. Hallucinations denied. Denies any history of abuse/trauma. Patient reports that he drinks 18+ beers per day. Denies any symptoms of withdrawal from alcohol (no nausea, diarrhea, vomiting, tremors, seizures, etc.). Patient also reports using methamphetamines via smoking for the past several years. Denies any intravenous use. Last use around 0300 this morning, per patient. In regards to sleep, patient states that he gets maybe 4 hours per night. States this is due to his stimulant usage. Indicates that sleep is not restful and that he has difficulty falling asleep and has insomnia. On the PHQ-9, patient reports nearly every day having trouble falling asleep, feeling tired/having little energy, poor appetite, and feel bad about self. Throughout assessment, patient avoided clinician's gaze and presented as depressed, sad, and worried. His affect was appropriate. He was alert and oriented to person, place, time, and situation. His interview behavior was cooperative. Thought processes and content were normative. His insight and judgment appeared intact. Level of Care Disposition:      Consulted with medical provider. Patient not yet medically cleared. Case transitioned to third shift clinician.

## 2023-03-13 NOTE — CHART NOTE - NSCHARTNOTEFT_GEN_A_CORE
This SW left message for pts wife to follow up, further discuss plan and offer support. Awaiting call back. Our team will continue to follow.

## 2023-03-13 NOTE — PROGRESS NOTE ADULT - ASSESSMENT
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    Plan:  - please obtain CTA chest/abdomen/pelvis to delineate aneurism anatomy  - continue aspirin and statin  - vascular surgery will follow  - rest of management per primary team    Discussed with Dr. Aranda. 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    Plan:  - please obtain CTA chest/abdomen/pelvis to delineate aneurism anatomy  - continue aspirin and statin  - rest of management per primary team    Discussed with Dr. Aranda    ADDENDUM:  -CTA results reviewed  -per goals of care conversation, family wants pt to have no heroic measures, comfort care on discharge and are considering hospice  -no acute vascular surgery intervention is warranted at this time  -please reconsult prn

## 2023-03-13 NOTE — CDI QUERY NOTE - NSCDIOTHERTXTBX2_GEN_ALL_CORE_FT
Clinical documentation indicates that this patient has CHF.    Progress Note Adult-Hospitalist Attending " Patient has hx of afib, chf, pacemaker,"  " 3. r/o chronic Chf:  BNP elevated  obtain TTE  unable to use diuretic due to hypotension"    Per Consult Note Adult-GOC-Palliative Care Nurse Practitioner 3/10:   " 1) Acute Hypoxic respiratory failure   - PNA vs CHF exacerbation "     H+P "   * SOB elevated lactate  doubt PNA more likely CHF"     CT- CHF more than PNA  TTE 3/11 : EF= 55-60%   BNP 3/11= 4996    Please clarify the type and acuity of the CHF   - Acute Diastolic CHF  - Acute Systolic CHF  - Acute on chronic diastolic CHF  - Acute on chronic Systolic CHF  - Chronic diastolic CHF  - Chronic Systolic CHF  - Other, please clarify

## 2023-03-13 NOTE — DIETITIAN INITIAL EVALUATION ADULT - ADD RECOMMEND
1) C/w Regular diet per SLP recommendations   2) Add ensure plus high protein TID to optimize PO intake (provides 350 kcal, 20g protein/ shake)   3) Obtain vitamin D 25OH level to assess nutriture   4) Please obtain daily weights  5) Encourage protein-rich foods, maximize food preferences  6) Monitor bowel movements, if no BM for >3 days, consider implementing bowel regimen.  7) Consider adding thiamine 100 mg daily 2/2 poor PO intake/ malnutrition   8) Recommend to add MVI w/minerals, Vit C 500 mg BID, add Zinc Sulfate 220 mg x 10 days to promote wound healing.  9) Consider adding appetite stimulant such as Remeron or Marinol 2/2 chronically poor appetite/ PO intake  10) Pt is DNR/DNI, however TF IS NOT ADDRESSED IN MOLST   11) Confirm goals of care regarding nutrition support - Nutrition support is not recommended due to overall declining medical status which evidenced based studies indicate EN is not effective in prolonging survival and improving quality of life. It can also increase risk of aspiration pneumonia as well as other related issues.  RD will continue to monitor PO intake, labs, hydration, and wt prn.

## 2023-03-14 NOTE — CDI QUERY NOTE - NSCDIOTHERTXTBX_GEN_ALL_CORE_HH
Per Progress Note Adult-Hospitalist Attending 3/12:   " · Assessment	  1. Acute hypoxic resp failure : POA  2. Rt Pna:  r/o aspiration  On presentation to the ED, labs significant for leukocytosis (WBC 10.86), hypernatremia (Na 147), lactatemia (Lactate 4.5).   ICU consulted for hypotension in setting of severe sepsis. . Rejected by ICU."    H+P reveals : " * SOB elevated lactate  doubt PNA more likely CHF"    Per Critical care PA : " Patient stable for admission to Medicine for treatment of Sepsis secondary to Pneumonia "     On admission : WBC= 10.86  lactate= 4.5  Temp= 101.6  HR= 120  RR= 29  BP= 96/68 > 89/66    Please clarify the status of the Sepsis  - Sepsis, present on admission due to Pneumonia  - Sepsis, present on admission due to other source ( please clarify )  - No clinical indication of Sepsis, Sepsis ruled-out  - Other , please clarify
Per Progress Note Adult-Hospitalist Attending 3/12:   " · Assessment	  1. Acute hypoxic resp failure : POA  2. Rt Pna:  r/o aspiration  On presentation to the ED, labs significant for leukocytosis (WBC 10.86), hypernatremia (Na 147), lactatemia (Lactate 4.5).   ICU consulted for hypotension in setting of severe sepsis. . Rejected by ICU."    H+P reveals : " * SOB elevated lactate  doubt PNA more likely CHF"    Per Critical care PA : " Patient stable for admission to Medicine for treatment of Sepsis secondary to Pneumonia "     On admission : WBC= 10.86  lactate= 4.5  Temp= 101.6  HR= 120  RR= 29  BP= 96/68 > 89/66    Please clarify the status of the Sepsis  - Sepsis, present on admission due to Pneumonia  - Sepsis, present on admission due to other source ( please clarify )  - No clinical indication of Sepsis, Sepsis ruled-out  - Other , please clarify

## 2023-03-14 NOTE — PROGRESS NOTE ADULT - ASSESSMENT
Pt is a 87y old Male with hx of afib, chf, pacemaker, dementia and is combative. EMS reports nurses checked on patient and saw he was purple and placed him on 10LNC, gave an albuterol treatment and Lasix. As per EMS pt was noted to be hypotensive and unable to get access due to pt being combative. Pt came in by EMS on 15L Nonrebreather. Pt has MOLST form in chart. Palliative medicine consulted to help establish GOC and advance care planning     1) Acute Hypoxic respiratory failure   - PNA vs CHF exacerbation   - Supplemental O2 PRN   - Monitor O2     2) Dementia with agitation   - supportive care   - PPSV2: 20 %  - FAST: 7d  - long time resident of SNF  --maintain home meds.  has haldol PRN for severe agitation     Process of Care  --Reviewed dx/treatment problems and alignment with Goals of Care    Physical Aspects of Care  --Pain  pt appears comfortable at this time     --Dyspnea  No SOB at this time - resolving   pt appears comfortable and in NAD    --Nausea Vomiting  denies    --Weakness  PT as tolerated     Psychological and Psychiatric Aspects of Care:   --Greif/Bereavment: emotional support provided  -Pastoral Care Available PRN     Social Aspects of Care  -SW involved     Cultural Aspects  -Primary Language: English    Goals of Care:  plan is for SNF with hospice.     Ethical and Legal Aspects:   NA    Capacity- pt does not have capacity     HCP/Surrogate: HCP naming Louise (wife) on one content     Code Status- DNR/I   MOLST- in chart  Dispo Plan- SNF

## 2023-03-15 NOTE — PROGRESS NOTE ADULT - NUTRITIONAL ASSESSMENT
This patient has been assessed with a concern for Malnutrition and has been determined to have a diagnosis/diagnoses of Moderate protein-calorie malnutrition.    This patient is being managed with:   Diet Regular-  Lactose Restricted (Milk Sugar Intoler.)  Entered: Mar 12 2023 11:24AM    

## 2023-03-15 NOTE — PROGRESS NOTE ADULT - ASSESSMENT
Pt is a 87y old Male with hx of afib, chf, pacemaker, dementia and is combative. EMS reports nurses checked on patient and saw he was purple and placed him on 10LNC, gave an albuterol treatment and Lasix. As per EMS pt was noted to be hypotensive and unable to get access due to pt being combative. Pt came in by EMS on 15L Nonrebreather. Pt has MOLST form in chart. Palliative medicine consulted to help establish GOC and advance care planning     1) Acute Hypoxic respiratory failure   - PNA vs CHF exacerbation   - Supplemental O2 PRN   - Monitor O2     2) Dementia with agitation   - supportive care   - PPSV2: 20 %  - FAST: 7d  - long time resident of SNF  --maintain home meds.  has haldol PRN for severe agitation     Process of Care  --Reviewed dx/treatment problems and alignment with Goals of Care    Physical Aspects of Care  --Pain  pt appears comfortable at this time     --Dyspnea  No SOB at this time - resolving   pt appears comfortable and in NAD    --Nausea Vomiting  denies    --Weakness  PT as tolerated     Psychological and Psychiatric Aspects of Care:   --Greif/Bereavment: emotional support provided  -Pastoral Care Available PRN     Social Aspects of Care  -SW involved     Cultural Aspects  -Primary Language: English    Goals of Care:  plan is for SNF with hospice.     Ethical and Legal Aspects:   NA    Capacity- pt does not have capacity     HCP/Surrogate: HCP naming Louise (wife) on one content     Code Status- DNR/I   MOLST- in chart  Dispo Plan- SNF with hospice care      d.w floor SW

## 2023-03-16 NOTE — DISCHARGE NOTE PROVIDER - HOSPITAL COURSE
· Subjective and Objective:   86 y/o Male with PMH of CAD, Chronic Anemia, HTN, BPH, AFib (On Coumadin), GERD, CAMILA, Glaucoma, Depression, Dementia BIBA for shortness of breath. Per EMS, patient with desaturation event with associated shortness of breath in SNF. Transitioned to NRB with minimal improvement. Per staff, also increasingly altered, baseline AO x self. Unable to provide further history due to mental status.     On presentation to the ED, labs significant for leukocytosis (WBC 10.86), hypernatremia (Na 147), lactatemia (Lactate 4.5). Patient altered on admission, with associated hypoxemia. Transitioned to NRB with improvement in oxygenation.   Now on RA maintaining O2 >93%. CXR with B/L infiltrates. ICU consulted for hypotension in setting of severe sepsis.   -found to have sepsis due to Pna    3.15: tolerated diet, no agitation, no fever  3.16: doing well, tolerated diet, afebrile, confusion at baseline  incidentally Covid + today, no symptoms     ROS unable to obtain due to confusion     Vital Signs Last 24 Hrs  T(C): 36.4 (16 Mar 2023 12:18), Max: 37.1 (15 Mar 2023 21:48)  T(F): 97.5 (16 Mar 2023 12:18), Max: 98.8 (15 Mar 2023 21:48)  HR: 63 (16 Mar 2023 12:18) (60 - 63)  BP: 119/81 (16 Mar 2023 12:18) (93/54 - 119/81)  RR: 17 (16 Mar 2023 12:18) (16 - 18)  SpO2: 95% (16 Mar 2023 12:18) (92% - 95%)    Parameters below as of 16 Mar 2023 12:18  Patient On (Oxygen Delivery Method): room air        PHYSICAL EXAM:    GENERAL: NAD  HEENT:  NC/AT, EOMI, PERRLA, No scleral icterus, Moist mucous membranes  NECK: Supple, No JVD  CNS:  Alert & Oriented X3, Motor Strength 5/5 B/L upper and lower extremities; DTRs 2+ intact   LUNG: Normal Breath sounds, RLL rales   HEART: RRR; No murmurs, No rubs  ABDOMEN: +BS, ST/ND/NT  GENITOURINARY: Voiding, Bladder not distended  EXTREMITIES:  2+ Peripheral Pulses, No clubbing, No cyanosis, No tibial edema  MUSCULOSKELTAL: Joints normal ROM, No TTP, No effusion  SKIN: no rashes  RECTAL: deferred, not indicated  BREAST: deferred      · Assessment        1. Acute hypoxic resp failure : POA    2. Sepsis POA due to Rt Pna POA  unable to clinically determine type of Pna  c/w Zosyn IV day#6, change to Ceftin on dc x 5days  Blood Cx ngtd  Speech evaluation noted: assist w meals, regular texture diet    3. Chronic diastolic Chf:   no evidence of exacerbation   compensated  TTE noted : EF normal   unable to use diuretic due to hypotension, also seems dehydrated     4. Dementia resident at [facility] years:  Ativan and Haldol prn agitation     5. Chronic  AF: rate controlled  c/w Coumadin 3mg/day , f/u INR in 2days     6. AAA: vascular consult appreciated  CTA done  conservative management per vascular note

## 2023-03-16 NOTE — DISCHARGE NOTE NURSING/CASE MANAGEMENT/SOCIAL WORK - NSDCPEFALRISK_GEN_ALL_CORE
For information on Fall & Injury Prevention, visit: [email or web address] OR  [email or web address] OR  https://www.cdc.gov/steadi/patient.html

## 2023-03-16 NOTE — DISCHARGE NOTE PROVIDER - NSDCMRMEDTOKEN_GEN_ALL_CORE_FT
atorvastatin 20 mg oral tablet: 1 tab(s) orally once a day (at bedtime)  brimonidine 0.2% ophthalmic solution: 1 drop(s) in the left eye 2 times a day  busPIRone 10 mg oral tablet: 2 tab(s) orally 3 times a day  cefuroxime 250 mg oral tablet: 1 tab(s) orally 2 times a day   Coreg 3.125 mg oral tablet: 1 tab(s) orally 2 times a day, HOLD for SBP&lt;100  Coumadin 3 mg oral tablet: 1 tab(s) orally once a day (at bedtime) for 5 days  donepezil 10 mg oral tablet: 1 tab(s) orally once a day (at bedtime)  Dulcolax Laxative 10 mg rectal suppository: 1 suppository(ies) rectal once, As Needed -for constipation if no relief from MOM   escitalopram 10 mg oral tablet: 1 tab(s) orally once a day  LORazepam 0.5 mg oral tablet: 0.5 tab(s) orally 2 times a day  memantine 10 mg oral tablet: 1 tab(s) orally once a day  QUEtiapine 25 mg oral tablet: 1 tab(s) orally once a day (at bedtime)  Vitamin C 500 mg oral tablet: 1 tab(s) orally once a day

## 2023-03-16 NOTE — DISCHARGE NOTE NURSING/CASE MANAGEMENT/SOCIAL WORK - PATIENT PORTAL LINK FT
You can access the FollowMyHealth Patient Portal offered by [facility] by registering at the following website: [email or web address]. By joining [facility] FollowMyHealth portal, you will also be able to view your health information using other applications (apps) compatible with our system.

## 2023-03-16 NOTE — PROGRESS NOTE ADULT - REASON FOR ADMISSION
Patient BIBEMS from [facility] to the ED with c/o SOB. Patient has hx of afib, chf, pacemaker, dementia and is combative. EMS reports nurses checked on patient and saw he was purple and placed him on 10LNC, gave an albuterol treatment and Lasix. As per EMS pt was noted to be hypotensive and unable to get access due to pt being combative. Pt came in by EMS on 15L Nonrebreather. Pt has MOLST form in chart.
Patient BIBEMS from [facility] to the ED with c/o SOB. Patient has hx of afib, chf, pacemaker, dementia and is combative. EMS reports nurses checked on patient and saw he was purple and placed him on 10LNC, gave an albuterol treatment and Lasix. As per EMS pt was noted to be hypotensive and unable to get access due to pt being combative. Pt came in by EMS on 15L Nonrebreather. Pt has MOLST form in chart.
Patient BIBEMS from [facility] to the ED with c/o SOB. Patient has hx of afib, chf, pacemaker, dementia and is combative. EMS reports nurses checked on patient and saw he was purple and placed him on 10LNC, gave an albuterol treatment and Lasix. As per EMS pt was noted to be hypotensive and unable to get access due to pt being combative. Pt came in by EMS on 15L Nonrebreather. Pt has MOLST form in chart.
Patient BIBEMS from [facility] to the ED with c/o SOB. Patient has hx of afib, chf, pacemaker, dementia and is combative. EMS reports nurses checked on patient and saw he was purple and placed him on 10LNC, gave an albuterol treatment and Lasix. As per EMS pt was noted to be hypotensive and unable to get access due to pt being combative. Pt came in by EMS on 15L Nonrebreather. Pt has MOLST form in chart.
Patient BIBEMS from [facility] to the ED with c/o SOB. Patient has hx of afib, chf, pacemaker, dementia and is combative. EMS reports nurses checked on patient and saw he was purple and placed him on 10LNC, gave an albuterol treatment and Lasix. As per EMS pt was noted to be hypotensive and unable to get access due to pt being combative. Pt came in by EMS on 15L Nonrebreather. Pt has MOLST form in chart.
Patient BIBEMS from [facility] to the ED with c/o SOB. Patient has hx of afib, chf, pacemaker, dementia and is combative. EMS reports nurses checked on patient and saw he was purple and placed him on 10LNC, gave an albuterol treatment and Lasix. As per EMS pt was noted to be hypotensive and unable to get access due to pt being combative. Pt came in by EMS on 15L Nonrebreather. Pt has MOLST form in chart.
Patient BIBEMS from [facility] to the ED with c/o SOB. Patient has hx of afib, chf, pacemaker, dementia and is combative. EMS reports nurses checked on patient and saw he was purple and placed him on 10LNC, gave an albuterol treatment and Lasix. As per EMS pt was noted to be hypotensive and unable to get access due to pt being combative. Pt came in by EMS on 15L Nonrebreather. Pt has MOLST form in chart.
Patient BIBEMS from [facility] to the ED with c/o SOB. Patient has hx of afib, chf, pacemaker, dementia and is combative. EMS reports nurses checked on patient and saw he was purple and placed him on 10LNC, gave an albuterol treatment and Lasix. As per EMS pt was noted to be hypotensive and unable to get access due to pt being combative. Pt came in by EMS on 15L Nonrebreather. Pt has MOLST form in chart.
Patient BIBEMS from [facility] to the ED with c/o SOB. Patient has hx of afib, chf, pacemaker, dementia and is combative. EMS reports nurses checked on patient and saw he was purple and placed him on 10LNC, gave an albuterol treatment and Lasix. As per EMS pt was noted to be hypotensive and unable to get access due to pt being combative. Pt came in by EMS on 15L Nonrebreather. Pt has MOLST form in chart.

## 2023-03-16 NOTE — PROGRESS NOTE ADULT - SUBJECTIVE AND OBJECTIVE BOX
86 y/o Male with PMH of CAD, Chronic Anemia, HTN, BPH, AFib (On Coumadin), GERD, CAMILA, Glaucoma, Depression, Dementia BIBA for shortness of breath. Per EMS, patient with desaturation event with associated shortness of breath in SNF. Transitioned to NRB with minimal improvement. Per staff, also increasingly altered, baseline AO x self. Unable to provide further history due to mental status.     On presentation to the ED, labs significant for leukocytosis (WBC 10.86), hypernatremia (Na 147), lactatemia (Lactate 4.5). Patient altered on admission, with associated hypoxemia. Transitioned to NRB with improvement in oxygenation. Now on RA maintaining O2 >93%. CXR with B/L infiltrates. ICU consulted for hypotension in setting of severe sepsis. Rejected by ICU. Case d/w wife, remains hypotensive, Rechallange  with IVF and try Midodrine; pt is combative refused exam    3.11: no dyspnea today, tolerated diet  confused      REVIEW OF SYSTEMS:    CONSTITUTIONAL: No weakness, No fevers or chills  ENT: No ear ache, No sorethroat  NECK: No pain, No stiffness  RESPIRATORY: No cough, No wheezing, No hemoptysis; No dyspnea  CARDIOVASCULAR: No chest pain, No palpitations  GASTROINTESTINAL: No abd pain, No nausea, No vomiting, No hematemesis, No diarrhea or constipation. No melena, No hematochezia.  GENITOURINARY: No dysuria, No  hematuria  NEUROLOGICAL: No diplopia, No paresthesia, No motor dysfunction  MUSCULOSKELETAL: No arthralgia, No myalgia  SKIN: No rashes, or lesions   PSYCH: no anxiety, no suicidal ideation    All other review of systems is negative unless indicated above    Vital Signs Last 24 Hrs  T(C): 36.7 (11 Mar 2023 15:23), Max: 36.7 (11 Mar 2023 15:23)  T(F): 98.1 (11 Mar 2023 15:23), Max: 98.1 (11 Mar 2023 15:23)  HR: 81 (11 Mar 2023 15:23) (61 - 101)  BP: 104/70 (11 Mar 2023 15:23) (104/70 - 117/80)  RR: 18 (11 Mar 2023 15:23) (18 - 18)  SpO2: 97% (11 Mar 2023 15:23) (92% - 97%)    Parameters below as of 11 Mar 2023 15:23  Patient On (Oxygen Delivery Method): room air        PHYSICAL EXAM:    GENERAL: NAD  HEENT:  NC/AT, EOMI, PERRLA, No scleral icterus, Moist mucous membranes  NECK: Supple, No JVD  CNS:  Alert & Oriented X3, Motor Strength 5/5 B/L upper and lower extremities; DTRs 2+ intact   LUNG: Normal Breath sounds, Clear to auscultation bilaterally, No rales, No rhonchi, No wheezing  HEART: RRR; No murmurs, No rubs  ABDOMEN: +BS, ST/ND/NT  GENITOURINARY: Voiding, Bladder not distended  EXTREMITIES:  2+ Peripheral Pulses, No clubbing, No cyanosis, No tibial edema  MUSCULOSKELTAL: Joints normal ROM, No TTP, No effusion  SKIN: no rashes  RECTAL: deferred, not indicated  BREAST: deferred                          11.8   14.98 )-----------( 153      ( 11 Mar 2023 06:25 )             35.7     03-11    147<H>  |  119<H>  |  29<H>  ----------------------------<  102<H>  3.9   |  23  |  0.90    Ca    8.9      11 Mar 2023 06:25    TPro  6.7  /  Alb  2.9<L>  /  TBili  0.8  /  DBili  x   /  AST  17  /  ALT  18  /  AlkPhos  83  03-10    Vancomycin levels:   Cultures:     MEDICATIONS  (STANDING):  atorvastatin 20 milliGRAM(s) Oral at bedtime  brimonidine 0.2% Ophthalmic Solution 1 Drop(s) Left EYE two times a day  busPIRone 20 milliGRAM(s) Oral three times a day  carvedilol 3.125 milliGRAM(s) Oral every 12 hours  donepezil 10 milliGRAM(s) Oral at bedtime  escitalopram 10 milliGRAM(s) Oral daily  LORazepam     Tablet 0.5 milliGRAM(s) Oral two times a day  memantine 10 milliGRAM(s) Oral daily  midodrine. 10 milliGRAM(s) Oral three times a day    MEDICATIONS  (PRN):  acetaminophen     Tablet .. 650 milliGRAM(s) Oral every 6 hours PRN Temp greater or equal to 38C (100.4F), Mild Pain (1 - 3)  aluminum hydroxide/magnesium hydroxide/simethicone Suspension 30 milliLiter(s) Oral every 4 hours PRN Dyspepsia  haloperidol    Injectable 1 milliGRAM(s) IntraMuscular every 6 hours PRN Agitation  melatonin 3 milliGRAM(s) Oral at bedtime PRN Insomnia  ondansetron Injectable 4 milliGRAM(s) IV Push every 8 hours PRN Nausea and/or Vomiting      all labs reviewed  all imaging reviewed        · Assessment          1. Acute hypoxic resp failure : POA    2. Rt Pna:  r/o aspiration  c/w Zosyn IV  Speech evaluation to r/o dysphagia     3. r/o chronic Chf:  BNP elevated  obtain TTE  unable to use diuretic due to hypotension, also seems dehydrated     4. Dementia resident at NHx3 years    5. Chronic  AF- INR>3 Coumadin was not reordered; daily INR      
88 y/o Male with PMH of CAD, Chronic Anemia, HTN, BPH, AFib (On Coumadin), GERD, CAMILA, Glaucoma, Depression, Dementia BIBA for shortness of breath. Per EMS, patient with desaturation event with associated shortness of breath in SNF. Transitioned to NRB with minimal improvement. Per staff, also increasingly altered, baseline AO x self. Unable to provide further history due to mental status.     On presentation to the ED, labs significant for leukocytosis (WBC 10.86), hypernatremia (Na 147), lactatemia (Lactate 4.5). Patient altered on admission, with associated hypoxemia. Transitioned to NRB with improvement in oxygenation.   Now on RA maintaining O2 >93%. CXR with B/L infiltrates. ICU consulted for hypotension in setting of severe sepsis.   -found to have sepsis due to Pna    3.11: no dyspnea today, tolerated diet  confused  3.12: agitated at times, confused   3.13: intermittent agitation, confused    3.14: not agitated this am, tolerated diet, no dyspnea, confused  3.15: tolerated diet, no agitation, no fever    ROS unable to obtain due to confusion     Vital Signs Last 24 Hrs  T(C): 36.9 (15 Mar 2023 16:05), Max: 36.9 (15 Mar 2023 16:05)  T(F): 98.4 (15 Mar 2023 16:05), Max: 98.4 (15 Mar 2023 16:05)  HR: 60 (15 Mar 2023 16:05) (60 - 70)  BP: 93/54 (15 Mar 2023 16:05) (93/54 - 108/78)  RR: 18 (15 Mar 2023 16:05) (18 - 18)  SpO2: 92% (15 Mar 2023 16:05) (92% - 95%)    Parameters below as of 15 Mar 2023 16:05  Patient On (Oxygen Delivery Method): room air        PHYSICAL EXAM:    GENERAL: NAD  HEENT:  NC/AT, EOMI, PERRLA, No scleral icterus, Moist mucous membranes  NECK: Supple, No JVD  CNS:  Alert & Oriented X3, Motor Strength 5/5 B/L upper and lower extremities; DTRs 2+ intact   LUNG: Normal Breath sounds, RLL rales   HEART: RRR; No murmurs, No rubs  ABDOMEN: +BS, ST/ND/NT  GENITOURINARY: Voiding, Bladder not distended  EXTREMITIES:  2+ Peripheral Pulses, No clubbing, No cyanosis, No tibial edema  MUSCULOSKELTAL: Joints normal ROM, No TTP, No effusion  SKIN: no rashes  RECTAL: deferred, not indicated  BREAST: deferred                          11.8   14.98 )-----------( 153      ( 11 Mar 2023 06:25 )             35.7     03-11    147<H>  |  119<H>  |  29<H>  ----------------------------<  102<H>  3.9   |  23  |  0.90    Ca    8.9      11 Mar 2023 06:25    TPro  6.7  /  Alb  2.9<L>  /  TBili  0.8  /  DBili  x   /  AST  17  /  ALT  18  /  AlkPhos  83  03-10    Vancomycin levels:   Cultures:     MEDICATIONS  (STANDING):  atorvastatin 20 milliGRAM(s) Oral at bedtime  brimonidine 0.2% Ophthalmic Solution 1 Drop(s) Left EYE two times a day  busPIRone 20 milliGRAM(s) Oral three times a day  carvedilol 3.125 milliGRAM(s) Oral every 12 hours  donepezil 10 milliGRAM(s) Oral at bedtime  escitalopram 10 milliGRAM(s) Oral daily  LORazepam     Tablet 0.5 milliGRAM(s) Oral two times a day  memantine 10 milliGRAM(s) Oral daily  midodrine. 10 milliGRAM(s) Oral three times a day  piperacillin/tazobactam IVPB.. 3.375 Gram(s) IV Intermittent every 8 hours  QUEtiapine 25 milliGRAM(s) Oral at bedtime        all labs reviewed  all imaging reviewed        · Assessment	      1. Acute hypoxic resp failure : POA    2. Sepsis POA due to Rt Pna POA  unable to clinically determine type of Pna  c/w Zosyn IV day#5/5, change to Ceftin on dc  Blood Cx ngtd  Speech evaluation noted: assist w meals, regular texture diet    3. Chronic diastolic Chf:   no evidence of exacerbation   compensated  TTE noted   unable to use diuretic due to hypotension, also seems dehydrated     4. Dementia resident at NHx3 years:  Ativan and Haldol prn agitation     5. Chronic  AF: rate controlled  will check INR today    6. AAA: vascular  to comment on size of aneurysm and if we should c/w Coumadin anticoag  CTA done  conservative management per vascular note  
88 y/o Male with PMH of CAD, Chronic Anemia, HTN, BPH, AFib (On Coumadin), GERD, CAMILA, Glaucoma, Depression, Dementia BIBA for shortness of breath. Per EMS, patient with desaturation event with associated shortness of breath in SNF. Transitioned to NRB with minimal improvement. Per staff, also increasingly altered, baseline AO x self. Unable to provide further history due to mental status.     On presentation to the ED, labs significant for leukocytosis (WBC 10.86), hypernatremia (Na 147), lactatemia (Lactate 4.5). Patient altered on admission, with associated hypoxemia. Transitioned to NRB with improvement in oxygenation. Now on RA maintaining O2 >93%. CXR with B/L infiltrates. ICU consulted for hypotension in setting of severe sepsis. Rejected by ICU. Case d/w wife, remains hypotensive, Rechallange  with IVF and try Midodrine; pt is combative refused exam    3.11: no dyspnea today, tolerated diet  confused  3.12: agitated at times, confused   3.13: intermittent agitation, confused      ROS unable to obtain due to confusion     Vital Signs Last 24 Hrs  T(C): 36.9 (13 Mar 2023 07:43), Max: 37.2 (12 Mar 2023 21:00)  T(F): 98.4 (13 Mar 2023 07:43), Max: 99 (12 Mar 2023 21:00)  HR: 76 (13 Mar 2023 07:43) (61 - 76)  BP: 107/77 (13 Mar 2023 07:43) (91/68 - 116/83)  BP(mean): --  RR: 18 (13 Mar 2023 07:43) (18 - 18)  SpO2: 93% (13 Mar 2023 07:43) (93% - 95%)    Parameters below as of 13 Mar 2023 07:43  Patient On (Oxygen Delivery Method): room air          PHYSICAL EXAM:    GENERAL: NAD  HEENT:  NC/AT, EOMI, PERRLA, No scleral icterus, Moist mucous membranes  NECK: Supple, No JVD  CNS:  Alert & Oriented X3, Motor Strength 5/5 B/L upper and lower extremities; DTRs 2+ intact   LUNG: Normal Breath sounds, RLL rales   HEART: RRR; No murmurs, No rubs  ABDOMEN: +BS, ST/ND/NT  GENITOURINARY: Voiding, Bladder not distended  EXTREMITIES:  2+ Peripheral Pulses, No clubbing, No cyanosis, No tibial edema  MUSCULOSKELTAL: Joints normal ROM, No TTP, No effusion  SKIN: no rashes  RECTAL: deferred, not indicated  BREAST: deferred                          11.8   14.98 )-----------( 153      ( 11 Mar 2023 06:25 )             35.7     03-11    147<H>  |  119<H>  |  29<H>  ----------------------------<  102<H>  3.9   |  23  |  0.90    Ca    8.9      11 Mar 2023 06:25    TPro  6.7  /  Alb  2.9<L>  /  TBili  0.8  /  DBili  x   /  AST  17  /  ALT  18  /  AlkPhos  83  03-10    Vancomycin levels:   Cultures:     MEDICATIONS  (STANDING):  atorvastatin 20 milliGRAM(s) Oral at bedtime  brimonidine 0.2% Ophthalmic Solution 1 Drop(s) Left EYE two times a day  busPIRone 20 milliGRAM(s) Oral three times a day  carvedilol 3.125 milliGRAM(s) Oral every 12 hours  donepezil 10 milliGRAM(s) Oral at bedtime  escitalopram 10 milliGRAM(s) Oral daily  LORazepam     Tablet 0.5 milliGRAM(s) Oral two times a day  memantine 10 milliGRAM(s) Oral daily  midodrine. 10 milliGRAM(s) Oral three times a day  piperacillin/tazobactam IVPB.. 3.375 Gram(s) IV Intermittent every 8 hours  QUEtiapine 25 milliGRAM(s) Oral at bedtime        all labs reviewed  all imaging reviewed        · Assessment	      1. Acute hypoxic resp failure : POA    2. Rt Pna POA, unable to clinically determine type of Pna  c/w Zosyn IV day#3  Speech evaluation noted: assist w meals, regular texture diet    3. Chronic diastolic Chf:   no evidence of exacerbation   compensated  TTE noted   unable to use diuretic due to hypotension, also seems dehydrated     4. Dementia resident at NHx3 years:  Ativan and Haldol prn agitation     5. Chronic  AF: rate controlled  will check INR today    6. AAA: vascular  to comment on size of aneurysm and if we should c/w Coumadin anticoag  CTA done  
Subjective:  seen at bedside this morning, pt is sleeping.  minimally arousable, gets agitated when PE is attempted.   no adverse events noted on chart review.    pt is now Comfort measures, pending return to SNF with hospice care.     Review of Systems:  Unable to obtain/Limited due to: dementia/agitation        PHYSICAL EXAM:    Vital Signs Last 24 Hrs  T(C): 36.4 (14 Mar 2023 07:25), Max: 37 (13 Mar 2023 16:18)  T(F): 97.5 (14 Mar 2023 07:25), Max: 98.6 (13 Mar 2023 16:18)  HR: 73 (14 Mar 2023 11:22) (63 - 73)  BP: 109/84 (14 Mar 2023 11:22) (103/71 - 117/81)  BP(mean): --  RR: 18 (14 Mar 2023 07:25) (18 - 19)  SpO2: 93% (14 Mar 2023 07:25) (93% - 96%)    Parameters below as of 14 Mar 2023 07:25  Patient On (Oxygen Delivery Method): room air      Daily     Daily Weight in k.7 (14 Mar 2023 06:17)        General:  - GENERAL: arousable, not oriented. No acute distress.  frail appearing  - EYES: Anicteric.   - HENT: Moist mucous membranes.   - LUNGS: No accessory muscle use. would not allow auscultation.   - CARDIOVASCULAR: Regular rate and rhythm. No murmur. No JVD.   - ABDOMEN: pt would not allow exam   - EXTREMITIES:  sarcopenia  - SKIN:  no rashes or lesions on visible skin.   - PSYCHIATRIC: Not Cooperative.       LABS:                        12.9   8.30  )-----------( 192      ( 14 Mar 2023 07:01 )             38.5     03-14    140  |  111<H>  |  22  ----------------------------<  96  3.9   |  24  |  0.91    Ca    9.1      14 Mar 2023 07:01      PT/INR - ( 14 Mar 2023 07:01 )   PT: 18.8 sec;   INR: 1.61 ratio           Albumin: Albumin, Serum: 2.9 g/dL (03-10 @ 03:34)      Allergies    No Known Allergies    Intolerances      MEDICATIONS  (STANDING):  atorvastatin 20 milliGRAM(s) Oral at bedtime  brimonidine 0.2% Ophthalmic Solution 1 Drop(s) Left EYE two times a day  busPIRone 20 milliGRAM(s) Oral three times a day  carvedilol 3.125 milliGRAM(s) Oral every 12 hours  donepezil 10 milliGRAM(s) Oral at bedtime  escitalopram 10 milliGRAM(s) Oral daily  LORazepam     Tablet 0.5 milliGRAM(s) Oral two times a day  memantine 10 milliGRAM(s) Oral daily  piperacillin/tazobactam IVPB.. 3.375 Gram(s) IV Intermittent every 8 hours  QUEtiapine 25 milliGRAM(s) Oral at bedtime    MEDICATIONS  (PRN):  acetaminophen     Tablet .. 650 milliGRAM(s) Oral every 6 hours PRN Temp greater or equal to 38C (100.4F), Mild Pain (1 - 3)  aluminum hydroxide/magnesium hydroxide/simethicone Suspension 30 milliLiter(s) Oral every 4 hours PRN Dyspepsia  haloperidol    Injectable 2 milliGRAM(s) IntraMuscular every 6 hours PRN Agitation  melatonin 3 milliGRAM(s) Oral at bedtime PRN Insomnia  ondansetron Injectable 4 milliGRAM(s) IV Push every 8 hours PRN Nausea and/or Vomiting      RADIOLOGY:  
Subjective:  seen at bedside this morning, pt sleeping.  does not wake to verbal stimuli.  as he can be very agitated when woken, I did not do so.  pt was scheduled for d/c, however, tested positive for COVID and delay is now held until appropriate room can be found at facility.     Review of Systems:  Unable to obtain/Limited due to: sleeping/dementia        PHYSICAL EXAM:    Vital Signs Last 24 Hrs  T(C): 36.4 (16 Mar 2023 12:18), Max: 37.1 (15 Mar 2023 21:48)  T(F): 97.5 (16 Mar 2023 12:18), Max: 98.8 (15 Mar 2023 21:48)  HR: 63 (16 Mar 2023 12:18) (60 - 63)  BP: 119/81 (16 Mar 2023 12:18) (93/54 - 119/81)  BP(mean): --  RR: 17 (16 Mar 2023 12:18) (16 - 18)  SpO2: 95% (16 Mar 2023 12:18) (92% - 95%)    Parameters below as of 16 Mar 2023 12:18  Patient On (Oxygen Delivery Method): room air    General:  - GENERAL: No acute distress. frail appearing  - LUNGS: No accessory muscle use.   - EXTREMITIES: sarcopenia  - SKIN: No rashes or lesions on visible skin.       LABS:          PT/INR - ( 16 Mar 2023 10:49 )   PT: 14.3 sec;   INR: 1.23 ratio           Albumin: Albumin, Serum: 2.9 g/dL (03-10 @ 03:34)      Allergies    No Known Allergies    Intolerances      MEDICATIONS  (STANDING):  atorvastatin 20 milliGRAM(s) Oral at bedtime  brimonidine 0.2% Ophthalmic Solution 1 Drop(s) Left EYE two times a day  busPIRone 20 milliGRAM(s) Oral three times a day  carvedilol 3.125 milliGRAM(s) Oral every 12 hours  donepezil 10 milliGRAM(s) Oral at bedtime  escitalopram 10 milliGRAM(s) Oral daily  LORazepam     Tablet 0.5 milliGRAM(s) Oral two times a day  memantine 10 milliGRAM(s) Oral daily  piperacillin/tazobactam IVPB.. 3.375 Gram(s) IV Intermittent every 8 hours  QUEtiapine 25 milliGRAM(s) Oral at bedtime  warfarin 5 milliGRAM(s) Oral daily    MEDICATIONS  (PRN):  acetaminophen     Tablet .. 650 milliGRAM(s) Oral every 6 hours PRN Temp greater or equal to 38C (100.4F), Mild Pain (1 - 3)  aluminum hydroxide/magnesium hydroxide/simethicone Suspension 30 milliLiter(s) Oral every 4 hours PRN Dyspepsia  haloperidol    Injectable 2 milliGRAM(s) IntraMuscular every 6 hours PRN Agitation  melatonin 3 milliGRAM(s) Oral at bedtime PRN Insomnia  ondansetron Injectable 4 milliGRAM(s) IV Push every 8 hours PRN Nausea and/or Vomiting      RADIOLOGY:  
86 y/o Male with PMH of CAD, Chronic Anemia, HTN, BPH, AFib (On Coumadin), GERD, CAMLIA, Glaucoma, Depression, Dementia BIBA for shortness of breath. Per EMS, patient with desaturation event with associated shortness of breath in SNF. Transitioned to NRB with minimal improvement. Per staff, also increasingly altered, baseline AO x self. Unable to provide further history due to mental status.     On presentation to the ED, labs significant for leukocytosis (WBC 10.86), hypernatremia (Na 147), lactatemia (Lactate 4.5). Patient altered on admission, with associated hypoxemia. Transitioned to NRB with improvement in oxygenation. Now on RA maintaining O2 >93%. CXR with B/L infiltrates. ICU consulted for hypotension in setting of severe sepsis. Rejected by ICU. Case d/w wife, remains hypotensive, Rechallange  with IVF and try Midodrine; pt is combative refused exam    3.11: no dyspnea today, tolerated diet  confused  3.12: agitated at times, confused     ROS unable to obtain due to confusion     Vital Signs Last 24 Hrs  T(C): 36.7 (11 Mar 2023 15:23), Max: 36.7 (11 Mar 2023 15:23)  T(F): 98.1 (11 Mar 2023 15:23), Max: 98.1 (11 Mar 2023 15:23)  HR: 81 (11 Mar 2023 15:23) (61 - 101)  BP: 104/70 (11 Mar 2023 15:23) (104/70 - 117/80)  RR: 18 (11 Mar 2023 15:23) (18 - 18)  SpO2: 97% (11 Mar 2023 15:23) (92% - 97%)    Parameters below as of 11 Mar 2023 15:23  Patient On (Oxygen Delivery Method): room air        PHYSICAL EXAM:    GENERAL: NAD  HEENT:  NC/AT, EOMI, PERRLA, No scleral icterus, Moist mucous membranes  NECK: Supple, No JVD  CNS:  Alert & Oriented X3, Motor Strength 5/5 B/L upper and lower extremities; DTRs 2+ intact   LUNG: Normal Breath sounds, Clear to auscultation bilaterally, No rales, No rhonchi, No wheezing  HEART: RRR; No murmurs, No rubs  ABDOMEN: +BS, ST/ND/NT  GENITOURINARY: Voiding, Bladder not distended  EXTREMITIES:  2+ Peripheral Pulses, No clubbing, No cyanosis, No tibial edema  MUSCULOSKELTAL: Joints normal ROM, No TTP, No effusion  SKIN: no rashes  RECTAL: deferred, not indicated  BREAST: deferred                          11.8   14.98 )-----------( 153      ( 11 Mar 2023 06:25 )             35.7     03-11    147<H>  |  119<H>  |  29<H>  ----------------------------<  102<H>  3.9   |  23  |  0.90    Ca    8.9      11 Mar 2023 06:25    TPro  6.7  /  Alb  2.9<L>  /  TBili  0.8  /  DBili  x   /  AST  17  /  ALT  18  /  AlkPhos  83  03-10    Vancomycin levels:   Cultures:     MEDICATIONS  (STANDING):  atorvastatin 20 milliGRAM(s) Oral at bedtime  brimonidine 0.2% Ophthalmic Solution 1 Drop(s) Left EYE two times a day  busPIRone 20 milliGRAM(s) Oral three times a day  carvedilol 3.125 milliGRAM(s) Oral every 12 hours  donepezil 10 milliGRAM(s) Oral at bedtime  escitalopram 10 milliGRAM(s) Oral daily  LORazepam     Tablet 0.5 milliGRAM(s) Oral two times a day  memantine 10 milliGRAM(s) Oral daily  midodrine. 10 milliGRAM(s) Oral three times a day    MEDICATIONS  (PRN):  acetaminophen     Tablet .. 650 milliGRAM(s) Oral every 6 hours PRN Temp greater or equal to 38C (100.4F), Mild Pain (1 - 3)  aluminum hydroxide/magnesium hydroxide/simethicone Suspension 30 milliLiter(s) Oral every 4 hours PRN Dyspepsia  haloperidol    Injectable 1 milliGRAM(s) IntraMuscular every 6 hours PRN Agitation  melatonin 3 milliGRAM(s) Oral at bedtime PRN Insomnia  ondansetron Injectable 4 milliGRAM(s) IV Push every 8 hours PRN Nausea and/or Vomiting      all labs reviewed  all imaging reviewed        · Assessment	      1. Acute hypoxic resp failure : POA    2. Rt Pna:  r/o aspiration  c/w Zosyn IV  Speech evaluation to r/o dysphagia     3. r/o chronic Chf:  BNP elevated  obtain TTE  unable to use diuretic due to hypotension, also seems dehydrated     4. Dementia resident at NHx3 years:  Ativan and Haldol prn agitation     5. Chronic  AF- INR>3 Coumadin was not reordered; daily INR    6. AAA: vascular evaluation     
Subjective:  seen at bedside this morning, pt is sleeping. as he was agitated when I woke him yesterday, I chose to allow him to continue sleeping  per chart review, no adverse overnight events  d/w SW -- pt has been accepted to hospice and plan is to return to SNF with Hospice care.     Review of Systems:  Unable to obtain/Limited due to: dementia/agitation        PHYSICAL EXAM:    Vital Signs Last 24 Hrs  T(C): 36.4 (14 Mar 2023 07:25), Max: 37 (13 Mar 2023 16:18)  T(F): 97.5 (14 Mar 2023 07:25), Max: 98.6 (13 Mar 2023 16:18)  HR: 73 (14 Mar 2023 11:22) (63 - 73)  BP: 109/84 (14 Mar 2023 11:22) (103/71 - 117/81)  BP(mean): --  RR: 18 (14 Mar 2023 07:25) (18 - 19)  SpO2: 93% (14 Mar 2023 07:25) (93% - 96%)    Parameters below as of 14 Mar 2023 07:25  Patient On (Oxygen Delivery Method): room air      Daily     Daily Weight in k.7 (14 Mar 2023 06:17)        General:  - GENERAL:  sleeping. No acute distress.  frail appearing  - EYES: deferred  - LUNGS: No accessory muscle use. breathing comfortably  - EXTREMITIES:  sarcopenia  - SKIN:  no rashes or lesions on visible skin.       LABS:                        12.9   8.30  )-----------( 192      ( 14 Mar 2023 07:01 )             38.5     03-14    140  |  111<H>  |  22  ----------------------------<  96  3.9   |  24  |  0.91    Ca    9.1      14 Mar 2023 07:01      PT/INR - ( 14 Mar 2023 07:01 )   PT: 18.8 sec;   INR: 1.61 ratio           Albumin: Albumin, Serum: 2.9 g/dL (03-10 @ 03:34)      Allergies    No Known Allergies    Intolerances      MEDICATIONS  (STANDING):  atorvastatin 20 milliGRAM(s) Oral at bedtime  brimonidine 0.2% Ophthalmic Solution 1 Drop(s) Left EYE two times a day  busPIRone 20 milliGRAM(s) Oral three times a day  carvedilol 3.125 milliGRAM(s) Oral every 12 hours  donepezil 10 milliGRAM(s) Oral at bedtime  escitalopram 10 milliGRAM(s) Oral daily  LORazepam     Tablet 0.5 milliGRAM(s) Oral two times a day  memantine 10 milliGRAM(s) Oral daily  piperacillin/tazobactam IVPB.. 3.375 Gram(s) IV Intermittent every 8 hours  QUEtiapine 25 milliGRAM(s) Oral at bedtime    MEDICATIONS  (PRN):  acetaminophen     Tablet .. 650 milliGRAM(s) Oral every 6 hours PRN Temp greater or equal to 38C (100.4F), Mild Pain (1 - 3)  aluminum hydroxide/magnesium hydroxide/simethicone Suspension 30 milliLiter(s) Oral every 4 hours PRN Dyspepsia  haloperidol    Injectable 2 milliGRAM(s) IntraMuscular every 6 hours PRN Agitation  melatonin 3 milliGRAM(s) Oral at bedtime PRN Insomnia  ondansetron Injectable 4 milliGRAM(s) IV Push every 8 hours PRN Nausea and/or Vomiting      RADIOLOGY:  
88 y/o Male with PMH of CAD, Chronic Anemia, HTN, BPH, AFib (On Coumadin), GERD, CAMILA, Glaucoma, Depression, Dementia BIBA for shortness of breath. Per EMS, patient with desaturation event with associated shortness of breath in SNF. Transitioned to NRB with minimal improvement. Per staff, also increasingly altered, baseline AO x self. Unable to provide further history due to mental status.     On presentation to the ED, labs significant for leukocytosis (WBC 10.86), hypernatremia (Na 147), lactatemia (Lactate 4.5). Patient altered on admission, with associated hypoxemia. Transitioned to NRB with improvement in oxygenation.   Now on RA maintaining O2 >93%. CXR with B/L infiltrates. ICU consulted for hypotension in setting of severe sepsis.   -found to have sepsis due to Pna    3.11: no dyspnea today, tolerated diet  confused  3.12: agitated at times, confused   3.13: intermittent agitation, confused    3.14: not agitated this am, tolerated diet, no dyspnea, confused    ROS unable to obtain due to confusion     Vital Signs Last 24 Hrs  T(C): 36.9 (13 Mar 2023 07:43), Max: 37.2 (12 Mar 2023 21:00)  T(F): 98.4 (13 Mar 2023 07:43), Max: 99 (12 Mar 2023 21:00)  HR: 76 (13 Mar 2023 07:43) (61 - 76)  BP: 107/77 (13 Mar 2023 07:43) (91/68 - 116/83)  BP(mean): --  RR: 18 (13 Mar 2023 07:43) (18 - 18)  SpO2: 93% (13 Mar 2023 07:43) (93% - 95%)    Parameters below as of 13 Mar 2023 07:43  Patient On (Oxygen Delivery Method): room air          PHYSICAL EXAM:    GENERAL: NAD  HEENT:  NC/AT, EOMI, PERRLA, No scleral icterus, Moist mucous membranes  NECK: Supple, No JVD  CNS:  Alert & Oriented X3, Motor Strength 5/5 B/L upper and lower extremities; DTRs 2+ intact   LUNG: Normal Breath sounds, RLL rales   HEART: RRR; No murmurs, No rubs  ABDOMEN: +BS, ST/ND/NT  GENITOURINARY: Voiding, Bladder not distended  EXTREMITIES:  2+ Peripheral Pulses, No clubbing, No cyanosis, No tibial edema  MUSCULOSKELTAL: Joints normal ROM, No TTP, No effusion  SKIN: no rashes  RECTAL: deferred, not indicated  BREAST: deferred                          11.8   14.98 )-----------( 153      ( 11 Mar 2023 06:25 )             35.7     03-11    147<H>  |  119<H>  |  29<H>  ----------------------------<  102<H>  3.9   |  23  |  0.90    Ca    8.9      11 Mar 2023 06:25    TPro  6.7  /  Alb  2.9<L>  /  TBili  0.8  /  DBili  x   /  AST  17  /  ALT  18  /  AlkPhos  83  03-10    Vancomycin levels:   Cultures:     MEDICATIONS  (STANDING):  atorvastatin 20 milliGRAM(s) Oral at bedtime  brimonidine 0.2% Ophthalmic Solution 1 Drop(s) Left EYE two times a day  busPIRone 20 milliGRAM(s) Oral three times a day  carvedilol 3.125 milliGRAM(s) Oral every 12 hours  donepezil 10 milliGRAM(s) Oral at bedtime  escitalopram 10 milliGRAM(s) Oral daily  LORazepam     Tablet 0.5 milliGRAM(s) Oral two times a day  memantine 10 milliGRAM(s) Oral daily  midodrine. 10 milliGRAM(s) Oral three times a day  piperacillin/tazobactam IVPB.. 3.375 Gram(s) IV Intermittent every 8 hours  QUEtiapine 25 milliGRAM(s) Oral at bedtime        all labs reviewed  all imaging reviewed        · Assessment	      1. Acute hypoxic resp failure : POA    2. Sepsis POA due to Rt Pna POA  unable to clinically determine type of Pna  c/w Zosyn IV day#4/5  Blood Cx ngtd  Speech evaluation noted: assist w meals, regular texture diet    3. Chronic diastolic Chf:   no evidence of exacerbation   compensated  TTE noted   unable to use diuretic due to hypotension, also seems dehydrated     4. Dementia resident at [facility] years:  Ativan and Haldol prn agitation     5. Chronic  AF: rate controlled  will check INR today    6. AAA: vascular  to comment on size of aneurysm and if we should c/w Coumadin anticoag  CTA done  
SURGERY DAILY PROGRESS NOTE:     Subjective:  Patient seen and examined at bedside during am rounds. Non-conversant. AVSS. Denies any fevers, chills, n/v/d, chest pain or shortness of breath    Objective:  Vital Signs Last 24 Hrs  T(C): 37.2 (12 Mar 2023 21:00), Max: 37.2 (12 Mar 2023 21:00)  T(F): 99 (12 Mar 2023 21:00), Max: 99 (12 Mar 2023 21:00)  HR: 61 (13 Mar 2023 05:25) (61 - 71)  BP: 103/61 (13 Mar 2023 05:25) (91/68 - 116/83)  BP(mean): --  RR: 18 (12 Mar 2023 21:00) (18 - 18)  SpO2: 94% (12 Mar 2023 21:00) (94% - 97%)    Parameters below as of 12 Mar 2023 21:00  Patient On (Oxygen Delivery Method): room air        PHYSICAL EXAM   Gen: well-appearing, in no acute distress  CV: pulse regularly present   Resp: airway patent, non-labored breathing  Abd: soft, NTND; no rebound or guarding.   Extremities: WWP, normal strength, no clubbing/cyanosis/edema  Neuro: non-cooperative with exam  Pulses: palpable distal pulses          MEDICATIONS  (STANDING):  atorvastatin 20 milliGRAM(s) Oral at bedtime  brimonidine 0.2% Ophthalmic Solution 1 Drop(s) Left EYE two times a day  busPIRone 20 milliGRAM(s) Oral three times a day  carvedilol 3.125 milliGRAM(s) Oral every 12 hours  donepezil 10 milliGRAM(s) Oral at bedtime  escitalopram 10 milliGRAM(s) Oral daily  LORazepam     Tablet 0.5 milliGRAM(s) Oral two times a day  memantine 10 milliGRAM(s) Oral daily  midodrine. 10 milliGRAM(s) Oral three times a day  piperacillin/tazobactam IVPB.. 3.375 Gram(s) IV Intermittent every 8 hours  QUEtiapine 25 milliGRAM(s) Oral at bedtime    MEDICATIONS  (PRN):  acetaminophen     Tablet .. 650 milliGRAM(s) Oral every 6 hours PRN Temp greater or equal to 38C (100.4F), Mild Pain (1 - 3)  aluminum hydroxide/magnesium hydroxide/simethicone Suspension 30 milliLiter(s) Oral every 4 hours PRN Dyspepsia  haloperidol    Injectable 2 milliGRAM(s) IntraMuscular every 6 hours PRN Agitation  melatonin 3 milliGRAM(s) Oral at bedtime PRN Insomnia  ondansetron Injectable 4 milliGRAM(s) IV Push every 8 hours PRN Nausea and/or Vomiting

## 2023-03-16 NOTE — PROGRESS NOTE ADULT - PROVIDER SPECIALTY LIST ADULT
Palliative Care
Palliative Care
Hospitalist
Vascular Surgery
Hospitalist
Palliative Care

## 2023-03-16 NOTE — PROGRESS NOTE ADULT - ASSESSMENT
Pt is a 87y old Male with hx of afib, chf, pacemaker, dementia and is combative. EMS reports nurses checked on patient and saw he was purple and placed him on 10LNC, gave an albuterol treatment and Lasix. As per EMS pt was noted to be hypotensive and unable to get access due to pt being combative. Pt came in by EMS on 15L Nonrebreather. Pt has MOLST form in chart. Palliative medicine consulted to help establish GOC and advance care planning     1) Acute Hypoxic respiratory failure   - PNA vs CHF exacerbation   - Supplemental O2 PRN   - Monitor O2   -COVID Isolation    2) Dementia with agitation   - supportive care   - PPSV2: 20 %  - FAST: 7d  - long time resident of SNF  --maintain home meds.  has haldol PRN for severe agitation     Process of Care  --Reviewed dx/treatment problems and alignment with Goals of Care    Physical Aspects of Care  --Pain  pt appears comfortable at this time     --Dyspnea  No SOB at this time - resolving   pt appears comfortable and in NAD    --Nausea Vomiting  denies    --Weakness  PT as tolerated     Psychological and Psychiatric Aspects of Care:   --Greif/Bereavment: emotional support provided  -Pastoral Care Available PRN     Social Aspects of Care  -SW involved     Cultural Aspects  -Primary Language: English    Goals of Care:  plan is for SNF with hospice.     Ethical and Legal Aspects: NA    Capacity- pt does not have capacity     HCP/Surrogate: HCP naming Louise (wife) on one content     Code Status- DNR/I   MOLST- in chart  Dispo Plan- SNF with hospice care -- pending bed availability      d.w floor SW        Symptoms are controlled and GOC are defined.   Palliative Care will sign off  Please reconsult with additional questions or changes in pt's condition.

## 2023-05-26 ENCOUNTER — APPOINTMENT (OUTPATIENT)
Dept: ELECTROPHYSIOLOGY | Facility: CLINIC | Age: 88
End: 2023-05-26

## 2023-06-05 NOTE — PATIENT PROFILE ADULT. - AS SC BRADEN MOBILITY
(3) slightly limited Isotretinoin Counseling: Patient should get monthly blood tests, not donate blood, not drive at night if vision affected, not share medication, and not undergo elective surgery for 6 months after tx completed. Side effects reviewed, pt to contact office should one occur.

## 2023-09-25 NOTE — SWALLOW BEDSIDE ASSESSMENT ADULT - DIET PRIOR TO ADMI
Stelara 45 MG/0.5ML injection  - Approved - Ready to Fill    Authorization Number: [id number]  Valid from: 09/25/2023 to 09/25/2024   Copay: $Unknown    Prescription has been released to [facility].     Assistance: Patient previously enrolled.    Additional Information: This is a reauthorization, patient was not contacted. No change to filling pharmacy.    Baylin Shade   Clinical Pharmacy Liaison  9/25/2023   
unknown

## 2024-07-21 NOTE — PHYSICAL THERAPY INITIAL EVALUATION ADULT - LIVES WITH, PROFILE
Uncertain etiology  C/W previous recent admit  Tox screen positive for opioids , THC, benzodiazepines  Unable to obtain MRI of head, will need anesthesia  Consult neurology     unable to communicate due to [place] and pt not understanding, PT also wearing a mask for IP which increased the difficulty

## 2024-08-20 NOTE — H&P PST ADULT - OPHTHALMOLOGIC
CD mailed back to patient.   
MRI Brain w/o IVCON disc was successfully downloaded to [facility] from [facility].  
negative

## 2024-09-03 NOTE — ED ADULT TRIAGE NOTE - ESI TRIAGE ACUITY LEVEL, MLM
Patient off the floor for surgery. Got report from night nurse Kwaku. Waiting for patient to come back to the unit.    2

## 2024-09-17 NOTE — PROGRESS NOTE ADULT - PROVIDER SPECIALTY LIST ADULT
1. Sepsis (HCC)    2. Encephalopathy    3. COVID    4. Hypokalemia    5. Anemia      ED Disposition       ED Disposition   Admit    Condition   Stable    Date/Time   Tue Sep 17, 2024  6:14 PM    Comment                  Assessment & Plan       Medical Decision Making  Altered mental status and COVID-positive patient.  No history exam finding suggest CNS bleed or stroke and imaging is not indicated.  History exam to suggest meningitis and LP is not indicated.  Will do cardiac/septic workup to rule out a presentation of ACS, sepsis, electrolyte abnormality, admit.    Amount and/or Complexity of Data Reviewed  Labs: ordered. Decision-making details documented in ED Course.  Radiology: ordered.    Risk  Prescription drug management.  Decision regarding hospitalization.                  ED Course as of 09/17/24 2001   Tue Sep 17, 2024   1709 Hemoglobin(!): 10.0  stable   1709 Potassium(!): 2.9  Will replete   1709 GLUCOSE(!): 164  Not in dka       Medications   ceftriaxone (ROCEPHIN) 1 g/50 mL in dextrose IVPB (has no administration in time range)   multi-electrolyte (PLASMALYTE-A/ISOLYTE-S PH 7.4) IV solution (has no administration in time range)   acetaminophen (TYLENOL) tablet 650 mg (has no administration in time range)   ondansetron (ZOFRAN) injection 4 mg (has no administration in time range)   remdesivir (Veklury) 200 mg in sodium chloride 0.9 % 290 mL IVPB (has no administration in time range)     Followed by   remdesivir (Veklury) 100 mg in sodium chloride 0.9 % 270 mL IVPB (has no administration in time range)   aspirin chewable tablet 81 mg (has no administration in time range)   apixaban (ELIQUIS) tablet 5 mg (has no administration in time range)   atorvastatin (LIPITOR) tablet 80 mg (has no administration in time range)   docusate sodium (COLACE) capsule 100 mg (has no administration in time range)   escitalopram (LEXAPRO) tablet 10 mg (has no administration in time range)   ezetimibe (ZETIA) tablet 10 mg  Pulmonology (has no administration in time range)   finasteride (PROSCAR) tablet 5 mg (has no administration in time range)   lisinopril (ZESTRIL) tablet 10 mg (has no administration in time range)   tamsulosin (FLOMAX) capsule 0.4 mg (has no administration in time range)   insulin lispro (HumALOG/ADMELOG) 100 units/mL subcutaneous injection 1-6 Units (has no administration in time range)   insulin lispro (HumALOG/ADMELOG) 100 units/mL subcutaneous injection 1-6 Units (has no administration in time range)   acetaminophen (Ofirmev) injection 1,000 mg (has no administration in time range)   potassium chloride 20 mEq IVPB (premix) (has no administration in time range)   ceftriaxone (ROCEPHIN) 1 g/50 mL in dextrose IVPB (0 mg Intravenous Stopped 9/17/24 1720)   potassium chloride 20 mEq IVPB (premix) (0 mEq Intravenous Stopped 9/17/24 1902)   sodium chloride 0.9 % bolus 1,000 mL (0 mL Intravenous Stopped 9/17/24 1902)       History of Present Illness       77-year-old male presents for evaluation of not feeling well.  Patient significantly associates patient appears to be more confused.  Diagnosed with COVID yesterday.  No known recent falls.      History provided by:  Significant other  History limited by:  Mental status change  Altered Mental Status          Review of Systems   Unable to perform ROS: Mental status change           Objective     ED Triage Vitals [09/17/24 1605]   Temperature Pulse Blood Pressure Respirations SpO2 Patient Position - Orthostatic VS   (!) 101.1 °F (38.4 °C) (!) 127 149/91 20 94 % Lying      Temp Source Heart Rate Source BP Location FiO2 (%) Pain Score    Oral Monitor Right arm -- --        Physical Exam  Constitutional:       Appearance: He is well-developed.   HENT:      Head: Normocephalic and atraumatic.   Eyes:      General: No scleral icterus.     Conjunctiva/sclera: Conjunctivae normal.   Neck:      Vascular: No JVD.      Trachea: No tracheal deviation.   Pulmonary:      Effort: Pulmonary  effort is normal. No respiratory distress.   Abdominal:      General: There is no distension.   Musculoskeletal:         General: No deformity. Normal range of motion.      Cervical back: Normal range of motion.   Skin:     Coloration: Skin is not pale.      Findings: No erythema or rash.   Neurological:      Mental Status: He is alert. He is disoriented.      Cranial Nerves: No cranial nerve deficit.      Comments: LUE weakness, no weakness in RUE/RLE/LLE, pt follows commands.     Psychiatric:         Behavior: Behavior normal.         Labs Reviewed   COVID-19/INFLUENZA A/B RAPID ANTIGEN (30 MIN.TAT) - Abnormal       Result Value    SARS COV Rapid Antigen Positive (*)     Influenza A Rapid Antigen Negative      Influenza B Rapid Antigen Negative      Narrative:     This test has been performed using the PPI Ana 2 FLU+SARS Antigen test under the Emergency Use Authorization (EUA). This test has been validated by the  and verified by the performing laboratory. The Ana uses lateral flow immunofluorescent sandwich assay to detect SARS-COV, Influenza A and Influenza B Antigen.     The Quidel Ana 2 SARS Antigen test does not differentiate between SARS-CoV and SARS-CoV-2.     Negative results are presumptive and may be confirmed with a molecular assay, if necessary, for patient management. Negative results do not rule out SARS-CoV-2 or influenza infection and should not be used as the sole basis for treatment or patient management decisions. A negative test result may occur if the level of antigen in a sample is below the limit of detection of this test.     Positive results are indicative of the presence of viral antigens, but do not rule out bacterial infection or co-infection with other viruses.     All test results should be used as an adjunct to clinical observations and other information available to the provider.    FOR PEDIATRIC PATIENTS - copy/paste COVID Guidelines URL to browser:  [email or web address]   CBC AND DIFFERENTIAL - Abnormal    WBC 21.33 (*)     RBC 3.37 (*)     Hemoglobin 10.0 (*)     Hematocrit 31.8 (*)     MCV 94      MCH 29.7      MCHC 31.4      RDW 13.0      MPV 9.6      Platelets 497 (*)     nRBC 0      Segmented % 83 (*)     Immature Grans % 2      Lymphocytes % 10 (*)     Monocytes % 5      Eosinophils Relative 0      Basophils Relative 0      Absolute Neutrophils 17.92 (*)     Absolute Immature Grans 0.32 (*)     Absolute Lymphocytes 2.07      Absolute Monocytes 0.96      Eosinophils Absolute 0.01      Basophils Absolute 0.05     COMPREHENSIVE METABOLIC PANEL - Abnormal    Sodium 142      Potassium 2.9 (*)     Chloride 105      CO2 31      ANION GAP 6      BUN 15      Creatinine 0.76      Glucose 164 (*)     Calcium 9.0      Corrected Calcium 10.0      AST 38      ALT 28      Alkaline Phosphatase 88      Total Protein 8.3      Albumin 2.8 (*)     Total Bilirubin 1.27 (*)     eGFR 89      Narrative:     National Kidney Disease Foundation guidelines for Chronic Kidney Disease (CKD):     Stage 1 with normal or high GFR (GFR > 90 mL/min/1.73 square meters)    Stage 2 Mild CKD (GFR = 60-89 mL/min/1.73 square meters)    Stage 3A Moderate CKD (GFR = 45-59 mL/min/1.73 square meters)    Stage 3B Moderate CKD (GFR = 30-44 mL/min/1.73 square meters)    Stage 4 Severe CKD (GFR = 15-29 mL/min/1.73 square meters)    Stage 5 End Stage CKD (GFR <15 mL/min/1.73 square meters)  Note: GFR calculation is accurate only with a steady state creatinine   LACTIC ACID, PLASMA (W/REFLEX IF RESULT > 2.0) - Normal    LACTIC ACID 1.2      Narrative:     Result may be elevated if tourniquet was used during collection.   BLOOD CULTURE   BLOOD CULTURE   PROCALCITONIN TEST   UA W REFLEX TO MICROSCOPIC WITH REFLEX TO CULTURE   HEMOGLOBIN A1C     XR chest 2 views   ED Interpretation by Eduardo Masters MD (09/17 1813)   Independently reviewed: no acute  abnormality      Final Interpretation by Ron Newton MD (09/17 1808)      Limited study. Platelike atelectasis in the right lower lung field. No convincing evidence of pneumonia            Workstation performed: [id number]             ECG 12 Lead Documentation Only    Date/Time: 9/17/2024 5:01 PM    Performed by: Eduardo Masters MD  Authorized by: Eduardo Masters MD    Quality:     Tracing quality:  Limited by artifact  Rate:     ECG rate:  127    ECG rate assessment: tachycardic    Rhythm:     Rhythm: sinus tachycardia    Ectopy:     Ectopy: none    QRS:     QRS axis:  Normal    QRS intervals:  Normal  Conduction:     Conduction: normal    ST segments:     ST segments:  Normal  T waves:     T waves: normal    CriticalCare Time    Date/Time: 9/17/2024 5:04 PM    Performed by: Eduardo Masters MD  Authorized by: Eduardo Masters MD    Critical care provider statement:     Critical care time (minutes):  35    Critical care time was exclusive of:  Separately billable procedures and treating other patients and teaching time    Critical care was necessary to treat or prevent imminent or life-threatening deterioration of the following conditions:  Sepsis    Critical care was time spent personally by me on the following activities:  Blood draw for specimens, obtaining history from patient or surrogate, development of treatment plan with patient or surrogate, discussions with consultants, evaluation of patient's response to treatment, examination of patient, interpretation of cardiac output measurements, ordering and performing treatments and interventions, ordering and review of laboratory studies, ordering and review of radiographic studies, re-evaluation of patient's condition and review of old charts         Eduardo Masters MD  09/17/24 2001

## 2024-12-13 NOTE — ED PROVIDER NOTE - PENDING LAB RAD OPT OUT
Exclude Pending Lab and Radiology orders from printing on the Patient's Discharge Instructions, due to Privacy Concerns. Yes

## 2025-06-13 NOTE — AIRWAY REMOVAL NOTE  ADULT & PEDS - RESPIRATORY EXPANSION/ACCESSORY MUSCLES/RETRACTIONS
Regla Bryan MD to Regla Bryan Md ~ [facility] Clinical Support Pool (Selected Message)      6/13/25  7:27 AM  Result Note  Normal Pls make a VV June 23rd to f/u on meds   no retractions/no use of accessory muscles

## 2025-06-24 NOTE — PATIENT PROFILE ADULT - IS THERE A SUSPICION OF ABUSE/NEGLIGENCE?
Disp Refills Start End KASSANDRA   spironolactone (ALDACTONE) 25 MG tablet 90 tablet 0 3/28/2025 -- No     Last Office Visit: 06/03/2025  Future Office visit:    Next 5 appointments (look out 90 days)      Jul 15, 2025 10:30 AM  (Arrive by 10:15 AM)  Provider Visit with Dionne Jackson MD  [facility] [place] ([facility] [place] ) [address]  [place] [address]  [phone]             Routing refill request to provider for review/approval because:     Negative no